# Patient Record
Sex: FEMALE | Race: OTHER | Employment: OTHER | URBAN - METROPOLITAN AREA
[De-identification: names, ages, dates, MRNs, and addresses within clinical notes are randomized per-mention and may not be internally consistent; named-entity substitution may affect disease eponyms.]

---

## 2017-10-24 ENCOUNTER — HOSPITAL ENCOUNTER (OUTPATIENT)
Age: 67
Discharge: HOME | End: 2017-10-24
Payer: MEDICARE

## 2017-10-24 VITALS
RESPIRATION RATE: 18 BRPM | DIASTOLIC BLOOD PRESSURE: 71 MMHG | HEART RATE: 72 BPM | WEIGHT: 152 LBS | OXYGEN SATURATION: 97 % | SYSTOLIC BLOOD PRESSURE: 126 MMHG | TEMPERATURE: 99 F | HEIGHT: 62 IN | BODY MASS INDEX: 27.97 KG/M2

## 2017-10-24 DIAGNOSIS — J06.9 VIRAL UPPER RESPIRATORY TRACT INFECTION WITH COUGH: ICD-10-CM

## 2017-10-24 DIAGNOSIS — J04.0 ACUTE LARYNGITIS: Primary | ICD-10-CM

## 2017-10-24 PROCEDURE — 99203 OFFICE O/P NEW LOW 30 MIN: CPT

## 2017-10-24 PROCEDURE — 87430 STREP A AG IA: CPT | Performed by: PHYSICIAN ASSISTANT

## 2017-10-24 PROCEDURE — 87081 CULTURE SCREEN ONLY: CPT | Performed by: PHYSICIAN ASSISTANT

## 2017-10-24 PROCEDURE — 99204 OFFICE O/P NEW MOD 45 MIN: CPT

## 2017-10-24 RX ORDER — FLUTICASONE PROPIONATE 50 MCG
2 SPRAY, SUSPENSION (ML) NASAL DAILY
Qty: 16 G | Refills: 0 | Status: SHIPPED | OUTPATIENT
Start: 2017-10-24 | End: 2017-11-23

## 2017-10-24 RX ORDER — METHYLPREDNISOLONE 4 MG/1
TABLET ORAL
Qty: 1 PACKAGE | Refills: 0 | Status: SHIPPED | OUTPATIENT
Start: 2017-10-24 | End: 2018-01-03 | Stop reason: ALTCHOICE

## 2017-10-24 NOTE — ED INITIAL ASSESSMENT (HPI)
Ameena    Voice hoarse;-  Just came from [address] throat pain and loss of voice since sept 20. Took cold eeze, zyrtec. Denies fever.    Cough- x 1 month, cough dry

## 2017-10-24 NOTE — ED PROVIDER NOTES
Patient Seen in: THE MEDICAL CENTER [facility] In [facility]    History   Patient presents with:  Hoarseness  Cough    Stated Complaint: SORE THROAT, LOST VOICE, COUGH, [id number]    HPI    68-year-old female who comes in today complaining of a dry cough, throat p mucosa are moist, pink, and intact; teeth intact.  Mild erythema, no exudates or tonsillar hypertrophy, uvula midline, no trismus or drooling   Neck: Supple; no anterior or posterior cervical adenopathy  Lungs: Clear to auscultation bilaterally, respiration return to the immediate care or ER for new, worsening or any persistent conditions. I've explained the importance of following up with her doctor- No primary care provider on file. - as instructed.   The patient verbalized understanding of the discharge i

## 2017-11-21 ENCOUNTER — TELEPHONE (OUTPATIENT)
Dept: OTOLARYNGOLOGY | Facility: CLINIC | Age: 67
End: 2017-11-21

## 2017-11-21 ENCOUNTER — OFFICE VISIT (OUTPATIENT)
Dept: OTOLARYNGOLOGY | Facility: CLINIC | Age: 67
End: 2017-11-21

## 2017-11-21 VITALS
BODY MASS INDEX: 27.6 KG/M2 | DIASTOLIC BLOOD PRESSURE: 64 MMHG | WEIGHT: 150 LBS | SYSTOLIC BLOOD PRESSURE: 128 MMHG | TEMPERATURE: 98 F | HEIGHT: 62 IN

## 2017-11-21 DIAGNOSIS — R49.0 HOARSENESS: Primary | ICD-10-CM

## 2017-11-21 PROCEDURE — 99203 OFFICE O/P NEW LOW 30 MIN: CPT | Performed by: OTOLARYNGOLOGY

## 2017-11-21 PROCEDURE — 31575 DIAGNOSTIC LARYNGOSCOPY: CPT | Performed by: OTOLARYNGOLOGY

## 2017-11-21 RX ORDER — FLUTICASONE PROPIONATE 50 MCG
1 SPRAY, SUSPENSION (ML) NASAL 2 TIMES DAILY
Qty: 1 BOTTLE | Refills: 3 | Status: SHIPPED | OUTPATIENT
Start: 2017-11-21 | End: 2018-06-25 | Stop reason: ALTCHOICE

## 2017-11-21 RX ORDER — PREDNISONE 10 MG/1
TABLET ORAL
Qty: 44 TABLET | Refills: 0 | Status: SHIPPED | OUTPATIENT
Start: 2017-11-21 | End: 2017-12-14 | Stop reason: ALTCHOICE

## 2017-11-21 RX ORDER — MONTELUKAST SODIUM 10 MG/1
10 TABLET ORAL NIGHTLY
Qty: 30 TABLET | Refills: 3 | Status: SHIPPED | OUTPATIENT
Start: 2017-11-21 | End: 2018-06-25 | Stop reason: ALTCHOICE

## 2017-11-21 RX ORDER — AMOXICILLIN 500 MG/1
500 CAPSULE ORAL 3 TIMES DAILY
COMMUNITY
End: 2017-12-14 | Stop reason: ALTCHOICE

## 2017-11-21 RX ORDER — AMOXICILLIN AND CLAVULANATE POTASSIUM 875; 125 MG/1; MG/1
1 TABLET, FILM COATED ORAL EVERY 12 HOURS
Qty: 20 TABLET | Refills: 0 | Status: SHIPPED | OUTPATIENT
Start: 2017-11-21 | End: 2017-12-14 | Stop reason: ALTCHOICE

## 2017-11-21 NOTE — TELEPHONE ENCOUNTER
Spoke with pt and informed her that Dr Jeronimo Hanley prescribed 5 medications for her. Pt educated on the different medications.  Pt voiced understanding

## 2017-11-21 NOTE — TELEPHONE ENCOUNTER
Pt states she was told she was only going to be prescribed 3 medications but states she received 5 at the pharmacy, pt would like clarification. Pls call thank you. Bruneian speaker.

## 2017-11-21 NOTE — PROGRESS NOTES
Nazario Pedro is a 75year old female.   Patient presents with:  Voice Problem: patient presents for voice problem has been progressively losing her voice for about 4 weeks      HISTORY OF PRESENT ILLNESS  She presents with a history of worsening voice in t Location: Left arm, Patient Position: Sitting, Cuff Size: adult)   Temp 98.1 °F (36.7 °C) (Tympanic)   Ht 5' 2\" (1.575 m)   Wt 150 lb (68 kg)   BMI 27.44 kg/m²        Constitutional Normal Overall appearance - Normal.   Psychiatric Normal Orientation - Or the interior of the larynx was performed. Findings were as follows.        Hypopharynx/Larynx:  Epiglottis is normal.  Arytenoids:  Bilateral: Arytenoids are normal.  Vocal folds-false  Bilateral: Vocal folds (false) are normal.  Vocal folds-true  Bilater

## 2017-12-01 ENCOUNTER — TELEPHONE (OUTPATIENT)
Dept: OTOLARYNGOLOGY | Facility: CLINIC | Age: 67
End: 2017-12-01

## 2017-12-01 NOTE — TELEPHONE ENCOUNTER
Patient seen [address] on 11/21/17. States she still is having a lot of hoarseness in her voice. States hey symptoms have not gotten better and has finished her medication. Requesting to speak with clinical staff. Please call.  Slovenian speaker

## 2017-12-01 NOTE — TELEPHONE ENCOUNTER
Language line [address] , [address]. Pt's LOV 11/21:  1. Hoarseness  Laryngeal exam reveals a lesion at the anterior vocal cord on the left. This appears benign.   I have asked her to start Augmentin, Singulair, loratadine D, fluticasone and

## 2017-12-01 NOTE — TELEPHONE ENCOUNTER
Language line , Tawana Lopes [id number]    Pt informed per JDO no further recommendations at this time; she should follow up w/ JDO as previously recommended. Pt verbalized understanding.

## 2017-12-14 ENCOUNTER — OFFICE VISIT (OUTPATIENT)
Dept: OTOLARYNGOLOGY | Facility: CLINIC | Age: 67
End: 2017-12-14

## 2017-12-14 VITALS
TEMPERATURE: 99 F | WEIGHT: 150 LBS | HEIGHT: 62 IN | DIASTOLIC BLOOD PRESSURE: 70 MMHG | SYSTOLIC BLOOD PRESSURE: 110 MMHG | BODY MASS INDEX: 27.6 KG/M2

## 2017-12-14 DIAGNOSIS — J38.3 LESION OF TRUE VOCAL CORD: ICD-10-CM

## 2017-12-14 DIAGNOSIS — Z01.818 PREOP TESTING: Primary | ICD-10-CM

## 2017-12-14 PROCEDURE — 31575 DIAGNOSTIC LARYNGOSCOPY: CPT | Performed by: OTOLARYNGOLOGY

## 2017-12-14 PROCEDURE — 99214 OFFICE O/P EST MOD 30 MIN: CPT | Performed by: OTOLARYNGOLOGY

## 2017-12-14 NOTE — PROGRESS NOTES
Norbert Nicolas is a 75year old female. Patient presents with:   Follow - Up: 3 week follow up- hoarseness of voice- per pt there is some changes/improvement of symptoms      HISTORY OF PRESENT ILLNESS  She presents with a history of worsening voice in the diarrhea. Endocrine Negative Cold intolerance and heat intolerance. Neuro Negative Tremors. Psych Negative Anxiety and depression. Integumentary Negative Frequent skin infections, pigment change and rash.    Hema/Lymph Negative Easy bleeding and eas Fiberoptic Laryngoscopy: A diagnostic flexible fiberoptic laryngoscopy was performed. The flexible fiberoptic laryngoscope was placed into the nose or mouthand advanced  into the interior of the larynx.  A thorough examination of the interior of the larynx postoperative pain, bleeding as well as anesthesia use. She accepts these risks and wishes to proceed  - Arik Yan MD    12/14/2017    12:01 PM

## 2017-12-15 ENCOUNTER — TELEPHONE (OUTPATIENT)
Dept: OTOLARYNGOLOGY | Facility: CLINIC | Age: 67
End: 2017-12-15

## 2017-12-16 RX ORDER — HYDROCODONE BITARTRATE AND ACETAMINOPHEN 7.5; 325 MG/1; MG/1
1 TABLET ORAL EVERY 6 HOURS PRN
Qty: 30 TABLET | Refills: 0 | Status: SHIPPED | OUTPATIENT
Start: 2017-12-16 | End: 2018-01-03

## 2017-12-19 ENCOUNTER — EKG ENCOUNTER (OUTPATIENT)
Dept: LAB | Age: 67
End: 2017-12-19
Attending: OTOLARYNGOLOGY
Payer: MEDICARE

## 2017-12-19 ENCOUNTER — APPOINTMENT (OUTPATIENT)
Dept: LAB | Age: 67
End: 2017-12-19
Attending: OTOLARYNGOLOGY
Payer: MEDICARE

## 2017-12-19 DIAGNOSIS — Z01.818 PREOP TESTING: ICD-10-CM

## 2017-12-19 PROCEDURE — 85730 THROMBOPLASTIN TIME PARTIAL: CPT

## 2017-12-19 PROCEDURE — 93005 ELECTROCARDIOGRAM TRACING: CPT

## 2017-12-19 PROCEDURE — 80053 COMPREHEN METABOLIC PANEL: CPT

## 2017-12-19 PROCEDURE — 93010 ELECTROCARDIOGRAM REPORT: CPT | Performed by: INTERNAL MEDICINE

## 2017-12-19 PROCEDURE — 36415 COLL VENOUS BLD VENIPUNCTURE: CPT

## 2017-12-19 PROCEDURE — 85025 COMPLETE CBC W/AUTO DIFF WBC: CPT

## 2017-12-19 PROCEDURE — 85610 PROTHROMBIN TIME: CPT

## 2017-12-27 ENCOUNTER — TELEPHONE (OUTPATIENT)
Dept: OTOLARYNGOLOGY | Facility: CLINIC | Age: 67
End: 2017-12-27

## 2017-12-27 NOTE — TELEPHONE ENCOUNTER
Per JULIO, pt may try to obtain pain medication from her PCP if it is closer. Also, pt may want to try to move surgery to a sooner date because of her pain. Pt informed she may use extra strength Tylenol for pain if she is unable to p/u Rx for pain med from office or if she cannot see her PCP for pain med; states she will c/b 12/28 to see if surgery date can be moved up.

## 2017-12-27 NOTE — TELEPHONE ENCOUNTER
Patient states she was prescribed Hydrocodone from [facility] due to throat pain. States is unable to  medication script due to not being able to find any transportation. Would like to know if there is another medication that can be sent to her pharmacy that she does not have to . States medication she had finished today and is in a lot of pain. Patient is a Chinese speaker. Please call. Thank you.

## 2017-12-28 ENCOUNTER — HOSPITAL ENCOUNTER (OUTPATIENT)
Age: 67
Discharge: HOME | End: 2017-12-28
Payer: MEDICARE

## 2017-12-28 ENCOUNTER — APPOINTMENT (OUTPATIENT)
Dept: GENERAL RADIOLOGY | Age: 67
End: 2017-12-28
Attending: PHYSICIAN ASSISTANT
Payer: MEDICARE

## 2017-12-28 VITALS
HEART RATE: 81 BPM | RESPIRATION RATE: 18 BRPM | SYSTOLIC BLOOD PRESSURE: 90 MMHG | TEMPERATURE: 98 F | DIASTOLIC BLOOD PRESSURE: 70 MMHG | OXYGEN SATURATION: 99 %

## 2017-12-28 DIAGNOSIS — S92.504A CLOSED NONDISPLACED FRACTURE OF PHALANX OF LESSER TOE OF RIGHT FOOT, UNSPECIFIED PHALANX, INITIAL ENCOUNTER: Primary | ICD-10-CM

## 2017-12-28 PROCEDURE — 99213 OFFICE O/P EST LOW 20 MIN: CPT

## 2017-12-28 PROCEDURE — 28510 TREATMENT OF TOE FRACTURE: CPT

## 2017-12-28 PROCEDURE — 99212 OFFICE O/P EST SF 10 MIN: CPT

## 2017-12-28 PROCEDURE — 73660 X-RAY EXAM OF TOE(S): CPT | Performed by: PHYSICIAN ASSISTANT

## 2017-12-28 NOTE — ED PROVIDER NOTES
Patient Seen in: [facility] END    History   Patient presents with:   Toe Pain    Stated Complaint: toe pain over 1 month    HPI    59-year-old female here with complaint of pain swelling and redness to her right foot fourth digit ×4 Head: Normocephalic and atraumatic.    Right Ear: External ear normal.   Left Ear: External ear normal.   Nose: Nose normal.   Mouth/Throat: Oropharynx is clear and moist.   Eyes: Conjunctivae and EOM are normal. Pupils are equal, round, and reactive to lig Clinical Impression: Right foot fourth digit toe fracture  Course of Treatment: Patient will use the buddy tape and postop shoe is provided. Keep the leg elevated. Tylenol Motrin for pain. Follow-up with podiatry as needed.       The patient is in good c

## 2018-01-03 ENCOUNTER — TELEPHONE (OUTPATIENT)
Dept: OTOLARYNGOLOGY | Facility: CLINIC | Age: 68
End: 2018-01-03

## 2018-01-03 ENCOUNTER — OFFICE VISIT (OUTPATIENT)
Dept: INTERNAL MEDICINE CLINIC | Facility: CLINIC | Age: 68
End: 2018-01-03

## 2018-01-03 VITALS
TEMPERATURE: 98 F | HEIGHT: 62.25 IN | BODY MASS INDEX: 27.3 KG/M2 | WEIGHT: 150.25 LBS | HEART RATE: 72 BPM | SYSTOLIC BLOOD PRESSURE: 90 MMHG | DIASTOLIC BLOOD PRESSURE: 60 MMHG | RESPIRATION RATE: 17 BRPM

## 2018-01-03 DIAGNOSIS — Z01.818 PREOPERATIVE EXAMINATION: Primary | ICD-10-CM

## 2018-01-03 DIAGNOSIS — J38.3 LESION OF TRUE VOCAL CORD: ICD-10-CM

## 2018-01-03 PROCEDURE — 99203 OFFICE O/P NEW LOW 30 MIN: CPT | Performed by: INTERNAL MEDICINE

## 2018-01-03 NOTE — TELEPHONE ENCOUNTER
Pt states she saw her PCP today and was cleared for sx, asking if her sx can be rs to 1/8. Pls advise thank you. Ukrainian speaker.

## 2018-01-03 NOTE — PATIENT INSTRUCTIONS
- You are ok to proceed with surgery  - From today onwards, no ibuprofen, anti-inflammatories (tylenol is ok), aspirin, fish oil, vitamins/supplements.    - Ok to use fluticasone nasal spray daily, and can use twice daily when having a lot of drainage/disc

## 2018-01-03 NOTE — PROGRESS NOTES
Renuka Warner is a 75year old female who presents for a pre-operative physical exam.   Renuka Warner is scheduled for a micro-direct laryngoscopy with biopsy procedure at [facility] on 1/15/18 performed by Dr Ghada Batista, education: N/A  Number of children: N/A     Occupational History  None on file     Social History Main Topics   Smoking status: Never Smoker    Smokeless tobacco: Never Used    Alcohol use No    Drug use: No    Sexual activity: Not on file     Other Topics procedure, and is ok to proceed with surgery without any further testing. Patient does note that the last time she had general anesthesia ( section), she felt very anxious during induction.   Note and pertinent pre-operative testing results will be

## 2018-01-05 ENCOUNTER — TELEPHONE (OUTPATIENT)
Dept: OTOLARYNGOLOGY | Facility: CLINIC | Age: 68
End: 2018-01-05

## 2018-01-10 DIAGNOSIS — J38.3 LESION OF TRUE VOCAL CORD: Primary | ICD-10-CM

## 2018-01-11 DIAGNOSIS — J38.3 VOCAL CORD MASS: Primary | ICD-10-CM

## 2018-01-13 ENCOUNTER — TELEPHONE (OUTPATIENT)
Dept: OTOLARYNGOLOGY | Facility: CLINIC | Age: 68
End: 2018-01-13

## 2018-01-13 NOTE — TELEPHONE ENCOUNTER
Pt states was trying to call Pre admission nurse regarding surgery Monday such as which mediations to take and hold the day of surgery, discharger instructions. Advised pt discharge instructions will be given the day of surgery.   Contacted teresita in pre

## 2018-01-13 NOTE — TELEPHONE ENCOUNTER
Pt requesting to speak with J regarding surgery for 1/15. Please advise. Pt is Australian speaking only. Thank you.

## 2018-01-15 ENCOUNTER — ANESTHESIA EVENT (OUTPATIENT)
Dept: SURGERY | Facility: HOSPITAL | Age: 68
End: 2018-01-15
Payer: MEDICARE

## 2018-01-15 ENCOUNTER — SURGERY (OUTPATIENT)
Age: 68
End: 2018-01-15

## 2018-01-15 ENCOUNTER — ANESTHESIA (OUTPATIENT)
Dept: SURGERY | Facility: HOSPITAL | Age: 68
End: 2018-01-15
Payer: MEDICARE

## 2018-01-15 ENCOUNTER — HOSPITAL ENCOUNTER (OUTPATIENT)
Facility: HOSPITAL | Age: 68
Setting detail: HOSPITAL OUTPATIENT SURGERY
Discharge: HOME | End: 2018-01-15
Attending: OTOLARYNGOLOGY | Admitting: OTOLARYNGOLOGY
Payer: MEDICARE

## 2018-01-15 ENCOUNTER — TELEPHONE (OUTPATIENT)
Dept: OTOLARYNGOLOGY | Facility: CLINIC | Age: 68
End: 2018-01-15

## 2018-01-15 VITALS
DIASTOLIC BLOOD PRESSURE: 58 MMHG | TEMPERATURE: 99 F | BODY MASS INDEX: 26.68 KG/M2 | HEIGHT: 62 IN | RESPIRATION RATE: 16 BRPM | HEART RATE: 57 BPM | SYSTOLIC BLOOD PRESSURE: 100 MMHG | WEIGHT: 145 LBS | OXYGEN SATURATION: 91 %

## 2018-01-15 DIAGNOSIS — J38.3 LESION OF TRUE VOCAL CORD: ICD-10-CM

## 2018-01-15 PROBLEM — J38.7 LESION OF LARYNX: Status: ACTIVE | Noted: 2018-01-15

## 2018-01-15 PROCEDURE — 31536 LARYNGOSCOPY W/BX & OP SCOPE: CPT | Performed by: OTOLARYNGOLOGY

## 2018-01-15 PROCEDURE — 0CBT8ZX EXCISION OF RIGHT VOCAL CORD, VIA NATURAL OR ARTIFICIAL OPENING ENDOSCOPIC, DIAGNOSTIC: ICD-10-PCS | Performed by: OTOLARYNGOLOGY

## 2018-01-15 PROCEDURE — 0CBV8ZX EXCISION OF LEFT VOCAL CORD, VIA NATURAL OR ARTIFICIAL OPENING ENDOSCOPIC, DIAGNOSTIC: ICD-10-PCS | Performed by: OTOLARYNGOLOGY

## 2018-01-15 RX ORDER — HYDROMORPHONE HYDROCHLORIDE 1 MG/ML
0.4 INJECTION, SOLUTION INTRAMUSCULAR; INTRAVENOUS; SUBCUTANEOUS EVERY 5 MIN PRN
Status: DISCONTINUED | OUTPATIENT
Start: 2018-01-15 | End: 2018-01-15

## 2018-01-15 RX ORDER — ONDANSETRON 2 MG/ML
4 INJECTION INTRAMUSCULAR; INTRAVENOUS EVERY 6 HOURS PRN
Status: CANCELLED | OUTPATIENT
Start: 2018-01-15

## 2018-01-15 RX ORDER — SODIUM CHLORIDE 0.9 % (FLUSH) 0.9 %
10 SYRINGE (ML) INJECTION AS NEEDED
Status: CANCELLED | OUTPATIENT
Start: 2018-01-15

## 2018-01-15 RX ORDER — MORPHINE SULFATE 10 MG/ML
6 INJECTION, SOLUTION INTRAMUSCULAR; INTRAVENOUS EVERY 10 MIN PRN
Status: DISCONTINUED | OUTPATIENT
Start: 2018-01-15 | End: 2018-01-15

## 2018-01-15 RX ORDER — HYDROCODONE BITARTRATE AND ACETAMINOPHEN 5; 325 MG/1; MG/1
1 TABLET ORAL EVERY 6 HOURS PRN
Qty: 25 TABLET | Refills: 0 | Status: SHIPPED | OUTPATIENT
Start: 2018-01-15 | End: 2018-03-30

## 2018-01-15 RX ORDER — ONDANSETRON 2 MG/ML
INJECTION INTRAMUSCULAR; INTRAVENOUS AS NEEDED
Status: DISCONTINUED | OUTPATIENT
Start: 2018-01-15 | End: 2018-01-15 | Stop reason: SURG

## 2018-01-15 RX ORDER — HYDROCODONE BITARTRATE AND ACETAMINOPHEN 5; 325 MG/1; MG/1
2 TABLET ORAL AS NEEDED
Status: COMPLETED | OUTPATIENT
Start: 2018-01-15 | End: 2018-01-15

## 2018-01-15 RX ORDER — HALOPERIDOL 5 MG/ML
0.25 INJECTION INTRAMUSCULAR ONCE AS NEEDED
Status: DISCONTINUED | OUTPATIENT
Start: 2018-01-15 | End: 2018-01-15

## 2018-01-15 RX ORDER — ACETAMINOPHEN 500 MG
1000 TABLET ORAL ONCE
Status: COMPLETED | OUTPATIENT
Start: 2018-01-15 | End: 2018-01-15

## 2018-01-15 RX ORDER — HYDROCODONE BITARTRATE AND ACETAMINOPHEN 5; 325 MG/1; MG/1
1 TABLET ORAL EVERY 4 HOURS PRN
Status: CANCELLED | OUTPATIENT
Start: 2018-01-15

## 2018-01-15 RX ORDER — FAMOTIDINE 20 MG/1
20 TABLET ORAL ONCE
Status: DISCONTINUED | OUTPATIENT
Start: 2018-01-15 | End: 2018-01-15 | Stop reason: HOSPADM

## 2018-01-15 RX ORDER — NALOXONE HYDROCHLORIDE 0.4 MG/ML
80 INJECTION, SOLUTION INTRAMUSCULAR; INTRAVENOUS; SUBCUTANEOUS AS NEEDED
Status: DISCONTINUED | OUTPATIENT
Start: 2018-01-15 | End: 2018-01-15

## 2018-01-15 RX ORDER — AMOXICILLIN 875 MG/1
875 TABLET, COATED ORAL 2 TIMES DAILY
Qty: 20 TABLET | Refills: 0 | Status: SHIPPED | OUTPATIENT
Start: 2018-01-15 | End: 2018-04-02

## 2018-01-15 RX ORDER — ONDANSETRON 4 MG/1
4 TABLET, ORALLY DISINTEGRATING ORAL EVERY 6 HOURS PRN
Status: CANCELLED | OUTPATIENT
Start: 2018-01-15

## 2018-01-15 RX ORDER — ONDANSETRON HYDROCHLORIDE 8 MG/1
4 TABLET, FILM COATED ORAL EVERY 6 HOURS PRN
Status: CANCELLED | OUTPATIENT
Start: 2018-01-15

## 2018-01-15 RX ORDER — HYDROMORPHONE HYDROCHLORIDE 1 MG/ML
0.6 INJECTION, SOLUTION INTRAMUSCULAR; INTRAVENOUS; SUBCUTANEOUS EVERY 5 MIN PRN
Status: DISCONTINUED | OUTPATIENT
Start: 2018-01-15 | End: 2018-01-15

## 2018-01-15 RX ORDER — SODIUM CHLORIDE, SODIUM LACTATE, POTASSIUM CHLORIDE, CALCIUM CHLORIDE 600; 310; 30; 20 MG/100ML; MG/100ML; MG/100ML; MG/100ML
INJECTION, SOLUTION INTRAVENOUS CONTINUOUS
Status: DISCONTINUED | OUTPATIENT
Start: 2018-01-15 | End: 2018-01-15

## 2018-01-15 RX ORDER — DEXAMETHASONE SODIUM PHOSPHATE 4 MG/ML
VIAL (ML) INJECTION AS NEEDED
Status: DISCONTINUED | OUTPATIENT
Start: 2018-01-15 | End: 2018-01-15 | Stop reason: SURG

## 2018-01-15 RX ORDER — LIDOCAINE HYDROCHLORIDE 40 MG/ML
SOLUTION TOPICAL AS NEEDED
Status: DISCONTINUED | OUTPATIENT
Start: 2018-01-15 | End: 2018-01-15 | Stop reason: SURG

## 2018-01-15 RX ORDER — MORPHINE SULFATE 2 MG/ML
2 INJECTION, SOLUTION INTRAMUSCULAR; INTRAVENOUS EVERY 10 MIN PRN
Status: DISCONTINUED | OUTPATIENT
Start: 2018-01-15 | End: 2018-01-15

## 2018-01-15 RX ORDER — HYDROMORPHONE HYDROCHLORIDE 1 MG/ML
0.2 INJECTION, SOLUTION INTRAMUSCULAR; INTRAVENOUS; SUBCUTANEOUS EVERY 5 MIN PRN
Status: DISCONTINUED | OUTPATIENT
Start: 2018-01-15 | End: 2018-01-15

## 2018-01-15 RX ORDER — HYDROCODONE BITARTRATE AND ACETAMINOPHEN 5; 325 MG/1; MG/1
2 TABLET ORAL EVERY 4 HOURS PRN
Status: CANCELLED | OUTPATIENT
Start: 2018-01-15

## 2018-01-15 RX ORDER — METOCLOPRAMIDE 10 MG/1
10 TABLET ORAL ONCE
Status: DISCONTINUED | OUTPATIENT
Start: 2018-01-15 | End: 2018-01-15 | Stop reason: HOSPADM

## 2018-01-15 RX ORDER — HYDROCODONE BITARTRATE AND ACETAMINOPHEN 5; 325 MG/1; MG/1
1 TABLET ORAL AS NEEDED
Status: COMPLETED | OUTPATIENT
Start: 2018-01-15 | End: 2018-01-15

## 2018-01-15 RX ORDER — ONDANSETRON 2 MG/ML
4 INJECTION INTRAMUSCULAR; INTRAVENOUS ONCE AS NEEDED
Status: DISCONTINUED | OUTPATIENT
Start: 2018-01-15 | End: 2018-01-15

## 2018-01-15 RX ORDER — MORPHINE SULFATE 4 MG/ML
4 INJECTION, SOLUTION INTRAMUSCULAR; INTRAVENOUS EVERY 10 MIN PRN
Status: DISCONTINUED | OUTPATIENT
Start: 2018-01-15 | End: 2018-01-15

## 2018-01-15 RX ORDER — ACETAMINOPHEN 325 MG/1
650 TABLET ORAL EVERY 4 HOURS PRN
Status: CANCELLED | OUTPATIENT
Start: 2018-01-15

## 2018-01-15 RX ORDER — MECLIZINE HCL 12.5 MG/1
12.5 TABLET ORAL 3 TIMES DAILY PRN
Qty: 60 TABLET | Refills: 0 | Status: SHIPPED | OUTPATIENT
Start: 2018-01-15 | End: 2018-06-25 | Stop reason: ALTCHOICE

## 2018-01-15 RX ADMIN — ONDANSETRON 4 MG: 2 INJECTION INTRAMUSCULAR; INTRAVENOUS at 12:23:00

## 2018-01-15 RX ADMIN — SODIUM CHLORIDE, SODIUM LACTATE, POTASSIUM CHLORIDE, CALCIUM CHLORIDE: 600; 310; 30; 20 INJECTION, SOLUTION INTRAVENOUS at 12:25:00

## 2018-01-15 RX ADMIN — DEXAMETHASONE SODIUM PHOSPHATE 4 MG: 4 MG/ML VIAL (ML) INJECTION at 12:23:00

## 2018-01-15 RX ADMIN — SODIUM CHLORIDE, SODIUM LACTATE, POTASSIUM CHLORIDE, CALCIUM CHLORIDE: 600; 310; 30; 20 INJECTION, SOLUTION INTRAVENOUS at 12:04:00

## 2018-01-15 RX ADMIN — LIDOCAINE HYDROCHLORIDE 4 ML: 40 SOLUTION TOPICAL at 12:12:00

## 2018-01-15 NOTE — ANESTHESIA PREPROCEDURE EVALUATION
Anesthesia PreOp Note    HPI:     Stew Earl is a 75year old female who presents for preoperative consultation requested by: Cherylene Moulder, MD    Date of Surgery: 1/15/2018    Procedure(s):  LARYNGOSCOPY DIRECT  Indication: Lesion of true Smoking status: Never Smoker    Smokeless tobacco: Never Used    Alcohol use No    Drug use: No    Sexual activity: Not on file     Other Topics Concern   None on file     Social History Narrative    ** Merged History Encounter **           Available pre-o complications, and any alternative forms of anesthetic management. All of the patient's questions were answered to the best of my ability. The patient desires the anesthetic management as planned.   Lizandro Reyes  1/15/2018 12:02 PM

## 2018-01-15 NOTE — ANESTHESIA POSTPROCEDURE EVALUATION
Patient: Nely Berwick    Procedure Summary     Date:  01/15/18 Room / Location:  [facility] OR 03 / [facility] OR    Anesthesia Start:  0385 Anesthesia Stop:  0563    Procedure:  LARYNGOSCOPY DIRECT (N/A ) Diagnosis:       Lesion of true vocal cor

## 2018-01-15 NOTE — BRIEF OP NOTE
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
## 2018-01-15 NOTE — INTERVAL H&P NOTE
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   I di

## 2018-01-16 NOTE — OPERATIVE REPORT
[facility]    PATIENT'S NAME: Mccollum MikieANTONIA santiago   ATTENDING PHYSICIAN: Martha Duarte. Valeria Vazquez MD   OPERATING PHYSICIAN: Martha Duarte.  Valeria Vazquez MD   PATIENT ACCOUNT#:   [id number]    LOCATION:  [facility] 2 [facility] 10  MEDICAL RECORD #:   [id number] containing oxymetazoline were placed for hemostasis. The patient was subsequently extubated without difficulty, with all biopsies sent fresh in formalin for further evaluation. Dictated By Jerardo Morrison MD  d: 01/15/2018 12:35:45  t: 01/15/2018 21:23

## 2018-01-16 NOTE — TELEPHONE ENCOUNTER
Pt is post op day 1 mrico DL and biopsy. Per pt is doing well,no bleeding and some pain. Advised pt that pain is expected.  Per , pt to take amoxicillin as prescribed along with norco, and if pt wants to continue Singulair and Flonase and follow up

## 2018-01-23 ENCOUNTER — OFFICE VISIT (OUTPATIENT)
Dept: OTOLARYNGOLOGY | Facility: CLINIC | Age: 68
End: 2018-01-23

## 2018-01-23 ENCOUNTER — TELEPHONE (OUTPATIENT)
Dept: INTERNAL MEDICINE CLINIC | Facility: CLINIC | Age: 68
End: 2018-01-23

## 2018-01-23 VITALS
DIASTOLIC BLOOD PRESSURE: 72 MMHG | BODY MASS INDEX: 27.6 KG/M2 | WEIGHT: 150 LBS | SYSTOLIC BLOOD PRESSURE: 110 MMHG | HEIGHT: 62 IN | TEMPERATURE: 98 F

## 2018-01-23 DIAGNOSIS — C32.9 SQUAMOUS CELL CARCINOMA OF LARYNX (HCC): Primary | ICD-10-CM

## 2018-01-23 PROCEDURE — 99214 OFFICE O/P EST MOD 30 MIN: CPT | Performed by: OTOLARYNGOLOGY

## 2018-01-23 PROCEDURE — G0463 HOSPITAL OUTPT CLINIC VISIT: HCPCS | Performed by: OTOLARYNGOLOGY

## 2018-01-23 NOTE — TELEPHONE ENCOUNTER
Gael Becker MD Physician Sign at close encounter  Creation Time: 01/22/2018 4:02 PM   Cosign Attest  Bookmark Copy       Referrals entered for Oncology (Dr. Bob Nuñez) and Radiation Oncology (Dr. Nette Og) as recommended by ENT.   Please call patient and give h

## 2018-01-24 NOTE — PROGRESS NOTES
Yudy Truong is a 75year old female.   Patient presents with:  Post-Op: Micro Laryngoscopy w/biopsy done on 1/15/18,      HISTORY OF PRESENT ILLNESS  She presents with a history of worsening voice in the past 2-4 weeks.  Her daughter is presen Alcohol use:  No              Drug use: No            Social History Narrative    ** Merged History Encounter **             Family History   Problem Relation Age of Onset   • Cancer Father        Past Medical History:   Diagnosis Date   • Oropharynx - Normal.   Ears Normal Inspection - Right: Normal, Left: Normal. Canal - Right: Normal, Left: Normal. TM - Right: Normal, Left: Normal.   Skin Normal Inspection - Normal.        Lymph Detail Normal Submental. Submandibular.  Anterior cervical. P ([id number]); Future            Josemanuel Marte MD    1/23/2018    6:11 PM

## 2018-02-01 ENCOUNTER — OFFICE VISIT (OUTPATIENT)
Dept: HEMATOLOGY/ONCOLOGY | Facility: HOSPITAL | Age: 68
End: 2018-02-01
Attending: INTERNAL MEDICINE
Payer: MEDICARE

## 2018-02-01 ENCOUNTER — HOSPITAL ENCOUNTER (OUTPATIENT)
Dept: RADIATION ONCOLOGY | Facility: HOSPITAL | Age: 68
Discharge: HOME | End: 2018-02-01
Attending: RADIOLOGY
Payer: MEDICARE

## 2018-02-01 VITALS
BODY MASS INDEX: 27 KG/M2 | SYSTOLIC BLOOD PRESSURE: 120 MMHG | RESPIRATION RATE: 17 BRPM | DIASTOLIC BLOOD PRESSURE: 70 MMHG | WEIGHT: 147.19 LBS | OXYGEN SATURATION: 98 % | HEART RATE: 57 BPM

## 2018-02-01 VITALS
TEMPERATURE: 98 F | HEART RATE: 71 BPM | OXYGEN SATURATION: 98 % | DIASTOLIC BLOOD PRESSURE: 69 MMHG | HEIGHT: 62 IN | BODY MASS INDEX: 27.6 KG/M2 | SYSTOLIC BLOOD PRESSURE: 124 MMHG | WEIGHT: 150 LBS | RESPIRATION RATE: 18 BRPM

## 2018-02-01 DIAGNOSIS — R49.0 HOARSENESS: ICD-10-CM

## 2018-02-01 DIAGNOSIS — C32.9 PRIMARY CANCER OF LARYNX (HCC): Primary | ICD-10-CM

## 2018-02-01 DIAGNOSIS — R13.12 OROPHARYNGEAL DYSPHAGIA: ICD-10-CM

## 2018-02-01 DIAGNOSIS — C32.0 PRIMARY MALIGNANT NEOPLASM OF GLOTTIS (HCC): Primary | ICD-10-CM

## 2018-02-01 PROCEDURE — 99214 OFFICE O/P EST MOD 30 MIN: CPT

## 2018-02-01 PROCEDURE — 99205 OFFICE O/P NEW HI 60 MIN: CPT | Performed by: INTERNAL MEDICINE

## 2018-02-01 NOTE — CONSULTS
Cancer Center Report of Consultation    Patient Name: Karan Bhat   YOB: 1950   Medical Record Number: [id number]   CSN: [id number]   Consulting Physician: Mirella Pierce MD  Referring Physician(s): Riddhi Domnick Sacks  Date of Consu Brother        Gyne History:  Obstetric History     T0    L0    SAB0  TAB0  Ectopic0  Multiple0  Live Births0       Psychosocial History:    Social History  Social History   Marital status: Single  Spouse name: N/A    Years of education: N/A  N lymphadenopathy. Neck is supple. Lymphatics: There is no palpable lymphadenopathy throughout in the cervical, supraclavicular, axillary, or inguinal regions. Chest: Clear to auscultation. No wheezes or rales. Heart: Regular rate and rhythm.  S1S2 normal.

## 2018-02-01 NOTE — PROGRESS NOTES
Nursing Consultation Note  Patient: Martin Woods  YOB: 1950  Age: 75year old  Radiation Oncologist: Dr. Marlo Mcconnell  Referring Physician: Sondra Langford  Diagnosis:No diagnosis found.     Consult Date: 2/1/2018    Nursing No at the gym, denies,  N/V/HA/D/C, no throat pain, here today with daughter    Chemotherapy: NA  Previous Radiation: NA  Review of Systems:Review of Systems   Constitutional: Negative. HENT: Negative. Eyes: Negative. Respiratory: Negative.     Cardio

## 2018-02-01 NOTE — PROGRESS NOTES
[facility] RADIATION ONCOLOGY CONSULTATION    PATIENT:   Iona Peralta      75year old      10/11/1950    REFERRING MD:  Dr. 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 Relocated from [facility] late 2017. Non-smoker nondrinker. FH:  Negative for head and neck cancer. I treated her daughter for a glioma postoperatively. ROS:  Hoarseness, no dysphagia, no weight loss, no pain.   All other systems were possibly be upstaged to T3 N0 or more by imaging. She appears not to be a good candidate for voice sparing laryngectomy. I think she would be better served with a course of primary radiotherapy.   Depending on her stage, concurrent chemotherapy may be c

## 2018-02-05 ENCOUNTER — HOSPITAL ENCOUNTER (OUTPATIENT)
Dept: RADIATION ONCOLOGY | Facility: HOSPITAL | Age: 68
Discharge: HOME | End: 2018-02-05
Attending: RADIOLOGY
Payer: MEDICARE

## 2018-02-05 PROCEDURE — 77334 RADIATION TREATMENT AID(S): CPT | Performed by: RADIOLOGY

## 2018-02-07 DIAGNOSIS — C10.9 OROPHARYNGEAL CANCER (HCC): ICD-10-CM

## 2018-02-07 DIAGNOSIS — R13.10 DYSPHAGIA, UNSPECIFIED TYPE: Primary | ICD-10-CM

## 2018-02-07 NOTE — PROGRESS NOTES
Video swallow ordered as recommended by Radiation Oncology. Patient can call central scheduling to schedule the study.

## 2018-02-07 NOTE — PROGRESS NOTES
Swallow therapy referral? Order placed/changed to Speech therapy referral.  Video swallow order already in system.

## 2018-02-08 ENCOUNTER — TELEPHONE (OUTPATIENT)
Dept: INTERNAL MEDICINE CLINIC | Facility: CLINIC | Age: 68
End: 2018-02-08

## 2018-02-08 ENCOUNTER — HOSPITAL ENCOUNTER (OUTPATIENT)
Dept: NUCLEAR MEDICINE | Facility: HOSPITAL | Age: 68
Discharge: HOME | End: 2018-02-08
Attending: OTOLARYNGOLOGY
Payer: MEDICARE

## 2018-02-08 DIAGNOSIS — C32.9 SQUAMOUS CELL CARCINOMA OF LARYNX (HCC): ICD-10-CM

## 2018-02-08 DIAGNOSIS — R13.10 DYSPHAGIA, UNSPECIFIED TYPE: Primary | ICD-10-CM

## 2018-02-08 DIAGNOSIS — C10.9 OROPHARYNGEAL CANCER (HCC): ICD-10-CM

## 2018-02-08 LAB — GLUCOSE BLD-MCNC: 99 MG/DL (ref 65–99)

## 2018-02-08 PROCEDURE — 82962 GLUCOSE BLOOD TEST: CPT

## 2018-02-08 PROCEDURE — 78815 PET IMAGE W/CT SKULL-THIGH: CPT | Performed by: OTOLARYNGOLOGY

## 2018-02-08 PROCEDURE — 77399 UNLISTED PX MED RADJ PHYSICS: CPT | Performed by: RADIOLOGY

## 2018-02-12 ENCOUNTER — APPOINTMENT (OUTPATIENT)
Dept: SPEECH THERAPY | Age: 68
End: 2018-02-12
Attending: RADIOLOGY
Payer: MEDICARE

## 2018-02-22 PROCEDURE — 77470 SPECIAL RADIATION TREATMENT: CPT | Performed by: RADIOLOGY

## 2018-02-23 PROCEDURE — 77301 RADIOTHERAPY DOSE PLAN IMRT: CPT | Performed by: RADIOLOGY

## 2018-02-23 PROCEDURE — 77338 DESIGN MLC DEVICE FOR IMRT: CPT | Performed by: RADIOLOGY

## 2018-02-23 PROCEDURE — 77300 RADIATION THERAPY DOSE PLAN: CPT | Performed by: RADIOLOGY

## 2018-02-24 ENCOUNTER — HOSPITAL ENCOUNTER (OUTPATIENT)
Dept: CT IMAGING | Facility: HOSPITAL | Age: 68
Discharge: HOME | End: 2018-02-24
Attending: RADIOLOGY
Payer: MEDICARE

## 2018-02-24 ENCOUNTER — HOSPITAL ENCOUNTER (OUTPATIENT)
Dept: GENERAL RADIOLOGY | Facility: HOSPITAL | Age: 68
Discharge: HOME | End: 2018-02-24
Attending: INTERNAL MEDICINE
Payer: MEDICARE

## 2018-02-24 DIAGNOSIS — C32.0 PRIMARY MALIGNANT NEOPLASM OF GLOTTIS (HCC): ICD-10-CM

## 2018-02-24 DIAGNOSIS — C10.9 OROPHARYNGEAL CANCER (HCC): ICD-10-CM

## 2018-02-24 DIAGNOSIS — R13.10 DYSPHAGIA, UNSPECIFIED TYPE: ICD-10-CM

## 2018-02-24 LAB — CREAT SERPL-MCNC: 0.7 MG/DL (ref 0.55–1.02)

## 2018-02-24 PROCEDURE — 74230 X-RAY XM SWLNG FUNCJ C+: CPT | Performed by: INTERNAL MEDICINE

## 2018-02-24 PROCEDURE — 82565 ASSAY OF CREATININE: CPT

## 2018-02-24 PROCEDURE — 92611 MOTION FLUOROSCOPY/SWALLOW: CPT

## 2018-02-24 PROCEDURE — 70491 CT SOFT TISSUE NECK W/DYE: CPT | Performed by: RADIOLOGY

## 2018-02-24 NOTE — PROGRESS NOTES
ADULT VIDEOFLUOROSCOPIC SWALLOWING STUDY    Admission Date: 2/24/2018  Evaluation Date: 02/24/18  Radiologist: María Muller    RECOMMENDATIONS   Diet Recommendations - Solids: Regular  Diet Recommendations - Liquid:  Thin    Further Follow-up:  Follow Up Needed: Goal #2 The patient/family/caregiver will demonstrate understanding and implementation of aspiration precautions and swallow strategies independently over 2-3 session(s).     In Progress   Goal #3  Pt would benefit from outpatient services for follow up a

## 2018-02-26 ENCOUNTER — OFFICE VISIT (OUTPATIENT)
Dept: SPEECH THERAPY | Age: 68
End: 2018-02-26
Attending: INTERNAL MEDICINE
Payer: MEDICARE

## 2018-02-26 DIAGNOSIS — R13.10 DYSPHAGIA, UNSPECIFIED TYPE: ICD-10-CM

## 2018-02-26 DIAGNOSIS — C10.9 OROPHARYNGEAL CANCER (HCC): ICD-10-CM

## 2018-02-26 PROCEDURE — 92610 EVALUATE SWALLOWING FUNCTION: CPT

## 2018-02-26 PROCEDURE — 92526 ORAL FUNCTION THERAPY: CPT

## 2018-02-26 NOTE — PROGRESS NOTES
HEAD AND NECK SWALLOWING EVALUATION  Referring Physician: Dr. Sandro Morrison  Diagnosis: Dysphagia, unspecified type [facility] R13.12,Dysphonia R49.0, Dysphagia, unspecified R13.10 Date of Service: 2/26/2018     PATIENT SUMMARY  Sandhya Chapman Repeat swallow, chin tuck    Today's Treatment: Reviewed the results of the VFSS and discussed swallowing anatomy and physiology.  Discussed possible side effects of radiation, what to expect during treatment, and educated her and family of dysphagia sympto me at Dept: [phone]    Sincerely,  Electronically signed by therapist: Karlo Marrero MS, CCC-SLP/L  Licensed Speech-Language Pathologist    [address] [facility] [address], [address]  Student Speech-Language Pathologist     Physician's certification required: Yes  I cert

## 2018-02-28 ENCOUNTER — APPOINTMENT (OUTPATIENT)
Dept: HEMATOLOGY/ONCOLOGY | Facility: HOSPITAL | Age: 68
End: 2018-02-28
Attending: INTERNAL MEDICINE
Payer: MEDICARE

## 2018-02-28 ENCOUNTER — HOSPITAL ENCOUNTER (OUTPATIENT)
Age: 68
Discharge: HOME | End: 2018-02-28
Payer: MEDICARE

## 2018-02-28 VITALS
OXYGEN SATURATION: 96 % | TEMPERATURE: 98 F | HEART RATE: 64 BPM | DIASTOLIC BLOOD PRESSURE: 75 MMHG | SYSTOLIC BLOOD PRESSURE: 122 MMHG | RESPIRATION RATE: 20 BRPM

## 2018-02-28 DIAGNOSIS — H66.001 ACUTE SUPPURATIVE OTITIS MEDIA OF RIGHT EAR WITHOUT SPONTANEOUS RUPTURE OF TYMPANIC MEMBRANE, RECURRENCE NOT SPECIFIED: ICD-10-CM

## 2018-02-28 DIAGNOSIS — R51.9 ACUTE NONINTRACTABLE HEADACHE, UNSPECIFIED HEADACHE TYPE: Primary | ICD-10-CM

## 2018-02-28 PROCEDURE — 99214 OFFICE O/P EST MOD 30 MIN: CPT

## 2018-02-28 PROCEDURE — 99213 OFFICE O/P EST LOW 20 MIN: CPT

## 2018-02-28 RX ORDER — ONDANSETRON 4 MG/1
4 TABLET, ORALLY DISINTEGRATING ORAL EVERY 4 HOURS PRN
Qty: 10 TABLET | Refills: 0 | Status: SHIPPED | OUTPATIENT
Start: 2018-02-28 | End: 2018-03-07

## 2018-02-28 RX ORDER — AMOXICILLIN 875 MG/1
875 TABLET, COATED ORAL 2 TIMES DAILY
Qty: 20 TABLET | Refills: 0 | Status: SHIPPED | OUTPATIENT
Start: 2018-02-28 | End: 2018-03-10

## 2018-02-28 RX ORDER — IBUPROFEN 600 MG/1
600 TABLET ORAL ONCE
Status: COMPLETED | OUTPATIENT
Start: 2018-02-28 | End: 2018-02-28

## 2018-02-28 RX ORDER — ONDANSETRON 4 MG/1
4 TABLET, ORALLY DISINTEGRATING ORAL ONCE
Status: COMPLETED | OUTPATIENT
Start: 2018-02-28 | End: 2018-02-28

## 2018-02-28 RX ORDER — MECLIZINE HCL 12.5 MG/1
12.5 TABLET ORAL ONCE
Status: DISCONTINUED | OUTPATIENT
Start: 2018-02-28 | End: 2018-02-28

## 2018-02-28 NOTE — ED PROVIDER NOTES
Patient Seen in: THE MEDICAL CENTER [facility] Immediate Care In [facility] & [facility]    History   Patient presents with:  Headache    Stated Complaint: SEVERE HEADACHE TODAY,PT SUFFERS FROM VERTIGO    HPI    Imer Wong is a 29-year-old female who presents with her daughter today for ev Triage Vitals [02/28/18 1157]  BP: 122/75  Pulse: 64  Resp: 20  Temp: (!) 97.5 °F (36.4 °C)  Temp src: Oral  SpO2: 96 %  O2 Device: None (Room air)    Current:/75   Pulse 64   Temp (!) 97.5 °F (36.4 °C) (Oral)   Resp 20   SpO2 96%         Physical Ex and her daughter express understanding, but the patient politely declines CT imaging at this time. Her judgement is intact and she appears to be capable of making her own decisions at this time.      With 600 mg of Motrin and Zofran in the office today, he Disp-20 tablet, R-0    ondansetron 4 MG Oral Tablet Dispersible  Take 1 tablet (4 mg total) by mouth every 4 (four) hours as needed for Nausea., Normal, Disp-10 tablet, R-0    !! - Potential duplicate medications found. Please discuss with provider.

## 2018-02-28 NOTE — ED INITIAL ASSESSMENT (HPI)
Woke up today with complaints of headache on back of head, nose congestion and ear ache which started today. No nausea or vomiting and also feels lightheaded. Newly diagnosed CA of oropharynx by biopsy last month.

## 2018-03-01 ENCOUNTER — HOSPITAL ENCOUNTER (OUTPATIENT)
Dept: RADIATION ONCOLOGY | Facility: HOSPITAL | Age: 68
Discharge: HOME | End: 2018-03-01
Attending: RADIOLOGY
Payer: MEDICARE

## 2018-03-01 ENCOUNTER — APPOINTMENT (OUTPATIENT)
Dept: RADIATION ONCOLOGY | Facility: HOSPITAL | Age: 68
End: 2018-03-01
Attending: RADIOLOGY
Payer: MEDICARE

## 2018-03-05 ENCOUNTER — HOSPITAL ENCOUNTER (OUTPATIENT)
Dept: RADIATION ONCOLOGY | Facility: HOSPITAL | Age: 68
Discharge: HOME | End: 2018-03-05
Attending: RADIOLOGY
Payer: MEDICARE

## 2018-03-05 VITALS
DIASTOLIC BLOOD PRESSURE: 83 MMHG | HEART RATE: 80 BPM | WEIGHT: 148.38 LBS | RESPIRATION RATE: 17 BRPM | BODY MASS INDEX: 27 KG/M2 | SYSTOLIC BLOOD PRESSURE: 153 MMHG | OXYGEN SATURATION: 97 %

## 2018-03-05 DIAGNOSIS — C32.0 PRIMARY MALIGNANT NEOPLASM OF GLOTTIS (HCC): Primary | ICD-10-CM

## 2018-03-05 PROCEDURE — 77386 HC IMRT COMPLEX: CPT | Performed by: RADIOLOGY

## 2018-03-05 NOTE — PROGRESS NOTES
[facility] Radiation Treatment Management Note 1-5    Patient:  Horacio Barahona  Age:  75year old  Visit Diagnosis:    1.  Primary malignant neoplasm of glottis [facility])      Primary Rad/Onc:  Dr. Juan C Ennis

## 2018-03-06 PROCEDURE — 77386 HC IMRT COMPLEX: CPT | Performed by: RADIOLOGY

## 2018-03-07 ENCOUNTER — TELEPHONE (OUTPATIENT)
Dept: INTERNAL MEDICINE CLINIC | Facility: CLINIC | Age: 68
End: 2018-03-07

## 2018-03-07 DIAGNOSIS — C10.9 OROPHARYNGEAL CANCER (HCC): Primary | ICD-10-CM

## 2018-03-07 PROCEDURE — 77386 HC IMRT COMPLEX: CPT | Performed by: RADIOLOGY

## 2018-03-07 NOTE — TELEPHONE ENCOUNTER
Spoke to patient's daughter, Dr. Luis Fernando Marina is referring patient to Dr. Kitty Francis for possible bx, DX: Oropharyngeal Ca

## 2018-03-08 PROCEDURE — 77307 TELETHX ISODOSE PLAN CPLX: CPT | Performed by: RADIOLOGY

## 2018-03-08 PROCEDURE — 77290 THER RAD SIMULAJ FIELD CPLX: CPT | Performed by: RADIOLOGY

## 2018-03-08 PROCEDURE — 77334 RADIATION TREATMENT AID(S): CPT | Performed by: RADIOLOGY

## 2018-03-08 PROCEDURE — 77386 HC IMRT COMPLEX: CPT | Performed by: RADIOLOGY

## 2018-03-09 ENCOUNTER — APPOINTMENT (OUTPATIENT)
Dept: HEMATOLOGY/ONCOLOGY | Facility: HOSPITAL | Age: 68
End: 2018-03-09
Attending: INTERNAL MEDICINE
Payer: MEDICARE

## 2018-03-09 PROCEDURE — 77386 HC IMRT COMPLEX: CPT | Performed by: RADIOLOGY

## 2018-03-09 PROCEDURE — 77336 RADIATION PHYSICS CONSULT: CPT | Performed by: RADIOLOGY

## 2018-03-12 ENCOUNTER — HOSPITAL ENCOUNTER (OUTPATIENT)
Dept: RADIATION ONCOLOGY | Facility: HOSPITAL | Age: 68
Discharge: HOME | End: 2018-03-12
Attending: RADIOLOGY
Payer: MEDICARE

## 2018-03-12 VITALS
SYSTOLIC BLOOD PRESSURE: 117 MMHG | DIASTOLIC BLOOD PRESSURE: 46 MMHG | WEIGHT: 149.13 LBS | HEART RATE: 59 BPM | BODY MASS INDEX: 27 KG/M2

## 2018-03-12 DIAGNOSIS — C32.0 PRIMARY MALIGNANT NEOPLASM OF GLOTTIS (HCC): Primary | ICD-10-CM

## 2018-03-12 PROCEDURE — 77386 HC IMRT COMPLEX: CPT | Performed by: RADIOLOGY

## 2018-03-12 NOTE — PROGRESS NOTES
[facility] Radiation Treatment Management Note 6-10    Patient:  Hernandez Waller  Age:  75year old  Visit Diagnosis:    1.  Primary malignant neoplasm of glottis [facility])      Primary Rad/Onc:  Dr. Galen Cline    Site Delivered

## 2018-03-13 PROCEDURE — 77386 HC IMRT COMPLEX: CPT | Performed by: RADIOLOGY

## 2018-03-14 PROCEDURE — 77386 HC IMRT COMPLEX: CPT | Performed by: RADIOLOGY

## 2018-03-15 PROCEDURE — 77386 HC IMRT COMPLEX: CPT | Performed by: RADIOLOGY

## 2018-03-16 PROCEDURE — 77336 RADIATION PHYSICS CONSULT: CPT | Performed by: RADIOLOGY

## 2018-03-16 PROCEDURE — 77386 HC IMRT COMPLEX: CPT | Performed by: RADIOLOGY

## 2018-03-17 ENCOUNTER — TELEPHONE (OUTPATIENT)
Dept: RADIATION ONCOLOGY | Facility: HOSPITAL | Age: 68
End: 2018-03-17

## 2018-03-17 RX ORDER — DEXAMETHASONE 4 MG/1
4 TABLET ORAL DAILY PRN
Qty: 15 TABLET | Refills: 0 | Status: SHIPPED | OUTPATIENT
Start: 2018-03-17 | End: 2018-04-01

## 2018-03-17 NOTE — TELEPHONE ENCOUNTER
TELEPHONE NOTE/ On call Radiation Oncologist    Pt's daughter called RE progressive dysphagia, despite lidocain viscous. Able to tolerate liquids, less solids. 5/10 pain, taking tylenol. Pt not using Norco that was given post-op and not able to find it.

## 2018-03-19 ENCOUNTER — HOSPITAL ENCOUNTER (OUTPATIENT)
Dept: RADIATION ONCOLOGY | Facility: HOSPITAL | Age: 68
Discharge: HOME | End: 2018-03-19
Attending: RADIOLOGY
Payer: MEDICARE

## 2018-03-19 VITALS
WEIGHT: 145.38 LBS | BODY MASS INDEX: 27 KG/M2 | DIASTOLIC BLOOD PRESSURE: 76 MMHG | RESPIRATION RATE: 18 BRPM | SYSTOLIC BLOOD PRESSURE: 118 MMHG | OXYGEN SATURATION: 97 % | HEART RATE: 90 BPM

## 2018-03-19 DIAGNOSIS — C32.0 PRIMARY MALIGNANT NEOPLASM OF GLOTTIS (HCC): Primary | ICD-10-CM

## 2018-03-19 PROCEDURE — 77386 HC IMRT COMPLEX: CPT | Performed by: RADIOLOGY

## 2018-03-19 NOTE — PROGRESS NOTES
[facility] Radiation Treatment Management Note 11-15    Patient:  Hernandez Waller  Age:  75year old  Visit Diagnosis:    1.  Primary malignant neoplasm of glottis [facility])      Primary Rad/Onc:  Dr. Kvng Mcconnell

## 2018-03-20 PROCEDURE — 77386 HC IMRT COMPLEX: CPT | Performed by: RADIOLOGY

## 2018-03-21 ENCOUNTER — OFFICE VISIT (OUTPATIENT)
Dept: HEMATOLOGY/ONCOLOGY | Facility: HOSPITAL | Age: 68
End: 2018-03-21
Attending: INTERNAL MEDICINE
Payer: MEDICARE

## 2018-03-21 PROCEDURE — 77386 HC IMRT COMPLEX: CPT | Performed by: RADIOLOGY

## 2018-03-21 NOTE — PROGRESS NOTES
Nutrition F/U Note     Date of visit: 3/21/2018     Diet Rx: High protein/calorie, soft     Pertinent Dx/PMH: Primary malignant neoplasm of glottis     TX: RT (anticipated completion date 4/20/18)     Other pertinent subjective/objective: samples of Ensure

## 2018-03-22 ENCOUNTER — SOCIAL WORK SERVICES (OUTPATIENT)
Dept: HEMATOLOGY/ONCOLOGY | Facility: HOSPITAL | Age: 68
End: 2018-03-22

## 2018-03-22 ENCOUNTER — NURSE ONLY - PARTIAL VISIT (OUTPATIENT)
Dept: RADIATION ONCOLOGY | Facility: HOSPITAL | Age: 68
End: 2018-03-22

## 2018-03-22 PROCEDURE — 77386 HC IMRT COMPLEX: CPT | Performed by: RADIOLOGY

## 2018-03-22 NOTE — PROGRESS NOTES
Met with patient while here for daily radiation treatment. VSS. States her pain has decreased with Norco & triple mix. Reports less difficulty swallowing, & has been able to take fluids/soup. Sleeping better as well.  Instructed RN will see here again mando

## 2018-03-22 NOTE — PROGRESS NOTES
At RN's request, LACY called patient and provided information on the [facility] in [place] and how to call and make an appointment.

## 2018-03-23 ENCOUNTER — OFFICE VISIT (OUTPATIENT)
Dept: HEMATOLOGY/ONCOLOGY | Facility: HOSPITAL | Age: 68
End: 2018-03-23
Attending: INTERNAL MEDICINE
Payer: MEDICARE

## 2018-03-23 PROCEDURE — 77386 HC IMRT COMPLEX: CPT | Performed by: RADIOLOGY

## 2018-03-23 PROCEDURE — 77336 RADIATION PHYSICS CONSULT: CPT | Performed by: RADIOLOGY

## 2018-03-23 NOTE — PROGRESS NOTES
Nutrition F/U Note     Date of visit: 3/23/2018     Diet Rx: High protein/calorie, soft     Pertinent Dx/PMH: Primary malignant neoplasm of glottis      TX: RT (anticipated completion date 4/20/18)     Other pertinent subjective/objective: samples of Ensur

## 2018-03-25 ENCOUNTER — HOSPITAL ENCOUNTER (EMERGENCY)
Facility: HOSPITAL | Age: 68
Discharge: HOME | End: 2018-03-25
Attending: EMERGENCY MEDICINE
Payer: MEDICARE

## 2018-03-25 VITALS
OXYGEN SATURATION: 98 % | RESPIRATION RATE: 16 BRPM | HEART RATE: 53 BPM | BODY MASS INDEX: 27 KG/M2 | WEIGHT: 145.5 LBS | DIASTOLIC BLOOD PRESSURE: 64 MMHG | SYSTOLIC BLOOD PRESSURE: 113 MMHG | TEMPERATURE: 98 F

## 2018-03-25 DIAGNOSIS — T66.XXXA ADVERSE EFFECT OF RADIATION, INITIAL ENCOUNTER: ICD-10-CM

## 2018-03-25 DIAGNOSIS — R13.10 SWALLOWING PAIN: Primary | ICD-10-CM

## 2018-03-25 LAB
ALBUMIN SERPL-MCNC: 3.8 G/DL (ref 3.5–4.8)
ALP LIVER SERPL-CCNC: 47 U/L (ref 55–142)
ALT SERPL-CCNC: 19 U/L (ref 14–54)
AST SERPL-CCNC: 16 U/L (ref 15–41)
BASOPHILS # BLD AUTO: 0.02 X10(3) UL (ref 0–0.1)
BASOPHILS NFR BLD AUTO: 0.3 %
BILIRUB SERPL-MCNC: 0.7 MG/DL (ref 0.1–2)
BUN BLD-MCNC: 13 MG/DL (ref 8–20)
CALCIUM BLD-MCNC: 8.9 MG/DL (ref 8.3–10.3)
CHLORIDE: 104 MMOL/L (ref 101–111)
CO2: 28 MMOL/L (ref 22–32)
CREAT BLD-MCNC: 0.77 MG/DL (ref 0.55–1.02)
EOSINOPHIL # BLD AUTO: 0.04 X10(3) UL (ref 0–0.3)
EOSINOPHIL NFR BLD AUTO: 0.7 %
ERYTHROCYTE [DISTWIDTH] IN BLOOD BY AUTOMATED COUNT: 12 % (ref 11.5–16)
GLUCOSE BLD-MCNC: 91 MG/DL (ref 70–99)
HCT VFR BLD AUTO: 44.4 % (ref 34–50)
HGB BLD-MCNC: 14.7 G/DL (ref 12–16)
IMMATURE GRANULOCYTE COUNT: 0.03 X10(3) UL (ref 0–1)
IMMATURE GRANULOCYTE RATIO %: 0.5 %
LYMPHOCYTES # BLD AUTO: 0.51 X10(3) UL (ref 0.9–4)
LYMPHOCYTES NFR BLD AUTO: 8.9 %
M PROTEIN MFR SERPL ELPH: 7.3 G/DL (ref 6.1–8.3)
MCH RBC QN AUTO: 30.4 PG (ref 27–33.2)
MCHC RBC AUTO-ENTMCNC: 33.1 G/DL (ref 31–37)
MCV RBC AUTO: 91.7 FL (ref 81–100)
MONOCYTES # BLD AUTO: 0.48 X10(3) UL (ref 0.1–1)
MONOCYTES NFR BLD AUTO: 8.3 %
NEUTROPHIL ABS PRELIM: 4.68 X10 (3) UL (ref 1.3–6.7)
NEUTROPHILS # BLD AUTO: 4.68 X10(3) UL (ref 1.3–6.7)
NEUTROPHILS NFR BLD AUTO: 81.3 %
POTASSIUM SERPL-SCNC: 3.5 MMOL/L (ref 3.6–5.1)
RBC # BLD AUTO: 4.84 X10(6)UL (ref 3.8–5.1)
RED CELL DISTRIBUTION WIDTH-SD: 40.2 FL (ref 35.1–46.3)
SODIUM SERPL-SCNC: 139 MMOL/L (ref 136–144)
WBC # BLD AUTO: 5.8 X10(3) UL (ref 4–13)

## 2018-03-25 PROCEDURE — 80053 COMPREHEN METABOLIC PANEL: CPT | Performed by: EMERGENCY MEDICINE

## 2018-03-25 PROCEDURE — 99284 EMERGENCY DEPT VISIT MOD MDM: CPT

## 2018-03-25 PROCEDURE — 99283 EMERGENCY DEPT VISIT LOW MDM: CPT

## 2018-03-25 PROCEDURE — 96374 THER/PROPH/DIAG INJ IV PUSH: CPT

## 2018-03-25 PROCEDURE — 85025 COMPLETE CBC W/AUTO DIFF WBC: CPT | Performed by: EMERGENCY MEDICINE

## 2018-03-25 PROCEDURE — 96375 TX/PRO/DX INJ NEW DRUG ADDON: CPT

## 2018-03-25 RX ORDER — KETOROLAC TROMETHAMINE 30 MG/ML
30 INJECTION, SOLUTION INTRAMUSCULAR; INTRAVENOUS ONCE
Status: COMPLETED | OUTPATIENT
Start: 2018-03-25 | End: 2018-03-25

## 2018-03-25 RX ORDER — DEXAMETHASONE SODIUM PHOSPHATE 4 MG/ML
4 VIAL (ML) INJECTION ONCE
Status: COMPLETED | OUTPATIENT
Start: 2018-03-25 | End: 2018-03-25

## 2018-03-25 NOTE — ED PROVIDER NOTES
Patient Seen in: [facility] Emergency Department    History   Patient presents with:  Swallowing Problem (gastrointestinal)  Pain (neurologic)    Stated Complaint:     HPI    With significant pain and difficulty swallowing secondary to getting daily Mouth/Throat: Oropharynx is clear and moist.   Erythema of her posterior oropharynx   Eyes: Conjunctivae and EOM are normal. Pupils are equal, round, and reactive to light. Right eye exhibits no discharge. Left eye exhibits no discharge.  No scleral icter MDM   She is coming by her daughter to the emergency department. She is having pain in her neck with swallowing and is fearful that she has become dehydrated because she is having difficulty eating and drinking.   Given a liter of normal saline in the em

## 2018-03-25 NOTE — ED INITIAL ASSESSMENT (HPI)
Pt arrives with c/o throat pain. Pt is presently being treated with radiation for a mass in her throat. Pt is able to swallow saliva but hasnt eaten in 3 days. Denies emesis or chest pain.

## 2018-03-26 ENCOUNTER — HOSPITAL ENCOUNTER (OUTPATIENT)
Dept: RADIATION ONCOLOGY | Facility: HOSPITAL | Age: 68
Discharge: HOME | End: 2018-03-26
Attending: RADIOLOGY
Payer: MEDICARE

## 2018-03-26 VITALS
SYSTOLIC BLOOD PRESSURE: 119 MMHG | HEART RATE: 73 BPM | BODY MASS INDEX: 26 KG/M2 | DIASTOLIC BLOOD PRESSURE: 59 MMHG | WEIGHT: 141.81 LBS

## 2018-03-26 DIAGNOSIS — C32.0 PRIMARY MALIGNANT NEOPLASM OF GLOTTIS (HCC): Primary | ICD-10-CM

## 2018-03-26 PROCEDURE — 77386 HC IMRT COMPLEX: CPT | Performed by: RADIOLOGY

## 2018-03-26 RX ORDER — FENTANYL 25 UG/H
1 PATCH TRANSDERMAL
Qty: 5 PATCH | Refills: 0 | Status: SHIPPED | OUTPATIENT
Start: 2018-03-26 | End: 2018-05-25

## 2018-03-26 NOTE — PROGRESS NOTES
[facility] Radiation Treatment Management Note 16-20    Patient:  Yudy Truong  Age:  75year old  Visit Diagnosis:    1.  Primary malignant neoplasm of glottis [facility])      Primary Rad/Onc:  Dr. Jean Jones prescribed by Dr. Shine Allen, whom she contacted over the weekend for choking sensation. Patient has Lortab but states that it does nothing. Offered to give her a break from therapy but she refused. Will try Fentanyl patch 0.25mcg.     Will be difficult to get

## 2018-03-27 ENCOUNTER — HOSPITAL ENCOUNTER (EMERGENCY)
Facility: HOSPITAL | Age: 68
Discharge: HOME | End: 2018-03-27
Attending: EMERGENCY MEDICINE
Payer: MEDICARE

## 2018-03-27 ENCOUNTER — TELEPHONE (OUTPATIENT)
Dept: INTERNAL MEDICINE CLINIC | Facility: CLINIC | Age: 68
End: 2018-03-27

## 2018-03-27 VITALS
OXYGEN SATURATION: 98 % | DIASTOLIC BLOOD PRESSURE: 68 MMHG | SYSTOLIC BLOOD PRESSURE: 113 MMHG | WEIGHT: 141 LBS | HEIGHT: 62 IN | BODY MASS INDEX: 25.95 KG/M2 | HEART RATE: 55 BPM | RESPIRATION RATE: 16 BRPM

## 2018-03-27 DIAGNOSIS — R11.2 NAUSEA AND VOMITING IN ADULT: Primary | ICD-10-CM

## 2018-03-27 LAB
ALBUMIN SERPL-MCNC: 3.6 G/DL (ref 3.5–4.8)
ALP LIVER SERPL-CCNC: 44 U/L (ref 55–142)
ALT SERPL-CCNC: 18 U/L (ref 14–54)
AST SERPL-CCNC: 16 U/L (ref 15–41)
BASOPHILS # BLD AUTO: 0.02 X10(3) UL (ref 0–0.1)
BASOPHILS NFR BLD AUTO: 0.2 %
BILIRUB SERPL-MCNC: 0.7 MG/DL (ref 0.1–2)
BILIRUB UR QL STRIP.AUTO: NEGATIVE
BUN BLD-MCNC: 14 MG/DL (ref 8–20)
CALCIUM BLD-MCNC: 8.9 MG/DL (ref 8.3–10.3)
CHLORIDE: 104 MMOL/L (ref 101–111)
CO2: 26 MMOL/L (ref 22–32)
COLOR UR AUTO: YELLOW
CREAT BLD-MCNC: 0.64 MG/DL (ref 0.55–1.02)
EOSINOPHIL # BLD AUTO: 0.02 X10(3) UL (ref 0–0.3)
EOSINOPHIL NFR BLD AUTO: 0.2 %
ERYTHROCYTE [DISTWIDTH] IN BLOOD BY AUTOMATED COUNT: 12.1 % (ref 11.5–16)
GLUCOSE BLD-MCNC: 112 MG/DL (ref 70–99)
GLUCOSE UR STRIP.AUTO-MCNC: NEGATIVE MG/DL
HCT VFR BLD AUTO: 41.5 % (ref 34–50)
HGB BLD-MCNC: 13.9 G/DL (ref 12–16)
HYALINE CASTS #/AREA URNS AUTO: PRESENT /LPF
IMMATURE GRANULOCYTE COUNT: 0.04 X10(3) UL (ref 0–1)
IMMATURE GRANULOCYTE RATIO %: 0.4 %
KETONES UR STRIP.AUTO-MCNC: 80 MG/DL
LYMPHOCYTES # BLD AUTO: 0.24 X10(3) UL (ref 0.9–4)
LYMPHOCYTES NFR BLD AUTO: 2.6 %
M PROTEIN MFR SERPL ELPH: 6.8 G/DL (ref 6.1–8.3)
MCH RBC QN AUTO: 31.2 PG (ref 27–33.2)
MCHC RBC AUTO-ENTMCNC: 33.5 G/DL (ref 31–37)
MCV RBC AUTO: 93 FL (ref 81–100)
MONOCYTES # BLD AUTO: 0.47 X10(3) UL (ref 0.1–1)
MONOCYTES NFR BLD AUTO: 5.1 %
NEUTROPHIL ABS PRELIM: 8.44 X10 (3) UL (ref 1.3–6.7)
NEUTROPHILS # BLD AUTO: 8.44 X10(3) UL (ref 1.3–6.7)
NEUTROPHILS NFR BLD AUTO: 91.5 %
NITRITE UR QL STRIP.AUTO: NEGATIVE
PH UR STRIP.AUTO: 5 [PH] (ref 4.5–8)
PLATELET # BLD AUTO: 143 10(3)UL (ref 150–450)
POTASSIUM SERPL-SCNC: 4 MMOL/L (ref 3.6–5.1)
PROT UR STRIP.AUTO-MCNC: NEGATIVE MG/DL
RBC # BLD AUTO: 4.46 X10(6)UL (ref 3.8–5.1)
RBC UR QL AUTO: NEGATIVE
RED CELL DISTRIBUTION WIDTH-SD: 41.7 FL (ref 35.1–46.3)
SODIUM SERPL-SCNC: 140 MMOL/L (ref 136–144)
SP GR UR STRIP.AUTO: 1.02 (ref 1–1.03)
UROBILINOGEN UR STRIP.AUTO-MCNC: <2 MG/DL
WBC # BLD AUTO: 9.2 X10(3) UL (ref 4–13)

## 2018-03-27 PROCEDURE — 80053 COMPREHEN METABOLIC PANEL: CPT | Performed by: EMERGENCY MEDICINE

## 2018-03-27 PROCEDURE — 85025 COMPLETE CBC W/AUTO DIFF WBC: CPT | Performed by: EMERGENCY MEDICINE

## 2018-03-27 PROCEDURE — 81001 URINALYSIS AUTO W/SCOPE: CPT | Performed by: EMERGENCY MEDICINE

## 2018-03-27 PROCEDURE — 99284 EMERGENCY DEPT VISIT MOD MDM: CPT

## 2018-03-27 PROCEDURE — 96374 THER/PROPH/DIAG INJ IV PUSH: CPT

## 2018-03-27 PROCEDURE — 87086 URINE CULTURE/COLONY COUNT: CPT | Performed by: EMERGENCY MEDICINE

## 2018-03-27 PROCEDURE — 96361 HYDRATE IV INFUSION ADD-ON: CPT

## 2018-03-27 RX ORDER — ONDANSETRON 2 MG/ML
4 INJECTION INTRAMUSCULAR; INTRAVENOUS ONCE
Status: COMPLETED | OUTPATIENT
Start: 2018-03-27 | End: 2018-03-27

## 2018-03-27 RX ORDER — ONDANSETRON 4 MG/1
4 TABLET, ORALLY DISINTEGRATING ORAL EVERY 4 HOURS PRN
Qty: 10 TABLET | Refills: 0 | Status: SHIPPED | OUTPATIENT
Start: 2018-03-27 | End: 2018-04-03

## 2018-03-27 NOTE — ADDENDUM NOTE
Encounter addended by: Miguel Schwarz MD on: 3/27/2018 11:23 AM<BR>    Actions taken: Sign clinical note

## 2018-03-27 NOTE — PROGRESS NOTES
Pt seen again on 3/27/18 due to CC: pain, nausea and decreased PO intake    Subjective: Pt started on Fentanyl patch but unable to use short acting Norco and Dysphagia cocktail due to nausea,  Vomited x 2 in clinic prior to tx,    Objective:   o/p clear  M

## 2018-03-27 NOTE — ED NOTES
Patient assisted up to bathroom at this time. Steady gait observed. Denies nausea at this time.  Waiting for updated from MD.

## 2018-03-27 NOTE — ED INITIAL ASSESSMENT (HPI)
Patient here from cancer center for throat pain, reports she is receiving radiation treatments, last treatment yesterday. Reports she was here on Sunday for the same. Reports pain increased today. Also reports nausea/vomiting today.  Reports treatment for g

## 2018-03-27 NOTE — ED PROVIDER NOTES
Patient Seen in: [facility] Emergency Department    History   Patient presents with:  Sore Throat  Nausea/Vomiting/Diarrhea (gastrointestinal)    Stated Complaint: from cancer center, throat pain.   Receiving radiation     HPI    68-year-old female c kg/m²         Physical Exam    HEENT : NCAT, EOMI, PEERL, throat clear, neck supple, no JVD, trachea midline, No LAD  Heart: S1S2 normal. No murmurs, regular rate and rhythm  Lungs: Clear to auscultation bilaterally  Abdomen: Soft nontender nondistended no Zofran were given. Did discuss removal of her patch but she states is controlling her pain secondary to radiation and would like to keep it. Patient be discharged home with Zofran and follow-up the physician.   ED Course as of Mar 27 1400  ---------------

## 2018-03-27 NOTE — ED NOTES
Patient remains updated with results at this time. Reports she is feeling a little better, nausea controlled at this time. IV fluids continue to infuse.

## 2018-03-27 NOTE — ED NOTES
MD at bedside at this time updating patient with results and plan of care. Patient has been resting comfortably with lights dimmed, eating ice chips intermittently.

## 2018-03-27 NOTE — TELEPHONE ENCOUNTER
Pt (Belizean speaking) came into the office requesting advise from Dr Suzan Clark due to not been able to eat and drink since the start of radiation therapy due to cancer of the larynx. Pt accompanied by daughter in [address] mother.  Pt informed Dr Suzan Clark not in the off

## 2018-03-28 ENCOUNTER — APPOINTMENT (OUTPATIENT)
Dept: HEMATOLOGY/ONCOLOGY | Facility: HOSPITAL | Age: 68
End: 2018-03-28
Attending: INTERNAL MEDICINE
Payer: MEDICARE

## 2018-03-29 ENCOUNTER — OFFICE VISIT (OUTPATIENT)
Dept: HEMATOLOGY/ONCOLOGY | Facility: HOSPITAL | Age: 68
End: 2018-03-29
Attending: INTERNAL MEDICINE
Payer: MEDICARE

## 2018-03-29 ENCOUNTER — HOSPITAL ENCOUNTER (EMERGENCY)
Facility: HOSPITAL | Age: 68
Discharge: HOME | End: 2018-03-31
Attending: EMERGENCY MEDICINE
Payer: MEDICARE

## 2018-03-29 VITALS
BODY MASS INDEX: 25.97 KG/M2 | HEIGHT: 62 IN | RESPIRATION RATE: 18 BRPM | DIASTOLIC BLOOD PRESSURE: 67 MMHG | TEMPERATURE: 98 F | WEIGHT: 141.13 LBS | OXYGEN SATURATION: 100 % | HEART RATE: 67 BPM | SYSTOLIC BLOOD PRESSURE: 123 MMHG

## 2018-03-29 VITALS
DIASTOLIC BLOOD PRESSURE: 66 MMHG | WEIGHT: 141 LBS | SYSTOLIC BLOOD PRESSURE: 103 MMHG | HEART RATE: 76 BPM | HEIGHT: 62.01 IN | TEMPERATURE: 98 F | RESPIRATION RATE: 18 BRPM | OXYGEN SATURATION: 98 % | BODY MASS INDEX: 25.62 KG/M2

## 2018-03-29 DIAGNOSIS — R53.1 WEAKNESS: ICD-10-CM

## 2018-03-29 DIAGNOSIS — F41.9 ANXIETY: ICD-10-CM

## 2018-03-29 DIAGNOSIS — R11.2 INTRACTABLE VOMITING WITH NAUSEA, UNSPECIFIED VOMITING TYPE: Primary | ICD-10-CM

## 2018-03-29 DIAGNOSIS — R11.0 NAUSEA: Primary | ICD-10-CM

## 2018-03-29 DIAGNOSIS — K59.09 OTHER CONSTIPATION: ICD-10-CM

## 2018-03-29 DIAGNOSIS — R53.83 FATIGUE DUE TO TREATMENT: ICD-10-CM

## 2018-03-29 DIAGNOSIS — R52 PAIN: ICD-10-CM

## 2018-03-29 DIAGNOSIS — B37.0 ORAL CANDIDIASIS: ICD-10-CM

## 2018-03-29 PROCEDURE — 96374 THER/PROPH/DIAG INJ IV PUSH: CPT

## 2018-03-29 PROCEDURE — 85025 COMPLETE CBC W/AUTO DIFF WBC: CPT | Performed by: EMERGENCY MEDICINE

## 2018-03-29 PROCEDURE — 99284 EMERGENCY DEPT VISIT MOD MDM: CPT

## 2018-03-29 PROCEDURE — 96361 HYDRATE IV INFUSION ADD-ON: CPT

## 2018-03-29 PROCEDURE — 99205 OFFICE O/P NEW HI 60 MIN: CPT | Performed by: NURSE PRACTITIONER

## 2018-03-29 PROCEDURE — 80048 BASIC METABOLIC PNL TOTAL CA: CPT | Performed by: EMERGENCY MEDICINE

## 2018-03-29 PROCEDURE — 82962 GLUCOSE BLOOD TEST: CPT

## 2018-03-29 RX ORDER — MORPHINE SULFATE 4 MG/ML
4 INJECTION, SOLUTION INTRAMUSCULAR; INTRAVENOUS EVERY 30 MIN PRN
Status: DISCONTINUED | OUTPATIENT
Start: 2018-03-29 | End: 2018-03-31

## 2018-03-29 RX ORDER — ONDANSETRON 2 MG/ML
4 INJECTION INTRAMUSCULAR; INTRAVENOUS ONCE
Status: COMPLETED | OUTPATIENT
Start: 2018-03-29 | End: 2018-03-29

## 2018-03-29 NOTE — ED PROVIDER NOTES
Patient Seen in: [facility] Emergency Department    History   Patient presents with:  GI Bleeding (gastrointestinal)    Stated Complaint: Blood in vomit, small amount    HPI    50-year-old female with a history of laryngal carcinoma who up until 2 d soft palate and buccal mucosa consistent with thrush. Neck: No adenopathy or thyromegaly. No hoarseness or stridor. Lungs are clear to auscultation. Heart exam: Normal S1-S2 without extra sounds or murmurs. Regular rate and rhythm.   Abdomen is nontend Clinical Impression:  Intractable vomiting with nausea, unspecified vomiting type  (primary encounter diagnosis)  Oral candidiasis    Disposition:  Discharge  3/29/2018 12:47 pm    Follow-up:  Cece Brady MD  [address] [place] [address] [place] [address]

## 2018-03-30 ENCOUNTER — OFFICE VISIT (OUTPATIENT)
Dept: HEMATOLOGY/ONCOLOGY | Facility: HOSPITAL | Age: 68
End: 2018-03-30
Attending: INTERNAL MEDICINE
Payer: MEDICARE

## 2018-03-30 ENCOUNTER — TELEPHONE (OUTPATIENT)
Dept: HEMATOLOGY/ONCOLOGY | Facility: HOSPITAL | Age: 68
End: 2018-03-30

## 2018-03-30 VITALS
TEMPERATURE: 97 F | HEART RATE: 69 BPM | DIASTOLIC BLOOD PRESSURE: 74 MMHG | OXYGEN SATURATION: 99 % | WEIGHT: 136.81 LBS | BODY MASS INDEX: 24.86 KG/M2 | RESPIRATION RATE: 18 BRPM | HEIGHT: 62.01 IN | SYSTOLIC BLOOD PRESSURE: 137 MMHG

## 2018-03-30 DIAGNOSIS — C32.9 CANCER OF LARYNX (HCC): Primary | ICD-10-CM

## 2018-03-30 PROBLEM — Z51.5 PALLIATIVE CARE BY SPECIALIST: Status: ACTIVE | Noted: 2018-03-30

## 2018-03-30 PROCEDURE — 96361 HYDRATE IV INFUSION ADD-ON: CPT

## 2018-03-30 PROCEDURE — 96375 TX/PRO/DX INJ NEW DRUG ADDON: CPT

## 2018-03-30 PROCEDURE — 96374 THER/PROPH/DIAG INJ IV PUSH: CPT

## 2018-03-30 RX ORDER — HYDROMORPHONE HYDROCHLORIDE 1 MG/ML
0.5 INJECTION, SOLUTION INTRAMUSCULAR; INTRAVENOUS; SUBCUTANEOUS EVERY 2 HOUR PRN
Status: CANCELLED
Start: 2018-03-30

## 2018-03-30 RX ORDER — KETOROLAC TROMETHAMINE 30 MG/ML
15 INJECTION, SOLUTION INTRAMUSCULAR; INTRAVENOUS ONCE
Status: CANCELLED
Start: 2018-03-30 | End: 2018-03-30

## 2018-03-30 RX ORDER — KETOROLAC TROMETHAMINE 30 MG/ML
15 INJECTION, SOLUTION INTRAMUSCULAR; INTRAVENOUS ONCE
Status: COMPLETED | OUTPATIENT
Start: 2018-03-30 | End: 2018-03-30

## 2018-03-30 RX ORDER — HYDROMORPHONE HYDROCHLORIDE 1 MG/ML
0.5 INJECTION, SOLUTION INTRAMUSCULAR; INTRAVENOUS; SUBCUTANEOUS EVERY 2 HOUR PRN
Status: CANCELLED
Start: 2018-03-31

## 2018-03-30 RX ADMIN — KETOROLAC TROMETHAMINE 15 MG: 30 INJECTION, SOLUTION INTRAMUSCULAR; INTRAVENOUS at 14:18:00

## 2018-03-30 NOTE — TELEPHONE ENCOUNTER
The patient states she is concern about continuing treatment. She states she has not eaten anything or drank anything in 15 days. She states she has lost weight. She is unable to sleep at night. She verbalizes anxiety. She would like to see Dr Santiago Pepper.  She melendez

## 2018-03-30 NOTE — PROGRESS NOTES
Education Record    Learner:  Patient    Disease / Diagnosis: Cancer of larynx     Barriers / Limitations:  None   Comments:    Method:  Brief focused and Reinforcement   Comments:    General Topics:  Diet, Side effects and symptom management and Plan of c

## 2018-03-30 NOTE — PROGRESS NOTES
Palliative Care Follow up Note    Patient Name: Cruz Wallis   YOB: 1950   Medical Record Number: [id number]   CSN: [id number]   Date of visit: 3/30/2018       Chief Complaint/Reason for Visit:  Patient presents with:  Consult: Occupational History  None on file     Social History Main Topics   Smoking status: Never Smoker    Smokeless tobacco: Never Used    Alcohol use No    Drug use: No    Sexual activity: Not on file     Other Topics Concern   None on file     Social Histo Sleeping well      Palliative Performance Scale:  50%     Physical Examination:   General: alert and oriented, In wheelchair,    Respiratory: Clear to auscultation. Cardiac: Regular rate and rhythm.    No edema  Abdomen: Soft, non distended, nontender, goo

## 2018-03-31 ENCOUNTER — OFFICE VISIT (OUTPATIENT)
Dept: HEMATOLOGY/ONCOLOGY | Facility: HOSPITAL | Age: 68
End: 2018-03-31
Attending: INTERNAL MEDICINE
Payer: MEDICARE

## 2018-03-31 VITALS
RESPIRATION RATE: 18 BRPM | SYSTOLIC BLOOD PRESSURE: 119 MMHG | TEMPERATURE: 98 F | HEART RATE: 59 BPM | DIASTOLIC BLOOD PRESSURE: 66 MMHG

## 2018-03-31 DIAGNOSIS — C32.9 CANCER OF LARYNX (HCC): Primary | ICD-10-CM

## 2018-03-31 PROCEDURE — 96361 HYDRATE IV INFUSION ADD-ON: CPT

## 2018-03-31 PROCEDURE — 96360 HYDRATION IV INFUSION INIT: CPT

## 2018-04-01 ENCOUNTER — OFFICE VISIT (OUTPATIENT)
Dept: HEMATOLOGY/ONCOLOGY | Facility: HOSPITAL | Age: 68
End: 2018-04-01
Attending: INTERNAL MEDICINE
Payer: MEDICARE

## 2018-04-01 ENCOUNTER — HOSPITAL ENCOUNTER (OUTPATIENT)
Dept: RADIATION ONCOLOGY | Facility: HOSPITAL | Age: 68
Discharge: HOME | End: 2018-04-01
Attending: RADIOLOGY
Payer: MEDICARE

## 2018-04-01 VITALS
HEART RATE: 61 BPM | OXYGEN SATURATION: 98 % | TEMPERATURE: 98 F | DIASTOLIC BLOOD PRESSURE: 83 MMHG | RESPIRATION RATE: 18 BRPM | SYSTOLIC BLOOD PRESSURE: 131 MMHG

## 2018-04-01 DIAGNOSIS — C32.9 CANCER OF LARYNX (HCC): Primary | ICD-10-CM

## 2018-04-01 DIAGNOSIS — R11.0 NAUSEA: ICD-10-CM

## 2018-04-01 PROCEDURE — 96374 THER/PROPH/DIAG INJ IV PUSH: CPT

## 2018-04-01 PROCEDURE — 96361 HYDRATE IV INFUSION ADD-ON: CPT

## 2018-04-01 RX ORDER — ONDANSETRON 2 MG/ML
4 INJECTION INTRAMUSCULAR; INTRAVENOUS ONCE
Status: COMPLETED | OUTPATIENT
Start: 2018-04-01 | End: 2018-04-01

## 2018-04-01 RX ADMIN — ONDANSETRON 4 MG: 2 INJECTION INTRAMUSCULAR; INTRAVENOUS at 08:58:00

## 2018-04-02 ENCOUNTER — OFFICE VISIT (OUTPATIENT)
Dept: HEMATOLOGY/ONCOLOGY | Facility: HOSPITAL | Age: 68
End: 2018-04-02
Attending: INTERNAL MEDICINE
Payer: MEDICARE

## 2018-04-02 ENCOUNTER — HOSPITAL ENCOUNTER (OUTPATIENT)
Dept: GENERAL RADIOLOGY | Facility: HOSPITAL | Age: 68
Discharge: HOME | End: 2018-04-02
Attending: NURSE PRACTITIONER
Payer: MEDICARE

## 2018-04-02 ENCOUNTER — HOSPITAL ENCOUNTER (OUTPATIENT)
Dept: RADIATION ONCOLOGY | Facility: HOSPITAL | Age: 68
Discharge: HOME | End: 2018-04-02
Attending: RADIOLOGY
Payer: MEDICARE

## 2018-04-02 VITALS
RESPIRATION RATE: 18 BRPM | WEIGHT: 136.19 LBS | SYSTOLIC BLOOD PRESSURE: 139 MMHG | HEART RATE: 82 BPM | DIASTOLIC BLOOD PRESSURE: 77 MMHG | BODY MASS INDEX: 25 KG/M2

## 2018-04-02 DIAGNOSIS — F41.9 ANXIETY: ICD-10-CM

## 2018-04-02 DIAGNOSIS — R52 PAIN: ICD-10-CM

## 2018-04-02 DIAGNOSIS — C32.9 CANCER OF LARYNX (HCC): ICD-10-CM

## 2018-04-02 DIAGNOSIS — K59.03 CONSTIPATION DUE TO OPIOID THERAPY: ICD-10-CM

## 2018-04-02 DIAGNOSIS — C32.0 PRIMARY MALIGNANT NEOPLASM OF GLOTTIS (HCC): Primary | ICD-10-CM

## 2018-04-02 DIAGNOSIS — R11.0 NAUSEA: ICD-10-CM

## 2018-04-02 DIAGNOSIS — T40.2X5A CONSTIPATION DUE TO OPIOID THERAPY: ICD-10-CM

## 2018-04-02 DIAGNOSIS — C32.9 CANCER OF LARYNX (HCC): Primary | ICD-10-CM

## 2018-04-02 DIAGNOSIS — R53.1 WEAKNESS: ICD-10-CM

## 2018-04-02 PROCEDURE — 96375 TX/PRO/DX INJ NEW DRUG ADDON: CPT

## 2018-04-02 PROCEDURE — 96361 HYDRATE IV INFUSION ADD-ON: CPT

## 2018-04-02 PROCEDURE — 99215 OFFICE O/P EST HI 40 MIN: CPT | Performed by: NURSE PRACTITIONER

## 2018-04-02 PROCEDURE — 74018 RADEX ABDOMEN 1 VIEW: CPT | Performed by: NURSE PRACTITIONER

## 2018-04-02 PROCEDURE — 96374 THER/PROPH/DIAG INJ IV PUSH: CPT

## 2018-04-02 RX ORDER — KETOROLAC TROMETHAMINE 30 MG/ML
15 INJECTION, SOLUTION INTRAMUSCULAR; INTRAVENOUS ONCE
Status: COMPLETED | OUTPATIENT
Start: 2018-04-02 | End: 2018-04-02

## 2018-04-02 RX ORDER — KETOROLAC TROMETHAMINE 30 MG/ML
15 INJECTION, SOLUTION INTRAMUSCULAR; INTRAVENOUS ONCE
Status: CANCELLED
Start: 2018-04-02 | End: 2018-04-02

## 2018-04-02 RX ORDER — SODIUM CHLORIDE 9 MG/ML
INJECTION, SOLUTION INTRAVENOUS CONTINUOUS
Status: DISCONTINUED | OUTPATIENT
Start: 2018-04-02 | End: 2018-04-02

## 2018-04-02 RX ORDER — SODIUM CHLORIDE 9 MG/ML
INJECTION, SOLUTION INTRAVENOUS ONCE
Status: DISCONTINUED | OUTPATIENT
Start: 2018-04-02 | End: 2018-04-02

## 2018-04-02 RX ORDER — HYDROMORPHONE HYDROCHLORIDE 1 MG/ML
0.5 INJECTION, SOLUTION INTRAMUSCULAR; INTRAVENOUS; SUBCUTANEOUS EVERY 2 HOUR PRN
Status: CANCELLED
Start: 2018-04-02

## 2018-04-02 RX ADMIN — KETOROLAC TROMETHAMINE 15 MG: 30 INJECTION, SOLUTION INTRAMUSCULAR; INTRAVENOUS at 14:48:00

## 2018-04-02 RX ADMIN — SODIUM CHLORIDE: 9 INJECTION, SOLUTION INTRAVENOUS at 15:05:00

## 2018-04-02 NOTE — PROGRESS NOTES
Palliative Care Follow up Note    Patient Name: Aunpam Cruz   YOB: 1950   Medical Record Number: [id number]   CSN: [id number]   Date of visit: 4/2/2018       Chief Complaint/Reason for Visit:  Patient presents with:   Follow - U Merged History Encounter **           Medications:    Current Outpatient Prescriptions:   •  nystatin 952243 UNIT/ML Mouth/Throat Suspension, Take 5 mL (500,000 Units total) by mouth 4 (four) times daily. , Disp: 140 mL, Rfl: 0  •  ondansetron 4 MG Oral Tab physically exhausted but states daily IV fluids have been helpful. She doesn't want to take an anxiolytic as anxiety is probably contributing to her heightened perception of symptoms.   Constipation remains a problem and she is using no laxatives despite c

## 2018-04-02 NOTE — PROGRESS NOTES
Pt meeting definition for SEVERE MALNUTRITION in the context of acute illness as evidenced by 8% unplanned wt loss x 1 mos and po intake meeting less than 50% estimated nutrition needs.      Nutrition F/U Note     Date of visit: 04/02/2018     Diet Rx: High

## 2018-04-02 NOTE — PROGRESS NOTES
[facility] Radiation Treatment Management Note 16-20    Patient:  Zeus Castle  Age:  75year old  Visit Diagnosis:    1.  Primary malignant neoplasm of glottis [facility])      Primary Rad/Onc:  Dr. Genesis Cordoba

## 2018-04-02 NOTE — PROGRESS NOTES
Education Record    Learner:  Patient and Family Member    Disease / Diagnosis: Laryngeal CA; here for IVF and pain management    Barriers / Limitations:  None   Comments:    Method:  Discussion   Comments:    General Topics:  Medication, Side effects and

## 2018-04-03 ENCOUNTER — OFFICE VISIT (OUTPATIENT)
Dept: HEMATOLOGY/ONCOLOGY | Facility: HOSPITAL | Age: 68
End: 2018-04-03
Attending: INTERNAL MEDICINE
Payer: MEDICARE

## 2018-04-03 VITALS
SYSTOLIC BLOOD PRESSURE: 125 MMHG | RESPIRATION RATE: 18 BRPM | TEMPERATURE: 99 F | DIASTOLIC BLOOD PRESSURE: 64 MMHG | HEART RATE: 60 BPM

## 2018-04-03 DIAGNOSIS — C32.9 CANCER OF LARYNX (HCC): Primary | ICD-10-CM

## 2018-04-03 PROBLEM — E86.0 DEHYDRATION: Status: ACTIVE | Noted: 2018-04-03

## 2018-04-03 PROBLEM — R52 PAIN: Status: ACTIVE | Noted: 2018-04-03

## 2018-04-03 PROCEDURE — 96375 TX/PRO/DX INJ NEW DRUG ADDON: CPT

## 2018-04-03 PROCEDURE — 96361 HYDRATE IV INFUSION ADD-ON: CPT

## 2018-04-03 PROCEDURE — 96374 THER/PROPH/DIAG INJ IV PUSH: CPT

## 2018-04-03 RX ORDER — HYDROMORPHONE HYDROCHLORIDE 1 MG/ML
0.5 INJECTION, SOLUTION INTRAMUSCULAR; INTRAVENOUS; SUBCUTANEOUS EVERY 2 HOUR PRN
Status: DISCONTINUED | OUTPATIENT
Start: 2018-04-03 | End: 2018-04-03

## 2018-04-03 RX ORDER — KETOROLAC TROMETHAMINE 30 MG/ML
15 INJECTION, SOLUTION INTRAMUSCULAR; INTRAVENOUS ONCE
Status: COMPLETED | OUTPATIENT
Start: 2018-04-03 | End: 2018-04-03

## 2018-04-03 RX ORDER — POLYETHYLENE GLYCOL 3350 17 G/17G
17 POWDER, FOR SOLUTION ORAL 2 TIMES DAILY
COMMUNITY
End: 2018-06-25 | Stop reason: ALTCHOICE

## 2018-04-03 RX ORDER — MAGNESIUM CARB/ALUMINUM HYDROX 105-160MG
296 TABLET,CHEWABLE ORAL ONCE
COMMUNITY
End: 2018-06-08 | Stop reason: ALTCHOICE

## 2018-04-03 RX ORDER — HYDROMORPHONE HYDROCHLORIDE 1 MG/ML
0.5 INJECTION, SOLUTION INTRAMUSCULAR; INTRAVENOUS; SUBCUTANEOUS EVERY 2 HOUR PRN
Status: CANCELLED
Start: 2018-04-03

## 2018-04-03 RX ORDER — SENNOSIDES 8.6 MG
8.6 TABLET ORAL 2 TIMES DAILY
COMMUNITY
End: 2018-06-25 | Stop reason: ALTCHOICE

## 2018-04-03 RX ADMIN — KETOROLAC TROMETHAMINE 15 MG: 30 INJECTION, SOLUTION INTRAMUSCULAR; INTRAVENOUS at 16:17:00

## 2018-04-03 NOTE — PROGRESS NOTES
Patient states she's feeling slightly better today. Pain improved, nausea improved. States she cannot return for infusion tomorrow d/t lack of transportation.     Education Record    Learner:  Patient    Disease / Diagnosis:    Barriers / Limitations:  None

## 2018-04-04 ENCOUNTER — APPOINTMENT (OUTPATIENT)
Dept: HEMATOLOGY/ONCOLOGY | Facility: HOSPITAL | Age: 68
End: 2018-04-04
Attending: INTERNAL MEDICINE
Payer: MEDICARE

## 2018-04-05 ENCOUNTER — OFFICE VISIT (OUTPATIENT)
Dept: HEMATOLOGY/ONCOLOGY | Facility: HOSPITAL | Age: 68
End: 2018-04-05
Attending: INTERNAL MEDICINE
Payer: MEDICARE

## 2018-04-05 ENCOUNTER — TELEPHONE (OUTPATIENT)
Dept: RADIATION ONCOLOGY | Facility: HOSPITAL | Age: 68
End: 2018-04-05

## 2018-04-05 ENCOUNTER — APPOINTMENT (OUTPATIENT)
Dept: RADIATION ONCOLOGY | Facility: HOSPITAL | Age: 68
End: 2018-04-05
Attending: RADIOLOGY
Payer: MEDICARE

## 2018-04-05 VITALS
DIASTOLIC BLOOD PRESSURE: 82 MMHG | RESPIRATION RATE: 18 BRPM | OXYGEN SATURATION: 98 % | HEART RATE: 61 BPM | TEMPERATURE: 98 F | SYSTOLIC BLOOD PRESSURE: 128 MMHG | BODY MASS INDEX: 25.44 KG/M2 | HEIGHT: 62.01 IN | WEIGHT: 140 LBS

## 2018-04-05 DIAGNOSIS — C32.9 CANCER OF LARYNX (HCC): Primary | ICD-10-CM

## 2018-04-05 PROCEDURE — 96361 HYDRATE IV INFUSION ADD-ON: CPT

## 2018-04-05 PROCEDURE — 99214 OFFICE O/P EST MOD 30 MIN: CPT | Performed by: INTERNAL MEDICINE

## 2018-04-05 PROCEDURE — 96374 THER/PROPH/DIAG INJ IV PUSH: CPT

## 2018-04-05 RX ORDER — FLUCONAZOLE 40 MG/ML
100 POWDER, FOR SUSPENSION ORAL DAILY
Qty: 35 ML | Refills: 0 | Status: SHIPPED | OUTPATIENT
Start: 2018-04-05 | End: 2018-04-19

## 2018-04-05 RX ORDER — HYDROMORPHONE HYDROCHLORIDE 1 MG/ML
0.5 INJECTION, SOLUTION INTRAMUSCULAR; INTRAVENOUS; SUBCUTANEOUS EVERY 2 HOUR PRN
Status: CANCELLED
Start: 2018-04-05

## 2018-04-05 RX ORDER — FENTANYL 12 UG/H
1 PATCH TRANSDERMAL
Qty: 10 PATCH | Refills: 0 | Status: SHIPPED | OUTPATIENT
Start: 2018-04-05 | End: 2018-05-25

## 2018-04-05 NOTE — PATIENT INSTRUCTIONS
Education Record    Learner:  Patient    Disease / Diagnosis:CANCER OF LARYNX    Barriers / Limitations:  None   Comments:    Method:  Brief focused   Comments:    General Topics:  Medication and Plan of care reviewed   Comments:  PT HERE FOR IV ZOFRAN AND

## 2018-04-05 NOTE — TELEPHONE ENCOUNTER
TC to patient for condition update & to discuss resuming RT. Patient reports she is still having significant pain & nausea despite receiving daily hydration & pain medication. States she had a long discussion with Dr Nicole Pena about her treatment today.  Remains

## 2018-04-05 NOTE — PROGRESS NOTES
Cancer Center Progress Note    Problem List:      Patient Active Problem List:     Cancer of larynx [facility])     Palliative care by specialist     Pain     constipation     Dehydration      Interim History:    The patient has been getting primary radiation th and rhythm. Abdomen: Soft, non tender with good bowel sounds. Extremities: No edema. No calf tenderness. Lymphatics: There is no palpable lymphadenopathy throughout in the cervical, supraclavicular, or axillary regions.       Labs:       Lab Results  Co

## 2018-04-06 ENCOUNTER — OFFICE VISIT (OUTPATIENT)
Dept: HEMATOLOGY/ONCOLOGY | Facility: HOSPITAL | Age: 68
End: 2018-04-06
Attending: INTERNAL MEDICINE
Payer: MEDICARE

## 2018-04-06 VITALS
BODY MASS INDEX: 24.62 KG/M2 | SYSTOLIC BLOOD PRESSURE: 115 MMHG | DIASTOLIC BLOOD PRESSURE: 70 MMHG | HEART RATE: 61 BPM | RESPIRATION RATE: 18 BRPM | HEIGHT: 62.01 IN | WEIGHT: 135.5 LBS | OXYGEN SATURATION: 100 % | TEMPERATURE: 98 F

## 2018-04-06 DIAGNOSIS — R52 PAIN: Primary | ICD-10-CM

## 2018-04-06 DIAGNOSIS — R52 PAIN: ICD-10-CM

## 2018-04-06 DIAGNOSIS — F41.9 ANXIETY: ICD-10-CM

## 2018-04-06 DIAGNOSIS — K59.03 CONSTIPATION DUE TO OPIOID THERAPY: ICD-10-CM

## 2018-04-06 DIAGNOSIS — C32.9 CANCER OF LARYNX (HCC): Primary | ICD-10-CM

## 2018-04-06 DIAGNOSIS — C32.9 LARYNX CANCER (HCC): ICD-10-CM

## 2018-04-06 DIAGNOSIS — E86.0 DEHYDRATION: ICD-10-CM

## 2018-04-06 DIAGNOSIS — T40.2X5A CONSTIPATION DUE TO OPIOID THERAPY: ICD-10-CM

## 2018-04-06 PROCEDURE — 96360 HYDRATION IV INFUSION INIT: CPT

## 2018-04-06 PROCEDURE — 96374 THER/PROPH/DIAG INJ IV PUSH: CPT

## 2018-04-06 PROCEDURE — 99214 OFFICE O/P EST MOD 30 MIN: CPT | Performed by: NURSE PRACTITIONER

## 2018-04-06 PROCEDURE — 96361 HYDRATE IV INFUSION ADD-ON: CPT

## 2018-04-06 RX ORDER — HYDROMORPHONE HYDROCHLORIDE 1 MG/ML
0.5 INJECTION, SOLUTION INTRAMUSCULAR; INTRAVENOUS; SUBCUTANEOUS EVERY 2 HOUR PRN
Status: CANCELLED
Start: 2018-04-06

## 2018-04-06 RX ORDER — IBUPROFEN 200 MG
400 TABLET ORAL EVERY 6 HOURS PRN
Status: CANCELLED
Start: 2018-04-07

## 2018-04-06 RX ORDER — IBUPROFEN 200 MG
400 TABLET ORAL EVERY 6 HOURS PRN
Status: DISCONTINUED | OUTPATIENT
Start: 2018-04-07 | End: 2018-04-07

## 2018-04-06 RX ORDER — IBUPROFEN 200 MG
400 TABLET ORAL EVERY 6 HOURS PRN
Status: DISCONTINUED | OUTPATIENT
Start: 2018-04-07 | End: 2018-04-08

## 2018-04-06 RX ORDER — KETOROLAC TROMETHAMINE 30 MG/ML
15 INJECTION, SOLUTION INTRAMUSCULAR; INTRAVENOUS ONCE
Status: COMPLETED | OUTPATIENT
Start: 2018-04-06 | End: 2018-04-06

## 2018-04-06 RX ORDER — KETOROLAC TROMETHAMINE 30 MG/ML
15 INJECTION, SOLUTION INTRAMUSCULAR; INTRAVENOUS ONCE
Status: CANCELLED
Start: 2018-04-06 | End: 2018-04-06

## 2018-04-06 RX ADMIN — KETOROLAC TROMETHAMINE 15 MG: 30 INJECTION, SOLUTION INTRAMUSCULAR; INTRAVENOUS at 14:43:00

## 2018-04-06 NOTE — PROGRESS NOTES
Palliative Care Follow up Note    Patient Name: Manda Johnson   YOB: 1950   Medical Record Number: [id number]   CSN: [id number]   Date of visit: 4/6/2018       Chief Complaint/Reason for Visit:  Palliative follow up     History o mouth daily. , Disp: 35 mL, Rfl: 0  •  fentaNYL 12 MCG/HR Transdermal Patch 72 Hr, Place 1 patch onto the skin every third day., Disp: 10 patch, Rfl: 0  •  Polyethylene Glycol 3350 (MIRALAX) Oral Powder, Take 17 g by mouth 2 (two) times daily. , Disp: , Rfl: Directives Discussed and Completed:    No   POLST Discussed and Completed:   No    Palliative Care:  Patient continues to complain of pain but isn't using any regular pain meds. She is happy with toradol when given in clinic.   She should benefit from ibup

## 2018-04-06 NOTE — PROGRESS NOTES
Education Record    Learner:  Patient    Disease / Diagnosis:CANCER OF LARYNX , PAIN, AND DIFFICULTY SWALLOWING    Barriers / Limitations:  None   Comments:    Method:  Brief focused   Comments:    General Topics:  Medication and Plan of care reviewed   Co

## 2018-04-06 NOTE — PROGRESS NOTES
NUTRITION F/U NOTE: Noted pt RT on hold as per side effects. RD continued available for support prn.

## 2018-04-07 ENCOUNTER — OFFICE VISIT (OUTPATIENT)
Dept: HEMATOLOGY/ONCOLOGY | Facility: HOSPITAL | Age: 68
End: 2018-04-07
Attending: INTERNAL MEDICINE
Payer: MEDICARE

## 2018-04-07 VITALS
OXYGEN SATURATION: 100 % | SYSTOLIC BLOOD PRESSURE: 119 MMHG | RESPIRATION RATE: 18 BRPM | HEART RATE: 65 BPM | TEMPERATURE: 98 F | DIASTOLIC BLOOD PRESSURE: 69 MMHG

## 2018-04-07 DIAGNOSIS — C32.9 CANCER OF LARYNX (HCC): Primary | ICD-10-CM

## 2018-04-07 DIAGNOSIS — R52 PAIN: ICD-10-CM

## 2018-04-07 PROCEDURE — 96360 HYDRATION IV INFUSION INIT: CPT

## 2018-04-08 ENCOUNTER — OFFICE VISIT (OUTPATIENT)
Dept: HEMATOLOGY/ONCOLOGY | Facility: HOSPITAL | Age: 68
End: 2018-04-08
Attending: INTERNAL MEDICINE
Payer: MEDICARE

## 2018-04-08 VITALS
TEMPERATURE: 98 F | SYSTOLIC BLOOD PRESSURE: 121 MMHG | RESPIRATION RATE: 18 BRPM | OXYGEN SATURATION: 99 % | DIASTOLIC BLOOD PRESSURE: 76 MMHG | HEART RATE: 99 BPM

## 2018-04-08 DIAGNOSIS — C32.9 CANCER OF LARYNX (HCC): Primary | ICD-10-CM

## 2018-04-08 DIAGNOSIS — R52 PAIN: ICD-10-CM

## 2018-04-08 PROCEDURE — 96360 HYDRATION IV INFUSION INIT: CPT

## 2018-04-08 PROCEDURE — 96361 HYDRATE IV INFUSION ADD-ON: CPT

## 2018-04-09 ENCOUNTER — TELEPHONE (OUTPATIENT)
Dept: RADIATION ONCOLOGY | Age: 68
End: 2018-04-09

## 2018-04-09 ENCOUNTER — SOCIAL WORK SERVICES (OUTPATIENT)
Dept: HEMATOLOGY/ONCOLOGY | Facility: HOSPITAL | Age: 68
End: 2018-04-09

## 2018-04-09 ENCOUNTER — TELEPHONE (OUTPATIENT)
Dept: HEMATOLOGY/ONCOLOGY | Facility: HOSPITAL | Age: 68
End: 2018-04-09

## 2018-04-09 ENCOUNTER — APPOINTMENT (OUTPATIENT)
Dept: RADIATION ONCOLOGY | Facility: HOSPITAL | Age: 68
End: 2018-04-09
Attending: RADIOLOGY
Payer: MEDICARE

## 2018-04-09 NOTE — TELEPHONE ENCOUNTER
Called patient this morning , she was to return to us in [facility] today for RT. Pt stated because of transportation issues, she is unable to come in today for appointment.   I told patient to please try her hardest to come in tomorrow to discuss our n

## 2018-04-09 NOTE — TELEPHONE ENCOUNTER
Patient states she does not feel good and she will call when she feels ready to do treatment.  Dr Nito Alvarado informed

## 2018-04-09 NOTE — PROGRESS NOTES
At [facility]' request, LACY called patient's daughter Cinthia Allen to discuss transportation for her mother's treatment visits. Khushboo asked that the resource be emailed to her at [email or web address] because she is at [employer].     LACY did so, an

## 2018-04-10 ENCOUNTER — TELEPHONE (OUTPATIENT)
Dept: RADIATION ONCOLOGY | Age: 68
End: 2018-04-10

## 2018-04-10 ENCOUNTER — TELEPHONE (OUTPATIENT)
Dept: HEMATOLOGY/ONCOLOGY | Facility: HOSPITAL | Age: 68
End: 2018-04-10

## 2018-04-10 NOTE — TELEPHONE ENCOUNTER
Attempted to call patient to check on her as she continues to cancel RT due to symptoms. She has not received IV hydration since Friday,  Message left for daughter, Saintclair Killer to have patient call regarding plan and how she is feeling.     Let daughter know t

## 2018-04-10 NOTE — TELEPHONE ENCOUNTER
Made telephone call to patient asking her if she was coming in today to speak to Dr. Munira Powell. She stated she is still at home resting. I asked her if she is continuing to have pain.   She stated she is feeling a better, eating has improved, she is moving her

## 2018-04-11 ENCOUNTER — TELEPHONE (OUTPATIENT)
Dept: RADIATION ONCOLOGY | Facility: HOSPITAL | Age: 68
End: 2018-04-11

## 2018-04-11 NOTE — TELEPHONE ENCOUNTER
TC received from Annelise Turner, Nurse Navigator at [facility] . RN states patient called their facility seeking an appointment for treatment there.  During their conversation, patient reported she \"stopped her radiation treatment with Dr Florencia Ponce a few weeks ago

## 2018-04-13 ENCOUNTER — TELEPHONE (OUTPATIENT)
Dept: RADIATION ONCOLOGY | Facility: HOSPITAL | Age: 68
End: 2018-04-13

## 2018-04-13 NOTE — TELEPHONE ENCOUNTER
TC to patient for a condition update & to discuss resuming radiation treatments. Patient reports pain is a little better & she is \"finally able to take a little water & soup\". She states she is using her pain medication as directed.  She reports feeling e

## 2018-04-16 ENCOUNTER — APPOINTMENT (OUTPATIENT)
Dept: RADIATION ONCOLOGY | Facility: HOSPITAL | Age: 68
End: 2018-04-16
Attending: RADIOLOGY
Payer: MEDICARE

## 2018-04-17 ENCOUNTER — TELEPHONE (OUTPATIENT)
Dept: RADIATION ONCOLOGY | Facility: HOSPITAL | Age: 68
End: 2018-04-17

## 2018-04-17 ENCOUNTER — HOSPITAL ENCOUNTER (OUTPATIENT)
Dept: RADIATION ONCOLOGY | Facility: HOSPITAL | Age: 68
Discharge: HOME | End: 2018-04-17
Attending: RADIOLOGY
Payer: MEDICARE

## 2018-04-17 VITALS
OXYGEN SATURATION: 97 % | WEIGHT: 134 LBS | SYSTOLIC BLOOD PRESSURE: 150 MMHG | RESPIRATION RATE: 17 BRPM | DIASTOLIC BLOOD PRESSURE: 47 MMHG | HEART RATE: 80 BPM | BODY MASS INDEX: 25 KG/M2

## 2018-04-17 DIAGNOSIS — C32.9 CANCER OF LARYNX (HCC): ICD-10-CM

## 2018-04-17 PROCEDURE — 99213 OFFICE O/P EST LOW 20 MIN: CPT

## 2018-04-17 RX ORDER — PILOCARPINE HYDROCHLORIDE 5 MG/1
5 TABLET, FILM COATED ORAL 2 TIMES DAILY
COMMUNITY
End: 2018-10-29

## 2018-04-17 NOTE — PROGRESS NOTES
Nursing Follow-Up Note    Patient: Daniel Rivers  YOB: 1950  Age: 75year old  Radiation Oncologist: Dr. Jude Paige  Referring Physician: Jong Browning  Chief Complaint: Patient presents with:   Follow - Up    Date: 4/17/2018

## 2018-04-17 NOTE — TELEPHONE ENCOUNTER
TC to patient. She is doing ok. Has been resistant to resuming RT despite multiple calls from my office and nurses. She said she would call her daughter, figure out transportation today, and come in to further discuss.   Her treatment is absolutely i

## 2018-04-18 PROCEDURE — 77300 RADIATION THERAPY DOSE PLAN: CPT | Performed by: RADIOLOGY

## 2018-04-19 ENCOUNTER — TELEPHONE (OUTPATIENT)
Dept: RADIATION ONCOLOGY | Facility: HOSPITAL | Age: 68
End: 2018-04-19

## 2018-04-19 ENCOUNTER — SOCIAL WORK SERVICES (OUTPATIENT)
Dept: HEMATOLOGY/ONCOLOGY | Facility: HOSPITAL | Age: 68
End: 2018-04-19

## 2018-04-19 NOTE — PROGRESS NOTES
SW received call from patient stating that she needs to stop treatment because she has too many problems. She states that she is awaiting a return call from Dr. Jeet Obando.     Patient advises that she received a bill for services she did not have, and hasn't be

## 2018-04-19 NOTE — TELEPHONE ENCOUNTER
TC received from patient regarding her billing statement, she has questions/concerns regarding the dates & charges. We had instructed this patient to call Patient Accounts regarding her bill when she was here for an office visit 4/17.  We instructed that th

## 2018-04-19 NOTE — TELEPHONE ENCOUNTER
TC to patient to give her additional contact information. Per Antionette Gone in billing, patient should call [phone] with her billing questions. Patient states she will call & was able to repeat phone number back to me.

## 2018-04-23 ENCOUNTER — TELEPHONE (OUTPATIENT)
Dept: RADIATION ONCOLOGY | Facility: HOSPITAL | Age: 68
End: 2018-04-23

## 2018-04-23 ENCOUNTER — APPOINTMENT (OUTPATIENT)
Dept: RADIATION ONCOLOGY | Facility: HOSPITAL | Age: 68
End: 2018-04-23
Attending: RADIOLOGY
Payer: MEDICARE

## 2018-04-23 NOTE — TELEPHONE ENCOUNTER
Received TC from patient, asking to talk to Dr Etelvina Hernandez. States she has more questions regarding \"seeing ENT & starting radiation\". I offered to address her questions/concerns but patient states she wants to speak directly to Dr Etelvina Hernandez.

## 2018-04-24 ENCOUNTER — SOCIAL WORK SERVICES (OUTPATIENT)
Dept: HEMATOLOGY/ONCOLOGY | Facility: HOSPITAL | Age: 68
End: 2018-04-24

## 2018-04-24 ENCOUNTER — TELEPHONE (OUTPATIENT)
Dept: RADIATION ONCOLOGY | Facility: HOSPITAL | Age: 68
End: 2018-04-24

## 2018-04-24 NOTE — TELEPHONE ENCOUNTER
TC to patient. I explained again that she needs to resume radiation immediately. Her social circumstances, to her, do not allow her to come in for treatment. She feels she needs to prioritize other matters.   SW is working with her on transportation an
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
## 2018-05-01 ENCOUNTER — HOSPITAL ENCOUNTER (OUTPATIENT)
Dept: RADIATION ONCOLOGY | Facility: HOSPITAL | Age: 68
Discharge: HOME | End: 2018-05-01
Attending: RADIOLOGY
Payer: MEDICARE

## 2018-05-14 ENCOUNTER — HOSPITAL ENCOUNTER (OUTPATIENT)
Dept: RADIATION ONCOLOGY | Facility: HOSPITAL | Age: 68
Discharge: HOME | End: 2018-05-14
Attending: RADIOLOGY
Payer: MEDICARE

## 2018-05-14 VITALS
DIASTOLIC BLOOD PRESSURE: 71 MMHG | WEIGHT: 134.19 LBS | HEART RATE: 68 BPM | SYSTOLIC BLOOD PRESSURE: 126 MMHG | RESPIRATION RATE: 17 BRPM | OXYGEN SATURATION: 98 % | BODY MASS INDEX: 25 KG/M2

## 2018-05-14 DIAGNOSIS — C32.9 CANCER OF LARYNX (HCC): ICD-10-CM

## 2018-05-14 PROCEDURE — 77412 RADIATION TX DELIVERY LVL 3: CPT | Performed by: RADIOLOGY

## 2018-05-14 PROCEDURE — 77280 THER RAD SIMULAJ FIELD SMPL: CPT | Performed by: RADIOLOGY

## 2018-05-14 PROCEDURE — 99213 OFFICE O/P EST LOW 20 MIN: CPT

## 2018-05-14 NOTE — PROGRESS NOTES
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 She will not agree to a G tube or consideration of laryngectomy. Lanney Leyden, MD  Radiation Oncology  [email or web address]  Spectralink:  [phone]      CC: Dr. Courtney Short, Augustin PARNELL - Palliative Care, Dr. Isrrael Brown

## 2018-05-14 NOTE — PROGRESS NOTES
Nursing Follow-Up Note    Patient: Martin Woods  YOB: 1950  Age: 75year old  Radiation Oncologist: Dr. Marlo Mcconnell  Referring Physician: Sondra Langford  Chief Complaint: Patient presents with:   Follow - Up    Date: 5/14/2018

## 2018-05-15 PROCEDURE — 77412 RADIATION TX DELIVERY LVL 3: CPT | Performed by: RADIOLOGY

## 2018-05-15 PROCEDURE — 77387 GUIDANCE FOR RADJ TX DLVR: CPT | Performed by: RADIOLOGY

## 2018-05-16 PROCEDURE — 77412 RADIATION TX DELIVERY LVL 3: CPT | Performed by: RADIOLOGY

## 2018-05-16 PROCEDURE — 77387 GUIDANCE FOR RADJ TX DLVR: CPT | Performed by: RADIOLOGY

## 2018-05-16 PROCEDURE — 77331 SPECIAL RADIATION DOSIMETRY: CPT | Performed by: RADIOLOGY

## 2018-05-17 PROCEDURE — 77387 GUIDANCE FOR RADJ TX DLVR: CPT | Performed by: RADIOLOGY

## 2018-05-17 PROCEDURE — 77412 RADIATION TX DELIVERY LVL 3: CPT | Performed by: RADIOLOGY

## 2018-05-18 PROCEDURE — 77336 RADIATION PHYSICS CONSULT: CPT | Performed by: RADIOLOGY

## 2018-05-18 PROCEDURE — 77412 RADIATION TX DELIVERY LVL 3: CPT | Performed by: RADIOLOGY

## 2018-05-18 PROCEDURE — 77387 GUIDANCE FOR RADJ TX DLVR: CPT | Performed by: RADIOLOGY

## 2018-05-21 ENCOUNTER — HOSPITAL ENCOUNTER (OUTPATIENT)
Dept: RADIATION ONCOLOGY | Facility: HOSPITAL | Age: 68
Discharge: HOME | End: 2018-05-21
Attending: RADIOLOGY
Payer: MEDICARE

## 2018-05-21 ENCOUNTER — APPOINTMENT (OUTPATIENT)
Dept: HEMATOLOGY/ONCOLOGY | Facility: HOSPITAL | Age: 68
End: 2018-05-21
Attending: INTERNAL MEDICINE
Payer: MEDICARE

## 2018-05-21 VITALS
DIASTOLIC BLOOD PRESSURE: 76 MMHG | HEART RATE: 66 BPM | SYSTOLIC BLOOD PRESSURE: 116 MMHG | BODY MASS INDEX: 24 KG/M2 | WEIGHT: 133.81 LBS | RESPIRATION RATE: 16 BRPM | OXYGEN SATURATION: 98 %

## 2018-05-21 DIAGNOSIS — C32.0 PRIMARY MALIGNANT NEOPLASM OF GLOTTIS (HCC): Primary | ICD-10-CM

## 2018-05-21 NOTE — PROGRESS NOTES
[facility] Radiation Treatment Management Note 1-5    Patient:  Trinidad Thompson  Age:  75year old  Visit Diagnosis:    1.  Primary malignant neoplasm of glottis [facility])      Primary Rad/Onc:  Dr. Toshia Hutchins

## 2018-05-22 PROCEDURE — 77412 RADIATION TX DELIVERY LVL 3: CPT | Performed by: RADIOLOGY

## 2018-05-22 PROCEDURE — 77387 GUIDANCE FOR RADJ TX DLVR: CPT | Performed by: RADIOLOGY

## 2018-05-24 PROCEDURE — 77412 RADIATION TX DELIVERY LVL 3: CPT | Performed by: RADIOLOGY

## 2018-05-24 PROCEDURE — 77387 GUIDANCE FOR RADJ TX DLVR: CPT | Performed by: RADIOLOGY

## 2018-05-25 ENCOUNTER — OFFICE VISIT (OUTPATIENT)
Dept: HEMATOLOGY/ONCOLOGY | Facility: HOSPITAL | Age: 68
End: 2018-05-25
Attending: INTERNAL MEDICINE
Payer: MEDICARE

## 2018-05-25 DIAGNOSIS — G89.3 NEOPLASM RELATED PAIN: Primary | ICD-10-CM

## 2018-05-25 DIAGNOSIS — Z51.5 ENCOUNTER FOR QUALITY OF LIFE PALLIATIVE CARE: ICD-10-CM

## 2018-05-25 PROCEDURE — 77412 RADIATION TX DELIVERY LVL 3: CPT | Performed by: RADIOLOGY

## 2018-05-25 PROCEDURE — 99213 OFFICE O/P EST LOW 20 MIN: CPT | Performed by: NURSE PRACTITIONER

## 2018-05-25 PROCEDURE — 77387 GUIDANCE FOR RADJ TX DLVR: CPT | Performed by: RADIOLOGY

## 2018-05-25 NOTE — PROGRESS NOTES
Palliative Care Follow up Note    Patient Name: Lorraine Hunt   YOB: 1950   Medical Record Number: [id number]   CSN: [id number]   Date of visit: 5/25/2018       Chief Complaint/Reason for Visit:  Palliative follow up      History Powder, Take 17 g by mouth 2 (two) times daily. , Disp: , Rfl:   •  magnesium citrate 1.745 GM/30ML Oral Solution, Take 296 mL by mouth once., Disp: , Rfl:   •  Senna 8.6 MG Oral Tab, Take 8.6 mg by mouth 2 (two) times daily. , Disp: , Rfl:   •  HYDROcodone- spent in counseling and coordination of care for symptom management.         Electronically Signed by:  SOHEILA Bess, EMMETTP-BC  [facility] Outpatient Palliative Nurse Practitioner

## 2018-05-29 PROCEDURE — 77412 RADIATION TX DELIVERY LVL 3: CPT | Performed by: RADIOLOGY

## 2018-05-29 PROCEDURE — 77387 GUIDANCE FOR RADJ TX DLVR: CPT | Performed by: RADIOLOGY

## 2018-05-30 ENCOUNTER — HOSPITAL ENCOUNTER (OUTPATIENT)
Dept: RADIATION ONCOLOGY | Facility: HOSPITAL | Age: 68
End: 2018-05-30
Attending: RADIOLOGY
Payer: MEDICARE

## 2018-05-30 ENCOUNTER — MED REC SCAN ONLY (OUTPATIENT)
Dept: INTERNAL MEDICINE CLINIC | Facility: CLINIC | Age: 68
End: 2018-05-30

## 2018-05-30 PROCEDURE — 77387 GUIDANCE FOR RADJ TX DLVR: CPT | Performed by: RADIOLOGY

## 2018-05-30 PROCEDURE — 77412 RADIATION TX DELIVERY LVL 3: CPT | Performed by: RADIOLOGY

## 2018-05-31 ENCOUNTER — OFFICE VISIT (OUTPATIENT)
Dept: HEMATOLOGY/ONCOLOGY | Facility: HOSPITAL | Age: 68
End: 2018-05-31
Attending: INTERNAL MEDICINE
Payer: MEDICARE

## 2018-05-31 ENCOUNTER — HOSPITAL ENCOUNTER (OUTPATIENT)
Dept: RADIATION ONCOLOGY | Facility: HOSPITAL | Age: 68
Discharge: HOME | End: 2018-05-31
Attending: RADIOLOGY
Payer: MEDICARE

## 2018-05-31 VITALS
SYSTOLIC BLOOD PRESSURE: 114 MMHG | WEIGHT: 131.81 LBS | BODY MASS INDEX: 24 KG/M2 | DIASTOLIC BLOOD PRESSURE: 71 MMHG | HEART RATE: 75 BPM

## 2018-05-31 DIAGNOSIS — C32.9 CANCER OF LARYNX (HCC): Primary | ICD-10-CM

## 2018-05-31 DIAGNOSIS — C32.0 PRIMARY MALIGNANT NEOPLASM OF GLOTTIS (HCC): Primary | ICD-10-CM

## 2018-05-31 PROCEDURE — 77387 GUIDANCE FOR RADJ TX DLVR: CPT | Performed by: RADIOLOGY

## 2018-05-31 PROCEDURE — 77412 RADIATION TX DELIVERY LVL 3: CPT | Performed by: RADIOLOGY

## 2018-05-31 RX ORDER — IBUPROFEN 200 MG
400 TABLET ORAL EVERY 6 HOURS PRN
Status: CANCELLED
Start: 2018-06-01

## 2018-05-31 RX ORDER — HYDROMORPHONE HYDROCHLORIDE 1 MG/ML
0.5 INJECTION, SOLUTION INTRAMUSCULAR; INTRAVENOUS; SUBCUTANEOUS EVERY 2 HOUR PRN
Status: CANCELLED
Start: 2018-05-31

## 2018-05-31 NOTE — PROGRESS NOTES
[facility] Radiation Treatment Management Note 11-15    Patient:  Trinidad Thompson  Age:  75year old  Visit Diagnosis:    1.  Primary malignant neoplasm of glottis [facility])      Primary Rad/Onc:  Dr. Rebecca Lennon

## 2018-06-01 ENCOUNTER — HOSPITAL ENCOUNTER (OUTPATIENT)
Dept: RADIATION ONCOLOGY | Facility: HOSPITAL | Age: 68
Discharge: HOME | End: 2018-06-01
Attending: RADIOLOGY
Payer: MEDICARE

## 2018-06-01 ENCOUNTER — OFFICE VISIT (OUTPATIENT)
Dept: HEMATOLOGY/ONCOLOGY | Facility: HOSPITAL | Age: 68
End: 2018-06-01
Attending: INTERNAL MEDICINE
Payer: MEDICARE

## 2018-06-01 DIAGNOSIS — G89.3 NEOPLASM RELATED PAIN: Primary | ICD-10-CM

## 2018-06-01 DIAGNOSIS — F41.9 ANXIETY: ICD-10-CM

## 2018-06-01 PROCEDURE — 77336 RADIATION PHYSICS CONSULT: CPT | Performed by: RADIOLOGY

## 2018-06-01 PROCEDURE — 77412 RADIATION TX DELIVERY LVL 3: CPT | Performed by: RADIOLOGY

## 2018-06-01 PROCEDURE — 77387 GUIDANCE FOR RADJ TX DLVR: CPT | Performed by: RADIOLOGY

## 2018-06-01 PROCEDURE — 99213 OFFICE O/P EST LOW 20 MIN: CPT | Performed by: NURSE PRACTITIONER

## 2018-06-01 NOTE — PROGRESS NOTES
Palliative Care Follow up Note    Patient Name: Tay Padron   YOB: 1950   Medical Record Number: [id number]   [facility]: [id number]   Date of visit: 6/1/2018       Chief Complaint/Reason for Visit:  Palliative follow up     History o mouth once., Disp: , Rfl:   •  Senna 8.6 MG Oral Tab, Take 8.6 mg by mouth 2 (two) times daily. , Disp: , Rfl:   •  Meclizine HCl 12.5 MG Oral Tab, Take 1 tablet (12.5 mg total) by mouth 3 (three) times daily as needed. , Disp: 60 tablet, Rfl: 0  •  [place]

## 2018-06-04 ENCOUNTER — SOCIAL WORK SERVICES (OUTPATIENT)
Dept: HEMATOLOGY/ONCOLOGY | Facility: HOSPITAL | Age: 68
End: 2018-06-04

## 2018-06-04 ENCOUNTER — HOSPITAL ENCOUNTER (OUTPATIENT)
Dept: RADIATION ONCOLOGY | Facility: HOSPITAL | Age: 68
Discharge: HOME | End: 2018-06-04
Attending: RADIOLOGY
Payer: MEDICARE

## 2018-06-04 VITALS
HEART RATE: 65 BPM | DIASTOLIC BLOOD PRESSURE: 66 MMHG | SYSTOLIC BLOOD PRESSURE: 112 MMHG | BODY MASS INDEX: 24 KG/M2 | WEIGHT: 133 LBS

## 2018-06-04 DIAGNOSIS — C32.0 PRIMARY MALIGNANT NEOPLASM OF GLOTTIS (HCC): Primary | ICD-10-CM

## 2018-06-04 PROCEDURE — 77412 RADIATION TX DELIVERY LVL 3: CPT | Performed by: RADIOLOGY

## 2018-06-04 PROCEDURE — 77387 GUIDANCE FOR RADJ TX DLVR: CPT | Performed by: RADIOLOGY

## 2018-06-04 NOTE — PROGRESS NOTES
[facility] Radiation Treatment Management Note 11-15    Patient:  Phong Anderson  Age:  75year old  Visit Diagnosis:    1.  Primary malignant neoplasm of glottis [facility])      Primary Rad/Onc:  Dr. Laney Powell 60.328 kg   BSA (m2) 1.61 m2 1.6 m2 1.61 m2   /76 114/71 112/66   Pulse 66 75 65   Resp 16 - -   SpO2 98 - -   Pain Score 0 3 1   Some recent data might be hidden      Nursing Note:  Skin intact, denies pruritis   Mild discomfort with swallowing, imp

## 2018-06-05 ENCOUNTER — OFFICE VISIT (OUTPATIENT)
Dept: HEMATOLOGY/ONCOLOGY | Facility: HOSPITAL | Age: 68
End: 2018-06-05
Attending: INTERNAL MEDICINE
Payer: MEDICARE

## 2018-06-05 ENCOUNTER — TELEPHONE (OUTPATIENT)
Dept: HEMATOLOGY/ONCOLOGY | Facility: HOSPITAL | Age: 68
End: 2018-06-05

## 2018-06-05 DIAGNOSIS — R52 PAIN: Primary | ICD-10-CM

## 2018-06-05 DIAGNOSIS — C32.9 CANCER OF LARYNX (HCC): ICD-10-CM

## 2018-06-05 PROCEDURE — 77387 GUIDANCE FOR RADJ TX DLVR: CPT | Performed by: RADIOLOGY

## 2018-06-05 PROCEDURE — 96374 THER/PROPH/DIAG INJ IV PUSH: CPT

## 2018-06-05 PROCEDURE — 77412 RADIATION TX DELIVERY LVL 3: CPT | Performed by: RADIOLOGY

## 2018-06-05 RX ORDER — KETOROLAC TROMETHAMINE 30 MG/ML
15 INJECTION, SOLUTION INTRAMUSCULAR; INTRAVENOUS EVERY 6 HOURS PRN
Status: CANCELLED
Start: 2018-06-05

## 2018-06-05 RX ORDER — KETOROLAC TROMETHAMINE 30 MG/ML
15 INJECTION, SOLUTION INTRAMUSCULAR; INTRAVENOUS EVERY 6 HOURS PRN
Status: DISCONTINUED | OUTPATIENT
Start: 2018-06-05 | End: 2018-06-05

## 2018-06-05 RX ORDER — HYDROMORPHONE HYDROCHLORIDE 1 MG/ML
0.5 INJECTION, SOLUTION INTRAMUSCULAR; INTRAVENOUS; SUBCUTANEOUS EVERY 2 HOUR PRN
Status: CANCELLED
Start: 2018-06-05

## 2018-06-05 RX ADMIN — KETOROLAC TROMETHAMINE 15 MG: 30 INJECTION, SOLUTION INTRAMUSCULAR; INTRAVENOUS at 11:11:00

## 2018-06-05 NOTE — TELEPHONE ENCOUNTER
Patient called the pain is starting to intensify in her throat. She has 1 week left of RT and the pain is now becoming severe 10/10. She is eating and drinking at this point. She doesn't want any opiates as they cause nausea.   She is declining hydration

## 2018-06-06 ENCOUNTER — OFFICE VISIT (OUTPATIENT)
Dept: HEMATOLOGY/ONCOLOGY | Facility: HOSPITAL | Age: 68
End: 2018-06-06
Attending: INTERNAL MEDICINE
Payer: MEDICARE

## 2018-06-06 VITALS
RESPIRATION RATE: 18 BRPM | HEIGHT: 62.01 IN | HEART RATE: 64 BPM | WEIGHT: 131.81 LBS | SYSTOLIC BLOOD PRESSURE: 112 MMHG | DIASTOLIC BLOOD PRESSURE: 74 MMHG | BODY MASS INDEX: 23.95 KG/M2 | OXYGEN SATURATION: 98 % | TEMPERATURE: 97 F

## 2018-06-06 DIAGNOSIS — C32.9 CANCER OF LARYNX (HCC): Primary | ICD-10-CM

## 2018-06-06 DIAGNOSIS — R52 PAIN: Primary | ICD-10-CM

## 2018-06-06 DIAGNOSIS — R52 PAIN: ICD-10-CM

## 2018-06-06 PROCEDURE — 96374 THER/PROPH/DIAG INJ IV PUSH: CPT

## 2018-06-06 PROCEDURE — 96361 HYDRATE IV INFUSION ADD-ON: CPT

## 2018-06-06 PROCEDURE — 99214 OFFICE O/P EST MOD 30 MIN: CPT | Performed by: NURSE PRACTITIONER

## 2018-06-06 PROCEDURE — 77412 RADIATION TX DELIVERY LVL 3: CPT | Performed by: RADIOLOGY

## 2018-06-06 PROCEDURE — 77387 GUIDANCE FOR RADJ TX DLVR: CPT | Performed by: RADIOLOGY

## 2018-06-06 RX ORDER — HYDROMORPHONE HYDROCHLORIDE 1 MG/ML
0.5 INJECTION, SOLUTION INTRAMUSCULAR; INTRAVENOUS; SUBCUTANEOUS EVERY 2 HOUR PRN
Status: CANCELLED
Start: 2018-06-06

## 2018-06-06 RX ORDER — KETOROLAC TROMETHAMINE 30 MG/ML
15 INJECTION, SOLUTION INTRAMUSCULAR; INTRAVENOUS ONCE
Status: COMPLETED | OUTPATIENT
Start: 2018-06-06 | End: 2018-06-06

## 2018-06-06 RX ORDER — KETOROLAC TROMETHAMINE 30 MG/ML
15 INJECTION, SOLUTION INTRAMUSCULAR; INTRAVENOUS EVERY 6 HOURS PRN
Status: CANCELLED
Start: 2018-06-06

## 2018-06-06 RX ADMIN — KETOROLAC TROMETHAMINE 15 MG: 30 INJECTION, SOLUTION INTRAMUSCULAR; INTRAVENOUS at 10:14:00

## 2018-06-06 NOTE — PROGRESS NOTES
Education Record    Learner:  Patient    Disease / Diagnosis:Pain management and IV hydration     Barriers / Limitations:  None   Comments:    Method:  Brief focused   Comments:    General Topics:  Infection, Medication, Pain, Precautions, Procedure, Side

## 2018-06-07 ENCOUNTER — OFFICE VISIT (OUTPATIENT)
Dept: HEMATOLOGY/ONCOLOGY | Facility: HOSPITAL | Age: 68
End: 2018-06-07
Attending: INTERNAL MEDICINE
Payer: MEDICARE

## 2018-06-07 VITALS
TEMPERATURE: 97 F | HEART RATE: 113 BPM | DIASTOLIC BLOOD PRESSURE: 65 MMHG | RESPIRATION RATE: 18 BRPM | SYSTOLIC BLOOD PRESSURE: 106 MMHG

## 2018-06-07 DIAGNOSIS — R52 PAIN: ICD-10-CM

## 2018-06-07 DIAGNOSIS — C32.9 CANCER OF LARYNX (HCC): Primary | ICD-10-CM

## 2018-06-07 PROCEDURE — 77412 RADIATION TX DELIVERY LVL 3: CPT | Performed by: RADIOLOGY

## 2018-06-07 PROCEDURE — 96360 HYDRATION IV INFUSION INIT: CPT

## 2018-06-07 PROCEDURE — 77387 GUIDANCE FOR RADJ TX DLVR: CPT | Performed by: RADIOLOGY

## 2018-06-07 PROCEDURE — 96361 HYDRATE IV INFUSION ADD-ON: CPT

## 2018-06-07 PROCEDURE — 96374 THER/PROPH/DIAG INJ IV PUSH: CPT

## 2018-06-07 RX ORDER — HYDROMORPHONE HYDROCHLORIDE 1 MG/ML
0.5 INJECTION, SOLUTION INTRAMUSCULAR; INTRAVENOUS; SUBCUTANEOUS EVERY 2 HOUR PRN
Status: CANCELLED
Start: 2018-06-07

## 2018-06-07 RX ORDER — KETOROLAC TROMETHAMINE 30 MG/ML
15 INJECTION, SOLUTION INTRAMUSCULAR; INTRAVENOUS EVERY 6 HOURS PRN
Status: CANCELLED
Start: 2018-06-07

## 2018-06-07 RX ORDER — KETOROLAC TROMETHAMINE 30 MG/ML
15 INJECTION, SOLUTION INTRAMUSCULAR; INTRAVENOUS ONCE
Status: COMPLETED | OUTPATIENT
Start: 2018-06-07 | End: 2018-06-07

## 2018-06-07 RX ADMIN — KETOROLAC TROMETHAMINE 15 MG: 30 INJECTION, SOLUTION INTRAMUSCULAR; INTRAVENOUS at 10:09:00

## 2018-06-07 NOTE — PROGRESS NOTES
Palliative Care Follow up Note    Patient Name: Bard Nava   YOB: 1950   Medical Record Number: [id number]   CSN: [id number]   Date of visit: 6/7/2018       Chief Complaint/Reason for Visit:  Palliative follow up     History o Rfl:   •  magnesium citrate 1.745 GM/30ML Oral Solution, Take 296 mL by mouth once., Disp: , Rfl:   •  Senna 8.6 MG Oral Tab, Take 8.6 mg by mouth 2 (two) times daily. , Disp: , Rfl:   •  Meclizine HCl 12.5 MG Oral Tab, Take 1 tablet (12.5 mg total) by mout

## 2018-06-07 NOTE — PROGRESS NOTES
Pt here for fluids and pain medicine. Arrives Ambulating independently .     Modifications in dose or schedule: NO     Frequency of blood return and site check throughout administration: Prior to administration   Discharged to Home, accompanied by Adam Umanzor

## 2018-06-08 ENCOUNTER — OFFICE VISIT (OUTPATIENT)
Dept: HEMATOLOGY/ONCOLOGY | Facility: HOSPITAL | Age: 68
End: 2018-06-08
Attending: INTERNAL MEDICINE
Payer: MEDICARE

## 2018-06-08 ENCOUNTER — DOCUMENTATION ONLY (OUTPATIENT)
Dept: RADIATION ONCOLOGY | Facility: HOSPITAL | Age: 68
End: 2018-06-08

## 2018-06-08 VITALS
HEIGHT: 62.01 IN | RESPIRATION RATE: 18 BRPM | OXYGEN SATURATION: 99 % | BODY MASS INDEX: 24.53 KG/M2 | WEIGHT: 135 LBS | SYSTOLIC BLOOD PRESSURE: 138 MMHG | DIASTOLIC BLOOD PRESSURE: 83 MMHG | TEMPERATURE: 98 F | HEART RATE: 59 BPM

## 2018-06-08 DIAGNOSIS — C32.9 LARYNX CANCER (HCC): ICD-10-CM

## 2018-06-08 DIAGNOSIS — C32.9 CANCER OF LARYNX (HCC): Primary | ICD-10-CM

## 2018-06-08 DIAGNOSIS — C32.9 LARYNX CANCER (HCC): Primary | ICD-10-CM

## 2018-06-08 DIAGNOSIS — R52 PAIN: ICD-10-CM

## 2018-06-08 DIAGNOSIS — R52 PAIN: Primary | ICD-10-CM

## 2018-06-08 PROCEDURE — 96374 THER/PROPH/DIAG INJ IV PUSH: CPT

## 2018-06-08 PROCEDURE — 96361 HYDRATE IV INFUSION ADD-ON: CPT

## 2018-06-08 PROCEDURE — 77387 GUIDANCE FOR RADJ TX DLVR: CPT | Performed by: RADIOLOGY

## 2018-06-08 PROCEDURE — 77336 RADIATION PHYSICS CONSULT: CPT | Performed by: RADIOLOGY

## 2018-06-08 PROCEDURE — 77412 RADIATION TX DELIVERY LVL 3: CPT | Performed by: RADIOLOGY

## 2018-06-08 PROCEDURE — 99214 OFFICE O/P EST MOD 30 MIN: CPT | Performed by: NURSE PRACTITIONER

## 2018-06-08 RX ORDER — HYDROMORPHONE HYDROCHLORIDE 1 MG/ML
0.5 INJECTION, SOLUTION INTRAMUSCULAR; INTRAVENOUS; SUBCUTANEOUS EVERY 2 HOUR PRN
Status: CANCELLED
Start: 2018-06-08

## 2018-06-08 RX ORDER — KETOROLAC TROMETHAMINE 30 MG/ML
15 INJECTION, SOLUTION INTRAMUSCULAR; INTRAVENOUS EVERY 6 HOURS PRN
Status: CANCELLED
Start: 2018-06-08

## 2018-06-08 RX ORDER — ACETAMINOPHEN 500 MG
1000 TABLET ORAL EVERY 6 HOURS PRN
Qty: 120 TABLET | Refills: 0 | Status: SHIPPED | OUTPATIENT
Start: 2018-06-08 | End: 2018-06-25 | Stop reason: ALTCHOICE

## 2018-06-08 RX ORDER — OMEGA-3 FATTY ACIDS/FISH OIL 300-1000MG
400 CAPSULE ORAL EVERY 6 HOURS PRN
Qty: 120 CAPSULE | Refills: 0 | Status: SHIPPED | OUTPATIENT
Start: 2018-06-08 | End: 2018-06-25 | Stop reason: ALTCHOICE

## 2018-06-08 RX ORDER — KETOROLAC TROMETHAMINE 30 MG/ML
15 INJECTION, SOLUTION INTRAMUSCULAR; INTRAVENOUS EVERY 6 HOURS PRN
Status: DISCONTINUED | OUTPATIENT
Start: 2018-06-08 | End: 2018-06-08

## 2018-06-08 RX ADMIN — KETOROLAC TROMETHAMINE 15 MG: 30 INJECTION, SOLUTION INTRAMUSCULAR; INTRAVENOUS at 10:16:00

## 2018-06-08 NOTE — PROGRESS NOTES
Education Record    Learner:  Patient    Disease / Diagnosis: Cancer of the larynx/pain    Barriers / Limitations:  Language   Comments:    Method:  Brief focused   Comments:    General Topics:  Plan of care reviewed   Comments:    Outcome:  Shows Lelia

## 2018-06-08 NOTE — PATIENT INSTRUCTIONS
POST-RADIATION INSTRUCTIONS:   - CALL [phone] FOR A FOLLOW-UP WITH DR. WILKS IN ONE MONTH  - SIDE EFFECTS OF RADIATION WILL GRADUALLY SUBSIDE, IT MAY TAKE UP TO 2 WEEKS POST-RADIATION FOR YOU TO NOTICE CHANGES; SUCH AS A DECREASE IN YOUR FATIGUE CARLOSE

## 2018-06-08 NOTE — PATIENT INSTRUCTIONS
Take ibuprofen 400mg (2 tabs) every 6 hours if needed for pain  If ibuprofen isn't helpful, may alternate with acetaminophen 1000mg (2 tabs) every 6 hours if needed for pain

## 2018-06-08 NOTE — PROGRESS NOTES
Palliative Care Follow up Note    Patient Name: Mike Daily   YOB: 1950   Medical Record Number: [id number]   CSN: [id number]   Date of visit: 6/8/2018       Chief Complaint/Reason for Visit:  Patient presents with:   Follow - U Solution, MIX EQUAL PARTS BENADRYL AND MAALOX OTC.  TAKE 10 CC PO (DO NOT SWISH) PRN PAIN UP TO 5X PER DAY., Disp: 100 mL, Rfl: 1  •  Pilocarpine HCl 5 MG Oral Tab, Take 5 mg by mouth 2 (two) times daily. , Disp: , Rfl:   •  Polyethylene Glycol 3350 (Wyatt Miryam counseling and coordination of care for symptom management.         Electronically Signed by:  SOHEILA Tran, JÚNIOR-BC  [facility] Outpatient Palliative Nurse Practitioner

## 2018-06-11 ENCOUNTER — OFFICE VISIT (OUTPATIENT)
Dept: HEMATOLOGY/ONCOLOGY | Facility: HOSPITAL | Age: 68
End: 2018-06-11
Attending: INTERNAL MEDICINE
Payer: MEDICARE

## 2018-06-11 ENCOUNTER — HOSPITAL ENCOUNTER (OUTPATIENT)
Dept: RADIATION ONCOLOGY | Facility: HOSPITAL | Age: 68
Discharge: HOME | End: 2018-06-11
Attending: RADIOLOGY
Payer: MEDICARE

## 2018-06-11 VITALS
WEIGHT: 130.38 LBS | SYSTOLIC BLOOD PRESSURE: 121 MMHG | DIASTOLIC BLOOD PRESSURE: 67 MMHG | RESPIRATION RATE: 17 BRPM | OXYGEN SATURATION: 98 % | BODY MASS INDEX: 24 KG/M2 | HEART RATE: 62 BPM

## 2018-06-11 DIAGNOSIS — C32.9 CANCER OF LARYNX (HCC): Primary | ICD-10-CM

## 2018-06-11 DIAGNOSIS — R52 PAIN: ICD-10-CM

## 2018-06-11 DIAGNOSIS — C32.0 PRIMARY MALIGNANT NEOPLASM OF GLOTTIS (HCC): Primary | ICD-10-CM

## 2018-06-11 DIAGNOSIS — R52 PAIN: Primary | ICD-10-CM

## 2018-06-11 PROCEDURE — 99214 OFFICE O/P EST MOD 30 MIN: CPT | Performed by: NURSE PRACTITIONER

## 2018-06-11 PROCEDURE — 96360 HYDRATION IV INFUSION INIT: CPT

## 2018-06-11 PROCEDURE — 96361 HYDRATE IV INFUSION ADD-ON: CPT

## 2018-06-11 PROCEDURE — 96374 THER/PROPH/DIAG INJ IV PUSH: CPT

## 2018-06-11 RX ORDER — KETOROLAC TROMETHAMINE 30 MG/ML
15 INJECTION, SOLUTION INTRAMUSCULAR; INTRAVENOUS EVERY 6 HOURS PRN
Status: DISCONTINUED | OUTPATIENT
Start: 2018-06-11 | End: 2018-06-11

## 2018-06-11 RX ORDER — KETOROLAC TROMETHAMINE 30 MG/ML
15 INJECTION, SOLUTION INTRAMUSCULAR; INTRAVENOUS EVERY 6 HOURS PRN
Status: CANCELLED
Start: 2018-06-11

## 2018-06-11 RX ORDER — TRAMADOL HYDROCHLORIDE 50 MG/1
50 TABLET ORAL EVERY 6 HOURS PRN
Qty: 20 TABLET | Refills: 0 | Status: SHIPPED | OUTPATIENT
Start: 2018-06-11 | End: 2018-10-29

## 2018-06-11 RX ORDER — HYDROMORPHONE HYDROCHLORIDE 1 MG/ML
0.5 INJECTION, SOLUTION INTRAMUSCULAR; INTRAVENOUS; SUBCUTANEOUS EVERY 2 HOUR PRN
Status: CANCELLED
Start: 2018-06-11

## 2018-06-11 RX ADMIN — KETOROLAC TROMETHAMINE 15 MG: 30 INJECTION, SOLUTION INTRAMUSCULAR; INTRAVENOUS at 10:31:00

## 2018-06-11 NOTE — PROGRESS NOTES
Education Record    Learner:  Patient    Disease / Diagnosis: Pain    Barriers / Limitations:  Pain   Comments:    Method:  Brief focused   Comments:    General Topics:  Medication, Side effects and symptom management and Plan of care reviewed   Comments:

## 2018-06-11 NOTE — PROGRESS NOTES
[facility] Radiation Treatment Management Note 16-20    Patient:  Zeus Castle  Age:  75year old  Visit Diagnosis:    1.  Primary malignant neoplasm of glottis [facility])      Primary Rad/Onc:  Dr. Ankur Mendoza RT    Dr. Toro Signs Radiation Treatment Management Note 11-15    Patient:  Mardella Fossa  Age:  75year old  Visit Diagnosis:    No diagnosis found.   Primary Rad/Onc:  Dr. Maine Jaime

## 2018-06-11 NOTE — PROGRESS NOTES
Palliative Care Follow up Note    Patient Name: Tay Padron   YOB: 1950   Medical Record Number: [id number]   CSN: [id number]   Date of visit: 6/11/2018       Chief Complaint/Reason for Visit:  Palliative care follow up     His Pain., Disp: 120 tablet, Rfl: 0  •  Lidocaine Viscous 2 % Mouth/Throat Solution, MIX EQUAL PARTS BENADRYL AND MAALOX OTC.  TAKE 10 CC PO (DO NOT SWISH) PRN PAIN UP TO 5X PER DAY., Disp: 100 mL, Rfl: 1  •  Pilocarpine HCl 5 MG Oral Tab, Take 5 mg by mouth 2 intake. Impression/Plan:   Pain  1. Tramadol 50mg Q 6 prn  2. Ibuprofen 400mg Q 6 prn  3. Acetaminophen 1G Q 6 prn  4.   IV toradol 15mg X 3 days  5.  1L NS hydration prn      Planned Follow up:   prn        30 total minutes spent with patient >50%

## 2018-06-12 ENCOUNTER — TELEPHONE (OUTPATIENT)
Dept: RADIATION ONCOLOGY | Age: 68
End: 2018-06-12

## 2018-06-12 ENCOUNTER — OFFICE VISIT (OUTPATIENT)
Dept: HEMATOLOGY/ONCOLOGY | Facility: HOSPITAL | Age: 68
End: 2018-06-12
Attending: INTERNAL MEDICINE
Payer: MEDICARE

## 2018-06-12 DIAGNOSIS — C32.9 CANCER OF LARYNX (HCC): Primary | ICD-10-CM

## 2018-06-12 DIAGNOSIS — R52 PAIN: ICD-10-CM

## 2018-06-12 PROCEDURE — 96374 THER/PROPH/DIAG INJ IV PUSH: CPT

## 2018-06-12 PROCEDURE — 96361 HYDRATE IV INFUSION ADD-ON: CPT

## 2018-06-12 RX ORDER — KETOROLAC TROMETHAMINE 30 MG/ML
15 INJECTION, SOLUTION INTRAMUSCULAR; INTRAVENOUS EVERY 6 HOURS PRN
Status: CANCELLED
Start: 2018-06-12

## 2018-06-12 RX ORDER — KETOROLAC TROMETHAMINE 30 MG/ML
15 INJECTION, SOLUTION INTRAMUSCULAR; INTRAVENOUS ONCE
Status: COMPLETED | OUTPATIENT
Start: 2018-06-12 | End: 2018-06-12

## 2018-06-12 RX ORDER — HYDROMORPHONE HYDROCHLORIDE 1 MG/ML
0.5 INJECTION, SOLUTION INTRAMUSCULAR; INTRAVENOUS; SUBCUTANEOUS EVERY 2 HOUR PRN
Status: CANCELLED
Start: 2018-06-12

## 2018-06-12 RX ADMIN — KETOROLAC TROMETHAMINE 15 MG: 30 INJECTION, SOLUTION INTRAMUSCULAR; INTRAVENOUS at 09:15:00

## 2018-06-12 NOTE — TELEPHONE ENCOUNTER
PT arrived for RT appointment yesterday, and refused treatment first until talking to a doctor.   Upon bringing her back to speak to doctor, she stated she no longer wanted to continue with on with with her last three treatments due to extreme pain in her t

## 2018-06-12 NOTE — PROGRESS NOTES
Education Record    Learner:  Patient    Disease / Diagnosis: Cancer of larynx/pain    Barriers / Limitations:  Pain   Comments:    Method:  Brief focused   Comments:    General Topics:  Plan of care reviewed   Comments:    Outcome:  Shows understanding

## 2018-06-13 ENCOUNTER — SOCIAL WORK SERVICES (OUTPATIENT)
Dept: HEMATOLOGY/ONCOLOGY | Facility: HOSPITAL | Age: 68
End: 2018-06-13

## 2018-06-13 ENCOUNTER — OFFICE VISIT (OUTPATIENT)
Dept: HEMATOLOGY/ONCOLOGY | Facility: HOSPITAL | Age: 68
End: 2018-06-13
Attending: NURSE PRACTITIONER
Payer: MEDICARE

## 2018-06-13 ENCOUNTER — OFFICE VISIT (OUTPATIENT)
Dept: HEMATOLOGY/ONCOLOGY | Facility: HOSPITAL | Age: 68
End: 2018-06-13
Attending: INTERNAL MEDICINE
Payer: MEDICARE

## 2018-06-13 VITALS
RESPIRATION RATE: 18 BRPM | DIASTOLIC BLOOD PRESSURE: 71 MMHG | TEMPERATURE: 97 F | SYSTOLIC BLOOD PRESSURE: 99 MMHG | HEART RATE: 62 BPM | OXYGEN SATURATION: 99 %

## 2018-06-13 DIAGNOSIS — R13.10 ODYNOPHAGIA: Primary | ICD-10-CM

## 2018-06-13 DIAGNOSIS — C32.9 CANCER OF LARYNX (HCC): Primary | ICD-10-CM

## 2018-06-13 DIAGNOSIS — R52 PAIN: ICD-10-CM

## 2018-06-13 PROCEDURE — 96361 HYDRATE IV INFUSION ADD-ON: CPT

## 2018-06-13 PROCEDURE — 99214 OFFICE O/P EST MOD 30 MIN: CPT | Performed by: NURSE PRACTITIONER

## 2018-06-13 PROCEDURE — 96360 HYDRATION IV INFUSION INIT: CPT

## 2018-06-13 PROCEDURE — 96374 THER/PROPH/DIAG INJ IV PUSH: CPT

## 2018-06-13 RX ORDER — KETOROLAC TROMETHAMINE 30 MG/ML
15 INJECTION, SOLUTION INTRAMUSCULAR; INTRAVENOUS EVERY 6 HOURS PRN
Status: CANCELLED
Start: 2018-06-13

## 2018-06-13 RX ORDER — HYDROMORPHONE HYDROCHLORIDE 1 MG/ML
0.5 INJECTION, SOLUTION INTRAMUSCULAR; INTRAVENOUS; SUBCUTANEOUS EVERY 2 HOUR PRN
Status: CANCELLED
Start: 2018-06-13

## 2018-06-13 RX ORDER — KETOROLAC TROMETHAMINE 30 MG/ML
15 INJECTION, SOLUTION INTRAMUSCULAR; INTRAVENOUS ONCE
Status: COMPLETED | OUTPATIENT
Start: 2018-06-13 | End: 2018-06-13

## 2018-06-13 RX ADMIN — KETOROLAC TROMETHAMINE 15 MG: 30 INJECTION, SOLUTION INTRAMUSCULAR; INTRAVENOUS at 09:25:00

## 2018-06-13 NOTE — PROGRESS NOTES
Patient reports pain 10/10 and difficulty swallowing due to RT. Patient getting 1L NS and IV Toradol.      Education Record    Learner:  Patient    Disease / Diagnosis:    Barriers / Limitations:  Pain   Comments:    Method:  Discussion   Comments:    Gener

## 2018-06-13 NOTE — PROGRESS NOTES
LACY met with patient and advised that she qualified for a 25% [employer]. LACY organized her outstanding invoices and calculated the discount.   Encouraged her to call [employer] whenever she receives another bill, and [employer]

## 2018-06-14 NOTE — PROGRESS NOTES
Palliative Care Follow up Note    Patient Name: Stew Earl   YOB: 1950   Medical Record Number: [id number]   CSN: [id number]   Date of visit: 6/14/2018       Chief Complaint/Reason for Visit:  Palliative follow up     History MAALOX OTC.  TAKE 10 CC PO (DO NOT SWISH) PRN PAIN UP TO 5X PER DAY., Disp: 100 mL, Rfl: 1  •  Pilocarpine HCl 5 MG Oral Tab, Take 5 mg by mouth 2 (two) times daily. , Disp: , Rfl:   •  Polyethylene Glycol 3350 (MIRALAX) Oral Powder, Take 17 g by mouth 2 (t Follow up:    prn        30 total minutes spent with patient >50% of visit was spent in counseling and coordination of care for symptom management.         Electronically Signed by:  SOHEILA Gar, FNP-BC  [address] [facility] Outpatient Palliative Nurse Practitioner

## 2018-06-25 ENCOUNTER — APPOINTMENT (OUTPATIENT)
Dept: GENERAL RADIOLOGY | Age: 68
End: 2018-06-25
Attending: FAMILY MEDICINE
Payer: MEDICARE

## 2018-06-25 ENCOUNTER — HOSPITAL ENCOUNTER (OUTPATIENT)
Age: 68
Discharge: HOME | End: 2018-06-25
Attending: FAMILY MEDICINE
Payer: MEDICARE

## 2018-06-25 ENCOUNTER — APPOINTMENT (OUTPATIENT)
Dept: ULTRASOUND IMAGING | Age: 68
End: 2018-06-25
Attending: FAMILY MEDICINE
Payer: MEDICARE

## 2018-06-25 VITALS
OXYGEN SATURATION: 98 % | HEART RATE: 65 BPM | BODY MASS INDEX: 23.92 KG/M2 | TEMPERATURE: 98 F | HEIGHT: 62 IN | RESPIRATION RATE: 20 BRPM | DIASTOLIC BLOOD PRESSURE: 45 MMHG | WEIGHT: 130 LBS | SYSTOLIC BLOOD PRESSURE: 96 MMHG

## 2018-06-25 DIAGNOSIS — M17.11 ARTHRITIS OF KNEE, RIGHT: Primary | ICD-10-CM

## 2018-06-25 DIAGNOSIS — M71.21 BAKER CYST, RIGHT: ICD-10-CM

## 2018-06-25 PROCEDURE — 99214 OFFICE O/P EST MOD 30 MIN: CPT

## 2018-06-25 PROCEDURE — 93971 EXTREMITY STUDY: CPT | Performed by: FAMILY MEDICINE

## 2018-06-25 PROCEDURE — 73560 X-RAY EXAM OF KNEE 1 OR 2: CPT | Performed by: FAMILY MEDICINE

## 2018-06-25 PROCEDURE — 96372 THER/PROPH/DIAG INJ SC/IM: CPT

## 2018-06-25 RX ORDER — NAPROXEN 500 MG/1
500 TABLET ORAL 2 TIMES DAILY PRN
Qty: 20 TABLET | Refills: 0 | Status: SHIPPED | OUTPATIENT
Start: 2018-06-25 | End: 2018-07-02

## 2018-06-25 RX ORDER — KETOROLAC TROMETHAMINE 30 MG/ML
30 INJECTION, SOLUTION INTRAMUSCULAR; INTRAVENOUS ONCE
Status: COMPLETED | OUTPATIENT
Start: 2018-06-25 | End: 2018-06-25

## 2018-06-25 NOTE — ED PROVIDER NOTES
Patient Seen in: [facility]    History   Patient presents with:  Leg Pain    Telephone  service  used to get the history and explained the results  Stated Complaint: right leg pain, x2days    HPI    67-year-ol Head: Normocephalic and atraumatic. Eyes: Conjunctivae are normal.   Cardiovascular: Normal rate, regular rhythm and normal heart sounds. Pulmonary/Chest: Effort normal and breath sounds normal. No respiratory distress. She has no wheezes.    Musculo analysis, and color flow. Doppler imaging were performed. The following veins were imaged:  Common, deep, and superficial femoral, popliteal, sapheno-femoral junction, posterior tibial veins, and the contralateral common femoral vein.   PATIENT STATED HIS Prescribed:  Current Discharge Medication List    START taking these medications    naproxen 500 MG Oral Tab  Take 1 tablet (500 mg total) by mouth 2 (two) times daily as needed.   Qty: 20 tablet Refills: 0

## 2018-06-27 NOTE — PROGRESS NOTES
[facility] RADIATION ONCOLOGY   TREATMENT SUMMARY    PATIENT:  Martin Woods    REFERRING MD: Dr. 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 Follow up with Dr. Anastasiya Westbrook. She is at high risk for local recurrence and will need very close follow-up. Eva Perkins M.D. Radiation Oncology  [email or web address]    CC: Dr. Anastasiya Westbrook

## 2018-06-29 ENCOUNTER — OFFICE VISIT (OUTPATIENT)
Dept: HEMATOLOGY/ONCOLOGY | Facility: HOSPITAL | Age: 68
End: 2018-06-29
Attending: INTERNAL MEDICINE
Payer: MEDICARE

## 2018-06-29 DIAGNOSIS — R52 PAIN: Primary | ICD-10-CM

## 2018-06-29 PROCEDURE — 99213 OFFICE O/P EST LOW 20 MIN: CPT | Performed by: NURSE PRACTITIONER

## 2018-07-02 ENCOUNTER — TELEPHONE (OUTPATIENT)
Dept: HEMATOLOGY/ONCOLOGY | Facility: HOSPITAL | Age: 68
End: 2018-07-02

## 2018-07-02 ENCOUNTER — HOSPITAL ENCOUNTER (OUTPATIENT)
Dept: RADIATION ONCOLOGY | Facility: HOSPITAL | Age: 68
Discharge: HOME | End: 2018-07-02
Attending: RADIOLOGY
Payer: MEDICARE

## 2018-07-02 VITALS
RESPIRATION RATE: 16 BRPM | DIASTOLIC BLOOD PRESSURE: 77 MMHG | OXYGEN SATURATION: 97 % | WEIGHT: 128.63 LBS | BODY MASS INDEX: 24 KG/M2 | HEART RATE: 81 BPM | SYSTOLIC BLOOD PRESSURE: 128 MMHG

## 2018-07-02 DIAGNOSIS — C32.0 PRIMARY MALIGNANT NEOPLASM OF GLOTTIS (HCC): Primary | ICD-10-CM

## 2018-07-02 PROCEDURE — 77412 RADIATION TX DELIVERY LVL 3: CPT | Performed by: RADIOLOGY

## 2018-07-02 PROCEDURE — 77387 GUIDANCE FOR RADJ TX DLVR: CPT | Performed by: RADIOLOGY

## 2018-07-02 NOTE — PROGRESS NOTES
Palliative Care Follow up Note    Patient Name: Stew Earl   YOB: 1950   Medical Record Number: [id number]   CSN: [id number]   Date of visit: 7/2/2018       Chief Complaint/Reason for Visit:  Palliative follow up     History o Feels well. Respiratory:  Denies SOB, denies cough  Cardiac:  Denies chest pain, heart palpitations  Abdomen:  Denies constipation, diarrhea. Denies pain. Psych:  No complaints.   Sleeping well      Palliative Performance Scale:    100%     Physical

## 2018-07-02 NOTE — PROGRESS NOTES
[facility] Radiation Treatment Management Note 16-20    Patient:  Albina Tello  Age:  75year old  Visit Diagnosis:    1.  Primary malignant neoplasm of glottis [facility])      Primary Rad/Onc:  Dr. Lesley Samuels

## 2018-07-03 PROCEDURE — 77412 RADIATION TX DELIVERY LVL 3: CPT | Performed by: RADIOLOGY

## 2018-07-03 PROCEDURE — 77387 GUIDANCE FOR RADJ TX DLVR: CPT | Performed by: RADIOLOGY

## 2018-07-05 ENCOUNTER — DOCUMENTATION ONLY (OUTPATIENT)
Dept: RADIATION ONCOLOGY | Facility: HOSPITAL | Age: 68
End: 2018-07-05

## 2018-07-05 ENCOUNTER — OFFICE VISIT (OUTPATIENT)
Dept: HEMATOLOGY/ONCOLOGY | Facility: HOSPITAL | Age: 68
End: 2018-07-05
Attending: INTERNAL MEDICINE
Payer: MEDICARE

## 2018-07-05 DIAGNOSIS — C32.9 CANCER OF LARYNX (HCC): Primary | ICD-10-CM

## 2018-07-05 DIAGNOSIS — R13.10 PAINFUL SWALLOWING: Primary | ICD-10-CM

## 2018-07-05 DIAGNOSIS — R52 PAIN: ICD-10-CM

## 2018-07-05 PROCEDURE — 77412 RADIATION TX DELIVERY LVL 3: CPT | Performed by: RADIOLOGY

## 2018-07-05 PROCEDURE — 99213 OFFICE O/P EST LOW 20 MIN: CPT | Performed by: NURSE PRACTITIONER

## 2018-07-05 PROCEDURE — 77387 GUIDANCE FOR RADJ TX DLVR: CPT | Performed by: RADIOLOGY

## 2018-07-05 PROCEDURE — 96374 THER/PROPH/DIAG INJ IV PUSH: CPT

## 2018-07-05 RX ORDER — HYDROMORPHONE HYDROCHLORIDE 1 MG/ML
0.5 INJECTION, SOLUTION INTRAMUSCULAR; INTRAVENOUS; SUBCUTANEOUS EVERY 2 HOUR PRN
Status: CANCELLED
Start: 2018-07-05

## 2018-07-05 RX ORDER — KETOROLAC TROMETHAMINE 30 MG/ML
15 INJECTION, SOLUTION INTRAMUSCULAR; INTRAVENOUS ONCE
Status: COMPLETED | OUTPATIENT
Start: 2018-07-05 | End: 2018-07-05

## 2018-07-05 RX ORDER — KETOROLAC TROMETHAMINE 30 MG/ML
15 INJECTION, SOLUTION INTRAMUSCULAR; INTRAVENOUS EVERY 6 HOURS PRN
Status: CANCELLED
Start: 2018-07-05

## 2018-07-05 RX ADMIN — KETOROLAC TROMETHAMINE 15 MG: 30 INJECTION, SOLUTION INTRAMUSCULAR; INTRAVENOUS at 14:40:00

## 2018-07-05 NOTE — PROGRESS NOTES
Education Record    Learner:  Patient    Disease / Diagnosis: Pain management - patient given 15mg IV tordal per Khushbu Santo    Barriers / Limitations:  None   Comments:    Method:  Brief focused and Reinforcement   Comments:    General Topics:  Plan of care

## 2018-07-06 PROCEDURE — 77336 RADIATION PHYSICS CONSULT: CPT | Performed by: RADIOLOGY

## 2018-07-07 NOTE — PROGRESS NOTES
Palliative Care Follow up Note    Patient Name: Karan Bhat   YOB: 1950   Medical Record Number: [id number]   CSN: [id number]   Date of visit: 7/6/2018       Chief Complaint/Reason for Visit:  Palliative follow up     History o well      Palliative Performance Scale:   100%      Physical Examination:   General: alert and oriented, not in acute distress. HEENT:  Oropharynx is clear. Skin:  Discolored intact skin at larynx in RT field  Respiratory: Clear to auscultation.   Cardiac

## 2018-08-16 ENCOUNTER — HOSPITAL ENCOUNTER (OUTPATIENT)
Dept: RADIATION ONCOLOGY | Facility: HOSPITAL | Age: 68
Discharge: HOME | End: 2018-08-16
Attending: RADIOLOGY
Payer: MEDICARE

## 2018-08-16 VITALS
WEIGHT: 130.19 LBS | HEART RATE: 58 BPM | BODY MASS INDEX: 24 KG/M2 | DIASTOLIC BLOOD PRESSURE: 90 MMHG | SYSTOLIC BLOOD PRESSURE: 138 MMHG

## 2018-08-16 DIAGNOSIS — C32.0 PRIMARY MALIGNANT NEOPLASM OF GLOTTIS (HCC): Primary | ICD-10-CM

## 2018-08-16 PROCEDURE — 99213 OFFICE O/P EST LOW 20 MIN: CPT

## 2018-08-16 NOTE — PROGRESS NOTES
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

## 2018-08-16 NOTE — PROGRESS NOTES
Nursing Follow-Up Note    Patient: Bard Nava  YOB: 1950  Age: 75year old  Radiation Oncologist: Dr. Evie Rhoades  Referring Physician: No ref. provider found  Chief Complaint: Patient presents with:   Follow - Up    Date: 8/

## 2018-08-16 NOTE — PATIENT INSTRUCTIONS
- CALL [phone] FOR A [address] in 6 months (February 2019)  - Zamzam Colunga his office for an appointment  - CALL [place] [phone] [address] REGARDING RADIATION THERAPY 21

## 2018-10-29 ENCOUNTER — OFFICE VISIT (OUTPATIENT)
Dept: INTERNAL MEDICINE CLINIC | Facility: CLINIC | Age: 68
End: 2018-10-29

## 2018-10-29 VITALS
BODY MASS INDEX: 25.03 KG/M2 | HEART RATE: 82 BPM | OXYGEN SATURATION: 98 % | WEIGHT: 136 LBS | SYSTOLIC BLOOD PRESSURE: 122 MMHG | TEMPERATURE: 98 F | DIASTOLIC BLOOD PRESSURE: 62 MMHG | RESPIRATION RATE: 18 BRPM | HEIGHT: 62 IN

## 2018-10-29 DIAGNOSIS — Z00.00 ROUTINE GENERAL MEDICAL EXAMINATION AT A HEALTH CARE FACILITY: Primary | ICD-10-CM

## 2018-10-29 DIAGNOSIS — M17.11 PRIMARY OSTEOARTHRITIS OF RIGHT KNEE: ICD-10-CM

## 2018-10-29 DIAGNOSIS — D69.6 THROMBOCYTOPENIA (HCC): Chronic | ICD-10-CM

## 2018-10-29 DIAGNOSIS — C32.9 CANCER OF LARYNX (HCC): ICD-10-CM

## 2018-10-29 DIAGNOSIS — Z12.39 SCREENING BREAST EXAMINATION: ICD-10-CM

## 2018-10-29 DIAGNOSIS — Z12.11 SCREEN FOR COLON CANCER: ICD-10-CM

## 2018-10-29 DIAGNOSIS — M81.0 AGE-RELATED OSTEOPOROSIS WITHOUT CURRENT PATHOLOGICAL FRACTURE: ICD-10-CM

## 2018-10-29 PROCEDURE — G0439 PPPS, SUBSEQ VISIT: HCPCS | Performed by: INTERNAL MEDICINE

## 2018-10-29 PROCEDURE — 96160 PT-FOCUSED HLTH RISK ASSMT: CPT | Performed by: INTERNAL MEDICINE

## 2018-10-29 NOTE — PATIENT INSTRUCTIONS
Please request results of your last pap smear from [address] [facility] [address] porciones de ocho onzas de calcio / vitamina D al día. [place] incluye espinacas, col rizada, brócoli, almendras, leche, yogur o [employer].     Le aconsejo que se aplique la vac aproximadamente a 1 pie de distancia de la pared. [employer] (conectado a stefan polea o a stefan singh de goma) debe estar un paso detrás. · Tire hacia adelante con el pie atado, manteniendo las rodillas rectas rojelio no trabadas.  (Mantenga los dedos del pie or reservados. Esta información no pretende sustituir la atención médica profesional. Sólo pineda médico puede diagnosticar y tratar un problema de christine.         Ejercicios para las piernas y las rodillas: Elevación de talones     El objetivo de deep ejercicio es hombros.  2. Dé un paso adelante con el pie _______ y baje el cuerpo hasta alcanzar stefan posición cómoda. Mantenga la espalda recta y los pies orientados hacia adelante. Mientras da el paso hacia adelante, el talón del otro pie se levanta del suelo.   3. [place] [address] [address]. Todos los derechos reservados. Esta información no pretende sustituir la atención médica profesional. Sólo pineda médico puede diagnosticar y tratar un problema de christine. Estiramiento de gemelos (flexibilidad)    1.  Póngase de pie mirando haci recibido.     Consejo de seguridad: Vaya de a poco. Si usted se esfuerza demasiado antes de [place], podría crear nuevos problemas en la rodilla o incluso volver a lesionarla. Date Last Reviewed: 3/10/2016  © 9115-6484 [employer] [address].  [facility]

## 2018-10-29 NOTE — PROGRESS NOTES
Rufus Valerio is a 70year old female. HPI:   Patient presents for medicare supervisit and following issues. 1. Laryngeal cancer: now doing well post radiation thherapy. Completed in 7/2018.  F/u with rad onc and ENT a few months ago and th Smoking status: Never Smoker      Smokeless tobacco: Never Used    Alcohol use: No    Drug use: No            EXAM:   /62   Pulse 82   Temp 97.8 °F (36.6 °C) (Oral)   Resp 18   Ht 62\"   Wt 136 lb   SpO2 98%   BMI 24.87 kg/m²   Medicare Hearing Evalu years ago. She is going to request records.    Breast Cancer Screening: discussed yearly mammogram, ordered  Bone Health/Fall Prevention (Matthew Chi): see above  Vaccines: I discussed recommendations for prevnar, pneumovac 23, shingrix, TDAP, and flu shot but

## 2018-11-02 ENCOUNTER — TELEPHONE (OUTPATIENT)
Dept: INTERNAL MEDICINE CLINIC | Facility: CLINIC | Age: 68
End: 2018-11-02

## 2018-11-02 NOTE — TELEPHONE ENCOUNTER
Per provider OV note:   Start vitamin D3, check level, discussed weight bearing exercises, dietary calcium/vit D3, fall prevention.      Please advise dosage pt should start on or if pt needs to have labs checked first.

## 2018-11-14 ENCOUNTER — LAB ENCOUNTER (OUTPATIENT)
Dept: LAB | Age: 68
End: 2018-11-14
Attending: INTERNAL MEDICINE
Payer: MEDICARE

## 2018-11-14 DIAGNOSIS — M81.0 AGE-RELATED OSTEOPOROSIS WITHOUT CURRENT PATHOLOGICAL FRACTURE: ICD-10-CM

## 2018-11-14 DIAGNOSIS — Z12.39 SCREENING BREAST EXAMINATION: ICD-10-CM

## 2018-11-14 DIAGNOSIS — C32.9 CANCER OF LARYNX (HCC): ICD-10-CM

## 2018-11-14 DIAGNOSIS — Z12.11 SCREEN FOR COLON CANCER: ICD-10-CM

## 2018-11-14 DIAGNOSIS — D69.6 THROMBOCYTOPENIA (HCC): ICD-10-CM

## 2018-11-14 PROCEDURE — 86803 HEPATITIS C AB TEST: CPT

## 2018-11-14 PROCEDURE — 80048 BASIC METABOLIC PNL TOTAL CA: CPT

## 2018-11-14 PROCEDURE — 82306 VITAMIN D 25 HYDROXY: CPT

## 2018-11-14 PROCEDURE — 84450 TRANSFERASE (AST) (SGOT): CPT

## 2018-11-14 PROCEDURE — 36415 COLL VENOUS BLD VENIPUNCTURE: CPT

## 2018-11-14 PROCEDURE — 80061 LIPID PANEL: CPT

## 2018-11-14 PROCEDURE — 85025 COMPLETE CBC W/AUTO DIFF WBC: CPT

## 2018-11-14 PROCEDURE — 84460 ALANINE AMINO (ALT) (SGPT): CPT

## 2018-11-15 DIAGNOSIS — M81.0 AGE-RELATED OSTEOPOROSIS WITHOUT CURRENT PATHOLOGICAL FRACTURE: ICD-10-CM

## 2018-11-15 DIAGNOSIS — E55.9 VITAMIN D DEFICIENCY: Primary | ICD-10-CM

## 2018-11-15 RX ORDER — ERGOCALCIFEROL 1.25 MG/1
50000 CAPSULE ORAL WEEKLY
Qty: 8 CAPSULE | Refills: 0 | Status: SHIPPED | OUTPATIENT
Start: 2018-11-15 | End: 2018-12-15

## 2018-11-29 ENCOUNTER — TELEPHONE (OUTPATIENT)
Dept: OTOLARYNGOLOGY | Facility: CLINIC | Age: 68
End: 2018-11-29

## 2018-11-29 NOTE — TELEPHONE ENCOUNTER
Ce Fagan MD  [facility] Clinical Staff             Please contact patient and see if she has followed with an ENT following termination of her radiation earlier this year.  She should RTC if not being followed by an ENT

## 2018-11-29 NOTE — TELEPHONE ENCOUNTER
Dr. Leyla De Luna, per pt she has followed up with another ENT, finished radiation. She would like to thank you for the your concerns and checking up on her.

## 2019-01-16 ENCOUNTER — HOSPITAL ENCOUNTER (OUTPATIENT)
Dept: MAMMOGRAPHY | Age: 69
Discharge: HOME | End: 2019-01-16
Attending: INTERNAL MEDICINE
Payer: MEDICARE

## 2019-01-16 ENCOUNTER — HOSPITAL ENCOUNTER (OUTPATIENT)
Dept: BONE DENSITY | Age: 69
Discharge: HOME | End: 2019-01-16
Attending: INTERNAL MEDICINE
Payer: MEDICARE

## 2019-01-16 DIAGNOSIS — M81.0 AGE-RELATED OSTEOPOROSIS WITHOUT CURRENT PATHOLOGICAL FRACTURE: ICD-10-CM

## 2019-01-16 DIAGNOSIS — Z12.39 SCREENING BREAST EXAMINATION: ICD-10-CM

## 2019-01-16 PROCEDURE — 77067 SCR MAMMO BI INCL CAD: CPT | Performed by: INTERNAL MEDICINE

## 2019-01-16 PROCEDURE — 77063 BREAST TOMOSYNTHESIS BI: CPT | Performed by: INTERNAL MEDICINE

## 2019-01-16 PROCEDURE — 77080 DXA BONE DENSITY AXIAL: CPT | Performed by: INTERNAL MEDICINE

## 2019-02-19 ENCOUNTER — HOSPITAL ENCOUNTER (OUTPATIENT)
Dept: RADIATION ONCOLOGY | Facility: HOSPITAL | Age: 69
Discharge: HOME | End: 2019-02-19
Attending: RADIOLOGY
Payer: MEDICARE

## 2019-02-19 VITALS
HEART RATE: 69 BPM | SYSTOLIC BLOOD PRESSURE: 129 MMHG | WEIGHT: 142 LBS | BODY MASS INDEX: 26 KG/M2 | DIASTOLIC BLOOD PRESSURE: 72 MMHG | OXYGEN SATURATION: 100 % | TEMPERATURE: 98 F

## 2019-02-19 DIAGNOSIS — C32.0 PRIMARY MALIGNANT NEOPLASM OF GLOTTIS (HCC): Primary | ICD-10-CM

## 2019-02-19 PROCEDURE — 99213 OFFICE O/P EST LOW 20 MIN: CPT

## 2019-02-19 NOTE — PROGRESS NOTES
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

## 2019-02-19 NOTE — PROGRESS NOTES
Nursing Follow-Up Note    Patient: Maia Goldmann  YOB: 1950  Age: 70year old  Radiation Oncologist: Dr. Ed Dean  Referring Physician: No ref. provider found  Chief Complaint: Patient presents with:   Follow - Up    Date: 2/

## 2019-02-19 NOTE — PATIENT INSTRUCTIONS
- CALL [phone] FOR A FOLLOW-UP WITH DR. Garima Garsia in  [facility] Dr Celeste Reeder as directed in 6 months  - CALL [phone] TO SCHEDULE YOUR     - CALL IF YOU HAVE ANY QUESTIONS/CONCERNS REGARDING RADIATION THERAPY: 21

## 2019-05-30 ENCOUNTER — TELEPHONE (OUTPATIENT)
Dept: INTERNAL MEDICINE CLINIC | Facility: CLINIC | Age: 69
End: 2019-05-30

## 2019-05-30 DIAGNOSIS — C10.9 OROPHARYNGEAL CANCER (HCC): Primary | ICD-10-CM

## 2019-05-30 NOTE — TELEPHONE ENCOUNTER
Patient Name: Gerardo Li   [facility]   Reason for the order/referral: F/u   PCP: Dante Franklin MD   Refer to Provider (first and last name): Dr. Giovanni Arriola   Specialty: ENT   Patient Insurance: Payor: [facility]   Duration/Summary of S

## 2019-06-24 NOTE — TELEPHONE ENCOUNTER
Pt called in wanted referral's faxed, sent out faxed and confirmed it sent for OTOLARYNGOLOGY and Radiation Oncology

## 2019-09-24 ENCOUNTER — MA CHART PREP (OUTPATIENT)
Dept: FAMILY MEDICINE CLINIC | Facility: CLINIC | Age: 69
End: 2019-09-24

## 2019-09-24 NOTE — PROGRESS NOTES
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

## 2019-09-25 ENCOUNTER — HOSPITAL ENCOUNTER (OUTPATIENT)
Dept: RADIATION ONCOLOGY | Facility: HOSPITAL | Age: 69
Discharge: HOME | End: 2019-09-25
Attending: RADIOLOGY
Payer: MEDICARE

## 2019-09-25 VITALS
OXYGEN SATURATION: 98 % | WEIGHT: 151 LBS | TEMPERATURE: 98 F | HEART RATE: 60 BPM | RESPIRATION RATE: 18 BRPM | DIASTOLIC BLOOD PRESSURE: 79 MMHG | BODY MASS INDEX: 28 KG/M2 | SYSTOLIC BLOOD PRESSURE: 127 MMHG

## 2019-09-25 DIAGNOSIS — C32.9 CANCER OF LARYNX (HCC): Primary | ICD-10-CM

## 2019-09-25 PROCEDURE — 99213 OFFICE O/P EST LOW 20 MIN: CPT

## 2019-09-25 NOTE — PROGRESS NOTES
Nursing Follow-Up Note    Patient: Lorraine Hunt  YOB: 1950  Age: 70year old  Radiation Oncologist: Dr. Doreatha Meckel  Referring Physician: Jeimy Zuleta  Chief Complaint: Patient presents with:   Follow - Up    Date: 9/25/2019

## 2019-09-25 NOTE — PATIENT INSTRUCTIONS
- CALL [phone] FOR A FOLLOW-UP WITH DR. Bala Morgan in 6 months (March 2020)  - Dr Bala Morgan would like you to see Dr Lashae Merchant in 6 months (March 2020)  - CALL IF YOU HAVE ANY QUESTIONS/CONCERNS REGARDING RADIATION THERAPY 21

## 2019-10-30 ENCOUNTER — LAB ENCOUNTER (OUTPATIENT)
Dept: LAB | Age: 69
End: 2019-10-30
Attending: INTERNAL MEDICINE
Payer: MEDICARE

## 2019-10-30 ENCOUNTER — OFFICE VISIT (OUTPATIENT)
Dept: INTERNAL MEDICINE CLINIC | Facility: CLINIC | Age: 69
End: 2019-10-30
Payer: MEDICARE

## 2019-10-30 VITALS
OXYGEN SATURATION: 98 % | HEIGHT: 62 IN | RESPIRATION RATE: 16 BRPM | TEMPERATURE: 98 F | SYSTOLIC BLOOD PRESSURE: 116 MMHG | BODY MASS INDEX: 28.71 KG/M2 | WEIGHT: 156 LBS | DIASTOLIC BLOOD PRESSURE: 70 MMHG | HEART RATE: 62 BPM

## 2019-10-30 DIAGNOSIS — E66.3 OVERWEIGHT (BMI 25.0-29.9): ICD-10-CM

## 2019-10-30 DIAGNOSIS — Z12.31 ENCOUNTER FOR SCREENING MAMMOGRAM FOR MALIGNANT NEOPLASM OF BREAST: ICD-10-CM

## 2019-10-30 DIAGNOSIS — E55.9 VITAMIN D DEFICIENCY: ICD-10-CM

## 2019-10-30 DIAGNOSIS — Z12.11 SCREEN FOR COLON CANCER: ICD-10-CM

## 2019-10-30 DIAGNOSIS — Z00.00 ROUTINE GENERAL MEDICAL EXAMINATION AT A HEALTH CARE FACILITY: Primary | ICD-10-CM

## 2019-10-30 DIAGNOSIS — M81.0 AGE-RELATED OSTEOPOROSIS WITHOUT CURRENT PATHOLOGICAL FRACTURE: ICD-10-CM

## 2019-10-30 DIAGNOSIS — M85.88 OSTEOPENIA OF LUMBAR SPINE: ICD-10-CM

## 2019-10-30 DIAGNOSIS — C32.9 CANCER OF LARYNX (HCC): ICD-10-CM

## 2019-10-30 PROCEDURE — 99397 PER PM REEVAL EST PAT 65+ YR: CPT | Performed by: INTERNAL MEDICINE

## 2019-10-30 PROCEDURE — 96160 PT-FOCUSED HLTH RISK ASSMT: CPT | Performed by: INTERNAL MEDICINE

## 2019-10-30 PROCEDURE — G0439 PPPS, SUBSEQ VISIT: HCPCS | Performed by: INTERNAL MEDICINE

## 2019-10-30 RX ORDER — ACETAMINOPHEN 160 MG
2000 TABLET,DISINTEGRATING ORAL DAILY
COMMUNITY

## 2019-10-30 NOTE — PATIENT INSTRUCTIONS
Necesita 3 porciones de ocho onzas de calcio / vitamina D al día. [place] incluye espinacas, col rizada, brócoli, almendras, leche, yogurt o requesón.

## 2019-10-30 NOTE — PROGRESS NOTES
Daron Ho is a 78year old female. HPI:   Patient presents for medicare supervisit and additional issues. Comes in alone. 1. Laryngeal Cancer: she feels very well. No issues. 2. Osteopenia: she has not fallen. Taking vitamin D daily. Past Surgical History:   Procedure Laterality Date   •       x 3   • LARYNGOSCOPY DIRECT N/A 1/15/2018    Performed by Craige Duverney, MD at [facility] OR   • LARYNGOSCOPY,DIRCT,OP,BIOPSY  01/15/2018      Social History:    Social History    Tobac ASA (50-57 yo): no indication for ASA at this time  Colon Cancer Screening: never had a colonoscopy. Referral ordered again. Cervical Cancer Screening: she states she has always had regular pap smears (normal) over past 10 years.  She was not able to get

## 2020-08-31 ENCOUNTER — TELEPHONE (OUTPATIENT)
Dept: CASE MANAGEMENT | Age: 70
End: 2020-08-31

## 2020-08-31 NOTE — TELEPHONE ENCOUNTER
Using  Kerry Macias #[id number] Pt informed is eligible for 2020 Medicare Advantage Supervisit and offered assistance in scheduling, states will call back to schedule later this year.

## 2020-09-23 ENCOUNTER — HOSPITAL ENCOUNTER (OUTPATIENT)
Dept: RADIATION ONCOLOGY | Facility: HOSPITAL | Age: 70
Discharge: HOME | End: 2020-09-23
Attending: RADIOLOGY
Payer: MEDICARE

## 2020-09-23 VITALS
HEART RATE: 63 BPM | OXYGEN SATURATION: 96 % | WEIGHT: 161 LBS | BODY MASS INDEX: 29 KG/M2 | SYSTOLIC BLOOD PRESSURE: 139 MMHG | RESPIRATION RATE: 16 BRPM | TEMPERATURE: 98 F | DIASTOLIC BLOOD PRESSURE: 75 MMHG

## 2020-09-23 DIAGNOSIS — C32.0 PRIMARY MALIGNANT NEOPLASM OF GLOTTIS (HCC): Primary | ICD-10-CM

## 2020-09-23 RX ORDER — OMEPRAZOLE 20 MG/1
20 CAPSULE, DELAYED RELEASE ORAL NIGHTLY
COMMUNITY
End: 2021-01-18

## 2020-09-23 NOTE — PATIENT INSTRUCTIONS
- CALL [phone] FOR A FOLLOW-UP WITH DR. WILKS IN 6 MONTHS (MARCH 2021)    - CALL IF YOU HAVE ANY QUESTIONS/CONCERNS REGARDING RADIATION THERAPY

## 2020-09-23 NOTE — PROGRESS NOTES
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

## 2020-09-23 NOTE — PROGRESS NOTES
Nursing Follow-Up Note    Patient: Joann Frank  YOB: 1950  Age: 78year old  Radiation Oncologist: Dr. Anil Jefferson  Referring Physician: No ref. provider found  Chief Complaint: Patient presents with:   Follow - Up    Date: 9/

## 2020-10-29 ENCOUNTER — TELEPHONE (OUTPATIENT)
Dept: CASE MANAGEMENT | Age: 70
End: 2020-10-29

## 2020-10-29 NOTE — TELEPHONE ENCOUNTER
Pt is eligible for 2020 Medicare Advantage Supervisit, using  Karishma Mello #[id number] message left for patient to call Dr. Altagracia Villafana office to schedule MA supervisit.

## 2020-11-09 NOTE — TELEPHONE ENCOUNTER
Informed pt via  line rx ready for . Pt verbalized did not have a ride. Requesting the rx be mailed or faxed. Informed pt rx could only be picked up. Pt verbalized understanding. rx placed at .         Nirmala Gaston MD   Em Ent
Language line , [place] [id number]    Per pt her throat has been hurting for months; pt would like to know if there is anything she may take for this? Pt currently taking Singulair, Claritin, and Flonase as directed by JULIO.   Advised pt to con
Pt. States that she is having throat pain, and wants to know what should she take?
She is being scheduled for surgery. She can not use NSAIDs.   Tylenol or if she wishes please ask DR Loreta Nowak if she will fill a script for a few [place] until surgery
H

## 2020-12-14 ENCOUNTER — TELEPHONE (OUTPATIENT)
Dept: INTERNAL MEDICINE CLINIC | Facility: CLINIC | Age: 70
End: 2020-12-14

## 2020-12-14 DIAGNOSIS — R21 RASH AND NONSPECIFIC SKIN ERUPTION: Primary | ICD-10-CM

## 2020-12-14 NOTE — TELEPHONE ENCOUNTER
Patient called requesting a referral to see allergist specialist \" Merced Saleh" Due to her insurance. She currently has a rash all over her body  for the past 3-4 days.  Patient stated she has been using OTC  Hydrocortisone cream, she mention that speci

## 2020-12-14 NOTE — TELEPHONE ENCOUNTER
I have placed one referral to [employer]. Patient is overdue for MAS with me. Please schedule for 45 minute appt asap.

## 2020-12-14 NOTE — TELEPHONE ENCOUNTER
Pt called back to schedule MA Supervisit and to check status on referral and let her know that referral has not been authorized yet

## 2020-12-14 NOTE — TELEPHONE ENCOUNTER
Spoke with patient provided recommendations from Dr. Lennette Bosworth, patient will call back to schedule MAS, she would like to hold off a couple weeks due to Amarjit, she is aware will not receive refills until she comes in for appt.  Patient verbalized understanding a

## 2021-01-01 ENCOUNTER — MED REC SCAN ONLY (OUTPATIENT)
Dept: INTERNAL MEDICINE CLINIC | Facility: CLINIC | Age: 71
End: 2021-01-01

## 2021-01-06 ENCOUNTER — TELEPHONE (OUTPATIENT)
Dept: INTERNAL MEDICINE CLINIC | Facility: CLINIC | Age: 71
End: 2021-01-06

## 2021-01-06 DIAGNOSIS — Z91.018 FOOD ALLERGY: Primary | ICD-10-CM

## 2021-01-06 NOTE — TELEPHONE ENCOUNTER
Referral pending if appropriate (needs recommendation on a provider). FYI LOV 10/30/2019    . Reason for the order/referral:ALLERGY & IMMUNOLOGY   PCP: Simone Ring   Refer to Provider (RECOMMENDATION ON [address]   Patient In

## 2021-01-06 NOTE — TELEPHONE ENCOUNTER
Pt called regarding the referral for dermatology. Pt notes that insurance says it is in network. Pt advised that we will have referral specialist look into this matter and reach out. Per referral notes Dr. Maria T Uribe is out of network.

## 2021-01-06 NOTE — TELEPHONE ENCOUNTER
I have declined referral. I have not seen her since 2019. She has an appt with me coming up. I will discuss issues at that visit and order referral if appropriate.  TY

## 2021-01-18 ENCOUNTER — OFFICE VISIT (OUTPATIENT)
Dept: INTERNAL MEDICINE CLINIC | Facility: CLINIC | Age: 71
End: 2021-01-18
Payer: MEDICARE

## 2021-01-18 VITALS
RESPIRATION RATE: 16 BRPM | HEIGHT: 61.5 IN | OXYGEN SATURATION: 99 % | BODY MASS INDEX: 29.41 KG/M2 | TEMPERATURE: 98 F | HEART RATE: 71 BPM | WEIGHT: 157.81 LBS | DIASTOLIC BLOOD PRESSURE: 70 MMHG | SYSTOLIC BLOOD PRESSURE: 122 MMHG

## 2021-01-18 DIAGNOSIS — Z85.21 HISTORY OF CANCER OF LARYNX: ICD-10-CM

## 2021-01-18 DIAGNOSIS — M85.88 OSTEOPENIA OF LUMBAR SPINE: ICD-10-CM

## 2021-01-18 DIAGNOSIS — Z00.00 ROUTINE GENERAL MEDICAL EXAMINATION AT A HEALTH CARE FACILITY: Primary | ICD-10-CM

## 2021-01-18 DIAGNOSIS — L50.9 URTICARIA: ICD-10-CM

## 2021-01-18 DIAGNOSIS — Z12.11 SCREEN FOR COLON CANCER: ICD-10-CM

## 2021-01-18 DIAGNOSIS — Z12.31 VISIT FOR SCREENING MAMMOGRAM: ICD-10-CM

## 2021-01-18 DIAGNOSIS — E66.3 OVERWEIGHT (BMI 25.0-29.9): ICD-10-CM

## 2021-01-18 PROCEDURE — G0439 PPPS, SUBSEQ VISIT: HCPCS | Performed by: INTERNAL MEDICINE

## 2021-01-18 PROCEDURE — 96160 PT-FOCUSED HLTH RISK ASSMT: CPT | Performed by: INTERNAL MEDICINE

## 2021-01-18 PROCEDURE — 3078F DIAST BP <80 MM HG: CPT | Performed by: INTERNAL MEDICINE

## 2021-01-18 PROCEDURE — 3008F BODY MASS INDEX DOCD: CPT | Performed by: INTERNAL MEDICINE

## 2021-01-18 PROCEDURE — 3074F SYST BP LT 130 MM HG: CPT | Performed by: INTERNAL MEDICINE

## 2021-01-18 PROCEDURE — 99397 PER PM REEVAL EST PAT 65+ YR: CPT | Performed by: INTERNAL MEDICINE

## 2021-01-18 RX ORDER — OMEPRAZOLE 20 MG/1
20 CAPSULE, DELAYED RELEASE ORAL
COMMUNITY

## 2021-01-18 RX ORDER — OMEPRAZOLE 40 MG/1
40 CAPSULE, DELAYED RELEASE ORAL NIGHTLY
COMMUNITY
Start: 2021-01-05 | End: 2021-01-18

## 2021-01-18 NOTE — PROGRESS NOTES
Maia Goldmann is a 69year old female. HPI:   Patient presents for medicare supervisit and additional issues. Comes in alone. 1. GERD: controlled on daily omeprazole.  She denies any symptoms of heartburn but her ENT saw signs of gastritis • Cancer Brother    • Cancer Brother         liver cancer      Past Medical History:   Diagnosis Date   • Cancer [facility])    • Migraines    • Osteoporosis    • Vertigo    • Visual impairment       Past Surgical History:   Procedure Laterality Date   • C-SEC - discussed weight bearing exercises, dietary calcium/vit D3, fall prevention. # Primary OA of Right Knee: mild on xray. Currently no pain or issues  # Bilateral Sensorineural Hearing Loss: does not need hearing aids yet per ENT.   # Health Maintenance:

## 2021-01-18 NOTE — PATIENT INSTRUCTIONS
Necesita 3 porciones de ocho onzas de calcio / vitamina D al día. [place] incluye helenecas, col rizada, brócoli, almendras, leche, yogur o Yoshi Teran.

## 2021-01-20 PROBLEM — Z85.21 HISTORY OF CANCER OF LARYNX: Status: ACTIVE | Noted: 2021-01-20

## 2021-01-20 PROBLEM — C32.9 CANCER OF LARYNX (HCC): Status: RESOLVED | Noted: 2018-01-15 | Resolved: 2021-01-20

## 2021-01-26 ENCOUNTER — TELEPHONE (OUTPATIENT)
Dept: INTERNAL MEDICINE CLINIC | Facility: CLINIC | Age: 71
End: 2021-01-26

## 2021-01-26 NOTE — TELEPHONE ENCOUNTER
Spoke with patient notified Dr. Yaritza Curiel does not have any additional recommendations at this time, patient to await Dr. Anastasiia Starkey advise and recommendations. Patient verbalized understanding and agreeable to POC.

## 2021-01-26 NOTE — TELEPHONE ENCOUNTER
Spoke with patient stating she continues to experience \"buzzing\" in ears, left greater than right. Patient indicates has seen Dr. Eden Ponce as well for this.  Patient had hearing test with audiologist not too long ago, states was reviewed by Dr. Virgie Duarte as well,

## 2021-01-26 NOTE — TELEPHONE ENCOUNTER
Pt would like a call back is calling with condition update.  Pt was seen 1.18.21 for wellness visit and pcp and pt talked about her having ringing in her ear and as recommended she went to see an ENT but states they did not tell her anything nor did they gi

## 2021-01-27 ENCOUNTER — LAB ENCOUNTER (OUTPATIENT)
Dept: LAB | Age: 71
End: 2021-01-27
Attending: INTERNAL MEDICINE
Payer: MEDICARE

## 2021-01-27 DIAGNOSIS — R79.89 ABNORMAL TSH: Primary | ICD-10-CM

## 2021-01-27 DIAGNOSIS — M85.88 OSTEOPENIA OF LUMBAR SPINE: ICD-10-CM

## 2021-01-27 DIAGNOSIS — Z00.00 ROUTINE GENERAL MEDICAL EXAMINATION AT A HEALTH CARE FACILITY: ICD-10-CM

## 2021-01-27 LAB
ALT SERPL-CCNC: 26 U/L
ANION GAP SERPL CALC-SCNC: 1 MMOL/L (ref 0–18)
AST SERPL-CCNC: 18 U/L (ref 15–37)
BASOPHILS # BLD AUTO: 0.01 X10(3) UL (ref 0–0.2)
BASOPHILS NFR BLD AUTO: 0.2 %
BUN BLD-MCNC: 14 MG/DL (ref 7–18)
BUN/CREAT SERPL: 17.1 (ref 10–20)
CALCIUM BLD-MCNC: 8.9 MG/DL (ref 8.5–10.1)
CHLORIDE SERPL-SCNC: 110 MMOL/L (ref 98–112)
CHOLEST SMN-MCNC: 126 MG/DL (ref ?–200)
CO2 SERPL-SCNC: 31 MMOL/L (ref 21–32)
CREAT BLD-MCNC: 0.82 MG/DL
DEPRECATED RDW RBC AUTO: 43.4 FL (ref 35.1–46.3)
EOSINOPHIL # BLD AUTO: 0.07 X10(3) UL (ref 0–0.7)
EOSINOPHIL NFR BLD AUTO: 1.6 %
ERYTHROCYTE [DISTWIDTH] IN BLOOD BY AUTOMATED COUNT: 12.5 % (ref 11–15)
GLUCOSE BLD-MCNC: 93 MG/DL (ref 70–99)
HCT VFR BLD AUTO: 40.8 %
HDLC SERPL-MCNC: 64 MG/DL (ref 40–59)
HGB BLD-MCNC: 13.1 G/DL
IMM GRANULOCYTES # BLD AUTO: 0.01 X10(3) UL (ref 0–1)
IMM GRANULOCYTES NFR BLD: 0.2 %
LDLC SERPL CALC-MCNC: 52 MG/DL (ref ?–100)
LYMPHOCYTES # BLD AUTO: 1.06 X10(3) UL (ref 1–4)
LYMPHOCYTES NFR BLD AUTO: 24.4 %
MCH RBC QN AUTO: 30.3 PG (ref 26–34)
MCHC RBC AUTO-ENTMCNC: 32.1 G/DL (ref 31–37)
MCV RBC AUTO: 94.2 FL
MONOCYTES # BLD AUTO: 0.47 X10(3) UL (ref 0.1–1)
MONOCYTES NFR BLD AUTO: 10.8 %
NEUTROPHILS # BLD AUTO: 2.72 X10 (3) UL (ref 1.5–7.7)
NEUTROPHILS # BLD AUTO: 2.72 X10(3) UL (ref 1.5–7.7)
NEUTROPHILS NFR BLD AUTO: 62.8 %
NONHDLC SERPL-MCNC: 62 MG/DL (ref ?–130)
OSMOLALITY SERPL CALC.SUM OF ELEC: 294 MOSM/KG (ref 275–295)
PATIENT FASTING Y/N/NP: YES
PATIENT FASTING Y/N/NP: YES
PLATELET # BLD AUTO: 165 10(3)UL (ref 150–450)
POTASSIUM SERPL-SCNC: 4.1 MMOL/L (ref 3.5–5.1)
RBC # BLD AUTO: 4.33 X10(6)UL
SODIUM SERPL-SCNC: 142 MMOL/L (ref 136–145)
T4 FREE SERPL-MCNC: 1 NG/DL (ref 0.8–1.7)
TRIGL SERPL-MCNC: 49 MG/DL (ref 30–149)
TSI SER-ACNC: 5.7 MIU/ML (ref 0.36–3.74)
VIT D+METAB SERPL-MCNC: 37.1 NG/ML (ref 30–100)
VLDLC SERPL CALC-MCNC: 10 MG/DL (ref 0–30)
WBC # BLD AUTO: 4.3 X10(3) UL (ref 4–11)

## 2021-01-27 PROCEDURE — 82306 VITAMIN D 25 HYDROXY: CPT

## 2021-01-27 PROCEDURE — 36415 COLL VENOUS BLD VENIPUNCTURE: CPT

## 2021-01-27 PROCEDURE — 84443 ASSAY THYROID STIM HORMONE: CPT

## 2021-01-27 PROCEDURE — 85025 COMPLETE CBC W/AUTO DIFF WBC: CPT

## 2021-01-27 PROCEDURE — 80048 BASIC METABOLIC PNL TOTAL CA: CPT

## 2021-01-27 PROCEDURE — 84439 ASSAY OF FREE THYROXINE: CPT

## 2021-01-27 PROCEDURE — 84460 ALANINE AMINO (ALT) (SGPT): CPT

## 2021-01-27 PROCEDURE — 80061 LIPID PANEL: CPT

## 2021-01-27 PROCEDURE — 84450 TRANSFERASE (AST) (SGOT): CPT

## 2021-01-28 ENCOUNTER — TELEPHONE (OUTPATIENT)
Dept: INTERNAL MEDICINE CLINIC | Facility: CLINIC | Age: 71
End: 2021-01-28

## 2021-01-28 NOTE — TELEPHONE ENCOUNTER
Patient called stating that she was referred to Dr. Parish Pisano but he is too far.  Would like the name of a different provider that Dr. Ramon Hansen would recommend

## 2021-01-29 NOTE — TELEPHONE ENCOUNTER
Unfortunately she will have to call her insurance to see who else is in network.  I am only familiar with [facility].

## 2021-02-06 ENCOUNTER — HOSPITAL ENCOUNTER (OUTPATIENT)
Dept: MAMMOGRAPHY | Age: 71
Discharge: HOME | End: 2021-02-06
Attending: INTERNAL MEDICINE
Payer: MEDICARE

## 2021-02-06 ENCOUNTER — HOSPITAL ENCOUNTER (OUTPATIENT)
Dept: BONE DENSITY | Age: 71
Discharge: HOME | End: 2021-02-06
Attending: INTERNAL MEDICINE
Payer: MEDICARE

## 2021-02-06 DIAGNOSIS — M85.88 OSTEOPENIA OF LUMBAR SPINE: ICD-10-CM

## 2021-02-06 DIAGNOSIS — Z12.31 VISIT FOR SCREENING MAMMOGRAM: ICD-10-CM

## 2021-02-06 PROCEDURE — 77063 BREAST TOMOSYNTHESIS BI: CPT | Performed by: INTERNAL MEDICINE

## 2021-02-06 PROCEDURE — 77080 DXA BONE DENSITY AXIAL: CPT | Performed by: INTERNAL MEDICINE

## 2021-02-06 PROCEDURE — 77067 SCR MAMMO BI INCL CAD: CPT | Performed by: INTERNAL MEDICINE

## 2021-02-10 ENCOUNTER — TELEPHONE (OUTPATIENT)
Dept: INTERNAL MEDICINE CLINIC | Facility: CLINIC | Age: 71
End: 2021-02-10

## 2021-02-10 DIAGNOSIS — H93.13 BILATERAL TINNITUS: Primary | ICD-10-CM

## 2021-02-10 NOTE — TELEPHONE ENCOUNTER
Pt would like a call back from nurse would like some advise says that pcp sent her to see an ENT for her ear pain and had labs done and was suggested to see a neurologist

## 2021-02-10 NOTE — TELEPHONE ENCOUNTER
Spoke with patient stating had office visit with Dr. Mere Blanchard and he referred her to Neurology Dr. Sharona Moore, patient is asking for Dr. Eileen Sanchez opinion on this and would like her to place referral if it is what she recommends. Please advise.

## 2021-02-15 ENCOUNTER — TELEPHONE (OUTPATIENT)
Dept: INTERNAL MEDICINE CLINIC | Facility: CLINIC | Age: 71
End: 2021-02-15

## 2021-02-15 DIAGNOSIS — H93.13 BILATERAL TINNITUS: Primary | ICD-10-CM

## 2021-02-15 NOTE — TELEPHONE ENCOUNTER
Referral pending approval if appropriate. Thank you!     .Reason for the order/referral:PHYSICAL THERAPY/CONSULT AND TREATMENT   PCP: RONDA QUEZADA [facility]   Refer to Provider ([address]   Specialty:PT   Patient Insurance: Payor: Landen Tovar / Plan: Manuel Lopez

## 2021-02-15 NOTE — TELEPHONE ENCOUNTER
LM on  notifying patient referral for PT, for [facility] has been placed, once approved we will let her know.

## 2021-02-18 ENCOUNTER — TELEPHONE (OUTPATIENT)
Dept: INTERNAL MEDICINE CLINIC | Facility: CLINIC | Age: 71
End: 2021-02-18

## 2021-02-18 DIAGNOSIS — L50.9 URTICARIA: Primary | ICD-10-CM

## 2021-02-18 NOTE — TELEPHONE ENCOUNTER
Patient called requesting referral to be placed for allergist. Needed to have referral placed for someone who is in [facility]\"  Dr. Amin Hallmark    Ph# [phone]  Fx# [phone]    [place] speaking

## 2021-02-18 NOTE — TELEPHONE ENCOUNTER
Went over labs with pt. She understands that she will need to recheck thyroid levels. Pt is asking about her mammogram results and Bone density results. Please advise.

## 2021-02-19 NOTE — TELEPHONE ENCOUNTER
Pt informed referral for allergist and PT order placed and awaiting for insurance authorization. Pt agreeable to wait and requested a call back when authorized.

## 2021-02-19 NOTE — TELEPHONE ENCOUNTER
Pt informed order placed and awaiting for insurance authorization. Pt agreeable.    See other te re: referral for allergist.

## 2021-02-19 NOTE — TELEPHONE ENCOUNTER
Pt informed of test results and stated will continue w MD's advise. Pt clarified does not use [employer] and unable to login due to deficient electronic literacy. Pt agreeable to close account.

## 2021-02-19 NOTE — TELEPHONE ENCOUNTER
I had sent her a NMRKTchart message on 2/6 regarding both tests. If she is not using [employer] then we should disable it so she gets called instead. Her mammogram did not show any suspicious lesions. Dear Domingo Hamman for completing your DEXA scan.

## 2021-02-26 ENCOUNTER — TELEPHONE (OUTPATIENT)
Dept: INTERNAL MEDICINE CLINIC | Facility: CLINIC | Age: 71
End: 2021-02-26

## 2021-02-26 NOTE — TELEPHONE ENCOUNTER
Faxed Plan of care to [facility]   Fax confirmation received   Sent to scan and copy placed in accordion

## 2021-03-01 ENCOUNTER — TELEPHONE (OUTPATIENT)
Dept: INTERNAL MEDICINE CLINIC | Facility: CLINIC | Age: 71
End: 2021-03-01

## 2021-03-01 NOTE — TELEPHONE ENCOUNTER
Patient came into office expressing frustration over not receiving covid vaccine or any communication regarding when she would be able to receive covid vaccine from office. She expected to have some communication after Jan 18 OV. She does not use [facility], no existing orders for vaccine. She did also speak with , Adela Mcgarry. Requesting communication from Dr. Ryan directly.

## 2021-03-02 NOTE — TELEPHONE ENCOUNTER
I am unable to call patient directly for this matter as it is not under my control. I am sure staff has already explained COVID vaccine eligibility and process through [employer]. Nothing more to add. Please convey to patient that I agree with staff's recommendations.

## 2021-03-12 DIAGNOSIS — Z23 NEED FOR VACCINATION: ICD-10-CM

## 2021-04-21 ENCOUNTER — HOSPITAL ENCOUNTER (OUTPATIENT)
Dept: RADIATION ONCOLOGY | Facility: HOSPITAL | Age: 71
Discharge: HOME | End: 2021-04-21
Attending: RADIOLOGY
Payer: MEDICARE

## 2021-04-21 ENCOUNTER — APPOINTMENT (OUTPATIENT)
Dept: RADIATION ONCOLOGY | Facility: HOSPITAL | Age: 71
End: 2021-04-21
Attending: RADIOLOGY
Payer: MEDICARE

## 2021-04-21 VITALS
WEIGHT: 154 LBS | RESPIRATION RATE: 16 BRPM | HEART RATE: 65 BPM | OXYGEN SATURATION: 98 % | BODY MASS INDEX: 29 KG/M2 | DIASTOLIC BLOOD PRESSURE: 68 MMHG | TEMPERATURE: 98 F | SYSTOLIC BLOOD PRESSURE: 138 MMHG

## 2021-04-21 DIAGNOSIS — C32.0 PRIMARY MALIGNANT NEOPLASM OF GLOTTIS (HCC): Primary | ICD-10-CM

## 2021-04-21 NOTE — PROGRESS NOTES
[facility] RADIATION ONCOLOGY  FOLLOW UP     PATIENT:   Oma Bettencourt      10/11/1950    DIAGNOSIS:   T2 N0 SCCA glottis      CANCER HISTORY   56-year-old woman with SCCA bilateral cords, SGL extension, ?subglottic, node negative, cT2N0.

## 2021-04-21 NOTE — PATIENT INSTRUCTIONS
- CALL [phone] FOR A FOLLOW-UP WITH DR. WILKS IN 6 MONTHS (OCTOBER 2021)    - CALL IF YOU HAVE ANY QUESTIONS/CONCERNS REGARDING RADIATION THERAPY

## 2021-04-21 NOTE — PROGRESS NOTES
Nursing Follow-Up Note    Patient: Nely Richmond  YOB: 1950  Age: 69year old  Radiation Oncologist: Dr. Sara Sorto   Referring Physician: Dr. Kaleigh Wheatley  Chief Complaint: Patient presents with:   Follow - Up    Date: 4/21/2021    To

## 2021-04-29 ENCOUNTER — TELEPHONE (OUTPATIENT)
Dept: RADIATION ONCOLOGY | Facility: HOSPITAL | Age: 71
End: 2021-04-29

## 2021-04-29 NOTE — TELEPHONE ENCOUNTER
Lisa naranjo. She states she received an approval letter from [employer] approving treatment for this year. She is very confused as to what treatment this is and if Dr. Luther Ott wants her to complete more treatment.  She states she is very nervous about this because

## 2021-06-11 ENCOUNTER — TELEPHONE (OUTPATIENT)
Dept: INTERNAL MEDICINE CLINIC | Facility: CLINIC | Age: 71
End: 2021-06-11

## 2021-06-11 NOTE — TELEPHONE ENCOUNTER
Spoke with patient stating she would like an order for PT for tinnitus/dizzines, symptoms have been ongoing for 7 months, patient would like an order for PT in [place], she states her insurance here will no longer approve any more sessions and she cont

## 2021-06-11 NOTE — TELEPHONE ENCOUNTER
Pt is very concerned about her condition (Dizziness, Vertigo, Unbalance) pt stated she is been going to [facility] since December 2020 she has an appt this month and has been treating this condition for almost 7 months and the [facility] treatment has not help her get b

## 2021-06-16 ENCOUNTER — ORDER TRANSCRIPTION (OUTPATIENT)
Dept: PHYSICAL THERAPY | Facility: HOSPITAL | Age: 71
End: 2021-06-16

## 2021-06-16 ENCOUNTER — TELEPHONE (OUTPATIENT)
Dept: PHYSICAL THERAPY | Facility: HOSPITAL | Age: 71
End: 2021-06-16

## 2021-06-16 DIAGNOSIS — R42 DIZZINESS: Primary | ICD-10-CM

## 2021-06-23 ENCOUNTER — TELEPHONE (OUTPATIENT)
Dept: PHYSICAL THERAPY | Facility: HOSPITAL | Age: 71
End: 2021-06-23

## 2021-06-23 ENCOUNTER — TELEPHONE (OUTPATIENT)
Dept: INTERNAL MEDICINE CLINIC | Facility: CLINIC | Age: 71
End: 2021-06-23

## 2021-06-28 ENCOUNTER — TELEPHONE (OUTPATIENT)
Dept: PHYSICAL THERAPY | Facility: HOSPITAL | Age: 71
End: 2021-06-28

## 2021-06-28 NOTE — TELEPHONE ENCOUNTER
Called to speak with patient regarding moving her PT evaluation to an earlier date. Patient requested that I call her daughter Dennis Potts at [phone] to set this up. Called daughter and she took this appointment for her mother.  Agreed to 7/8/21 at 8:30 am

## 2021-07-07 ENCOUNTER — TELEPHONE (OUTPATIENT)
Dept: PHYSICAL THERAPY | Facility: HOSPITAL | Age: 71
End: 2021-07-07

## 2021-07-08 ENCOUNTER — OFFICE VISIT (OUTPATIENT)
Dept: PHYSICAL THERAPY | Facility: HOSPITAL | Age: 71
End: 2021-07-08
Attending: INTERNAL MEDICINE
Payer: MEDICARE

## 2021-07-08 PROCEDURE — 97161 PT EVAL LOW COMPLEX 20 MIN: CPT

## 2021-07-08 PROCEDURE — 95992 CANALITH REPOSITIONING PROC: CPT

## 2021-07-08 NOTE — PROGRESS NOTES
VESTIBULAR EVALUATION:   Referring Physician: Dr. Natan Valenzuela  Diagnosis: Dizziness     Date of Service: 7/8/2021     PATIENT Shonda Akins is a 69year old female who presents to therapy today with reports of vertigo and tinnitus.  Kavya Dizziness: Current 0/10, Best 0/10, Worst 10/10    Quality: spinning, nausea, vomiting  Frequency/Duration: episodes can be brief or last for an entire day   Aggravates: Unsure; sometimes will not have an episode for almost a year or only a few in a ye nature of episodic symptoms. Pt and PT discussed evaluation findings, pathology, POC and HEP. Pt voiced understanding and performs HEP correctly. Skilled Physical Therapy is medically necessary to address the above impairments and reach functional goals. reproduction of dizziness or change in tinnitus  Palpation: NTTP SOs, UTs, scalenes, and SCM all WNL, no reproduction of dizziness or change in tinnitus  Accessory motion: cervical WNL throughout, no reproduction of dizziness or change in tinnitus  Cervica stimulation  and manual therapy.      Education or treatment limitation: None   Rehab Potential: good     Patient/Family/Caregiver was advised of these findings, precautions, and treatment options and has agreed to actively participate in planning and for t

## 2021-07-20 ENCOUNTER — OFFICE VISIT (OUTPATIENT)
Dept: PHYSICAL THERAPY | Facility: HOSPITAL | Age: 71
End: 2021-07-20
Attending: INTERNAL MEDICINE
Payer: MEDICARE

## 2021-07-20 PROCEDURE — 97140 MANUAL THERAPY 1/> REGIONS: CPT

## 2021-07-20 PROCEDURE — 95992 CANALITH REPOSITIONING PROC: CPT

## 2021-07-20 PROCEDURE — 97112 NEUROMUSCULAR REEDUCATION: CPT

## 2021-07-20 NOTE — PROGRESS NOTES
Dx: Dizziness         Insurance (Authorized # of Visits):  8 requested           Authorizing Physician: Dr. Sandra Baer Next MD visit: none scheduled  Fall Risk: standard         Precautions: n/a             Subjective: Patient states that she is feeling about th CRM  - Epley R x 2       NR  - discussion, edu clinical findings, POC, HEP  - edu self-Epley R with performance and review       Manual  - STM cervical paraspinals, SOs, scalenes, UTs  - bouts of manual cervical traction  - supine C1-2 C-PA mobs gr II/II

## 2021-08-03 ENCOUNTER — OFFICE VISIT (OUTPATIENT)
Dept: PHYSICAL THERAPY | Facility: HOSPITAL | Age: 71
End: 2021-08-03
Attending: INTERNAL MEDICINE
Payer: MEDICARE

## 2021-08-03 DIAGNOSIS — R42 DIZZINESS: ICD-10-CM

## 2021-08-03 PROCEDURE — 95992 CANALITH REPOSITIONING PROC: CPT

## 2021-08-03 PROCEDURE — 97140 MANUAL THERAPY 1/> REGIONS: CPT

## 2021-08-03 NOTE — PROGRESS NOTES
Dx: Dizziness         Insurance (Authorized # of Visits):  8 requested           Authorizing Physician: Dr. Sea Arias Next MD visit: none scheduled  Fall Risk: standard         Precautions: n/a             Subjective: Patient states that she has not been experi exercises. Date: 7/20/2021  TX#: 2/8 Date: 8/3/21              TX#: 3/8 Date:                 TX#: 4/ Date:                 TX#: 5/ Date:    Tx#: 6/   CRM  - Epley R x 2 CRM  - Epley R x 2      NR  - discussion, edu clinical findings, POC, HEP  - edu self

## 2021-08-05 ENCOUNTER — APPOINTMENT (OUTPATIENT)
Dept: PHYSICAL THERAPY | Facility: HOSPITAL | Age: 71
End: 2021-08-05
Attending: INTERNAL MEDICINE
Payer: MEDICARE

## 2021-08-10 ENCOUNTER — OFFICE VISIT (OUTPATIENT)
Dept: PHYSICAL THERAPY | Facility: HOSPITAL | Age: 71
End: 2021-08-10
Attending: INTERNAL MEDICINE
Payer: MEDICARE

## 2021-08-10 PROCEDURE — 97140 MANUAL THERAPY 1/> REGIONS: CPT

## 2021-08-10 NOTE — PROGRESS NOTES
Dx: Dizziness         Insurance (Authorized # of Visits):  8 requested           Authorizing Physician: Dr. Sandra Baer Next MD visit: none scheduled  Fall Risk: standard         Precautions: n/a             Subjective: Patient states she has been doing well rega NR  - discussion, edu clinical findings, POC, HEP  - edu self-Epley R with performance and review - -     Manual  - STM cervical paraspinals, SOs, scalenes, UTs  - bouts of manual cervical traction  - supine C1-2 C-PA mobs gr II/III Manual  - STM cervica

## 2021-08-17 ENCOUNTER — OFFICE VISIT (OUTPATIENT)
Dept: PHYSICAL THERAPY | Facility: HOSPITAL | Age: 71
End: 2021-08-17
Attending: INTERNAL MEDICINE
Payer: MEDICARE

## 2021-08-17 PROCEDURE — 97140 MANUAL THERAPY 1/> REGIONS: CPT

## 2021-08-17 NOTE — PROGRESS NOTES
Dx: Dizziness         Insurance (Authorized # of Visits):  8 requested           Authorizing Physician: Dr. Britta Conklin Next MD visit: none scheduled  Fall Risk: standard         Precautions: n/a             Subjective: Patient states that she is happy she has no self-Epley R with performance and review - - -    Manual  - STM cervical paraspinals, SOs, scaleerica, UTs  - bouts of manual cervical traction  - supine C1-2 C-PA mobs gr II/III Manual  - STM cervical paraspinals, SOs, scaleerica, UTs  - bouts of manual cervi

## 2021-08-24 ENCOUNTER — OFFICE VISIT (OUTPATIENT)
Dept: PHYSICAL THERAPY | Facility: HOSPITAL | Age: 71
End: 2021-08-24
Attending: INTERNAL MEDICINE
Payer: MEDICARE

## 2021-08-24 PROCEDURE — 97140 MANUAL THERAPY 1/> REGIONS: CPT

## 2021-08-24 NOTE — PROGRESS NOTES
Discharge Summary  Pt has attended 6 visits in Physical Therapy.      Dx: Dizziness         Insurance (Authorized # of Visits):  8 requested           Authorizing Physician: Dr. Lennette Bosworth Next MD visit: none scheduled  Fall Risk: standard         Precautions: n supply for the ear, case studies have shown successful improvement in tinnitus with this. Attempted for several visits with patient reporting no change.  Discussed with her that due to no change, will D/C from PT and refer back to MD. She will also be likel paraspinals, SOs, scalenes, UTs  - bouts of manual cervical traction  - prone C1-2 C-PA mobs gr II/III  - upper thoracic STM and lateral oscillations Manual  - STM cervical paraspinals, SOs, scalenes, UTs, facial mm, masseter  - bouts of manual cervical tr

## 2021-08-31 ENCOUNTER — APPOINTMENT (OUTPATIENT)
Dept: PHYSICAL THERAPY | Facility: HOSPITAL | Age: 71
End: 2021-08-31
Attending: INTERNAL MEDICINE
Payer: MEDICARE

## 2021-09-02 ENCOUNTER — APPOINTMENT (OUTPATIENT)
Dept: PHYSICAL THERAPY | Facility: HOSPITAL | Age: 71
End: 2021-09-02
Attending: INTERNAL MEDICINE
Payer: MEDICARE

## 2021-11-11 ENCOUNTER — HOSPITAL ENCOUNTER (OUTPATIENT)
Dept: RADIATION ONCOLOGY | Facility: HOSPITAL | Age: 71
Discharge: HOME | End: 2021-11-11
Attending: RADIOLOGY
Payer: MEDICARE

## 2021-11-11 ENCOUNTER — APPOINTMENT (OUTPATIENT)
Dept: RADIATION ONCOLOGY | Facility: HOSPITAL | Age: 71
End: 2021-11-11
Attending: RADIOLOGY
Payer: MEDICARE

## 2021-11-11 VITALS
RESPIRATION RATE: 16 BRPM | BODY MASS INDEX: 29 KG/M2 | DIASTOLIC BLOOD PRESSURE: 74 MMHG | HEART RATE: 60 BPM | WEIGHT: 153.81 LBS | TEMPERATURE: 98 F | OXYGEN SATURATION: 96 % | SYSTOLIC BLOOD PRESSURE: 157 MMHG

## 2021-11-11 DIAGNOSIS — C32.0 PRIMARY MALIGNANT NEOPLASM OF GLOTTIS (HCC): Primary | ICD-10-CM

## 2021-11-11 DIAGNOSIS — Z85.21 HISTORY OF CANCER OF LARYNX: ICD-10-CM

## 2021-11-11 PROCEDURE — 99213 OFFICE O/P EST LOW 20 MIN: CPT

## 2021-11-11 NOTE — PROGRESS NOTES
Nursing Follow-Up Note    Patient: Stew Earl  YOB: 1950  Age: 66year old  Radiation Oncologist: Dr. Rod Flood  Referring Physician: Dr. Moon Cary  Chief Complaint: Patient presents with:   Follow - Up    Date: 11/11/2021    To

## 2021-11-11 NOTE — PATIENT INSTRUCTIONS
- WE WILL CALL TO SCHEDULE YOUR FOLLOW-UP APPOINTMENT WITH DR. WILKS IN 1 YEAR      - CALL [phone] IF YOU HAVE ANY QUESTIONS/CONCERNS REGARDING RADIATION THERAPY

## 2021-11-11 NOTE — PROGRESS NOTES
[facility] RADIATION ONCOLOGY  FOLLOW UP     PATIENT:   Bard Nava      10/11/1950    DIAGNOSIS:   T2 N0 SCCA glottis      CANCER HISTORY   41-year-old woman with SCCA bilateral cords, SGL extension, ?subglottic, node negative, cT2N0.

## 2021-12-03 LAB
AMB EXT HEMATOCRIT: 40.3
AMB EXT HEMOGLOBIN: 13.4
AMB EXT MCV: 92.3
AMB EXT PLATELETS: 160
AMB EXT WBC: 4.7 X10(3)UL

## 2022-01-03 ENCOUNTER — TELEPHONE (OUTPATIENT)
Dept: INTERNAL MEDICINE CLINIC | Facility: CLINIC | Age: 72
End: 2022-01-03

## 2022-01-03 NOTE — TELEPHONE ENCOUNTER
Using  [place] ID # [id number] message left for patient  is eligible for 2022 Medicare Advantage Supervisit and to call Dr. Annalise Velasquez office to schedule.

## 2022-06-06 ENCOUNTER — OFFICE VISIT (OUTPATIENT)
Dept: INTERNAL MEDICINE CLINIC | Facility: CLINIC | Age: 72
End: 2022-06-06
Payer: MEDICARE

## 2022-06-06 ENCOUNTER — TELEPHONE (OUTPATIENT)
Dept: INTERNAL MEDICINE CLINIC | Facility: CLINIC | Age: 72
End: 2022-06-06

## 2022-06-06 VITALS
SYSTOLIC BLOOD PRESSURE: 115 MMHG | RESPIRATION RATE: 16 BRPM | DIASTOLIC BLOOD PRESSURE: 70 MMHG | WEIGHT: 173.19 LBS | HEIGHT: 62.21 IN | HEART RATE: 68 BPM | OXYGEN SATURATION: 97 % | BODY MASS INDEX: 31.47 KG/M2

## 2022-06-06 DIAGNOSIS — Z85.21 HISTORY OF CANCER OF LARYNX: ICD-10-CM

## 2022-06-06 DIAGNOSIS — K21.9 GASTROESOPHAGEAL REFLUX DISEASE, UNSPECIFIED WHETHER ESOPHAGITIS PRESENT: ICD-10-CM

## 2022-06-06 DIAGNOSIS — H93.13 TINNITUS OF BOTH EARS: ICD-10-CM

## 2022-06-06 DIAGNOSIS — Z12.31 ENCOUNTER FOR SCREENING MAMMOGRAM FOR MALIGNANT NEOPLASM OF BREAST: ICD-10-CM

## 2022-06-06 DIAGNOSIS — R79.89 ABNORMAL THYROID BLOOD TEST: ICD-10-CM

## 2022-06-06 DIAGNOSIS — M85.88 OSTEOPENIA OF LUMBAR SPINE: ICD-10-CM

## 2022-06-06 DIAGNOSIS — J34.89 SINUS PRESSURE: ICD-10-CM

## 2022-06-06 DIAGNOSIS — E03.9 HYPOTHYROIDISM, UNSPECIFIED TYPE: Primary | ICD-10-CM

## 2022-06-06 DIAGNOSIS — M79.10 MUSCLE TENSION PAIN: ICD-10-CM

## 2022-06-06 DIAGNOSIS — Z12.11 COLON CANCER SCREENING: ICD-10-CM

## 2022-06-06 PROCEDURE — 99214 OFFICE O/P EST MOD 30 MIN: CPT | Performed by: INTERNAL MEDICINE

## 2022-06-06 PROCEDURE — 3078F DIAST BP <80 MM HG: CPT | Performed by: INTERNAL MEDICINE

## 2022-06-06 PROCEDURE — 3074F SYST BP LT 130 MM HG: CPT | Performed by: INTERNAL MEDICINE

## 2022-06-06 PROCEDURE — 3008F BODY MASS INDEX DOCD: CPT | Performed by: INTERNAL MEDICINE

## 2022-06-06 RX ORDER — OMEPRAZOLE 20 MG/1
20 CAPSULE, DELAYED RELEASE ORAL
Qty: 90 CAPSULE | Refills: 1 | Status: SHIPPED | OUTPATIENT
Start: 2022-06-06

## 2022-06-06 RX ORDER — LEVOTHYROXINE SODIUM 0.03 MG/1
12.5 TABLET ORAL
Qty: 90 TABLET | Refills: 0 | Status: SHIPPED | OUTPATIENT
Start: 2022-06-06

## 2022-06-06 RX ORDER — FLUTICASONE PROPIONATE 50 MCG
2 SPRAY, SUSPENSION (ML) NASAL DAILY
Qty: 18.2 ML | Refills: 0 | Status: SHIPPED | OUTPATIENT
Start: 2022-06-06 | End: 2022-06-24

## 2022-06-09 LAB
ALBUMIN: 4.4 G/DL
ALKALINE PHOSPHATASE: 59
ALT: 13
ANION GAP: 7.5
AST: 17
BASOPHIL %: 0.6
BASOPHIL ABSOLUTE: 0
BILIRUBIN, TOTAL: 0.4
BUN: 17
CALCIUM: 9.8
CARBON DIOXIDE: 30.5
CHLORIDE: 105
CHOL/HDL RATIO: 2.3
CREATININE: 0.7 MG/DL (ref 0.6–1.2)
EGFR IF AFRICN AM: 100
EGFR IF NONAFRICN AM: 86
EOSINOPHIL %: 1.5
EOSINOPHIL ABSOLUTE: 0.1
GLUCOSE: 92
HCT: 40.3
HDL CHOL: 62
HGB: 13.4 G/DL
LDL CHOLESTEROL CALC: 61 MG/DL
LYMPHOCYTE %: 23.4
LYMPHOCYTE ABSOLUTE: 1.1
MCH: 30.7
MCHC: 33.2
MCV: 92.3
MEAN PLATELET VOLUME: 10.4
MONOCYTE %: 7.7
MONOCYTE ABSOLUTE: 0.4
NEUTROPHIL %: 66.8
NEUTROPHIL ABSOLUTE: 3.1
NON-HDL CHOLESTEROL: 78
PLT: 160
POTASSIUM: 4.5
PROTEIN: 6.6
RBC: 4.36
RDW: 13.5
SODIUM: 143
T4, FREE: 0.75
TOTAL CHOLESTEROL: 140 MG/DL (ref ?–200)
TRIGLYCERIDES: 87 MG/DL
TSH: 6.33 UIU/ML
VLDL CHOLESTEROL CAL: 17 MG/DL
WBC: 4.7

## 2022-12-14 ENCOUNTER — TELEPHONE (OUTPATIENT)
Dept: HEMATOLOGY/ONCOLOGY | Facility: HOSPITAL | Age: 72
End: 2022-12-14

## 2022-12-19 ENCOUNTER — OFFICE VISIT (OUTPATIENT)
Facility: LOCATION | Age: 72
End: 2022-12-19
Payer: MEDICARE

## 2022-12-19 DIAGNOSIS — K21.9 LARYNGOPHARYNGEAL REFLUX: Primary | ICD-10-CM

## 2022-12-19 DIAGNOSIS — R13.10 DYSPHAGIA, UNSPECIFIED TYPE: ICD-10-CM

## 2022-12-19 DIAGNOSIS — Z85.21 HISTORY OF PRIMARY LARYNGEAL CANCER: ICD-10-CM

## 2022-12-19 PROCEDURE — 99213 OFFICE O/P EST LOW 20 MIN: CPT | Performed by: OTOLARYNGOLOGY

## 2022-12-19 PROCEDURE — 31575 DIAGNOSTIC LARYNGOSCOPY: CPT | Performed by: OTOLARYNGOLOGY

## 2022-12-19 RX ORDER — OMEPRAZOLE 40 MG/1
CAPSULE, DELAYED RELEASE ORAL
Qty: 90 CAPSULE | Refills: 3 | Status: SHIPPED | OUTPATIENT
Start: 2022-12-19

## 2023-01-03 ENCOUNTER — TELEPHONE (OUTPATIENT)
Dept: RADIATION ONCOLOGY | Facility: HOSPITAL | Age: 73
End: 2023-01-03

## 2023-01-03 NOTE — TELEPHONE ENCOUNTER
Patient called to cancel appointment for tomorrow with doctor Abigail Tristan  has a dentist appointment schedule tomorrow. please call to reschedule .

## 2023-01-04 ENCOUNTER — TELEPHONE (OUTPATIENT)
Dept: RADIATION ONCOLOGY | Facility: HOSPITAL | Age: 73
End: 2023-01-04

## 2023-01-04 ENCOUNTER — APPOINTMENT (OUTPATIENT)
Dept: RADIATION ONCOLOGY | Facility: HOSPITAL | Age: 73
End: 2023-01-04
Attending: RADIOLOGY
Payer: MEDICARE

## 2023-01-04 NOTE — TELEPHONE ENCOUNTER
Patient called says she hasn't received a call back to reschedule her appt doctor Raquel Sheets. Please call patient.

## 2023-01-10 PROBLEM — C32.0: Status: ACTIVE | Noted: 2023-01-10

## 2023-01-11 ENCOUNTER — APPOINTMENT (OUTPATIENT)
Dept: RADIATION ONCOLOGY | Facility: HOSPITAL | Age: 73
End: 2023-01-11
Attending: RADIOLOGY
Payer: MEDICARE

## 2023-01-19 ENCOUNTER — TELEPHONE (OUTPATIENT)
Dept: HEMATOLOGY/ONCOLOGY | Facility: HOSPITAL | Age: 73
End: 2023-01-19

## 2023-02-15 ENCOUNTER — HOSPITAL ENCOUNTER (OUTPATIENT)
Dept: RADIATION ONCOLOGY | Facility: HOSPITAL | Age: 73
Discharge: HOME | End: 2023-02-15
Attending: RADIOLOGY
Payer: MEDICARE

## 2023-02-15 VITALS
OXYGEN SATURATION: 97 % | WEIGHT: 168.81 LBS | BODY MASS INDEX: 31 KG/M2 | RESPIRATION RATE: 16 BRPM | TEMPERATURE: 98 F | DIASTOLIC BLOOD PRESSURE: 77 MMHG | SYSTOLIC BLOOD PRESSURE: 131 MMHG | HEART RATE: 65 BPM

## 2023-02-15 DIAGNOSIS — C32.0 PRIMARY MALIGNANT NEOPLASM OF GLOTTIS (HCC): Primary | ICD-10-CM

## 2023-02-15 PROCEDURE — 99213 OFFICE O/P EST LOW 20 MIN: CPT

## 2023-02-15 NOTE — PATIENT INSTRUCTIONS
- FOLLOW-UP WITH DR. WILKS ONLY AS NEEDED      - CALL [phone] IF YOU HAVE ANY QUESTIONS/CONCERNS REGARDING RADIATION THERAPY

## 2023-07-20 ENCOUNTER — OFFICE VISIT (OUTPATIENT)
Dept: INTERNAL MEDICINE CLINIC | Facility: CLINIC | Age: 73
End: 2023-07-20
Payer: MEDICARE

## 2023-07-20 VITALS
WEIGHT: 172.81 LBS | HEIGHT: 61.3 IN | HEART RATE: 66 BPM | DIASTOLIC BLOOD PRESSURE: 60 MMHG | BODY MASS INDEX: 32.21 KG/M2 | RESPIRATION RATE: 15 BRPM | OXYGEN SATURATION: 97 % | SYSTOLIC BLOOD PRESSURE: 120 MMHG | TEMPERATURE: 98 F

## 2023-07-20 DIAGNOSIS — Z00.00 ENCOUNTER FOR ANNUAL WELLNESS VISIT (AWV) IN MEDICARE PATIENT: Primary | ICD-10-CM

## 2023-07-20 DIAGNOSIS — K21.9 LARYNGOPHARYNGEAL REFLUX: ICD-10-CM

## 2023-07-20 DIAGNOSIS — Z12.11 COLON CANCER SCREENING: ICD-10-CM

## 2023-07-20 DIAGNOSIS — Z78.0 POSTMENOPAUSAL: ICD-10-CM

## 2023-07-20 DIAGNOSIS — Z12.31 ENCOUNTER FOR SCREENING MAMMOGRAM FOR MALIGNANT NEOPLASM OF BREAST: ICD-10-CM

## 2023-07-20 DIAGNOSIS — M85.80 OSTEOPENIA, UNSPECIFIED LOCATION: ICD-10-CM

## 2023-07-20 DIAGNOSIS — Z85.21 HISTORY OF CANCER OF LARYNX: ICD-10-CM

## 2023-07-20 DIAGNOSIS — E03.9 HYPOTHYROIDISM, UNSPECIFIED TYPE: ICD-10-CM

## 2023-07-20 DIAGNOSIS — H93.19 TINNITUS, UNSPECIFIED LATERALITY: ICD-10-CM

## 2023-07-20 PROBLEM — M85.88 OSTEOPENIA OF LUMBAR SPINE: Status: RESOLVED | Noted: 2019-10-30 | Resolved: 2023-07-20

## 2023-07-20 PROBLEM — E66.3 OVERWEIGHT (BMI 25.0-29.9): Status: RESOLVED | Noted: 2019-10-30 | Resolved: 2023-07-20

## 2023-07-20 PROBLEM — C32.0: Status: RESOLVED | Noted: 2023-01-10 | Resolved: 2023-07-20

## 2023-08-23 ENCOUNTER — LAB ENCOUNTER (OUTPATIENT)
Dept: LAB | Age: 73
End: 2023-08-23
Attending: INTERNAL MEDICINE
Payer: MEDICARE

## 2023-08-23 DIAGNOSIS — M85.80 OSTEOPENIA, UNSPECIFIED LOCATION: ICD-10-CM

## 2023-08-23 DIAGNOSIS — Z00.00 ENCOUNTER FOR ANNUAL WELLNESS VISIT (AWV) IN MEDICARE PATIENT: ICD-10-CM

## 2023-08-23 DIAGNOSIS — E03.9 HYPOTHYROIDISM, UNSPECIFIED TYPE: Primary | ICD-10-CM

## 2023-08-23 DIAGNOSIS — Z78.0 POSTMENOPAUSAL: ICD-10-CM

## 2023-08-23 DIAGNOSIS — E03.9 HYPOTHYROIDISM, UNSPECIFIED TYPE: ICD-10-CM

## 2023-08-23 LAB
ALBUMIN SERPL-MCNC: 3.4 G/DL (ref 3.4–5)
ALBUMIN/GLOB SERPL: 1 {RATIO} (ref 1–2)
ALP LIVER SERPL-CCNC: 63 U/L
ALT SERPL-CCNC: 26 U/L
ANION GAP SERPL CALC-SCNC: 5 MMOL/L (ref 0–18)
AST SERPL-CCNC: 18 U/L (ref 15–37)
BASOPHILS # BLD AUTO: 0.02 X10(3) UL (ref 0–0.2)
BASOPHILS NFR BLD AUTO: 0.3 %
BILIRUB SERPL-MCNC: 0.5 MG/DL (ref 0.1–2)
BUN BLD-MCNC: 16 MG/DL (ref 7–18)
CALCIUM BLD-MCNC: 9.1 MG/DL (ref 8.5–10.1)
CHLORIDE SERPL-SCNC: 110 MMOL/L (ref 98–112)
CHOLEST SERPL-MCNC: 122 MG/DL (ref ?–200)
CO2 SERPL-SCNC: 26 MMOL/L (ref 21–32)
CREAT BLD-MCNC: 1.01 MG/DL
EGFRCR SERPLBLD CKD-EPI 2021: 59 ML/MIN/1.73M2 (ref 60–?)
EOSINOPHIL # BLD AUTO: 0.17 X10(3) UL (ref 0–0.7)
EOSINOPHIL NFR BLD AUTO: 2.7 %
ERYTHROCYTE [DISTWIDTH] IN BLOOD BY AUTOMATED COUNT: 13.3 %
EST. AVERAGE GLUCOSE BLD GHB EST-MCNC: 128 MG/DL (ref 68–126)
FASTING PATIENT LIPID ANSWER: YES
FASTING STATUS PATIENT QL REPORTED: YES
GLOBULIN PLAS-MCNC: 3.5 G/DL (ref 2.8–4.4)
GLUCOSE BLD-MCNC: 107 MG/DL (ref 70–99)
HBA1C MFR BLD: 6.1 % (ref ?–5.7)
HCT VFR BLD AUTO: 41.3 %
HDLC SERPL-MCNC: 49 MG/DL (ref 40–59)
HGB BLD-MCNC: 13.1 G/DL
IMM GRANULOCYTES # BLD AUTO: 0.02 X10(3) UL (ref 0–1)
IMM GRANULOCYTES NFR BLD: 0.3 %
LDLC SERPL CALC-MCNC: 58 MG/DL (ref ?–100)
LYMPHOCYTES # BLD AUTO: 1.28 X10(3) UL (ref 1–4)
LYMPHOCYTES NFR BLD AUTO: 20.3 %
MCH RBC QN AUTO: 28.4 PG (ref 26–34)
MCHC RBC AUTO-ENTMCNC: 31.7 G/DL (ref 31–37)
MCV RBC AUTO: 89.6 FL
MONOCYTES # BLD AUTO: 0.55 X10(3) UL (ref 0.1–1)
MONOCYTES NFR BLD AUTO: 8.7 %
NEUTROPHILS # BLD AUTO: 4.26 X10 (3) UL (ref 1.5–7.7)
NEUTROPHILS # BLD AUTO: 4.26 X10(3) UL (ref 1.5–7.7)
NEUTROPHILS NFR BLD AUTO: 67.7 %
NONHDLC SERPL-MCNC: 73 MG/DL (ref ?–130)
OSMOLALITY SERPL CALC.SUM OF ELEC: 294 MOSM/KG (ref 275–295)
PLATELET # BLD AUTO: 162 10(3)UL (ref 150–450)
POTASSIUM SERPL-SCNC: 4 MMOL/L (ref 3.5–5.1)
PROT SERPL-MCNC: 6.9 G/DL (ref 6.4–8.2)
RBC # BLD AUTO: 4.61 X10(6)UL
SODIUM SERPL-SCNC: 141 MMOL/L (ref 136–145)
T4 FREE SERPL-MCNC: 0.9 NG/DL (ref 0.8–1.7)
TRIGL SERPL-MCNC: 76 MG/DL (ref 30–149)
TSI SER-ACNC: 9.37 MIU/ML (ref 0.36–3.74)
VIT D+METAB SERPL-MCNC: 41 NG/ML (ref 30–100)
VLDLC SERPL CALC-MCNC: 11 MG/DL (ref 0–30)
WBC # BLD AUTO: 6.3 X10(3) UL (ref 4–11)

## 2023-08-23 PROCEDURE — 36415 COLL VENOUS BLD VENIPUNCTURE: CPT

## 2023-08-23 PROCEDURE — 82306 VITAMIN D 25 HYDROXY: CPT

## 2023-08-23 PROCEDURE — 83036 HEMOGLOBIN GLYCOSYLATED A1C: CPT

## 2023-08-23 PROCEDURE — 84443 ASSAY THYROID STIM HORMONE: CPT

## 2023-08-23 PROCEDURE — 85025 COMPLETE CBC W/AUTO DIFF WBC: CPT

## 2023-08-23 PROCEDURE — 80053 COMPREHEN METABOLIC PANEL: CPT

## 2023-08-23 PROCEDURE — 80061 LIPID PANEL: CPT

## 2023-08-23 PROCEDURE — 84439 ASSAY OF FREE THYROXINE: CPT

## 2023-08-23 RX ORDER — LEVOTHYROXINE SODIUM 0.03 MG/1
25 TABLET ORAL
Qty: 90 TABLET | Refills: 0 | Status: SHIPPED | OUTPATIENT
Start: 2023-08-23

## 2023-08-25 ENCOUNTER — APPOINTMENT (OUTPATIENT)
Dept: GENERAL RADIOLOGY | Age: 73
End: 2023-08-25
Attending: EMERGENCY MEDICINE
Payer: MEDICARE

## 2023-08-25 ENCOUNTER — HOSPITAL ENCOUNTER (OUTPATIENT)
Age: 73
Discharge: HOME | End: 2023-08-25
Attending: EMERGENCY MEDICINE
Payer: MEDICARE

## 2023-08-25 VITALS
HEIGHT: 61 IN | BODY MASS INDEX: 32.47 KG/M2 | RESPIRATION RATE: 20 BRPM | TEMPERATURE: 98 F | SYSTOLIC BLOOD PRESSURE: 140 MMHG | DIASTOLIC BLOOD PRESSURE: 65 MMHG | HEART RATE: 58 BPM | OXYGEN SATURATION: 99 % | WEIGHT: 172 LBS

## 2023-08-25 DIAGNOSIS — L03.039 CELLULITIS OF FIFTH TOE: Primary | ICD-10-CM

## 2023-08-25 PROCEDURE — 99213 OFFICE O/P EST LOW 20 MIN: CPT

## 2023-08-25 PROCEDURE — 99204 OFFICE O/P NEW MOD 45 MIN: CPT

## 2023-08-25 PROCEDURE — 73660 X-RAY EXAM OF TOE(S): CPT | Performed by: EMERGENCY MEDICINE

## 2023-08-25 RX ORDER — CEPHALEXIN 500 MG/1
500 CAPSULE ORAL 3 TIMES DAILY
Qty: 21 CAPSULE | Refills: 0 | Status: SHIPPED | OUTPATIENT
Start: 2023-08-25 | End: 2023-08-29

## 2023-08-25 NOTE — DISCHARGE INSTRUCTIONS
Follow up with podiatry if symptoms do not improve  Take keflex three times a day for 7 days  Take tylenol or motrin for pain  Return if any worsening symptoms or new concerns

## 2023-08-26 ENCOUNTER — HOSPITAL ENCOUNTER (OUTPATIENT)
Dept: BONE DENSITY | Age: 73
Discharge: HOME | End: 2023-08-26
Attending: INTERNAL MEDICINE
Payer: MEDICARE

## 2023-08-26 ENCOUNTER — HOSPITAL ENCOUNTER (OUTPATIENT)
Dept: MAMMOGRAPHY | Age: 73
Discharge: HOME | End: 2023-08-26
Attending: INTERNAL MEDICINE
Payer: MEDICARE

## 2023-08-26 DIAGNOSIS — Z00.00 ENCOUNTER FOR ANNUAL WELLNESS VISIT (AWV) IN MEDICARE PATIENT: ICD-10-CM

## 2023-08-26 DIAGNOSIS — Z12.31 ENCOUNTER FOR SCREENING MAMMOGRAM FOR MALIGNANT NEOPLASM OF BREAST: ICD-10-CM

## 2023-08-26 DIAGNOSIS — M85.80 OSTEOPENIA, UNSPECIFIED LOCATION: ICD-10-CM

## 2023-08-26 DIAGNOSIS — Z78.0 POSTMENOPAUSAL: ICD-10-CM

## 2023-08-26 PROCEDURE — 77063 BREAST TOMOSYNTHESIS BI: CPT | Performed by: INTERNAL MEDICINE

## 2023-08-26 PROCEDURE — 77080 DXA BONE DENSITY AXIAL: CPT | Performed by: INTERNAL MEDICINE

## 2023-08-26 PROCEDURE — 77067 SCR MAMMO BI INCL CAD: CPT | Performed by: INTERNAL MEDICINE

## 2023-08-29 ENCOUNTER — HOSPITAL ENCOUNTER (OUTPATIENT)
Age: 73
Discharge: HOME | End: 2023-08-29
Attending: EMERGENCY MEDICINE
Payer: MEDICARE

## 2023-08-29 VITALS
TEMPERATURE: 98 F | OXYGEN SATURATION: 97 % | DIASTOLIC BLOOD PRESSURE: 65 MMHG | HEART RATE: 88 BPM | WEIGHT: 172 LBS | HEIGHT: 61 IN | RESPIRATION RATE: 18 BRPM | BODY MASS INDEX: 32.47 KG/M2 | SYSTOLIC BLOOD PRESSURE: 134 MMHG

## 2023-08-29 DIAGNOSIS — T50.905A MEDICATION REACTION, INITIAL ENCOUNTER: Primary | ICD-10-CM

## 2023-08-29 DIAGNOSIS — Z87.2 HISTORY OF CELLULITIS: ICD-10-CM

## 2023-08-29 PROCEDURE — 99214 OFFICE O/P EST MOD 30 MIN: CPT

## 2023-08-29 PROCEDURE — 99213 OFFICE O/P EST LOW 20 MIN: CPT

## 2023-08-29 RX ORDER — DOXYCYCLINE HYCLATE 100 MG/1
100 CAPSULE ORAL 2 TIMES DAILY
Qty: 14 CAPSULE | Refills: 0 | Status: SHIPPED | OUTPATIENT
Start: 2023-08-29 | End: 2023-09-05

## 2023-08-29 NOTE — ED INITIAL ASSESSMENT (HPI)
C/o stomach for 3 days. Pt reports she took Cephalexin for 3 days and each time she took it it upset her stomach. Pt reports yesterday she stopped taking it and her pain went away. Pt taking meds for infection on 5th toe of right foot. Pt denies pain.

## 2023-08-29 NOTE — DISCHARGE INSTRUCTIONS
You are not allergic to the first antibiotic that you took however the stomach upset is likely related to it. Discontinue cephalexin. Begin doxycycline. If the doxycycline causes you stomach upset, discontinue all medications completely. Follow-up with podiatry as prior referral provided.

## 2023-08-30 ENCOUNTER — TELEPHONE (OUTPATIENT)
Dept: INTERNAL MEDICINE CLINIC | Facility: CLINIC | Age: 73
End: 2023-08-30

## 2023-09-06 ENCOUNTER — OFFICE VISIT (OUTPATIENT)
Facility: LOCATION | Age: 73
End: 2023-09-06
Payer: MEDICARE

## 2023-09-06 DIAGNOSIS — K21.9 LARYNGOPHARYNGEAL REFLUX: Primary | ICD-10-CM

## 2023-09-06 DIAGNOSIS — Z85.21 HISTORY OF PRIMARY LARYNGEAL CANCER: ICD-10-CM

## 2023-09-06 PROCEDURE — 31575 DIAGNOSTIC LARYNGOSCOPY: CPT | Performed by: OTOLARYNGOLOGY

## 2023-09-06 PROCEDURE — 1160F RVW MEDS BY RX/DR IN RCRD: CPT | Performed by: OTOLARYNGOLOGY

## 2023-09-06 PROCEDURE — 99213 OFFICE O/P EST LOW 20 MIN: CPT | Performed by: OTOLARYNGOLOGY

## 2023-09-06 PROCEDURE — 1159F MED LIST DOCD IN RCRD: CPT | Performed by: OTOLARYNGOLOGY

## 2023-09-06 NOTE — PROGRESS NOTES
Sia Lambert is a 67year old female. Patient presents with:  Throat Problem    HPI:   Patient had treatment for laryngeal cancer in 2017. She notes no dysphagia dyne aphasia otalgia. She is here for routine checkup. She usually takes omeprazole but has not had that for the last week or so. Current Outpatient Medications   Medication Sig Dispense Refill    levothyroxine 25 MCG Oral Tab Take 1 tablet (25 mcg total) by mouth before breakfast. 90 tablet 0    Omeprazole 40 MG Oral Capsule Delayed Release Take one capsule nightly 90 capsule 3    Multiple Vitamins-Minerals (CENTRUM) Oral Tab Take 1 tablet by mouth daily. Vitamin D3 50 MCG (2000 UT) Oral Cap Take 1 capsule (2,000 Units total) by mouth daily. Past Medical History:   Diagnosis Date    Cancer ([facility] [address]     Migraines     Osteoporosis     Primary malignant neoplasm of glottis ([facility] [address] 01/10/2023    Vertigo     Visual impairment       Social History:  Social History     Socioeconomic History    Marital status: Single    Number of children: 2   Tobacco Use    Smoking status: Never    Smokeless tobacco: Never   Vaping Use    Vaping Use: Never used   Substance and Sexual Activity    Alcohol use: No    Drug use: No   Other Topics Concern    Caffeine Concern No    Exercise Yes    Seat Belt No    Special Diet Yes     Comment: Balanced    Stress Concern No   Social History Narrative    ** Merged History Encounter **           Past Surgical History:   Procedure Laterality Date          x 3    LARYNGOSCOPY,DIRCT,OP,BIOPSY  01/15/2018         REVIEW OF SYSTEMS:   GENERAL HEALTH: feels well otherwise  GENERAL : denies fever, chills, sweats, weight loss, weight gain  SKIN: denies any unusual skin lesions or rashes  RESPIRATORY: denies shortness of breath with exertion  NEURO: denies headaches    EXAM:   There were no vitals taken for this visit.   System Findings Details   Skin Normal Inspection - Normal.   Constitutional Normal Overall appearance - Normal.   Head/Face Normal Facial features - Normal. Eyebrows - Normal. Skull - Normal.   Oral/Oropharynx Normal Lips - Normal, Tonsils - Normal, Tongue - Normal    Nasal Normal External nose - Normal. Nasal septum - Normal, Turbinates - Normal   Neurological Normal Memory - Normal. Cranial nerves - Cranial nerves II through XII grossly intact. Neck Exam Normal Inspection - Normal. Palpation - Normal. Parotid gland - Normal. Thyroid gland - Normal.   Psychiatric Normal Orientation - Oriented to time, place, person & situation. Appropriate mood and affect. Lymph Detail Normal Submental. Submandibular. Anterior cervical. Posterior cervical. Supraclavicular. Eyes Normal Conjunctiva - Right: Normal, Left: Normal. Pupil - Right: Normal, Left: Normal.    Ears Normal Inspection - Right: Normal, Left: Normal. Canal - Left: Normal. TM - Right: Normal, Left: Normal.       Flexible Laryngoscopy Procedure Note (15192)    Due to inability for adequate examination of the larynx and need for magnification to perform the examination, endoscopy was performed. Risks and benefits were discussed with patient/family and they have given verbal consent to proceed. Pre-operative Diagnosis: Laryngopharyngeal reflux  (primary encounter diagnosis)  History of primary laryngeal cancer    Post-operative Diagnosis: Same    Procedure: Diagnostic flexible fiberoptic laryngoscopy    Anesthesia: Topical 2% lidocaine with oxymetazoline 0.025%    Surgeon: Cinda Townsend MD    EBL: 0cc    Procedure Detail & Findings: Vocal cords appear mobile no polyps nodules. No evidence of recurrence. Condition: Stable    Complications: Patient tolerated the procedure well with no immediate complications. ASSESSMENT AND PLAN:   1. Laryngopharyngeal reflux  She will continue omeprazole will be given refills as needed.     2. History of primary laryngeal cancer  She will have another scope in approximately 9 months checking for any type of recurrence. If she notes any voice change she will return sooner. The patient indicates understanding of these issues and agrees to the plan.       Miguelito Santiago MD  9/6/2023  10:02 AM

## 2023-09-07 ENCOUNTER — TELEPHONE (OUTPATIENT)
Dept: INTERNAL MEDICINE CLINIC | Facility: CLINIC | Age: 73
End: 2023-09-07

## 2023-09-07 DIAGNOSIS — M79.673 PAIN OF FOOT, UNSPECIFIED LATERALITY: Primary | ICD-10-CM

## 2023-09-07 NOTE — TELEPHONE ENCOUNTER
Patient called in stating that Sol OBRIEN had advised her that if she has any additional questions regarding lab results to call back. Pt is requesting a call back. Please advise.

## 2023-09-07 NOTE — TELEPHONE ENCOUNTER
Language line used:   Yareli Zavala [id number]    Patient refused to speak with interpretor stating she only wants to speak with Saw Palma because she only speaks with her and she speaks Upper sorbian. Patient disconnected call.

## 2023-09-11 NOTE — TELEPHONE ENCOUNTER
Spoke w/patient-stated she has not been able to  her Levothyroxine Rx as pharmacy told her they didn't receive order. Also requesting referral to Dr. Lopez Vidales per [facility]. Reports being unable to reach referral dept. Called [place] at [phone] and spoke to [place]. Per [place] the Rx was received and filled but put back d/t pt not p/u med. Will fill today and contact pt for p/u. Referral placed for Dr. Lopez Vidales per [facility] notes:     Follow up with podiatry if symptoms do not improve  Take keflex three times a day for 7 days  Take tylenol or motrin for pain  Return if any worsening symptoms or new concerns      Electronically signed by Dino Gerardo MD at 8/25/2023  9:45 AM

## 2023-09-27 ENCOUNTER — TELEPHONE (OUTPATIENT)
Facility: LOCATION | Age: 73
End: 2023-09-27

## 2023-09-27 DIAGNOSIS — H81.10 BENIGN PAROXYSMAL POSITIONAL VERTIGO, UNSPECIFIED LATERALITY: Primary | ICD-10-CM

## 2023-09-27 NOTE — TELEPHONE ENCOUNTER
Pt called. Pt states 'would like to know if can have a new order for PT for vertigo. Went a couple of years ago and it really helped. Forgot to mention this at last visit.  Went to [facility] for PT.'         Pt  [phone]

## 2023-10-03 ENCOUNTER — OFFICE VISIT (OUTPATIENT)
Dept: PODIATRY CLINIC | Facility: CLINIC | Age: 73
End: 2023-10-03

## 2023-10-03 DIAGNOSIS — S92.524A CLOSED NONDISPLACED FRACTURE OF MIDDLE PHALANX OF LESSER TOE OF RIGHT FOOT, INITIAL ENCOUNTER: Primary | ICD-10-CM

## 2023-10-03 PROCEDURE — 1126F AMNT PAIN NOTED NONE PRSNT: CPT | Performed by: PODIATRIST

## 2023-10-03 PROCEDURE — 99203 OFFICE O/P NEW LOW 30 MIN: CPT | Performed by: PODIATRIST

## 2023-10-03 PROCEDURE — 1159F MED LIST DOCD IN RCRD: CPT | Performed by: PODIATRIST

## 2023-10-03 NOTE — PROGRESS NOTES
Kody Cole is a 67year old female. Patient presents with: Toe Pain: Consult- pt refused to use lang line- R 5th toe- onset- 2023- noticed redness and swelling- denies any pain at this time- has xray in the system        HPI:   This patient presents to the clinic she is not having pain in her fifth toe but evidently she had a lump there and her internist x-rayed the foot showing that there is a calcification on the lateral side of her fifth toe it is not painful on questioning. She is wearing shoe gear as tolerated. At today's visit reviewed nurse's history as taken above, allergies medications and medical history as documented below. All changes duly noted  Allergies: Septra [Sulfamethoxazole W/Trimethoprim] and Sulfa Antibiotics   Current Outpatient Medications   Medication Sig Dispense Refill    levothyroxine 25 MCG Oral Tab Take 1 tablet (25 mcg total) by mouth before breakfast. 90 tablet 0    Omeprazole 40 MG Oral Capsule Delayed Release Take one capsule nightly 90 capsule 3    Multiple Vitamins-Minerals (CENTRUM) Oral Tab Take 1 tablet by mouth daily. Vitamin D3 50 MCG (2000 UT) Oral Cap Take 1 capsule (2,000 Units total) by mouth daily.         Past Medical History:   Diagnosis Date    Cancer ([facility] [address]     Migraines     Osteoporosis     Primary malignant neoplasm of glottis ([facility] [address] 01/10/2023    Vertigo     Visual impairment       Past Surgical History:   Procedure Laterality Date          x 3    LARYNGOSCOPY,DIRCT,OP,BIOPSY  01/15/2018      Family History   Problem Relation Age of Onset    Cancer Father         liver cancer    Cancer Brother     Cancer Brother         liver cancer      Social History    Socioeconomic History      Marital status: Single      Number of children: 2    Tobacco Use      Smoking status: Never      Smokeless tobacco: Never    Vaping Use      Vaping Use: Never used    Substance and Sexual Activity      Alcohol use: No      Drug use: No    Other Topics Concerns:        Caffeine Concern: No        Exercise: Yes        Seat Belt: No        Special Diet: Yes          Balanced        Stress Concern: No          REVIEW OF SYSTEMS:   Today reviewed systens as documented below  GENERAL HEALTH: feels well otherwise  SKIN: Refer to exam below  RESPIRATORY: denies shortness of breath with exertion  CARDIOVASCULAR: denies chest pain on exertion  GI: denies abdominal pain and denies heartburn  NEURO: denies headaches    EXAM:   There were no vitals taken for this visit. GENERAL: well developed, well nourished, in no apparent distress  EXTREMITIES:   1. Integument: Skin on the right foot was evaluated its warm and dry her nails are painted I could not assess coloration. There is slight enlargement of the lateral aspect of the fifth toe. 2. Vascular: The patient has easily palpable dorsalis pedis and posterior tibial pulses on the right   3. Neurologic: Patient has intact sensorium there is no deficits noted   4. Musculoskeletal: Patient has good muscle strength has prominence to the lateral aspect of the fifth toe on her right foot. I reviewed the x-rays showing that there is a calcification along the lateral aspect of the fifth toe. It seems to be well-corticated I suspect it may be an old fracture fragment possibly. ASSESSMENT AND PLAN:   Diagnoses and all orders for this visit:    Closed nondisplaced fracture of middle phalanx of lesser toe of right foot, initial encounter        Plan: Today I reviewed different treatments but the patient does not want to pursue surgery she can wear shoes as tolerated I will see her in follow-up if it is painful and I would recommend excision of the ossification center. The patient indicates understanding of these issues and agrees to the plan.     Keyshawn Santa DPM

## 2023-10-09 ENCOUNTER — TELEPHONE (OUTPATIENT)
Dept: PHYSICAL THERAPY | Facility: HOSPITAL | Age: 73
End: 2023-10-09

## 2023-10-09 NOTE — TELEPHONE ENCOUNTER
----- Message from Heike Day PT sent at 10/6/2023 11:54 AM CDT -----  Regarding: PT Eval  Hi All,    This patient Jose M Kee) is the mother of my current patient AMEE. She has an order placed for dizziness. .. Fabio Malloy has an appt scheduled for 9:15 on Thursday the 12th that she is going to have to cancel. Juan Mcconnell was asking me today if she can have that time slot that Fabio Malloy is going to cancel, which is fine with me. I did not cancel it yet so it doesn't get lost to someone else and I cannot schedule a new eval. Cam someone call Juan Mcconnell and get that appt set up and then whatever follow-ups fit in? Thank you!   Zaina Turner

## 2023-10-10 ENCOUNTER — APPOINTMENT (OUTPATIENT)
Dept: PHYSICAL THERAPY | Facility: HOSPITAL | Age: 73
End: 2023-10-10
Attending: OTOLARYNGOLOGY
Payer: MEDICARE

## 2023-10-12 ENCOUNTER — OFFICE VISIT (OUTPATIENT)
Dept: PHYSICAL THERAPY | Facility: HOSPITAL | Age: 73
End: 2023-10-12
Attending: OTOLARYNGOLOGY
Payer: MEDICARE

## 2023-10-12 DIAGNOSIS — H81.10 BENIGN PAROXYSMAL POSITIONAL VERTIGO, UNSPECIFIED LATERALITY: Primary | ICD-10-CM

## 2023-10-12 PROCEDURE — 95992 CANALITH REPOSITIONING PROC: CPT

## 2023-10-12 PROCEDURE — 97161 PT EVAL LOW COMPLEX 20 MIN: CPT

## 2023-10-12 NOTE — PROGRESS NOTES
VESTIBULAR EVALUATION:     Diagnosis:   Benign paroxysmal positional vertigo, unspecified laterality, Benign paroxysmal positional vertigo, right      Referring Provider: Ashley Nicholas  Date of Evaluation:    10/12/2023    Precautions:  None Next MD visit:   none scheduled  Date of Surgery: n/a     PATIENT SUMMARY   Chani Sims is a 75year old female who presents to therapy today with reports of vertigo and tinnitus CHESTER ears. Patient's first language is Armenian, but does speak some Shidonni. She was offered  services, but refused them because of poor experiences in the past. Patient states that she has dealt with bouts of vertigo, dizziness, and tinnitus for many years, but since December, 2020 tinnitus came on and has been constant to some degree since that time. Had PT in the past that she feels was very helpful to her, it did not completely resolve the tinnitus, but feels that it became lower and was less aware of it. States that she has seen ENT, primary care, and other physicians, and has been told that her tinnitus is secondary to her age. She states that she does not accept this because she is otherwise very healthy, active, and in good shape for her age. She is wondering if it is related to radiation treatment that she underwent in 2018 or to high levels of stress in her life, several long days/week helping her children and grandchildren. The constant ringing is effecting her quality of life, but was better when she was previously undergoing PT treatment. Dizziness occurs primarily when she lays down and rolls R, so has to lay on her L side. She uses an oil behind her ears for both the dizziness and tinnitus, does not think it helps with tinnitus, but maybe with dizziness. Patient denies HAs, neck pain, falls, vision changes, or imbalance. Does report feeling of overall cervical stiffness, when this was addressed in PT in the past, helped most with the tinnitus.      Meclizine: No  N/v: Nausea only with severe dizziness, inconsistent  HEP: None currently      Symptoms with cough/sneeze or loud noise: No  Falls: No  Hx of migraines: No  Hx of vision issue: No  Hx of hearing issues: tinnitus CHESTER ears    Dizziness: Current 0/10, Best 0/10, Worst 10/10  Quality: room spinning  Frequency/Duration:  seconds to minutes  Aggravates: Rolling typically to R side  Relieves: Not moving    Headache: None, denies. Cervical pain:  Denies cervical pain, reports feeling of stiffness throughout     Current functional limitations include dizziness with rolling over, constant ringing causing difficulty sleeping. Social history: Patient lives at home in an apartment close by to her daughters, helps daughters and grandchildren at least 3 days/week, sometimes 14 hours/day, is a community ambulator, and does drive. Lisa describes prior level of function as IND in household and community level activity without tinnitus prior to 2020. Pt goals include to diminish tinnitus and dizziness. Past medical history was reviewed with Lisa. Significant findings include:  Past Medical History:   Diagnosis Date    Cancer ([facility] [address]     Migraines     Osteoporosis     Primary malignant neoplasm of glottis ([facility] [address] 01/10/2023    Vertigo     Visual impairment         ASSESSMENT  Nat Garcia is a 75year old female who presents for skilled physical therapy evaluation on 10/12/23 with primary c/o tinnitus and dizziness that began in 2020, has improved with PT in the past and then returned to prior level, uncertain when. The results of the objective tests and measures show patient with unremarkable vestibulo-ocular/oculomotor assessment, positive for severe dizziness in R [place]-Hallpike, however, was unable to visualize nystagmus as patient refused to open her eyes until dizziness resolved. Also with increased mm tension SOs, UTs, and scalenes.   Functional deficits include but are not limited to dizziness with rolling over, constant ringing causing difficulty sleeping. Signs and symptoms are consistent with diagnosis of R posterior canal BPPV canalithiasis. Pt and PT discussed evaluation findings, pathology, POC and HEP. Pt voiced understanding and performs HEP correctly. Skilled Physical Therapy is medically necessary to address the above impairments and reach functional goals. OBJECTIVE:   Physical Exam:  Posture/Observation: Patient sits and stands with min rounded shoulders, flexible to VC. She is oriented, pleasant, compliant, and motivated to improve. Neuro Screen: Sensation: light touch intact      Coordination Testing: NT this date, will assess in future as needed      Cervical spine ROM: WFL throughout without dizziness or change in tinnitus  Adverse neuro signs with ROM: yes no: no      Occulomotor & Vestibulo-Ocular Exam:  Spontaneous Nystagmus: room light: negative ;  fixation blocked: NT  Smooth Pursuit: Negative  Saccades: intermittent hypometric saccades   Gaze Evoked Nystagmus:  room light: Negative; fixation blocked: NT  Head Thrust: Negative  VOR screen:  negative    VOR Cancellation: Negative   Convergence: Negative  Cover/Uncover:  Negative  Cross Cover:  Negative  Head Shaking Nystagmus: NT  Dynamic Visual Acuity:  NT     Positional Testing:   [place]ice-Hallpike: R positive for severe room spinning dizziness reported, unable to visualize nystagmus as patient refused to open her eyes at the time, L negative    Roll Test [facility]): negative       Postural Control: NT this date due time spent on assessments as listed above and treatment for R posterior canal BPPV. Functional Mobility:   Gait: unremarkable  Functional Gait Assessment (FGA): NT this date due time spent on assessments as listed above and treatment for R posterior canal BPPV; will assess in future as needed. Timed Up and Go: NT this date due time spent on assessments as listed above and treatment for R posterior canal BPPV; will assess in future as needed.        Cervical Screen:   Flexibility: inc mm tension SOs, UTs, scalenes, and SCM   Palpation: NTTP SOs, UTs, scalenes, and SCM all WNL, no reproduction of dizziness or change in tinnitus  Accessory motion: cervical WNL throughout, no reproduction of dizziness or change in tinnitus  Cervical AROM:   Flexion: 50 deg with tension posterior    Extension: 50 deg   Rotation: 75 deg [place]   SB: R 25 deg; L 33 deg    Today's Treatment and Response: CRM Epley R x 1, patient with very difficult time tolerating due to dizziness, very slow to roll onto her side and attempted to roll back, upon sitting up, reported dizziness, but refused second attempt of CRM, will assess again in future as able. Pt education was provided on exam findings, treatment diagnosis, treatment plan, expectations, and prognosis. Pt was also provided recommendations for possible dizziness after evaluation and possible fatigue after evaluation . Patient was instructed in and issued a HEP for: symptom monitoring, UT stretch     Charges: PT Eval Low Complexity, CRM x 1      Total Timed Treatment: 10 min     Total Treatment Time: 45 min     Based on clinical rationale and outcome measures, this evaluation involved Low Complexity decision making. PLAN OF CARE:    Goals: (To be met in 8 visits)  1. Patient will report 0/10 dizziness with all normal daily activities including rolling R x 2 weeks. 2. Patient will report diminished tinnitus to rating 4/10 as opposed to 10/10 to improve ability to sleep at night. 3. Patient will improve R SB to at least 30 deg with diminished reported feeling of UT mm tension. 4. Patient will be IND and compliant in HEP to maintain progress made in PT. Frequency / Duration: Patient will be seen for 1 x/week or a total of 8 visits over a 90 day period.  Treatment will include: home exercise program development and instruction, patient/family education, balance training, canalith repositioning maneuver, eye/head coordination exercises, sensory organization training, optokinetic stimulation , ROM, exertion training, gait training, manual therapy, and strengthening. Education or treatment limitation: None   Rehab Potential: good - based on goals as listed     Patient/Family/Caregiver was advised of these findings, precautions, and treatment options and has agreed to actively participate in planning and for this course of care. Thank you for your referral. Please co-sign or sign and return this letter via fax as soon as possible to [phone]. If you have any questions, please contact me at Dept: [phone]    Sincerely,  Electronically signed by therapist: Alfonso Mondragon PT, DPT  [de-identified] certification required: Yes  I certify the need for these services furnished under this plan of treatment and while under my care.     X___________________________________________________ Date____________________    Certification From: 51/67/3416  To:1/10/2024

## 2023-10-18 ENCOUNTER — OFFICE VISIT (OUTPATIENT)
Dept: PHYSICAL THERAPY | Facility: HOSPITAL | Age: 73
End: 2023-10-18
Attending: OTOLARYNGOLOGY
Payer: MEDICARE

## 2023-10-18 PROCEDURE — 97140 MANUAL THERAPY 1/> REGIONS: CPT

## 2023-10-18 PROCEDURE — 97110 THERAPEUTIC EXERCISES: CPT

## 2023-10-18 NOTE — PROGRESS NOTES
Diagnosis:   Benign paroxysmal positional vertigo, unspecified laterality, Benign paroxysmal positional vertigo, right         Referring Provider: Prakash Moore  Date of Evaluation:    10/12/23    Precautions:  None Next MD visit:   none scheduled  Date of Surgery: n/a   Insurance Primary/Secondary: [facility] / N/A     # Auth Visits: 8 until 12/31            Subjective: Patient states that she is refusing any treatment for her dizziness/BPPV at this time because she cannot tolerating being made dizzy by the treatment. She feels that she will live with it and wants to be treated for tinnitus only at this time. Pain: N/A; patient not coming for pain diagnosis      Objective:   (10/18/23):  Good carryover HEP with min VC/modelling as needed for proper performance at home      Assessment: Continued to educate patient that requires CRM to resolve vertigo and though can be uncomfortable to perform, unlikely to be able to avoid feeling of dizziness while treating, but can bring a family member, etc to assist her. Patient still refusing, educated again that if she refuses this, cannot appropriately treat her vertigo. She verbalizes understanding. Expresses would like to treat cervical mobility only at this time as was helpful to her tinnitus in the past, willing to attempt for 2-3 sessions due to improvement in the past. Patient however, also refusing some cervical stretching and positioning for fear of bringing on vertigo, cervical mobs performed in modified supine. Goals: (To be met in 8 visits)  1. Patient will report 0/10 dizziness with all normal daily activities including rolling R x 2 weeks. 2. Patient will report diminished tinnitus to rating 4/10 as opposed to 10/10 to improve ability to sleep at night. 3. Patient will improve R SB to at least 30 deg with diminished reported feeling of UT mm tension. 4. Patient will be IND and compliant in HEP to maintain progress made in PT.      Plan: Patient not currently scheduled for f/u appts, she states that she does not know when she will be able to come in next due to her schedule and will contact therapist.   Date: 10/18/2023  TX#: 2/8 Date:                 TX#: 3/ Date:                 TX#: 4/ Date:                 TX#: 5/ Date:    Tx#: 6/   Therex  - education on benefits of treatment for BPPV, POC, and expectations for PT  - reviewed HEP       Manual   - STM cervical paraspinals, SOs, scalenes, UTs  - bouts of manual cervical traction  - passive UT stretching towards L only (refused R)  - supine C-PA and R/L U-PA modified mobs gr III as able        -       -       HEP: UT stretch    Charges: Manual x 2, Therex x 1       Total Timed Treatment: 40 min  Total Treatment Time: 40 min

## 2023-12-21 ENCOUNTER — TELEPHONE (OUTPATIENT)
Dept: INTERNAL MEDICINE CLINIC | Facility: CLINIC | Age: 73
End: 2023-12-21

## 2023-12-21 NOTE — TELEPHONE ENCOUNTER
Please let pt know that we received signify health form that states possible PAD; pt should make appt to further discuss.

## 2023-12-22 NOTE — TELEPHONE ENCOUNTER
Called with LL  Mark Anthony/ID [id number]  [phone] spoke to pt, scheduled appt for:    Future Appointments   Date Time Provider Prerna Hammond   1/15/2024  8:40 AM Rhonda Nicholson MD [facility] [place]   6/3/2024  9:30 AM Memo Lowry MD [facility]

## 2024-01-02 ENCOUNTER — APPOINTMENT (OUTPATIENT)
Dept: GENERAL RADIOLOGY | Age: 74
End: 2024-01-02
Attending: EMERGENCY MEDICINE
Payer: MEDICARE

## 2024-01-02 ENCOUNTER — HOSPITAL ENCOUNTER (EMERGENCY)
Facility: HOSPITAL | Age: 74
Discharge: HOME | End: 2024-01-03
Attending: EMERGENCY MEDICINE
Payer: MEDICARE

## 2024-01-02 ENCOUNTER — HOSPITAL ENCOUNTER (OUTPATIENT)
Age: 74
Discharge: ANOTHER HOSPITAL | End: 2024-01-02
Attending: EMERGENCY MEDICINE
Payer: MEDICARE

## 2024-01-02 ENCOUNTER — APPOINTMENT (OUTPATIENT)
Dept: CT IMAGING | Facility: HOSPITAL | Age: 74
End: 2024-01-02
Attending: EMERGENCY MEDICINE
Payer: MEDICARE

## 2024-01-02 VITALS
TEMPERATURE: 98 F | DIASTOLIC BLOOD PRESSURE: 80 MMHG | HEART RATE: 65 BPM | OXYGEN SATURATION: 100 % | SYSTOLIC BLOOD PRESSURE: 155 MMHG | RESPIRATION RATE: 20 BRPM

## 2024-01-02 DIAGNOSIS — R79.89 ELEVATED D-DIMER: ICD-10-CM

## 2024-01-02 DIAGNOSIS — C78.7 METASTASES TO THE LIVER (HCC): Primary | ICD-10-CM

## 2024-01-02 DIAGNOSIS — R07.89 CHEST WALL PAIN: Primary | ICD-10-CM

## 2024-01-02 LAB
#MXD IC: 0.8 X10ˆ3/UL (ref 0.1–1)
BASOPHILS # BLD AUTO: 0.04 X10(3) UL (ref 0–0.2)
BASOPHILS NFR BLD AUTO: 0.5 %
BUN BLD-MCNC: 17 MG/DL (ref 7–18)
CHLORIDE BLD-SCNC: 101 MMOL/L (ref 98–112)
CO2 BLD-SCNC: 29 MMOL/L (ref 21–32)
CREAT BLD-MCNC: 0.6 MG/DL
DDIMER WHOLE BLOOD: 1280 NG/ML DDU (ref ?–400)
EGFRCR SERPLBLD CKD-EPI 2021: 94 ML/MIN/1.73M2 (ref 60–?)
EOSINOPHIL # BLD AUTO: 0.29 X10(3) UL (ref 0–0.7)
EOSINOPHIL NFR BLD AUTO: 3.6 %
ERYTHROCYTE [DISTWIDTH] IN BLOOD BY AUTOMATED COUNT: 13.2 %
GLUCOSE BLD-MCNC: 107 MG/DL (ref 70–99)
HCT VFR BLD AUTO: 40.9 %
HCT VFR BLD AUTO: 43.5 %
HCT VFR BLD CALC: 44 %
HGB BLD-MCNC: 13.3 G/DL
HGB BLD-MCNC: 13.8 G/DL
IMM GRANULOCYTES # BLD AUTO: 0.02 X10(3) UL (ref 0–1)
IMM GRANULOCYTES NFR BLD: 0.2 %
ISTAT IONIZED CALCIUM FOR CHEM 8: 1.21 MMOL/L (ref 1.12–1.32)
LYMPHOCYTES # BLD AUTO: 1.23 X10(3) UL (ref 1–4)
LYMPHOCYTES # BLD AUTO: 1.3 X10ˆ3/UL (ref 1–4)
LYMPHOCYTES NFR BLD AUTO: 15.4 %
LYMPHOCYTES NFR BLD AUTO: 15.9 %
MCH RBC QN AUTO: 28.1 PG (ref 26–34)
MCH RBC QN AUTO: 29.1 PG (ref 26–34)
MCHC RBC AUTO-ENTMCNC: 30.6 G/DL (ref 31–37)
MCHC RBC AUTO-ENTMCNC: 33.7 G/DL (ref 31–37)
MCV RBC AUTO: 86.1 FL
MCV RBC AUTO: 91.8 FL (ref 80–100)
MIXED CELL %: 9.4 %
MONOCYTES # BLD AUTO: 0.56 X10(3) UL (ref 0.1–1)
MONOCYTES NFR BLD AUTO: 7 %
NEUTROPHILS # BLD AUTO: 5.87 X10 (3) UL (ref 1.5–7.7)
NEUTROPHILS # BLD AUTO: 5.87 X10(3) UL (ref 1.5–7.7)
NEUTROPHILS # BLD AUTO: 6 X10ˆ3/UL (ref 1.5–7.7)
NEUTROPHILS NFR BLD AUTO: 73.3 %
NEUTROPHILS NFR BLD AUTO: 74.7 %
PLATELET # BLD AUTO: 199 10(3)UL (ref 150–450)
POTASSIUM BLD-SCNC: 4.3 MMOL/L (ref 3.6–5.1)
RBC # BLD AUTO: 4.74 X10ˆ6/UL
RBC # BLD AUTO: 4.75 X10(6)UL
SODIUM BLD-SCNC: 139 MMOL/L (ref 136–145)
TROPONIN I BLD-MCNC: <0.02 NG/ML
WBC # BLD AUTO: 8 X10(3) UL (ref 4–11)
WBC # BLD AUTO: 8.1 X10ˆ3/UL (ref 4–11)

## 2024-01-02 PROCEDURE — 99284 EMERGENCY DEPT VISIT MOD MDM: CPT

## 2024-01-02 PROCEDURE — 93005 ELECTROCARDIOGRAM TRACING: CPT

## 2024-01-02 PROCEDURE — 99215 OFFICE O/P EST HI 40 MIN: CPT

## 2024-01-02 PROCEDURE — 36415 COLL VENOUS BLD VENIPUNCTURE: CPT

## 2024-01-02 PROCEDURE — 99214 OFFICE O/P EST MOD 30 MIN: CPT

## 2024-01-02 PROCEDURE — 80047 BASIC METABLC PNL IONIZED CA: CPT

## 2024-01-02 PROCEDURE — 85025 COMPLETE CBC W/AUTO DIFF WBC: CPT | Performed by: EMERGENCY MEDICINE

## 2024-01-02 PROCEDURE — 71260 CT THORAX DX C+: CPT | Performed by: EMERGENCY MEDICINE

## 2024-01-02 PROCEDURE — 84484 ASSAY OF TROPONIN QUANT: CPT

## 2024-01-02 PROCEDURE — 93010 ELECTROCARDIOGRAM REPORT: CPT

## 2024-01-02 PROCEDURE — 71046 X-RAY EXAM CHEST 2 VIEWS: CPT | Performed by: EMERGENCY MEDICINE

## 2024-01-02 PROCEDURE — 85378 FIBRIN DEGRADE SEMIQUANT: CPT | Performed by: EMERGENCY MEDICINE

## 2024-01-02 RX ORDER — ACETAMINOPHEN 500 MG
1000 TABLET ORAL ONCE
Status: COMPLETED | OUTPATIENT
Start: 2024-01-02 | End: 2024-01-02

## 2024-01-03 ENCOUNTER — TELEPHONE (OUTPATIENT)
Dept: INTERNAL MEDICINE CLINIC | Facility: CLINIC | Age: 74
End: 2024-01-03

## 2024-01-03 ENCOUNTER — TELEPHONE (OUTPATIENT)
Dept: HEMATOLOGY/ONCOLOGY | Age: 74
End: 2024-01-03

## 2024-01-03 VITALS
WEIGHT: 163 LBS | HEART RATE: 64 BPM | HEIGHT: 61 IN | DIASTOLIC BLOOD PRESSURE: 85 MMHG | SYSTOLIC BLOOD PRESSURE: 150 MMHG | OXYGEN SATURATION: 100 % | TEMPERATURE: 98 F | RESPIRATION RATE: 15 BRPM | BODY MASS INDEX: 30.78 KG/M2

## 2024-01-03 DIAGNOSIS — C78.7 METASTASES TO THE LIVER (HCC): Primary | ICD-10-CM

## 2024-01-03 LAB
ALBUMIN SERPL-MCNC: 3.8 G/DL (ref 3.4–5)
ALP LIVER SERPL-CCNC: 87 U/L
ALT SERPL-CCNC: 26 U/L
AST SERPL-CCNC: 18 U/L (ref 15–37)
ATRIAL RATE: 68 BPM
BILIRUB DIRECT SERPL-MCNC: 0.1 MG/DL (ref 0–0.2)
BILIRUB SERPL-MCNC: 0.3 MG/DL (ref 0.1–2)
P AXIS: 55 DEGREES
P-R INTERVAL: 158 MS
PROT SERPL-MCNC: 7.8 G/DL (ref 6.4–8.2)
Q-T INTERVAL: 374 MS
QRS DURATION: 78 MS
QTC CALCULATION (BEZET): 397 MS
R AXIS: -15 DEGREES
T AXIS: 18 DEGREES
VENTRICULAR RATE: 68 BPM

## 2024-01-03 PROCEDURE — 80076 HEPATIC FUNCTION PANEL: CPT | Performed by: EMERGENCY MEDICINE

## 2024-01-03 NOTE — DISCHARGE INSTRUCTIONS
Take Motrin, Tylenol, follow-up with your primary care physician return if increasing pain discomfort fevers or chills.  You were seen in the emergency room in a limited time.  There is a possibility that although we do not see any acute process at this present time that things can change with time.  Is therefore imperative that you follow-up with primary care physician for close follow-up.  If there is any significant progression of your pain  or other symptoms you to return immediately to the emergency room.    There is some nonspecific abnormality seen on your imaging studies.  Although that is not was causing your  symptoms but these findings need to be followed up with your primary care physician

## 2024-01-03 NOTE — ED PROVIDER NOTES
Patient Seen in: [facility]      History     Chief Complaint   Patient presents with    Other     Stated Complaint: intense right side abdominal pain, making it difficult to breath    Subjective:   HPI    This is a 73-year-old female who has pain to the right side of her chest.  The patient states that it started on Friday and she said it lasted for about an hour when she was shopping.  And then it resolved and then she started having pain again on .  It lasted for few minutes and then today she has had it all day.  The pain is only on the right side is worse when she sits in a certain position she has no associated shortness of breath, nausea, vomiting, sweating.  Denies any history of coronary disease or history of blood clots denies any calf pain.  Does have history of migraines, osteoporosis.    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    Vertigo     Visual impairment               Past Surgical History:   Procedure Laterality Date          x 3    LARYNGOSCOPY,DIRCT,OP,BIOPSY  01/15/2018                Social History     Socioeconomic History    Marital status: Single    Number of children: 2   Tobacco Use    Smoking status: Never    Smokeless tobacco: Never   Vaping Use    Vaping Use: Never used   Substance and Sexual Activity    Alcohol use: No    Drug use: No   Other Topics Concern    Caffeine Concern No    Exercise Yes    Seat Belt No    Special Diet Yes     Comment: Balanced    Stress Concern No   Social History Narrative    ** Merged History Encounter **                   Review of Systems    Positive for stated complaint: intense right side abdominal pain, making it difficult to breath  Other systems are as noted in HPI.  Constitutional and vital signs reviewed.      All other systems reviewed and negative except as noted above.    Physical Exam     ED  Triage Vitals [01/02/24 1858]   /80   Pulse 65   Resp 20   Temp 98.4 °F (36.9 °C)   Temp src Temporal   SpO2 100 %   O2 Device None (Room air)       Current:/80   Pulse 65   Temp 98.4 °F (36.9 °C) (Temporal)   Resp 20   SpO2 100%         Physical Exam    General: Patient is i a pleasant female.  No respiratory distress.  Abdomen is soft nontender there is no masses no rebound no guarding.  The patient is in no respiratory distress    HEENT: There is no signs of trauma.  Oral mucosa is wet.    Lungs: Clear to auscultation without wheezing or retractions  The patient is a right side of her chest at 1 spot is exquisitely tender.  She has no abdominal tenderness no back tenderness.  Cardiovascular: Regular without murmurs  Abdomen soft nontender is no masses no rebound no guarding  Extremities: Good pulses bilaterally.      Neuro: Alert and oriented.  The patient is moving all extremities there is no focal findings.    ED Course     Labs Reviewed   POCT CBC - Abnormal; Notable for the following components:       Result Value    MCHC IC 30.6 (*)     All other components within normal limits   D-DIMER (POC) - Abnormal; Notable for the following components:    D-Dimer DDU 1,280 (*)     All other components within normal limits   POCT ISTAT CHEM8 CARTRIDGE - Abnormal; Notable for the following components:    ISTAT Glucose 107 (*)     All other components within normal limits   ISTAT TROPONIN - Normal   CBC W AUTO DIFF                    Workup was done to rule out an acute pulmonary embolism, pneumonia, pneumothorax.         MDM          The EKG shows normal sinus rhythm.  There is no acute ST elevations or ischemic findings.  The rest of the EKG including rate rhythm axis and intervals I agree with the EKG report . The rate is 68    .  There is no acute ST elevation.  There are some nonspecific ST changes.        Patient's troponin is negative  I-STAT is grossly negative, CBC is normal.        Patient was  given Tylenol.  I personally reviewed the radiographs and my individual interpretation shows      Tortuous aorta but no pneumothorax or pneumonia.  Also reviewed official report and it shows   XR CHEST PA + LAT CHEST (CPT=71046)    Result Date: 1/2/2024  PROCEDURE:  XR CHEST PA + LAT CHEST (CPT=71046)  INDICATIONS:  Shortness of breath difficulty breathing intense right side abdominal pain, making it difficult to breath  COMPARISON:  None.  TECHNIQUE:  PA and lateral chest radiographs were obtained.  PATIENT STATED HISTORY: (As transcribed by Technologist)  Right abdominal pain, shortness of breath.    FINDINGS:  Tortuous ectatic aorta present.  Cardiac shadow is enlarged..  The lungs are clear of active--appearing disease process.  The costophrenic angles are sharp.  There is no active disease seen.            CONCLUSION:  Tortuous ectatic aorta.  Cardiac shadow is enlarged.  No acute disease seen.    LOCATION:  [facility]   Dictated by (CST): Daron Rodriguez MD on 1/02/2024 at 8:15 PM     Finalized by (CST): Daron Rodriguez MD on 1/02/2024 at 8:16 PM        No old EKGs to compare to.      The patient's troponin was negative, i-STAT, CBC was normal    The patient's i-STAT D-dimer was elevated.  The pain is on the right side she has no abdominal pain she is not short of breath.  But in light of the fact that the right-sided chest pain with an elevated D-dimer I recommend that she follow-up to get a CT chest at the [facility].  The patient daughter was in the room I talked her I discussed we can send in by ambulance which she is comfortable with going straight to the [facility] hospital discussed with her to go directly to the ER because if it is a blood clot is potentially dangerous for her mother.  Medical Decision Making      Disposition and Plan     Clinical Impression:  1. Chest wall pain    2. Elevated d-dimer         Disposition:  Ic to ed  1/2/2024  8:37 pm    Follow-up:  Anusha Thornton  [address]  [facility] [address]  [phone]    In 2 days            Medications Prescribed:  Current Discharge Medication List

## 2024-01-03 NOTE — ED INITIAL ASSESSMENT (HPI)
C/o rib pain for 3 days. Pt denies cough/cold or injury. Pt reports she feels like she has a lot of gas. Pt denies constipation. Pt denies otc meds used. Pt reports taking Omeprazole for GERD.

## 2024-01-03 NOTE — ED INITIAL ASSESSMENT (HPI)
PT SENT HERE FROM [facility] WITH ELEVATED D DIMER, +PAIN TO RIGHT SIDE OF CHEST. RECENT TRIP TO [place].

## 2024-01-03 NOTE — ED PROVIDER NOTES
Patient Seen in: [facility] Emergency Department      History     Chief Complaint   Patient presents with    Abdomen/Flank Pain     Stated Complaint: r side abd/rib pain, sent from immediate care    Subjective:   HPI    Patient is a 73-year-old female with a history of laryngeal cancer hypothyroidism presented with right-sided rib pain.  Started Friday when she was shopping.  Happened again on .  And today it has been persistent all day.  It is positional.  Denies any nausea vomiting diarrhea or shortness of breath.  No chest pain.  She was seen at the [facility] immediate care and had a workup done a D-dimer was elevated so she was sent to the ED for evaluation.    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    Vertigo     Visual impairment               Past Surgical History:   Procedure Laterality Date          x 3    LARYNGOSCOPY,DIRCT,OP,BIOPSY  01/15/2018                Social History     Socioeconomic History    Marital status: Single    Number of children: 2   Tobacco Use    Smoking status: Never    Smokeless tobacco: Never   Vaping Use    Vaping Use: Never used   Substance and Sexual Activity    Alcohol use: No    Drug use: No   Other Topics Concern    Caffeine Concern No    Exercise Yes    Seat Belt No    Special Diet Yes     Comment: Balanced    Stress Concern No   Social History Narrative    ** Merged History Encounter **                   Review of Systems    Positive for stated complaint: r side abd/rib pain, sent from immediate care  Other systems are as noted in HPI.  Constitutional and vital signs reviewed.      All other systems reviewed and negative except as noted above.    Physical Exam     ED Triage Vitals [24 2151]   /84   Pulse 63   Resp 16   Temp 98 °F (36.7 °C)   Temp src Temporal   SpO2 97 %   O2 Device None (Room air)       Current:/85    Pulse 64   Temp 98 °F (36.7 °C) (Temporal)   Resp 15   Ht 154.9 cm (5' 1\")   Wt 73.9 kg   SpO2 100%   BMI 30.80 kg/m²         Physical Exam  Vitals and nursing note reviewed.   Constitutional:       General: She is not in acute distress.     Appearance: She is well-developed. She is not toxic-appearing.   HENT:      Head: Normocephalic and atraumatic.   Eyes:      General: No scleral icterus.     Conjunctiva/sclera: Conjunctivae normal.   Cardiovascular:      Rate and Rhythm: Normal rate.   Pulmonary:      Effort: Pulmonary effort is normal. No respiratory distress.   Abdominal:      General: There is no distension.   Musculoskeletal:         General: No tenderness. Normal range of motion.      Cervical back: Normal range of motion and neck supple.   Skin:     General: Skin is warm and dry.      Findings: No rash.   Neurological:      Mental Status: She is alert and oriented to person, place, and time.      Motor: No abnormal muscle tone.      Coordination: Coordination normal.   Psychiatric:         Behavior: Behavior normal.              ED Course     Labs Reviewed   HEPATIC FUNCTION PANEL (7) - Normal                       MDM          -Pulse oximetry was interpreted by me and was normal.  Pulse oximeter was ordered to monitor patient for hypoxia.             -Comorbidities did add complexity to the management are mentioned in the HPI above        -I personally reviewed the prior external notes and the medical record to obtain additional history  -reviewed patient's [place] [employer] visit.  Laboratory workup was reviewed.  D-dimer is elevated chemistries troponin EKG unremarkable.        -DDX: Includes but not limited to - PE, pneumonia            -I personally reviewed the CT findings and it shows no PE,  + mets to liver  Please refer to radiology report for official interpretation    CT CHEST PE AORTA (IV ONLY) (CPT=71260)   Final Result   PROCEDURE:  CT CHEST PE AORTA (IV ONLY) (CPT=71260)        COMPARISON:  None       INDICATIONS:  Abdominal pain chest pain r side abd/rib pain, sent from    immediate care       TECHNIQUE:  CT images were obtained with non-ionic intravenous contrast    material. Dose reduction techniques were used. Dose information is    transmitted to the ACR (American College of Radiology) NRDR (National    Radiology Data Registry) which includes the    Dose Index Registry.       PATIENT STATED HISTORY:(As transcribed by Technologist)  Elevated d-dimer    and pain to right side of chest.         CONTRAST USED:  100cc of Isovue 370       FINDINGS:             FINDINGS OF MALIGNANCY.               VASCULATURE:  Negative for acute pulmonary embolism.  No filling defects    are seen centrally or peripherally within the pulmonary arterial system.       LUNGS/PLEURA:  No acute process.       THORACIC AORTA:  Ectasia ascending aorta 3.9 cm.  No sign of dissection.       MEDIASTINUM/HAILEY:  No pathologically enlarged nodes.       CARDIAC:  Unremarkable.       CHEST WALL:  Although nonspecific, there is asymmetric non fatty breast    tissue on the right, compared to left series 4, image 153. Given the    findings in the liver, suggest physical examination and consider    diagnostic breast imaging workup.       LIMITED ABDOMEN:  Partially seen low-attenuation hepatic masses concerning    for metastatic disease to the liver.  One of the larger masses is present    series 4, image 233 measuring 4.3 x 3.5 cm AP transverse dimension    low-attenuation centrally, with subtle    peripheral enhancement.  Additional masses are partially seen on this    exam covering portion of the liver.  Partially seen cysts involving both    kidneys, the largest of which projects laterally from the left kidney 7.1    cm.  No upper abdominal ascites    present.  Small hiatal hernia present.       BONES:  Note is made of degenerative changes present in the spine.       OTHER:  None.                         =====    CONCLUSION:             FINDINGS OF MALIGNANCY.           1. Limited imaging upper abdomen shows multiple hepatic metastatic    lesions.       2. Negative for acute pulmonary embolism.       3.  Subtle and nonspecific asymmetric breast density on the right.  Given    the findings in the liver, suggest breast examination, and consideration    for non emergency diagnostic breast imaging.  Non emergency metastatic    workup also advised.           LOCATION:  [facility]           Dictated by (CST): Daron Rodriguez MD on 1/02/2024 at 10:54 PM        Finalized by (CST): Daron Rodriguez MD on 1/02/2024 at 11:02 PM               I discussed case with Dr. Jimenez.  Recommended LFTs.  Okay to follow-up as outpatient for workup of malignancy    Labs Reviewed   HEPATIC FUNCTION PANEL (7) - Normal       LFTs are unremarkable.  Patient was discharged home with family.                                     Medical Decision Making      Disposition and Plan     Clinical Impression:  1. Metastases to the liver (HCC)         Disposition:  Discharge  1/3/2024 12:55 am    Follow-up:  Renaldo Guevara MD  [facility] [place] [facility] [address] [facility]  [facility] [address]  [phone]    Follow up            Medications Prescribed:  Discharge Medication List as of 1/3/2024 12:56 AM

## 2024-01-03 NOTE — TELEPHONE ENCOUNTER
Pt is calling to make a consult appt-NL    New Consult:Dr. Guevara  Referred by:Dr Thornton  Reason:Metastases to the liver   Insurance:[employer]

## 2024-01-03 NOTE — TELEPHONE ENCOUNTER
Pended referral to Dr Guevara. Patient has scheduled follow up with you on 01/15.      ER visit 01/02/2023  linical Impression:  1. Metastases to the liver (HCC)      Disposition:  Discharge  1/3/2024 12:55 am     Follow-up:  Renaldo Guevara MD  [address] [place] [address]  [address] [facility] [address]  [phone]     Follow up

## 2024-01-03 NOTE — TELEPHONE ENCOUNTER
Patient was seen at ER yesterday and referred to Renaldo Guevara MD.     They will not schedule an appointment without a referral.     [facility]  [address]   [address] [address]    [phone]    [place] [address] [address] [address]    [phone]

## 2024-01-04 ENCOUNTER — APPOINTMENT (OUTPATIENT)
Dept: HEMATOLOGY/ONCOLOGY | Age: 74
End: 2024-01-04
Attending: INTERNAL MEDICINE
Payer: MEDICARE

## 2024-01-11 ENCOUNTER — OFFICE VISIT (OUTPATIENT)
Dept: HEMATOLOGY/ONCOLOGY | Age: 74
End: 2024-01-11
Attending: INTERNAL MEDICINE
Payer: MEDICARE

## 2024-01-11 VITALS
BODY MASS INDEX: 31.28 KG/M2 | DIASTOLIC BLOOD PRESSURE: 84 MMHG | TEMPERATURE: 97 F | RESPIRATION RATE: 18 BRPM | OXYGEN SATURATION: 97 % | HEART RATE: 76 BPM | HEIGHT: 61.42 IN | WEIGHT: 167.81 LBS | SYSTOLIC BLOOD PRESSURE: 168 MMHG

## 2024-01-11 DIAGNOSIS — R16.0 LIVER MASSES: Primary | ICD-10-CM

## 2024-01-11 DIAGNOSIS — N64.89 BREAST ASYMMETRY: ICD-10-CM

## 2024-01-11 DIAGNOSIS — Z85.21 HISTORY OF CANCER OF LARYNX: ICD-10-CM

## 2024-01-11 PROCEDURE — 99205 OFFICE O/P NEW HI 60 MIN: CPT | Performed by: INTERNAL MEDICINE

## 2024-01-11 NOTE — PROGRESS NOTES
Patient is here for consultation for abnormal imaging.   She went to immediate care last week for pain in her right side that increased with deep breath.  She had imaging that showed abnormalities in the liver.   She no longer has this pain.   She denies any fever, cough or shortness of breath.   She is eating well and has good energy.   She denies any problems with bowel movements.     Education Record    Learner:  Patient and Family Member    Disease / Diagnosis: abnormal imaging    Barriers / Limitations:  Language   Comments: daughter here to translate, refuse phone translation    Method:  Discussion   Comments:    General Topics:  Other:  reason for consultation    Comments:    Outcome:  Shows understanding   Comments:

## 2024-01-11 NOTE — CONSULTS
[facility]cer Center Report of Consultation    Patient Name: Lisa Iqbal   YOB: 1950   Medical Record Number: [id number]   CSN: [id number]   Consulting Physician: Ronan Camarillo MD  Referring Physician(s): Anusha Abraham  Date of Consultation: 2024     Reason for Consultation:  Lisa Iqbal was seen today in the Cancer Center for evaluation and management of multiple liver lesions.    History of Present Illness:     73 year old has a history of head and neck cancer. 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 She had radiaiton but could not complete the full treatment due to mucosal toxity. She has had POLI with ENT and rad onc follow up within the last year. She presented with acute RUQ sharp pain. She had three episodes of intense sharp pain. She presented to immediate care and had a ddimer that was elevated. She was sent to the ED. She had a CTA that was negative for thrombosis but there were multiple liver lesions. She had some asymmetry of her breast tissue.      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    Vertigo     Visual impairment        Past Surgical History:  Past Surgical History:   Procedure Laterality Date          x 3    LARYNGOSCOPY,DIRCT,OP,BIOPSY  01/15/2018       Family Medical History:  Family History   Problem Relation Age of Onset    Cancer Father         liver cancer    Cancer Brother         liver cancer       Gyne History:  OB History    Para Term  AB Living   3 2 0 0 0 0   SAB IAB Ectopic Multiple Live Births   0 0 0 0 0       Psychosocial History:  Social History     Socioeconomic History    Marital status: Single     Spouse name: Not on file    Number of children: 2    Years of education: Not on file    Highest education level: Not on file   Occupational History    Not on file    Tobacco Use    Smoking status: Never    Smokeless tobacco: Never   Vaping Use    Vaping Use: Never used   Substance and Sexual Activity    Alcohol use: No    Drug use: No    Sexual activity: Not on file   Other Topics Concern    Caffeine Concern No    Exercise Yes    Seat Belt No    Special Diet Yes     Comment: Balanced    Stress Concern No    Weight Concern Not Asked   Social History Narrative    ** Merged History Encounter **          Social Determinants of Health     Financial Resource Strain: Not on file   Food Insecurity: Not on file   Transportation Needs: Not on file   Physical Activity: Not on file   Stress: Not on file   Social Connections: Not on file   Housing Stability: Not on file       Allergies:   Allergies   Allergen Reactions    Septra [Sulfamethoxazole W/Trimethoprim] RASH    Sulfa Antibiotics RASH       Current Medications:    Current Outpatient Medications:     levothyroxine 25 MCG Oral Tab, Take 1 tablet (25 mcg total) by mouth before breakfast., Disp: 90 tablet, Rfl: 0    Omeprazole 40 MG Oral Capsule Delayed Release, Take one capsule nightly, Disp: 90 capsule, Rfl: 3    Multiple Vitamins-Minerals (CENTRUM) Oral Tab, Take 1 tablet by mouth daily., Disp: , Rfl:     Vitamin D3 50 MCG (2000 UT) Oral Cap, Take 1 capsule (2,000 Units total) by mouth daily., Disp: , Rfl:     Review of Systems:    Constitutional: Negative for anorexia, fatigue, fevers, chills, night sweats and weight loss.  Eyes: Negative for visual disturbance, irritation and redness.  Respiratory: Negative for cough, hemoptysis, chest pain, or dyspnea.  Cardiovascular: Negative for angina, orthopnea or palpitations.  Gastrointestinal: Negative for nausea, vomiting, change in bowel habits, diarrhea, constipation and abdominal pain.  Integument/breast: Negative for rash, skin lesions, and pruritus.  Hematologic/lymphatic: Negative for easy bruising, bleeding, and lymphadenopathy.  Musculoskeletal: Negative for myalgias,  arthralgias, muscle weakness.  Genitourinary: Negative for dysuria or hematuria  Neurological: Negative for headaches, dizziness, speech problems, gait problems and focal weakness.  Psychiatric: The patient's mood was calm and appropriate for this visit.    The pertinent positives and negatives were described in the HPI and above. All other systems were negative.      Vital Signs:  Height: 156 cm (5' 1.42\") (01/11 1402)  Weight: 76.1 kg (167 lb 12.8 oz) (01/11 1402)  BSA (Calculated - sq m): 1.76 sq meters (01/11 1402)  Pulse: 76 (01/11 1402)  BP: 168/84 (01/11 1402)  Temp: 96.9 °F (36.1 °C) (01/11 1402)  Do Not Use - Resp Rate: --  SpO2: 97 % (01/11 1402)    Physical Examination:    Constitutional: Patient is alert and oriented x 3, not in acute distress.  HEENT:  Oropharynx is clear. Neck is supple.  Eyes: Anicteric sclera. Pink conjunctiva.  Respiratory: Clear to auscultation and percussion. No rales.  No wheezes.  Cardiovascular: Regular rate and rhythm.   Gastrointestinal: Soft, non tender with good bowel sounds.  Extremities: No edema. No calf tenderness.  Neurological: Grossly intact without focal motor or sensory deficit.  Lymphatics: There is no palpable lymphadenopathy throughout in the cervical, supraclavicular, or axillary regions.    Labs reviewed at this visit:  Lab Results   Component Value Date    WBC 8.0 01/02/2024    RBC 4.75 01/02/2024    HGB 13.8 01/02/2024    HCT 40.9 01/02/2024    MCV 91.8 01/02/2024    MCV 86.1 01/02/2024    MCH 29.1 01/02/2024    MCHC 30.6 (L) 01/02/2024    MCHC 33.7 01/02/2024    RDW 13.2 01/02/2024    .0 01/02/2024    MPV 10.4 12/03/2021     Lab Results   Component Value Date     08/23/2023    K 4.0 08/23/2023     08/23/2023    CO2 26.0 08/23/2023    BUN 16 08/23/2023    CREATSERUM 1.01 08/23/2023     (H) 08/23/2023    CA 9.1 08/23/2023    ALKPHO 87 01/03/2024    ALT 26 01/03/2024    AST 18 01/03/2024    BILT 0.3 01/03/2024    ALB 3.8 01/03/2024     TP 7.8 01/03/2024        Radiologic imaging reviewed at this visit:    CT Chest on 1/2/2024:  FINDINGS:          FINDINGS OF MALIGNANCY.     VASCULATURE:  Negative for acute pulmonary embolism.  No filling defects are seen centrally or peripherally within the pulmonary arterial system.     LUNGS/PLEURA: No acute process.     THORACIC AORTA: Ectasia ascending aorta 3.9 cm.  No sign of dissection.     MEDIASTINUM/HAILEY: No pathologically enlarged nodes.     CARDIAC:  Unremarkable.     CHEST WALL: Although nonspecific, there is asymmetric non fatty breast tissue on the right, compared to left series 4, image 153. Given the findings in the liver, suggest physical examination and consider diagnostic breast imaging workup.     LIMITED ABDOMEN:  Partially seen low-attenuation hepatic masses concerning for metastatic disease to the liver.  One of the larger masses is present series 4, image 233 measuring 4.3 x 3.5 cm AP transverse dimension low-attenuation centrally, with subtle  peripheral enhancement. Additional masses are partially seen on this exam covering portion of the liver.  Partially seen cysts involving both kidneys, the largest of which projects laterally from the left kidney 7.1 cm.  No upper abdominal ascites  present.  Small hiatal hernia present.     BONES:  Note is made of degenerative changes present in the spine.     OTHER:  None.      Impression   CONCLUSION:          FINDINGS OF MALIGNANCY.        1. Limited imaging upper abdomen shows multiple hepatic metastatic lesions.     2. Negative for acute pulmonary embolism.     3.  Subtle and nonspecific asymmetric breast density on the right.  Given the findings in the liver, suggest breast examination, and consideration for non emergency diagnostic breast imaging.  Non emergency metastatic workup also advised.       Assessment/Plan:    H/O Laryngeal cancer in 2018:  New multiple suspicious liver lesions:    This is suspicious of metastatic cancer but  recurrence from her head and neck cancer is unlikely over 5 years out. I would recommend that we first get a PET scan to evaluate for the extent of the disease and to determine the best site for biopsy to establish a diagnosis. I will contact her with the results to decide on further work up.     I reviewed the images of the CT scan with the patient and her daughter. All questions were answered.    This visit lasted 60 minutes with 20 minutes for history and physical, 30 minutes for management discussion and review of imaging and 10 minutes for post visit chart review and charting.        Ronan Camarillo MD

## 2024-01-15 ENCOUNTER — LAB ENCOUNTER (OUTPATIENT)
Dept: LAB | Age: 74
End: 2024-01-15
Attending: INTERNAL MEDICINE
Payer: MEDICARE

## 2024-01-15 ENCOUNTER — OFFICE VISIT (OUTPATIENT)
Dept: INTERNAL MEDICINE CLINIC | Facility: CLINIC | Age: 74
End: 2024-01-15
Payer: MEDICARE

## 2024-01-15 VITALS
TEMPERATURE: 97 F | WEIGHT: 169 LBS | DIASTOLIC BLOOD PRESSURE: 68 MMHG | BODY MASS INDEX: 31.5 KG/M2 | HEIGHT: 61.42 IN | HEART RATE: 80 BPM | SYSTOLIC BLOOD PRESSURE: 124 MMHG | OXYGEN SATURATION: 97 % | RESPIRATION RATE: 16 BRPM

## 2024-01-15 DIAGNOSIS — R93.89 ABNORMAL FINDING ON IMAGING: ICD-10-CM

## 2024-01-15 DIAGNOSIS — Z85.21 HISTORY OF CANCER OF LARYNX: ICD-10-CM

## 2024-01-15 DIAGNOSIS — E03.9 HYPOTHYROIDISM, UNSPECIFIED TYPE: ICD-10-CM

## 2024-01-15 DIAGNOSIS — E03.9 HYPOTHYROIDISM, UNSPECIFIED TYPE: Primary | ICD-10-CM

## 2024-01-15 DIAGNOSIS — R16.0 LIVER MASSES: ICD-10-CM

## 2024-01-15 PROCEDURE — 3008F BODY MASS INDEX DOCD: CPT | Performed by: INTERNAL MEDICINE

## 2024-01-15 PROCEDURE — 99214 OFFICE O/P EST MOD 30 MIN: CPT | Performed by: INTERNAL MEDICINE

## 2024-01-15 PROCEDURE — 1159F MED LIST DOCD IN RCRD: CPT | Performed by: INTERNAL MEDICINE

## 2024-01-15 PROCEDURE — 1160F RVW MEDS BY RX/DR IN RCRD: CPT | Performed by: INTERNAL MEDICINE

## 2024-01-15 PROCEDURE — 3078F DIAST BP <80 MM HG: CPT | Performed by: INTERNAL MEDICINE

## 2024-01-15 PROCEDURE — 3074F SYST BP LT 130 MM HG: CPT | Performed by: INTERNAL MEDICINE

## 2024-01-15 NOTE — PROGRESS NOTES
Subjective:   Lisa Iqbal is a 73 year old female  who presents for Follow - Up       Liver lesions thought suspicious for metastatic cancer. Hx of cancer of larynx. Pt following with Dr. Camarillo.   Had mammogram in 8/2023 that was reported as wnl.   However on recent imaging \" Subtle and nonspecific asymmetric breast density on the right.  Given the findings in the liver, suggest breast examination, and consideration for non emergency diagnostic breast imaging.  Non emergency metastatic workup also advised.  \"  Pt is scheduled for PET scan.   Denies family hx of breast or colon cancer.  Denies breast or nipple changes.   Per pt she had breast exam with Dr. Camarillo recently and reviewed prior mammogram.     Today we discussed doing a diagnostic mammogram out of precaution, but pt would like to wait for PET scan results.     Hypothyroidism- on levothyroxine. But due for labs.     Per home study done via health insurance - possible PAD. However pt wo claudication symptoms.     History/Other:    Chief Complaint Reviewed and Verified  No Further Nursing Notes to   Review  Tobacco Reviewed  Allergies Reviewed  Medications Reviewed  OB   Status Reviewed         Current Outpatient Medications   Medication Sig Dispense Refill    levothyroxine 25 MCG Oral Tab Take 1 tablet (25 mcg total) by mouth before breakfast. 90 tablet 0    Omeprazole 40 MG Oral Capsule Delayed Release Take one capsule nightly 90 capsule 3    Multiple Vitamins-Minerals (CENTRUM) Oral Tab Take 1 tablet by mouth daily.      Vitamin D3 50 MCG (2000 UT) Oral Cap Take 1 capsule (2,000 Units total) by mouth daily.         Review of Systems:  Pertinent items are noted in HPI.  A comprehensive 10 point review of systems was completed.  Pertinent positives and negatives noted in the the HPI.        Objective:   /68   Pulse 80   Temp 97.1 °F (36.2 °C) (Temporal)   Resp 16   Ht 5' 1.42\" (1.56 m)   Wt 169 lb (76.7 kg)   SpO2 97%   BMI  31.50 kg/m²  Estimated body mass index is 31.5 kg/m² as calculated from the following:    Height as of this encounter: 5' 1.42\" (1.56 m).    Weight as of this encounter: 169 lb (76.7 kg).  PHYSICAL EXAM:   General: no acute distress   Neck: trachea midline; no adenopathy; thyroid not enlarged   Hematologic/lymphatic:no cervical lymphadenopathy; no supraclavicular adenopathy   Respiratory: no increased work of breathing; good air exchange; CTAB; no crackles or wheezing   Cardiovascular: RRR; S1, S2; no murmurs; no edema;   Gastrointestinal: normal bowel sounds; soft; non-distended; non-tender  Breast exam- reports done with Dr. Camarillo   Behavioral/Psych: euthymic; appropriate affect     Assessment & Plan:     Lisa was seen today for follow - up.    Diagnoses and all orders for this visit:    Hypothyroidism, unspecified type  -levothyroxine   -check labs     History of cancer of larynx; glottis  -as per ENT    Liver masses  -scheduled for PET scan. Following with Dr. Camarillo for further management    Abnormal finding on imaging  Comments:  per insurance testing at home she may possibly have PAD. pt wo symptmos.  Orders:  -   for complete evaluation given above-  US ANKLE BRACHIAL INDEX (CPT=93922); Future          Anusha Abraham MD

## 2024-01-17 ENCOUNTER — LAB ENCOUNTER (OUTPATIENT)
Dept: LAB | Age: 74
End: 2024-01-17
Attending: INTERNAL MEDICINE
Payer: MEDICARE

## 2024-01-17 DIAGNOSIS — E03.9 HYPOTHYROIDISM, UNSPECIFIED TYPE: ICD-10-CM

## 2024-01-17 DIAGNOSIS — E03.9 ACQUIRED HYPOTHYROIDISM: ICD-10-CM

## 2024-01-17 LAB
T4 FREE SERPL-MCNC: 1.2 NG/DL (ref 0.8–1.7)
TSI SER-ACNC: 4.02 MIU/ML (ref 0.36–3.74)

## 2024-01-17 PROCEDURE — 84443 ASSAY THYROID STIM HORMONE: CPT

## 2024-01-17 PROCEDURE — 84439 ASSAY OF FREE THYROXINE: CPT

## 2024-01-17 PROCEDURE — 36415 COLL VENOUS BLD VENIPUNCTURE: CPT

## 2024-01-17 RX ORDER — LEVOTHYROXINE SODIUM 0.05 MG/1
50 TABLET ORAL
Qty: 90 TABLET | Refills: 0 | Status: ON HOLD | OUTPATIENT
Start: 2024-01-17 | End: 2024-03-25

## 2024-01-22 ENCOUNTER — HOSPITAL ENCOUNTER (OUTPATIENT)
Dept: NUCLEAR MEDICINE | Facility: HOSPITAL | Age: 74
Discharge: HOME | End: 2024-01-22
Attending: INTERNAL MEDICINE
Payer: MEDICARE

## 2024-01-22 DIAGNOSIS — N64.89 BREAST ASYMMETRY: ICD-10-CM

## 2024-01-22 DIAGNOSIS — R16.0 LIVER MASSES: ICD-10-CM

## 2024-01-22 DIAGNOSIS — Z85.21 HISTORY OF CANCER OF LARYNX: ICD-10-CM

## 2024-01-22 LAB — GLUCOSE BLD-MCNC: 100 MG/DL (ref 70–99)

## 2024-01-22 PROCEDURE — 82962 GLUCOSE BLOOD TEST: CPT

## 2024-01-22 PROCEDURE — 78815 PET IMAGE W/CT SKULL-THIGH: CPT | Performed by: INTERNAL MEDICINE

## 2024-01-24 ENCOUNTER — TELEPHONE (OUTPATIENT)
Dept: HEMATOLOGY/ONCOLOGY | Facility: HOSPITAL | Age: 74
End: 2024-01-24

## 2024-01-24 DIAGNOSIS — R16.0 LIVER MASSES: Primary | ICD-10-CM

## 2024-01-24 DIAGNOSIS — Z85.21 HISTORY OF CANCER OF LARYNX: ICD-10-CM

## 2024-01-24 NOTE — TELEPHONE ENCOUNTER
New patient of Dr Camarillo seen for consultation on 1/11/2024. She has a history of cancer of the larynx. New liver masses. Scheduled for biopsy on 2/8/2024.    Abdominal Pain/Gas    Patient reports having a pain and pressure in her abdomen. \"It feels like bad gas.\" Patient states her pain is not as bad as the pain was when she went to the ER. She is rating her current pain \"3/10.\" The strongest it has been is \"5/10.\" She is eating 3 good sized meals daily. She denies chills, shakes or fever. She is drinking plenty of fluids. She also had a medium to large, brown bowel movement this morning. She is passing gas. She asked what can she take ?    I told her she may 2 extra strength tylenol and Gas-X. She verbalized understanding and agreed with the plan. No other questions or concerns at this time.

## 2024-01-24 NOTE — TELEPHONE ENCOUNTER
Patient called.She has pain in her abdomen.  She has had the pain since 1/22/24.  She is eating and drinking. Sleeping fine.  Please call back.  Thank you.

## 2024-01-29 ENCOUNTER — TELEPHONE (OUTPATIENT)
Dept: HEMATOLOGY/ONCOLOGY | Facility: HOSPITAL | Age: 74
End: 2024-01-29

## 2024-01-29 DIAGNOSIS — R16.0 LIVER MASSES: Primary | ICD-10-CM

## 2024-01-29 DIAGNOSIS — Z85.21 HISTORY OF CANCER OF LARYNX: ICD-10-CM

## 2024-01-29 RX ORDER — HYDROCODONE BITARTRATE AND ACETAMINOPHEN 5; 325 MG/1; MG/1
1-2 TABLET ORAL EVERY 8 HOURS PRN
Qty: 30 TABLET | Refills: 0 | Status: SHIPPED | OUTPATIENT
Start: 2024-01-29

## 2024-01-29 NOTE — TELEPHONE ENCOUNTER
Dr. Camarillo is prescribing some Norco for pain and she can continue to use GasX for bloating feeling.

## 2024-01-29 NOTE — TELEPHONE ENCOUNTER
Pt's daughter is calling to speak to nurse regarding pt having pain on her right side under her ribs. She would like to know what she can take for the pain-NL

## 2024-01-29 NOTE — TELEPHONE ENCOUNTER
Spoke to dtr.  Patient in last couple of days having increased pain under ribs to right side and gas.  No relieved by Tylenol 500 mg BID or controlled by Gas X.  Describes pain as sharp intermittent pain, worsen with deep breath.  5-6/10.  Lying down bothers her more.  When pain passes it is gone.      Denies fevers, no sob or chest pain.  Denies n/v/d.  Is eating and drinking.  Has gas blotting of which Gas X only helps for short period.  Denies lightheadedness or dizziness.  No urinary or bowel complaints.    She is requesting a call back with instructions for better pain management.   VIEW ALL

## 2024-01-30 ENCOUNTER — TELEPHONE (OUTPATIENT)
Dept: HEMATOLOGY/ONCOLOGY | Facility: HOSPITAL | Age: 74
End: 2024-01-30

## 2024-01-30 DIAGNOSIS — G89.3 NEOPLASM RELATED PAIN: ICD-10-CM

## 2024-01-30 DIAGNOSIS — R16.0 LIVER MASSES: Primary | ICD-10-CM

## 2024-01-30 RX ORDER — TRAMADOL HYDROCHLORIDE 50 MG/1
50 TABLET ORAL EVERY 6 HOURS PRN
Qty: 20 TABLET | Refills: 0 | Status: SHIPPED | OUTPATIENT
Start: 2024-01-30 | End: 2024-02-08

## 2024-01-30 NOTE — TELEPHONE ENCOUNTER
Called Beryl.  Dr. Camarillo has recommended Tramadol and this prescription was sent to the pharmacy by SOHEILA.     Detail Level: Detailed Detail Level: Simple Detail Level: Generalized

## 2024-01-30 NOTE — TELEPHONE ENCOUNTER
Jamar Camarillo prescribed Norco for the patient, but it makes he vomit.  Can he prescribe something else?  Please call Beryl back.  Thank you.

## 2024-02-04 ENCOUNTER — HOSPITAL ENCOUNTER (OUTPATIENT)
Age: 74
Discharge: HOME | End: 2024-02-04
Attending: EMERGENCY MEDICINE
Payer: MEDICARE

## 2024-02-04 VITALS
RESPIRATION RATE: 20 BRPM | WEIGHT: 165 LBS | HEIGHT: 61 IN | OXYGEN SATURATION: 95 % | SYSTOLIC BLOOD PRESSURE: 147 MMHG | DIASTOLIC BLOOD PRESSURE: 83 MMHG | BODY MASS INDEX: 31.15 KG/M2 | TEMPERATURE: 98 F | HEART RATE: 95 BPM

## 2024-02-04 DIAGNOSIS — Z13.9 ENCOUNTER FOR MEDICAL SCREENING EXAMINATION: Primary | ICD-10-CM

## 2024-02-04 PROCEDURE — 99213 OFFICE O/P EST LOW 20 MIN: CPT

## 2024-02-04 PROCEDURE — 99212 OFFICE O/P EST SF 10 MIN: CPT

## 2024-02-04 NOTE — ED INITIAL ASSESSMENT (HPI)
Patient c/o ruq abdominal pain. Patient seen here 1/2/24 and sent to ER. Has upcoming biopsy of the liver this week. Patient has tramadol which is helping the pain. Patient here for a check up.

## 2024-02-04 NOTE — ED PROVIDER NOTES
Patient Seen in: [facility]      History     Chief Complaint   Patient presents with    Abdomen/Flank Pain     Stated Complaint: right side pain x 4 weeks    Subjective:   HPI    73-year-old female with a history of hypothyroidism, history of migraines, history of laryngeal cancer who presents to the immediate care for ongoing right-sided abdominal and rib pain.  Patient was initially seen in the immediate care and then was referred to the emergency department on January 3, 2024.  A CT was negative for evidence of pulmonary embolism.  Patient was noted to have multiple hepatic metastatic lesions of the liver.  Patient was seen in follow-up on 2024 by oncology.  Patient was referred for further imaging with a PET scan and she is scheduled for a liver biopsy on 2024.  Patient was concerned because of her ongoing symptoms.  She came to get her vital signs checked.  She denies any new complaints.  She denies any fevers or chills.  Denies productive cough or sputum.    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    Vertigo     Visual impairment     Glasses              Past Surgical History:   Procedure Laterality Date          x 3    HYSTERECTOMY      Bilateral ovaries remain    LARYNGOSCOPY,DIRCT,OP,BIOPSY  01/15/2018                Social History     Socioeconomic History    Marital status: Single    Number of children: 2   Tobacco Use    Smoking status: Never    Smokeless tobacco: Never   Vaping Use    Vaping Use: Never used   Substance and Sexual Activity    Alcohol use: No    Drug use: No   Other Topics Concern    Caffeine Concern No    Exercise Yes    Seat Belt No    Special  Diet Yes     Comment: Balanced    Stress Concern No   Social History Narrative    ** Merged History Encounter **                   Review of Systems    Positive for stated complaint: right side pain x 4 weeks  Other systems are as noted in HPI.  Constitutional and vital signs reviewed.      All other systems reviewed and negative except as noted above.    Physical Exam     ED Triage Vitals [02/04/24 0915]   /83   Pulse 95   Resp 20   Temp 97.9 °F (36.6 °C)   Temp src Temporal   SpO2 95 %   O2 Device None (Room air)       Current:/83   Pulse 95   Temp 97.9 °F (36.6 °C) (Temporal)   Resp 20   Ht 154.9 cm (5' 1\")   Wt 74.8 kg   SpO2 95%   BMI 31.18 kg/m²         Physical Exam  General: Alert and oriented. No acute distress.  HEENT: Normocephalic. No evidence of trauma. Extraocular movements are intact.  Cardiovascular exam: Regular rate and rhythm  Lungs: Clear to auscultation bilaterally.  Abdomen: Soft, nondistended, nontender.  Extremities: No evidence of deformity. No clubbing or cyanosis.  Neuro: No focal deficit is noted.       ED Course   Labs Reviewed - No data to display     Patient is hemodynamically stable.  Her physical exam is unremarkable.  She has had a fairly extensive workup recently which showed evidence of metastatic lesions to the liver.  Patient was given reassurance.  Recommend continue follow-up with oncology and to follow-up with her liver biopsy.  Patient instructed continue her home medications including tramadol which was prescribed for her.           MDM   Patient was screened and evaluated during this visit.   As a treating physician attending to the patient, I determined, within reasonable clinical confidence and prior to discharge, that an emergency medical condition was not or was no longer present.  There was no indication for further evaluation, treatment or admission on an emergency basis.  Comprehensive verbal and written discharge and follow-up instructions were  provided to help prevent relapse or worsening.  Patient was instructed to follow-up with her primary care provider for further evaluation and treatment, but to return immediately to the ER for worsening, concerning, new, changing or persisting symptoms.  I discussed the case with the patient and they had no questions, complaints, or concerns.  Patient felt comfortable going home.    ^^Please note that this report has been produced using speech recognition software and may contain errors related to that system including, but not limited to, errors in grammar, punctuation, and spelling, as well as words and phrases that possibly may have been recognized inappropriately.  If there are any questions or concerns, contact the dictating provider for clarification                                   Medical Decision Making      Disposition and Plan     Clinical Impression:  1. Encounter for medical screening examination         Disposition:  Discharge  2/4/2024  9:31 am    Follow-up:  Anusha Lucio MD  [address]  [address]  [facility] [id number] [phone]    Call   As needed, If symptoms worsen    Ronan Camarillo MD  [address]  [facility] [address] [phone]    Call   As needed, If symptoms worsen          Medications Prescribed:  Current Discharge Medication List

## 2024-02-04 NOTE — DISCHARGE INSTRUCTIONS
Continue your home medications including tramadol  Follow-up with your liver biopsy  Follow-up with oncology Dr. Camarillo  Return to the emergency department if you have any worsening symptoms or new concerns

## 2024-02-08 ENCOUNTER — NURSE ONLY (OUTPATIENT)
Dept: LAB | Facility: HOSPITAL | Age: 74
End: 2024-02-08
Attending: INTERNAL MEDICINE
Payer: MEDICARE

## 2024-02-08 ENCOUNTER — HOSPITAL ENCOUNTER (OUTPATIENT)
Dept: ULTRASOUND IMAGING | Facility: HOSPITAL | Age: 74
Discharge: HOME | End: 2024-02-08
Attending: INTERNAL MEDICINE
Payer: MEDICARE

## 2024-02-08 VITALS
HEIGHT: 61 IN | HEART RATE: 62 BPM | SYSTOLIC BLOOD PRESSURE: 101 MMHG | DIASTOLIC BLOOD PRESSURE: 69 MMHG | BODY MASS INDEX: 31.34 KG/M2 | OXYGEN SATURATION: 95 % | WEIGHT: 166 LBS | RESPIRATION RATE: 18 BRPM | TEMPERATURE: 98 F

## 2024-02-08 DIAGNOSIS — Z85.21 HISTORY OF CANCER OF LARYNX: ICD-10-CM

## 2024-02-08 DIAGNOSIS — R16.0 LIVER MASS: Primary | ICD-10-CM

## 2024-02-08 DIAGNOSIS — G89.3 NEOPLASM RELATED PAIN: ICD-10-CM

## 2024-02-08 DIAGNOSIS — R16.0 LIVER MASSES: ICD-10-CM

## 2024-02-08 DIAGNOSIS — R16.0 LIVER MASS: ICD-10-CM

## 2024-02-08 LAB
INR BLD: 1.07 (ref 0.8–1.2)
PLATELET # BLD AUTO: 205 10(3)UL (ref 150–450)
PROTHROMBIN TIME: 13.9 SECONDS (ref 11.6–14.8)

## 2024-02-08 PROCEDURE — 85610 PROTHROMBIN TIME: CPT

## 2024-02-08 PROCEDURE — 88342 IMHCHEM/IMCYTCHM 1ST ANTB: CPT | Performed by: INTERNAL MEDICINE

## 2024-02-08 PROCEDURE — 99153 MOD SED SAME PHYS/QHP EA: CPT | Performed by: INTERNAL MEDICINE

## 2024-02-08 PROCEDURE — 85049 AUTOMATED PLATELET COUNT: CPT | Performed by: RADIOLOGY

## 2024-02-08 PROCEDURE — 88341 IMHCHEM/IMCYTCHM EA ADD ANTB: CPT | Performed by: INTERNAL MEDICINE

## 2024-02-08 PROCEDURE — 47000 NEEDLE BIOPSY OF LIVER PERQ: CPT | Performed by: INTERNAL MEDICINE

## 2024-02-08 PROCEDURE — 88307 TISSUE EXAM BY PATHOLOGIST: CPT | Performed by: INTERNAL MEDICINE

## 2024-02-08 PROCEDURE — 76942 ECHO GUIDE FOR BIOPSY: CPT | Performed by: INTERNAL MEDICINE

## 2024-02-08 PROCEDURE — 99152 MOD SED SAME PHYS/QHP 5/>YRS: CPT | Performed by: INTERNAL MEDICINE

## 2024-02-08 PROCEDURE — 36415 COLL VENOUS BLD VENIPUNCTURE: CPT | Performed by: RADIOLOGY

## 2024-02-08 RX ORDER — TRAMADOL HYDROCHLORIDE 50 MG/1
50 TABLET ORAL EVERY 6 HOURS PRN
Qty: 20 TABLET | Refills: 0 | Status: SHIPPED | OUTPATIENT
Start: 2024-02-08 | End: 2024-02-16

## 2024-02-08 RX ORDER — HYDROCODONE BITARTRATE AND ACETAMINOPHEN 5; 325 MG/1; MG/1
2 TABLET ORAL EVERY 4 HOURS PRN
Status: DISCONTINUED | OUTPATIENT
Start: 2024-02-08 | End: 2024-02-10

## 2024-02-08 RX ORDER — SODIUM CHLORIDE 9 MG/ML
INJECTION, SOLUTION INTRAVENOUS CONTINUOUS
Status: DISCONTINUED | OUTPATIENT
Start: 2024-02-08 | End: 2024-02-10

## 2024-02-08 RX ORDER — ACETAMINOPHEN 325 MG/1
650 TABLET ORAL EVERY 4 HOURS PRN
Status: DISCONTINUED | OUTPATIENT
Start: 2024-02-08 | End: 2024-02-10

## 2024-02-08 RX ORDER — NALOXONE HYDROCHLORIDE 0.4 MG/ML
INJECTION, SOLUTION INTRAMUSCULAR; INTRAVENOUS; SUBCUTANEOUS
Status: DISCONTINUED
Start: 2024-02-08 | End: 2024-02-08 | Stop reason: WASHOUT

## 2024-02-08 RX ORDER — NALOXONE HYDROCHLORIDE 0.4 MG/ML
80 INJECTION, SOLUTION INTRAMUSCULAR; INTRAVENOUS; SUBCUTANEOUS AS NEEDED
Status: DISCONTINUED | OUTPATIENT
Start: 2024-02-08 | End: 2024-02-10

## 2024-02-08 RX ORDER — MIDAZOLAM HYDROCHLORIDE 1 MG/ML
INJECTION INTRAMUSCULAR; INTRAVENOUS
Status: COMPLETED
Start: 2024-02-08 | End: 2024-02-08

## 2024-02-08 RX ORDER — HYDROCODONE BITARTRATE AND ACETAMINOPHEN 5; 325 MG/1; MG/1
1 TABLET ORAL EVERY 4 HOURS PRN
Status: DISCONTINUED | OUTPATIENT
Start: 2024-02-08 | End: 2024-02-10

## 2024-02-08 RX ORDER — FLUMAZENIL 0.1 MG/ML
0.2 INJECTION INTRAVENOUS AS NEEDED
Status: DISCONTINUED | OUTPATIENT
Start: 2024-02-08 | End: 2024-02-10

## 2024-02-08 RX ORDER — FLUMAZENIL 0.1 MG/ML
INJECTION INTRAVENOUS
Status: DISCONTINUED
Start: 2024-02-08 | End: 2024-02-08 | Stop reason: WASHOUT

## 2024-02-08 RX ORDER — MIDAZOLAM HYDROCHLORIDE 1 MG/ML
1 INJECTION INTRAMUSCULAR; INTRAVENOUS EVERY 5 MIN PRN
Status: ACTIVE | OUTPATIENT
Start: 2024-02-08 | End: 2024-02-08

## 2024-02-08 RX ADMIN — MIDAZOLAM HYDROCHLORIDE 1 MG: 1 INJECTION INTRAMUSCULAR; INTRAVENOUS at 10:05:00

## 2024-02-08 RX ADMIN — SODIUM CHLORIDE: 9 INJECTION, SOLUTION INTRAVENOUS at 09:53:00

## 2024-02-08 RX ADMIN — MIDAZOLAM HYDROCHLORIDE 0.5 MG: 1 INJECTION INTRAMUSCULAR; INTRAVENOUS at 10:08:00

## 2024-02-08 NOTE — TELEPHONE ENCOUNTER
Patient's daughter called for a refill of tramadol.  She would like to pick it up at [facility].  The patient is having a biopsy now and it would be more convienent.  Please call Beryl back to confirm.   Thank you.

## 2024-02-08 NOTE — DISCHARGE INSTRUCTIONS
[facility] Department of Radiology    Biopsy of any type    Have someone drive you home after your procedure.  You may not drive yourself home    AFTER YOU ARRIVE HOME    Take things easy for the rest of the day after your biopsy.  You may be sore in the biopsy area for one to five days  DO drink plenty of fluids  DO resume your regular diet  DO keep a bandage on the biopsy site for at least 24 hours  DO NOT take a hot bath or shower for at least 12 hours  DO NOT drive or operative machinery for at least 24 hours  DO NOT smoke for at least 24 hours  DO NOT do any strenuous exercise or lifting for at least 48 hours  DO call your doctor immediately if  You start bleeding  There is any change in color or temperature of the area where the biopsy was performed  You develop increasing pain in shortness of breath

## 2024-02-08 NOTE — PROCEDURES
[facility]  Procedure Note    Lisa Iqbal Patient Status:  Outpatient    10/11/1950 MRN [id number]   Location [facility] ULTRASOUND Attending Ronan Camarillo MD    Day # 0 PCP Anusha Abraham MD     Procedure: liver dome Bx    Pre-Procedure Diagnosis:  liver masses    Post-Procedure Diagnosis: same    Anesthesia:  Sedation    Findings:  multiple masses liver    Specimens: 18 core dome liver lesion x 4    Blood Loss:  none    Tourniquet Time: none  Complications:  None  Drains:  none    Secondary Diagnosis:  larynx lesion    Clayton Bahena MD  2024

## 2024-02-08 NOTE — IMAGING NOTE
Pt post ultra sound guided liver biopsy. Tolerated procedure and sedation well. Denies any pain. Biospy site lateral upper abdomen CDI with tegaderm.  Alert and oriented. Report called to Crystal in [facility] and bed assigned.

## 2024-02-16 ENCOUNTER — APPOINTMENT (OUTPATIENT)
Dept: HEMATOLOGY/ONCOLOGY | Age: 74
End: 2024-02-16
Attending: INTERNAL MEDICINE
Payer: MEDICARE

## 2024-02-16 DIAGNOSIS — G89.3 NEOPLASM RELATED PAIN: ICD-10-CM

## 2024-02-16 DIAGNOSIS — R16.0 LIVER MASSES: ICD-10-CM

## 2024-02-16 RX ORDER — TRAMADOL HYDROCHLORIDE 50 MG/1
50 TABLET ORAL EVERY 6 HOURS PRN
Qty: 20 TABLET | Refills: 0 | Status: SHIPPED | OUTPATIENT
Start: 2024-02-16 | End: 2024-02-23

## 2024-02-16 RX ORDER — TRAMADOL HYDROCHLORIDE 50 MG/1
50 TABLET ORAL EVERY 8 HOURS PRN
Qty: 30 TABLET | Refills: 0 | Status: SHIPPED | OUTPATIENT
Start: 2024-02-16 | End: 2024-02-29

## 2024-02-23 DIAGNOSIS — R16.0 LIVER MASSES: ICD-10-CM

## 2024-02-23 DIAGNOSIS — G89.3 NEOPLASM RELATED PAIN: ICD-10-CM

## 2024-02-23 RX ORDER — TRAMADOL HYDROCHLORIDE 50 MG/1
50 TABLET ORAL EVERY 6 HOURS PRN
Qty: 60 TABLET | Refills: 0 | Status: SHIPPED | OUTPATIENT
Start: 2024-02-23

## 2024-02-27 ENCOUNTER — ANESTHESIA (OUTPATIENT)
Dept: ENDOSCOPY | Facility: HOSPITAL | Age: 74
End: 2024-02-27
Payer: MEDICARE

## 2024-02-27 ENCOUNTER — ANESTHESIA EVENT (OUTPATIENT)
Dept: ENDOSCOPY | Facility: HOSPITAL | Age: 74
End: 2024-02-27
Payer: MEDICARE

## 2024-02-27 ENCOUNTER — HOSPITAL ENCOUNTER (OUTPATIENT)
Facility: HOSPITAL | Age: 74
Setting detail: HOSPITAL OUTPATIENT SURGERY
Discharge: HOME | End: 2024-02-27
Attending: INTERNAL MEDICINE | Admitting: INTERNAL MEDICINE
Payer: MEDICARE

## 2024-02-27 VITALS
OXYGEN SATURATION: 98 % | SYSTOLIC BLOOD PRESSURE: 122 MMHG | WEIGHT: 165 LBS | HEART RATE: 61 BPM | DIASTOLIC BLOOD PRESSURE: 67 MMHG | TEMPERATURE: 98 F | HEIGHT: 61 IN | RESPIRATION RATE: 18 BRPM | BODY MASS INDEX: 31.15 KG/M2

## 2024-02-27 DIAGNOSIS — C79.9 METASTATIC MALIGNANT NEOPLASM, UNSPECIFIED SITE (HCC): ICD-10-CM

## 2024-02-27 DIAGNOSIS — C80.1 ADENOCARCINOMA (HCC): Primary | ICD-10-CM

## 2024-02-27 PROCEDURE — 0DBN8ZX EXCISION OF SIGMOID COLON, VIA NATURAL OR ARTIFICIAL OPENING ENDOSCOPIC, DIAGNOSTIC: ICD-10-PCS | Performed by: INTERNAL MEDICINE

## 2024-02-27 PROCEDURE — 88342 IMHCHEM/IMCYTCHM 1ST ANTB: CPT | Performed by: INTERNAL MEDICINE

## 2024-02-27 PROCEDURE — 0DB98ZX EXCISION OF DUODENUM, VIA NATURAL OR ARTIFICIAL OPENING ENDOSCOPIC, DIAGNOSTIC: ICD-10-PCS | Performed by: INTERNAL MEDICINE

## 2024-02-27 PROCEDURE — 0DB78ZX EXCISION OF STOMACH, PYLORUS, VIA NATURAL OR ARTIFICIAL OPENING ENDOSCOPIC, DIAGNOSTIC: ICD-10-PCS | Performed by: INTERNAL MEDICINE

## 2024-02-27 PROCEDURE — 88305 TISSUE EXAM BY PATHOLOGIST: CPT | Performed by: INTERNAL MEDICINE

## 2024-02-27 PROCEDURE — 3E0H8KZ INTRODUCTION OF OTHER DIAGNOSTIC SUBSTANCE INTO LOWER GI, VIA NATURAL OR ARTIFICIAL OPENING ENDOSCOPIC: ICD-10-PCS | Performed by: INTERNAL MEDICINE

## 2024-02-27 RX ORDER — NALOXONE HYDROCHLORIDE 0.4 MG/ML
0.08 INJECTION, SOLUTION INTRAMUSCULAR; INTRAVENOUS; SUBCUTANEOUS ONCE AS NEEDED
Status: DISCONTINUED | OUTPATIENT
Start: 2024-02-27 | End: 2024-02-27

## 2024-02-27 RX ORDER — SODIUM CHLORIDE, SODIUM LACTATE, POTASSIUM CHLORIDE, CALCIUM CHLORIDE 600; 310; 30; 20 MG/100ML; MG/100ML; MG/100ML; MG/100ML
INJECTION, SOLUTION INTRAVENOUS CONTINUOUS
Status: DISCONTINUED | OUTPATIENT
Start: 2024-02-27 | End: 2024-02-27

## 2024-02-27 RX ORDER — LIDOCAINE HYDROCHLORIDE 10 MG/ML
INJECTION, SOLUTION EPIDURAL; INFILTRATION; INTRACAUDAL; PERINEURAL AS NEEDED
Status: DISCONTINUED | OUTPATIENT
Start: 2024-02-27 | End: 2024-02-27 | Stop reason: SURG

## 2024-02-27 RX ADMIN — LIDOCAINE HYDROCHLORIDE 50 MG: 10 INJECTION, SOLUTION EPIDURAL; INFILTRATION; INTRACAUDAL; PERINEURAL at 09:38:00

## 2024-02-27 NOTE — OPERATIVE REPORT
Lisa Iqbal Patient Status:  Hospital Outpatient Surgery    10/11/1950 MRN [id number]   Formerly [facility] ENDOSCOPY PAIN CENTER Attending Po Aviles MD   Date 2024 PCP Anusha Abraham MD     PREOPERATIVE DIAGNOSIS/INDICATION: Metastatic adenocarcinoma   POSTOPERTATIVE DIAGNOSIS: Malignant looking mass rectosigmoid colon, colon polyp  PROCEDURE PERFORMED: COLONOSCOPY with biopsy and submucosal injection/tattoo  SEDATION: MAC sedation provided by General Anesthesia    TIME OUT WAS PERFORMED    INFORMED CONSENT: Risks, benefits and alternatives to the procedure were explained to the patient including but not limited to bleeding, infection, perforation, adverse drug reactions, pancreatitis and the need for hospitalization and surgery if this occurs, the patient understands and agrees to procedure.  PROCEDURE DESCRIPTION: After careful digital rectal examination a pediatric colonoscope was introduced into the patients rectum, advanced pass the recto sigmoid junction, into the descending colon, splenic flexure, transverse colon, hepatic flexure, ascending colon, cecum, confirmed by landmarks, including the appendiceal orifice and ileocecal valve. Careful examination of the above described areas was performed on withdrawal of the endoscope. Retroflexion was performed on the rectum. The patient tolerated the procedure well, there were no immediate complication immediately following the procedure, and the patient was transferred to recovery in stable condition.  QUALITY OF PREPARATION: Cameron Bowel Preparation Scale:            -      Right colon 2, Transverse colon 2, Left colon 2   FINDINGS/THERAPEUTICS:  COLON: Circumferential partially obstructive mass at 20 cm from the anal verge, friable, ulcerated malignant looking s/p cold forceps biopsy and submucosal spot tattoo in a four quadrant fashion distal (anal side) of the lesion, Just proximal to this there was a pedunculated  polyp 15 mm in diameter not removed  RECOMMENDATIONS:   Post Colonoscopy precautions, watch for bleeding, infection, perforation, adverse drug reactions   Follow biopsies.  Follow with Oncology  Po Aviles MD  2/27/2024  10:56 AM

## 2024-02-27 NOTE — DISCHARGE INSTRUCTIONS
Home Care Instructions for Colonoscopy and/or Gastroscopy with Sedation    Diet:  - Resume your regular diet .  - Start with light meals to minimize bloating.  - Do not drink alcohol today.    Medication:  - If you have questions about resuming your normal medications, please contact your Primary Care Physician.    Activities:  - Take it easy today. Do not return to work today.  - Do not drive today.  - Do not operate any machinery today (including kitchen equipment).    Colonoscopy:  - You may notice some rectal \"spotting\" (a little blood on the toilet tissue) for a day or two after the exam. This is normal.  - If you experience any rectal bleeding (not spotting), persistent tenderness or sharp severe abdominal pains, oral temperature over 100 degrees Fahrenheit, light-headedness or dizziness, or any other problems, contact your doctor.    Gastroscopy:  - You may have a sore throat for 2-3 days following the exam. This is normal. Gargling with warm salt water (1/2 tsp salt to 1 glass warm water) or using throat lozenges will help.  - If you experience any sharp pain in your neck, abdomen or chest, vomiting of blood, oral temperature over 100 degrees Fahrenheit, light-headedness or dizziness, or any other problems, contact your doctor.    **If unable to reach your doctor, please go to the [facility] Emergency Room**    - Your referring physician will receive a full report of your examination.  - If you do not hear from your doctor's office within two weeks of your biopsy, please call them for your results.

## 2024-02-27 NOTE — ANESTHESIA POSTPROCEDURE EVALUATION
[facility]    Lisa Iqbal Patient Status:  Hospital Outpatient Surgery   Age/Gender 73 year old female MRN [id number]   Location [facility] ENDOSCOPY PAIN CENTER Attending Po Aviles MD   Hosp Day # 0 PCP Anusha Abraham MD       Anesthesia Post-op Note    ESOPHAGOGASTRODUODENOSCOPY (EGD) with biopsies, COLONOSCOPY with biopsies and tattoo injection    Procedure Summary       Date: 02/27/24 Room / Location:  ENDOSCOPY 02 /  ENDOSCOPY    Anesthesia Start: 0937 Anesthesia Stop:     Procedures:       ESOPHAGOGASTRODUODENOSCOPY (EGD) with biopsies, COLONOSCOPY with biopsies and tattoo injection      COLONOSCOPY Diagnosis:       Metastatic malignant neoplasm, unspecified site (HCC)      (egd: hiatal hernia colon: sigmoid mass)    Surgeons: Po Aviles MD Anesthesiologist: Keegan Lantigua MD    Anesthesia Type: MAC ASA Status: 3            Anesthesia Type: MAC    Vitals Value Taken Time   BP 89/58 02/27/24 1010   Temp  02/27/24 1011   Pulse 70 02/27/24 1010   Resp 18 02/27/24 1010   SpO2 100 % 02/27/24 1010   Vitals shown include unfiled device data.    Patient Location: Endoscopy    Anesthesia Type: MAC    Airway Patency: patent    Postop Pain Control: adequate    Mental Status: mildly sedated but able to meaningfully participate in the post-anesthesia evaluation    Nausea/Vomiting: none    Cardiopulmonary/Hydration status: stable euvolemic    Complications: no apparent anesthesia related complications    Postop vital signs: stable    Dental Exam: Unchanged from Preop    Patient to be discharged home when criteria met.

## 2024-02-27 NOTE — ANESTHESIA PREPROCEDURE EVALUATION
PRE-OP EVALUATION    Patient Name: Lisa Iqbal    Admit Diagnosis: Metastatic malignant neoplasm, unspecified site (HCC) [C79.9]    Pre-op Diagnosis: Metastatic malignant neoplasm, unspecified site (HCC) [C79.9]    ESOPHAGOGASTRODUODENOSCOPY (EGD), COLONOSCOPY    Anesthesia Procedure: ESOPHAGOGASTRODUODENOSCOPY (EGD), COLONOSCOPY  COLONOSCOPY    Surgeon(s) and Role:     * Po Aviles MD - Primary    Pre-op vitals reviewed.  Temp: 98.1 °F (36.7 °C)  Pulse: 82  Resp: 18  BP: 134/73  SpO2: 100 %  Body mass index is 31.18 kg/m².    Current medications reviewed.  Hospital Medications:   lactated ringers infusion   Intravenous Continuous       Outpatient Medications:     Medications Prior to Admission   Medication Sig Dispense Refill Last Dose    Sodium Sulfate-Mag Sulfate-KCl (SUTAB) 7079-765-411 MG Oral Tab Take as directed by physician. Co pay card BIN: [id number]  PCN: PRESLEY  Group: [id number] Member ID: [id number] 24 tablet 0 2/26/2024    levothyroxine 50 MCG Oral Tab Take 1 tablet (50 mcg total) by mouth before breakfast. 90 tablet 0 2/24/2024    Omeprazole 40 MG Oral Capsule Delayed Release Take one capsule nightly 90 capsule 3 2/24/2024    Multiple Vitamins-Minerals (CENTRUM) Oral Tab Take 1 tablet by mouth daily.   2/24/2024    Vitamin D3 50 MCG (2000 UT) Oral Cap Take 1 capsule (2,000 Units total) by mouth daily.   2/24/2024    traMADol 50 MG Oral Tab Take 1 tablet (50 mg total) by mouth every 6 (six) hours as needed for Pain. 60 tablet 0 2/25/2024    traMADol 50 MG Oral Tab Take 1 tablet (50 mg total) by mouth every 8 (eight) hours as needed for Pain. 30 tablet 0 2/25/2024    PEG 3350-KCl-Na Bicarb-NaCl 420 g Oral Recon Soln Take as directed by physician 4000 mL 0        Allergies: Septra [sulfamethoxazole w/trimethoprim] and Sulfa antibiotics      Anesthesia Evaluation        Anesthetic Complications  (-) history of anesthetic complications         GI/Hepatic/Renal      (+) GERD                            Cardiovascular        Exercise tolerance: good     MET: >4                             (-) angina              Endo/Other      (-) diabetes     (+) hypothyroidism                  (-) rheumatoid arthritis     Pulmonary               (-) shortness of breath  (-) recent URI          Neuro/Psych                              74yo female w h/o laryngeal ca, hypothyroid now found to have liver lesions presents for EGD/cscope for further workup         Past Surgical History:   Procedure Laterality Date          x 3    HYSTERECTOMY      Bilateral ovaries remain    LARYNGOSCOPY,DIRCT,OP,BIOPSY  01/15/2018     Social History     Socioeconomic History    Marital status: Single    Number of children: 2   Tobacco Use    Smoking status: Never    Smokeless tobacco: Never   Vaping Use    Vaping Use: Never used   Substance and Sexual Activity    Alcohol use: No    Drug use: No   Other Topics Concern    Caffeine Concern No    Exercise Yes    Seat Belt No    Special Diet Yes     Comment: Balanced    Stress Concern No     History   Drug Use No     Available pre-op labs reviewed.  Lab Results   Component Value Date    WBC 8.0 2024    RBC 4.75 2024    HGB 13.8 2024    HCT 40.9 2024    MCV 91.8 2024    MCV 86.1 2024    MCH 29.1 2024    MCHC 30.6 (L) 2024    MCHC 33.7 2024    RDW 13.2 2024    .0 2024        Lab Results   Component Value Date    INR 1.07 2024         Airway      Mallampati: II  Mouth opening: >3 FB  TM distance: 4 - 6 cm  Neck ROM: full Cardiovascular    Cardiovascular exam normal.         Dental    Dentition appears grossly intact         Pulmonary    Pulmonary exam normal.                 Other findings              ASA: 3   Plan: MAC  NPO status verified and patient meets guidelines.    Post-procedure pain management plan discussed with surgeon and patient.    Comment: Intraoperative awareness, conversion to GA  (PONV, dental/oral injury, corneal abrasion, postoperative pain, allergic reaction, death, aspiration)  Plan/risks discussed with: patient  Use of blood product(s) discussed with: patient    Consented to blood products.          Present on Admission:  **None**

## 2024-02-27 NOTE — OPERATIVE REPORT
Lisa Iqbal Patient Status:  Hospital Outpatient Surgery    10/11/1950 MRN [id number]   [facility] ENDOSCOPY PAIN CENTER Attending Po Aviles MD   Date 2024 PCP Anusha Abraham MD     PREOPERATIVE DIAGNOSIS/INDICATION: Metastatic adenocarcinoma   POSTOPERTATIVE DIAGNOSIS: Hiatal hernia  PROCEDURE PERFORMED: EGD with biopsy  TIME OUT WAS PERFORMED    SEDATION: MAC sedation provided by General Anesthesia    INFORMED CONSENT: Risks, benefits and alternatives to the procedure were explained to the patient including but not limited to bleeding, infection, perforation, adverse drug reactions, pancreatitis and the need for hospitalization and surgery if this occurs, the patient understands and agrees to procedure.  PROCEDURE DESCRIPTION: A regular upper endoscope was introduced into the patient’s mouth, hypo pharynx, esophagus, stomach and the first and second portion of the duodenum, retroflexion of the endoscope was performed in the stomach. Careful examination of the above described areas was performed on withdrawal of the endoscope. The patient tolerated the procedure well, there were no immediate complication immediately following the procedure, and the patient was transferred to recovery in stable condition.  FINDINGS:  ESOPHAGUS: Normal  EGJ: Moderate hiatal hernia  STOMACH: Few sessile fundic gland polyps  DUODENUM: Normal  THERAPEUTICS: Cold forceps biopsies were performed from duodenum, and antrum  RECOMMENDATIONS:   Post EGD precautions, watch for bleeding, infection, perforation, adverse drug reactions   Follow biopsies.    Po Aviles MD  2024  10:55 AM

## 2024-02-27 NOTE — H&P
[facility]  Pre-op H and P    Lisa Iqbal Patient Status:  Hospital Outpatient Surgery    10/11/1950 MRN [id number]   Location [facility] ENDOSCOPY PAIN CENTER Attending Po Aviles MD   Date 2024 PCP Anusha Abraham MD     CC: Metastatic Metastatic adenocarcinoma     History of Present Illness:  Lisa Iqbal is a a(n) 73 year old female. 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    Disorder of thyroid     Esophageal reflux     Exposure to medical diagnostic radiation     Last treatment 2018    History of adverse reaction to anesthesia     Has anxiety/nervousness when waking up    Migraines     Osteopenia     Osteoporosis     Vertigo     Visual impairment     Glasses     Past Surgical History:   Procedure Laterality Date          x 3    HYSTERECTOMY      Bilateral ovaries remain    LARYNGOSCOPY,DIRCT,OP,BIOPSY  01/15/2018     Family History   Problem Relation Age of Onset    Cancer Father         liver cancer    Cancer Brother         liver cancer      reports that she has never smoked. She has never used smokeless tobacco. She reports that she does not drink alcohol and does not use drugs.    Allergies:  Allergies   Allergen Reactions    Septra [Sulfamethoxazole W/Trimethoprim] RASH    Sulfa Antibiotics RASH       Medications:    Current Facility-Administered Medications:     lactated ringers infusion, , Intravenous, Continuous    lactated ringers infusion, , Intravenous, Continuous    naloxone (Narcan) 0.4 MG/ML injection 0.08 mg, 0.08 mg, Intravenous, Once PRN    Physical Exam:    Blood pressure 122/67, pulse 61, temperature 98.1 °F (36.7 °C), temperature source Temporal, resp. rate 18, height 5' 1\" (1.549 m), weight 165 lb (74.8 kg), SpO2 98%.    General: Appears alert, oriented x3 and in no acute distress.  CV: Normal rate   Lungs: Normal effort   Skin:  Warm and dry.  Laboratory Data:         Assessment/Plan/Procedure:  Patient Active Problem List   Diagnosis    Osteopenia    History of cancer of larynx; glottis    Hypothyroidism    Laryngopharyngeal reflux    Tinnitus       Metastatic Metastatic adenocarcinoma     Plan:  EGD and colonoscopy    Po Aviles MD  2/27/2024  10:53 AM

## 2024-02-29 ENCOUNTER — OFFICE VISIT (OUTPATIENT)
Dept: HEMATOLOGY/ONCOLOGY | Age: 74
End: 2024-02-29
Attending: INTERNAL MEDICINE
Payer: MEDICARE

## 2024-02-29 VITALS
RESPIRATION RATE: 18 BRPM | WEIGHT: 161 LBS | OXYGEN SATURATION: 97 % | HEIGHT: 60.98 IN | BODY MASS INDEX: 30.4 KG/M2 | SYSTOLIC BLOOD PRESSURE: 146 MMHG | DIASTOLIC BLOOD PRESSURE: 79 MMHG | HEART RATE: 78 BPM | TEMPERATURE: 98 F

## 2024-02-29 DIAGNOSIS — C18.9 ADENOCARCINOMA OF COLON METASTATIC TO LIVER (HCC): Primary | ICD-10-CM

## 2024-02-29 DIAGNOSIS — Z85.21 HISTORY OF CANCER OF LARYNX: ICD-10-CM

## 2024-02-29 DIAGNOSIS — C78.7 ADENOCARCINOMA OF COLON METASTATIC TO LIVER (HCC): Primary | ICD-10-CM

## 2024-02-29 PROCEDURE — 99215 OFFICE O/P EST HI 40 MIN: CPT | Performed by: INTERNAL MEDICINE

## 2024-02-29 NOTE — PROGRESS NOTES
Next generation genomic sequencing order sent in online to  [facility] ; paired blood sample sent via [email or web address].

## 2024-02-29 NOTE — PROGRESS NOTES
Cancer Center Progress Note    Problem List:      Patient Active Problem List   Diagnosis    Osteopenia    History of cancer of larynx; glottis    Hypothyroidism    Laryngopharyngeal reflux    Tinnitus       Interim History:    Lisa Iqbal presents today for evaluation and management of a diagnosis of metastatic colon cancer with liver metastases.    The patient had a colonoscopy that showed a sigmoid colon mass. The biopsy was positive for invasive adenocarcinoma. She has multiple liver metastases confirmed with biopsy. She presents with her daughter to discuss management.    She has no pain. She has no dyspnea or cough. She has no fever or sweats. She has no other new complaints.    Review of Systems:   Constitutional: Negative for anorexia, fatigue, fevers, chills, night sweats and weight loss.  Respiratory: Negative for cough, hemoptysis, chest pain, or dyspnea.  Cardiovascular: Negative for angina, orthopnea or palpitations.  Gastrointestinal: Negative for nausea, vomiting, change in bowel habits, diarrhea, constipation and abdominal pain.  Integument/breast: Negative for rash, skin lesions, and pruritus.  Musculoskeletal: Negative for myalgias, arthralgias, muscle weakness.  Genitourinary: Negative for dysuria or hematuria  Psychiatric: The patient's mood was calm and appropriate for this visit.  The pertinent positives and negatives were described. All other systems were negative.    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    Disorder of thyroid     Esophageal reflux     Exposure to medical diagnostic radiation     Last treatment 2018    History of adverse reaction to anesthesia     Has anxiety/nervousness when waking up    Migraines     Osteopenia     Osteoporosis     Vertigo     Visual impairment     Glasses       Past Surgical History:   Procedure Laterality Date          x 3    COLONOSCOPY N/A 2024    Procedure:  COLONOSCOPY;  Surgeon: Po Aviles MD;  Location:  ENDOSCOPY    HYSTERECTOMY      Bilateral ovaries remain    LARYNGOSCOPY,DIRCT,OP,BIOPSY  01/15/2018       Family History Reviewed:  Family History   Problem Relation Age of Onset    Cancer Father         liver cancer    Cancer Brother         liver cancer       Allergies:     Allergies   Allergen Reactions    Septra [Sulfamethoxazole W/Trimethoprim] RASH    Sulfa Antibiotics RASH       Medications:   traMADol 50 MG Oral Tab Take 1 tablet (50 mg total) by mouth every 6 (six) hours as needed for Pain. 60 tablet 0    Sodium Sulfate-Mag Sulfate-KCl (SUTAB) 7941-611-605 MG Oral Tab Take as directed by physician. Co pay card BIN: [id number]  PCN: PRESLEY  Group: [id number] Member ID: [id number] 24 tablet 0    levothyroxine 50 MCG Oral Tab Take 1 tablet (50 mcg total) by mouth before breakfast. 90 tablet 0    Omeprazole 40 MG Oral Capsule Delayed Release Take one capsule nightly 90 capsule 3    Multiple Vitamins-Minerals (CENTRUM) Oral Tab Take 1 tablet by mouth daily.      Vitamin D3 50 MCG (2000 UT) Oral Cap Take 1 capsule (2,000 Units total) by mouth daily.           Vital Signs:      Height: 154.9 cm (5' 0.98\") (02/29 1021)  Weight: 73 kg (161 lb) (02/29 1021)  BSA (Calculated - sq m): 1.72 sq meters (02/29 1021)  Pulse: 78 (02/29 1021)  BP: 146/79 (02/29 1021)  Temp: 98.1 °F (36.7 °C) (02/29 1021)  Do Not Use - Resp Rate: --  SpO2: 97 % (02/29 1021)      Performance Status:  ECOG 0: Fully active, able to carry on all pre-disease performance without restriction     Physical Examination:    Constitutional: Patient is alert and oriented x 3, not in acute distress.  Respiratory: Clear to auscultation and percussion. No rales.  No wheezes.  Cardiovascular: Regular rate and rhythm. No murmurs.  Gastrointestinal: Soft, non tender with good bowel sounds.  Musculoskeletal: No edema. No calf tenderness.  Lymphatics: There is no palpable lymphadenopathy throughout in the  cervical, supraclavicular, or axillary regions.  Psychiatric: The patient's mood is calm and appropriate for this visit.      Labs reviewed at this visit:     Lab Results   Component Value Date    WBC 8.0 01/02/2024    RBC 4.75 01/02/2024    HGB 13.8 01/02/2024    HCT 40.9 01/02/2024    MCV 91.8 01/02/2024    MCV 86.1 01/02/2024    MCH 29.1 01/02/2024    MCHC 30.6 (L) 01/02/2024    MCHC 33.7 01/02/2024    RDW 13.2 01/02/2024    .0 02/08/2024    MPV 10.4 12/03/2021     Lab Results   Component Value Date     08/23/2023    K 4.0 08/23/2023     08/23/2023    CO2 26.0 08/23/2023    BUN 16 08/23/2023    CREATSERUM 1.01 08/23/2023     (H) 08/23/2023    CA 9.1 08/23/2023    ALKPHO 87 01/03/2024    ALT 26 01/03/2024    AST 18 01/03/2024    BILT 0.3 01/03/2024    ALB 3.8 01/03/2024    TP 7.8 01/03/2024       Radiologic imaging reviewed at this visit:    PET scan on 1/22/2024:  FINDINGS:    Head and neck:  There is increased uptake identified within a hypoattenuating left lobe thyroid nodule measuring 11 x 10 mm. Maximum SUV is 4.1.  Recommend thyroid ultrasound and FNA.  No other foci of abnormal uptake are identified in the head or neck.  There is no  lymphadenopathy.     Chest:  No abnormal FDG accumulation is identified.  There is no lung mass identified.  No mediastinal or hilar lymphadenopathy is identified.     Abdomen and pelvis:  There are multiple, innumerable hepatic hypoattenuating masses with numerous foci of abnormal FDG accumulation within both right and left hepatic lobes consistent with extensive hepatic metastatic disease.  Representative example includes a 3.2 x 3.3 cm  right hepatic lobe mass with a maximum SUV of 26.2.  There is up attic segment 4A mass measuring 2.1 x 1.9 cm with a maximum SUV of 19.3.  Hepatic segment 2 lesion in the lateral segment of the left lobe measures approximately 3.4 x 2.5 cm and has a  maximum SUV of 28.5 there are multiple other additional foci of  metastatic disease subtle which appear less well defined the noncontrast CT images.     There is otherwise physiologic uptake seen within the kidneys, ureters, bowel and bladder.  No lymphadenopathy is identified.  Large exophytic cortical cysts are seen in both kidneys measuring 4.4 x 4.4 cm on the right and 6.6 x 5.8 cm on the left.    There is mild colonic diverticulosis without diverticulitis.     MSK:  There is no abnormal foci of FDG accumulation within the of the visualized osseous structures.     Impression   CONCLUSION:    1. Innumerable hepatic masses with abnormal FDG accumulation consistent with hepatic metastatic disease.  The hepatic lesions are amenable to percutaneous biopsy if indicated clinically.  2. There is focal uptake identified within a hypoattenuating lesion within the left lobe of the thyroid gland.  Thyroid sonogram and fine needle aspiration biopsy recommended.          Assessment/Plan:     Metastatic sigmoid colon cancer:  Liver metastases:    The pathology is being assessed for MMR. We will need HER2 and KRAS/BRAF. I would recommend sending the liver biopsy for [facility]. I will also send liquid biopsy to [facility] so that we can get the results back sooner in order to expedite treatment start date.     I had a long discussion about treatment. I discussed the option of FOLFOX/Nuria. I discussed the way this is given and the potential side effects such as nausea, emesis, low blood counts, infection, neuropathy, and fatigue.     The patient is asymptomatic today. We will try to start treatment within the next 1 to 2 weeks. She will get portacath.    This visit lasted 40 minutes with 35 minutes for discussion of the pathology and management and planned testing. 5 minutes for post visit charting.      Ronan Camarillo MD

## 2024-02-29 NOTE — PROGRESS NOTES
Patient is here for follow after her scope and biopsy.  She is here to discuss  plan of care.   She has pain in her right upper quadrant that increases with a deep breath.  She has been using tramadol Q 6 hours with relief.  She sometimes only takes half a tablet.     Education Record    Learner:  Patient and Family Member    Disease / Diagnosis:     Barriers / Limitations:  None   Comments:    Method:  Discussion   Comments:    General Topics:  Side effects and symptom management   Comments:    Outcome:  Shows understanding   Comments:

## 2024-03-07 ENCOUNTER — APPOINTMENT (OUTPATIENT)
Dept: HEMATOLOGY/ONCOLOGY | Age: 74
End: 2024-03-07
Attending: INTERNAL MEDICINE
Payer: MEDICARE

## 2024-03-12 ENCOUNTER — HOSPITAL ENCOUNTER (EMERGENCY)
Facility: HOSPITAL | Age: 74
Discharge: HOME | End: 2024-03-12
Attending: EMERGENCY MEDICINE
Payer: MEDICARE

## 2024-03-12 ENCOUNTER — APPOINTMENT (OUTPATIENT)
Dept: CT IMAGING | Facility: HOSPITAL | Age: 74
End: 2024-03-12
Attending: EMERGENCY MEDICINE
Payer: MEDICARE

## 2024-03-12 ENCOUNTER — TELEPHONE (OUTPATIENT)
Dept: HEMATOLOGY/ONCOLOGY | Age: 74
End: 2024-03-12

## 2024-03-12 VITALS
DIASTOLIC BLOOD PRESSURE: 64 MMHG | TEMPERATURE: 97 F | BODY MASS INDEX: 30 KG/M2 | HEIGHT: 61 IN | HEART RATE: 68 BPM | RESPIRATION RATE: 18 BRPM | OXYGEN SATURATION: 100 % | SYSTOLIC BLOOD PRESSURE: 127 MMHG

## 2024-03-12 DIAGNOSIS — C18.9 ADENOCARCINOMA OF COLON METASTATIC TO LIVER (HCC): Primary | ICD-10-CM

## 2024-03-12 DIAGNOSIS — R11.10 VOMITING, UNSPECIFIED VOMITING TYPE, UNSPECIFIED WHETHER NAUSEA PRESENT: Primary | ICD-10-CM

## 2024-03-12 DIAGNOSIS — C78.7 ADENOCARCINOMA OF COLON METASTATIC TO LIVER (HCC): Primary | ICD-10-CM

## 2024-03-12 DIAGNOSIS — C18.7 MALIGNANT NEOPLASM OF SIGMOID COLON (HCC): ICD-10-CM

## 2024-03-12 LAB
ALBUMIN SERPL-MCNC: 3.2 G/DL (ref 3.4–5)
ALBUMIN/GLOB SERPL: 0.8 {RATIO} (ref 1–2)
ALP LIVER SERPL-CCNC: 288 U/L
ALT SERPL-CCNC: 45 U/L
ANION GAP SERPL CALC-SCNC: 3 MMOL/L (ref 0–18)
AST SERPL-CCNC: 61 U/L (ref 15–37)
BASOPHILS # BLD AUTO: 0.04 X10(3) UL (ref 0–0.2)
BASOPHILS NFR BLD AUTO: 0.3 %
BILIRUB SERPL-MCNC: 0.8 MG/DL (ref 0.1–2)
BUN BLD-MCNC: 18 MG/DL (ref 9–23)
CALCIUM BLD-MCNC: 9.4 MG/DL (ref 8.5–10.1)
CHLORIDE SERPL-SCNC: 105 MMOL/L (ref 98–112)
CO2 SERPL-SCNC: 28 MMOL/L (ref 21–32)
CREAT BLD-MCNC: 0.85 MG/DL
EGFRCR SERPLBLD CKD-EPI 2021: 72 ML/MIN/1.73M2 (ref 60–?)
EOSINOPHIL # BLD AUTO: 0.8 X10(3) UL (ref 0–0.7)
EOSINOPHIL NFR BLD AUTO: 6.8 %
ERYTHROCYTE [DISTWIDTH] IN BLOOD BY AUTOMATED COUNT: 14.1 %
GLOBULIN PLAS-MCNC: 4.1 G/DL (ref 2.8–4.4)
GLUCOSE BLD-MCNC: 98 MG/DL (ref 70–99)
HCT VFR BLD AUTO: 38.5 %
HGB BLD-MCNC: 12.4 G/DL
IMM GRANULOCYTES # BLD AUTO: 0.05 X10(3) UL (ref 0–1)
IMM GRANULOCYTES NFR BLD: 0.4 %
LIPASE SERPL-CCNC: 16 U/L (ref 13–75)
LYMPHOCYTES # BLD AUTO: 1.13 X10(3) UL (ref 1–4)
LYMPHOCYTES NFR BLD AUTO: 9.6 %
MCH RBC QN AUTO: 27.6 PG (ref 26–34)
MCHC RBC AUTO-ENTMCNC: 32.2 G/DL (ref 31–37)
MCV RBC AUTO: 85.6 FL
MONOCYTES # BLD AUTO: 0.99 X10(3) UL (ref 0.1–1)
MONOCYTES NFR BLD AUTO: 8.4 %
NEUTROPHILS # BLD AUTO: 8.79 X10 (3) UL (ref 1.5–7.7)
NEUTROPHILS # BLD AUTO: 8.79 X10(3) UL (ref 1.5–7.7)
NEUTROPHILS NFR BLD AUTO: 74.5 %
OSMOLALITY SERPL CALC.SUM OF ELEC: 284 MOSM/KG (ref 275–295)
PLATELET # BLD AUTO: 228 10(3)UL (ref 150–450)
POTASSIUM SERPL-SCNC: 4.3 MMOL/L (ref 3.5–5.1)
PROT SERPL-MCNC: 7.3 G/DL (ref 6.4–8.2)
RBC # BLD AUTO: 4.5 X10(6)UL
SODIUM SERPL-SCNC: 136 MMOL/L (ref 136–145)
WBC # BLD AUTO: 11.8 X10(3) UL (ref 4–11)

## 2024-03-12 PROCEDURE — 80053 COMPREHEN METABOLIC PANEL: CPT | Performed by: EMERGENCY MEDICINE

## 2024-03-12 PROCEDURE — 74177 CT ABD & PELVIS W/CONTRAST: CPT | Performed by: EMERGENCY MEDICINE

## 2024-03-12 PROCEDURE — 99284 EMERGENCY DEPT VISIT MOD MDM: CPT

## 2024-03-12 PROCEDURE — 80053 COMPREHEN METABOLIC PANEL: CPT

## 2024-03-12 PROCEDURE — 85025 COMPLETE CBC W/AUTO DIFF WBC: CPT | Performed by: EMERGENCY MEDICINE

## 2024-03-12 PROCEDURE — 99285 EMERGENCY DEPT VISIT HI MDM: CPT

## 2024-03-12 PROCEDURE — 96361 HYDRATE IV INFUSION ADD-ON: CPT

## 2024-03-12 PROCEDURE — 85025 COMPLETE CBC W/AUTO DIFF WBC: CPT

## 2024-03-12 PROCEDURE — 83690 ASSAY OF LIPASE: CPT | Performed by: EMERGENCY MEDICINE

## 2024-03-12 PROCEDURE — 96374 THER/PROPH/DIAG INJ IV PUSH: CPT

## 2024-03-12 RX ORDER — LEVOFLOXACIN 750 MG/1
750 TABLET, FILM COATED ORAL DAILY
Qty: 10 TABLET | Refills: 0 | Status: SHIPPED | OUTPATIENT
Start: 2024-03-12 | End: 2024-03-17

## 2024-03-12 RX ORDER — ONDANSETRON 2 MG/ML
4 INJECTION INTRAMUSCULAR; INTRAVENOUS ONCE
Status: COMPLETED | OUTPATIENT
Start: 2024-03-12 | End: 2024-03-12

## 2024-03-12 RX ORDER — ONDANSETRON 4 MG/1
4 TABLET, ORALLY DISINTEGRATING ORAL EVERY 4 HOURS PRN
Qty: 20 TABLET | Refills: 0 | Status: SHIPPED | OUTPATIENT
Start: 2024-03-12 | End: 2024-03-17

## 2024-03-12 RX ORDER — LEVOFLOXACIN 750 MG/1
750 TABLET, FILM COATED ORAL ONCE
Status: COMPLETED | OUTPATIENT
Start: 2024-03-12 | End: 2024-03-12

## 2024-03-12 NOTE — TELEPHONE ENCOUNTER
Patient is in a lot of pain, not able to eat says abdominal pain and not able to sleep. Please advise has appt Friday can she be seen sooner? Can't take pain anymore.  Needs

## 2024-03-12 NOTE — TELEPHONE ENCOUNTER
Dr Camarillo patient with new diagnosis of adenocarcinoma of the colon with metastasis to the liver. Patient has not started treatment yet. She is do for a follow up appointment with Dr Camarillo this Friday, 3/15/2024.    Anorexia/Abdominal Pain    : Rosa # [id number]    I attempted to reach Lisa. The  left a voice mail message asking her to please call the office so we may do a telephone assessment.

## 2024-03-12 NOTE — TELEPHONE ENCOUNTER
: Olivia # [id number]    Headache/Nausea/Anorexia/Abdominal Pain/Dehydration    Lisa reports that she had been taking tramadol for her abdominal pain. She said the medication caused her to have terrible headaches, lose her appetite and cause constant nausea. She stopped taking the medication and the headaches got better. She was able to eat \"one small meal a day.\" She was able to drink. For the last few days the abdominal pain has gotten much worse. It was \"10/10\" this morning. She was only able to eat 2 cookies and then she had terrible nausea and abdominal pain. She has only been able to drink 2 oz of water every 30 minutes to 1 hr for the last two days. She feels very weak and dehydrated. She has not vomited. She did have a small, brown bowel movement this morning. No hematochezia or melena. It was easy to pass. She could not sleep all last night due to the pain and nausea. She has not been able to shower for a few days due to weakness.    I asked the  and Lisa to please hold. I updated INDIRA Casillas. She asked me to have Lisa present to the  ER. I updated Lisa. She agreed. She said she has to call to find someone to come stay with her autistic grandson and someone to give her a ride. I assured her I would update Dr Camarillo and his nurse. I then called report to the  ER.

## 2024-03-12 NOTE — ED INITIAL ASSESSMENT (HPI)
Pt presents with complaints of headache, nausea and epigastric/lower abd pain. Pt newly diagnosis with colon cancer with mets to the liver. Has not started treatment. Pt  treated with Tramadol but unable to tolerate side-effects. 10/10 pain. Not eating for the last 3 days, insomnia and  weakness, can't take care of herself. Pt ambulatory, alert and oriented, respiration regular and unlabored, skin warm and dry.

## 2024-03-13 ENCOUNTER — HOSPITAL ENCOUNTER (INPATIENT)
Facility: HOSPITAL | Age: 74
LOS: 4 days | Discharge: HOME | End: 2024-03-17
Attending: STUDENT IN AN ORGANIZED HEALTH CARE EDUCATION/TRAINING PROGRAM | Admitting: STUDENT IN AN ORGANIZED HEALTH CARE EDUCATION/TRAINING PROGRAM
Payer: MEDICARE

## 2024-03-13 ENCOUNTER — TELEPHONE (OUTPATIENT)
Dept: HEMATOLOGY/ONCOLOGY | Facility: HOSPITAL | Age: 74
End: 2024-03-13

## 2024-03-13 DIAGNOSIS — C18.9 COLON CANCER METASTASIZED TO LIVER (HCC): Primary | ICD-10-CM

## 2024-03-13 DIAGNOSIS — C78.7 COLON CANCER METASTASIZED TO LIVER (HCC): Primary | ICD-10-CM

## 2024-03-13 DIAGNOSIS — R68.2 DRY MOUTH: ICD-10-CM

## 2024-03-13 DIAGNOSIS — R52 INTRACTABLE PAIN: ICD-10-CM

## 2024-03-13 DIAGNOSIS — R11.2 NAUSEA AND VOMITING IN ADULT: ICD-10-CM

## 2024-03-13 LAB
ALBUMIN SERPL-MCNC: 2.9 G/DL (ref 3.4–5)
ALBUMIN/GLOB SERPL: 0.7 {RATIO} (ref 1–2)
ALP LIVER SERPL-CCNC: 253 U/L
ALT SERPL-CCNC: 41 U/L
ANION GAP SERPL CALC-SCNC: 8 MMOL/L (ref 0–18)
AST SERPL-CCNC: 71 U/L (ref 15–37)
BASOPHILS # BLD AUTO: 0.04 X10(3) UL (ref 0–0.2)
BASOPHILS NFR BLD AUTO: 0.4 %
BILIRUB SERPL-MCNC: 1 MG/DL (ref 0.1–2)
BILIRUB UR QL STRIP.AUTO: NEGATIVE
BUN BLD-MCNC: 13 MG/DL (ref 9–23)
CALCIUM BLD-MCNC: 9.5 MG/DL (ref 8.5–10.1)
CHLORIDE SERPL-SCNC: 108 MMOL/L (ref 98–112)
CLARITY UR REFRACT.AUTO: CLEAR
CO2 SERPL-SCNC: 23 MMOL/L (ref 21–32)
CREAT BLD-MCNC: 0.63 MG/DL
EGFRCR SERPLBLD CKD-EPI 2021: 93 ML/MIN/1.73M2 (ref 60–?)
EOSINOPHIL # BLD AUTO: 0.65 X10(3) UL (ref 0–0.7)
EOSINOPHIL NFR BLD AUTO: 5.7 %
ERYTHROCYTE [DISTWIDTH] IN BLOOD BY AUTOMATED COUNT: 14.5 %
GLOBULIN PLAS-MCNC: 4.2 G/DL (ref 2.8–4.4)
GLUCOSE BLD-MCNC: 94 MG/DL (ref 70–99)
GLUCOSE UR STRIP.AUTO-MCNC: NORMAL MG/DL
HCT VFR BLD AUTO: 39 %
HGB BLD-MCNC: 12.7 G/DL
IMM GRANULOCYTES # BLD AUTO: 0.04 X10(3) UL (ref 0–1)
IMM GRANULOCYTES NFR BLD: 0.4 %
KETONES UR STRIP.AUTO-MCNC: NEGATIVE MG/DL
LEUKOCYTE ESTERASE UR QL STRIP.AUTO: NEGATIVE
LIPASE SERPL-CCNC: 11 U/L (ref 13–75)
LYMPHOCYTES # BLD AUTO: 0.84 X10(3) UL (ref 1–4)
LYMPHOCYTES NFR BLD AUTO: 7.4 %
MCH RBC QN AUTO: 27.9 PG (ref 26–34)
MCHC RBC AUTO-ENTMCNC: 32.6 G/DL (ref 31–37)
MCV RBC AUTO: 85.7 FL
MONOCYTES # BLD AUTO: 0.97 X10(3) UL (ref 0.1–1)
MONOCYTES NFR BLD AUTO: 8.6 %
NEUTROPHILS # BLD AUTO: 8.8 X10 (3) UL (ref 1.5–7.7)
NEUTROPHILS # BLD AUTO: 8.8 X10(3) UL (ref 1.5–7.7)
NEUTROPHILS NFR BLD AUTO: 77.5 %
NITRITE UR QL STRIP.AUTO: NEGATIVE
OSMOLALITY SERPL CALC.SUM OF ELEC: 288 MOSM/KG (ref 275–295)
PH UR STRIP.AUTO: 7 [PH] (ref 5–8)
PLATELET # BLD AUTO: 196 10(3)UL (ref 150–450)
POTASSIUM SERPL-SCNC: 4.6 MMOL/L (ref 3.5–5.1)
PROT SERPL-MCNC: 7.1 G/DL (ref 6.4–8.2)
RBC # BLD AUTO: 4.55 X10(6)UL
RBC UR QL AUTO: NEGATIVE
SODIUM SERPL-SCNC: 139 MMOL/L (ref 136–145)
SP GR UR STRIP.AUTO: 1.03 (ref 1–1.03)
UROBILINOGEN UR STRIP.AUTO-MCNC: NORMAL MG/DL
WBC # BLD AUTO: 11.3 X10(3) UL (ref 4–11)

## 2024-03-13 PROCEDURE — 99223 1ST HOSP IP/OBS HIGH 75: CPT | Performed by: INTERNAL MEDICINE

## 2024-03-13 PROCEDURE — 99222 1ST HOSP IP/OBS MODERATE 55: CPT | Performed by: STUDENT IN AN ORGANIZED HEALTH CARE EDUCATION/TRAINING PROGRAM

## 2024-03-13 RX ORDER — LEVOFLOXACIN 5 MG/ML
750 INJECTION, SOLUTION INTRAVENOUS ONCE
Status: DISCONTINUED | OUTPATIENT
Start: 2024-03-13 | End: 2024-03-13

## 2024-03-13 RX ORDER — ACETAMINOPHEN 500 MG
500 TABLET ORAL EVERY 4 HOURS PRN
Status: DISCONTINUED | OUTPATIENT
Start: 2024-03-13 | End: 2024-03-17

## 2024-03-13 RX ORDER — ONDANSETRON 2 MG/ML
4 INJECTION INTRAMUSCULAR; INTRAVENOUS ONCE
Status: COMPLETED | OUTPATIENT
Start: 2024-03-13 | End: 2024-03-13

## 2024-03-13 RX ORDER — MORPHINE SULFATE 4 MG/ML
2 INJECTION, SOLUTION INTRAMUSCULAR; INTRAVENOUS ONCE
Status: DISCONTINUED | OUTPATIENT
Start: 2024-03-13 | End: 2024-03-13

## 2024-03-13 RX ORDER — MORPHINE SULFATE 2 MG/ML
1 INJECTION, SOLUTION INTRAMUSCULAR; INTRAVENOUS EVERY 2 HOUR PRN
Status: DISCONTINUED | OUTPATIENT
Start: 2024-03-13 | End: 2024-03-17

## 2024-03-13 RX ORDER — ONDANSETRON 4 MG/1
8 TABLET, FILM COATED ORAL EVERY 8 HOURS PRN
Status: DISCONTINUED | OUTPATIENT
Start: 2024-03-13 | End: 2024-03-17

## 2024-03-13 RX ORDER — ECHINACEA PURPUREA EXTRACT 125 MG
1 TABLET ORAL
Status: DISCONTINUED | OUTPATIENT
Start: 2024-03-13 | End: 2024-03-17

## 2024-03-13 RX ORDER — BENZONATATE 200 MG/1
200 CAPSULE ORAL 3 TIMES DAILY PRN
Status: DISCONTINUED | OUTPATIENT
Start: 2024-03-13 | End: 2024-03-17

## 2024-03-13 RX ORDER — KETOROLAC TROMETHAMINE 15 MG/ML
15 INJECTION, SOLUTION INTRAMUSCULAR; INTRAVENOUS EVERY 6 HOURS PRN
Status: DISPENSED | OUTPATIENT
Start: 2024-03-13 | End: 2024-03-15

## 2024-03-13 RX ORDER — BISACODYL 10 MG
10 SUPPOSITORY, RECTAL RECTAL
Status: DISCONTINUED | OUTPATIENT
Start: 2024-03-13 | End: 2024-03-17

## 2024-03-13 RX ORDER — PROCHLORPERAZINE MALEATE 10 MG
10 TABLET ORAL EVERY 6 HOURS PRN
Status: DISCONTINUED | OUTPATIENT
Start: 2024-03-13 | End: 2024-03-17

## 2024-03-13 RX ORDER — SENNOSIDES 8.6 MG
17.2 TABLET ORAL NIGHTLY PRN
Status: DISCONTINUED | OUTPATIENT
Start: 2024-03-13 | End: 2024-03-17

## 2024-03-13 RX ORDER — PROCHLORPERAZINE EDISYLATE 5 MG/ML
10 INJECTION INTRAMUSCULAR; INTRAVENOUS EVERY 6 HOURS PRN
Status: DISCONTINUED | OUTPATIENT
Start: 2024-03-13 | End: 2024-03-17

## 2024-03-13 RX ORDER — ENEMA 19; 7 G/133ML; G/133ML
1 ENEMA RECTAL ONCE AS NEEDED
Status: DISCONTINUED | OUTPATIENT
Start: 2024-03-13 | End: 2024-03-17

## 2024-03-13 RX ORDER — POLYETHYLENE GLYCOL 3350 17 G/17G
17 POWDER, FOR SOLUTION ORAL DAILY PRN
Status: DISCONTINUED | OUTPATIENT
Start: 2024-03-13 | End: 2024-03-17

## 2024-03-13 RX ORDER — LEVOFLOXACIN 5 MG/ML
750 INJECTION, SOLUTION INTRAVENOUS ONCE
Status: COMPLETED | OUTPATIENT
Start: 2024-03-13 | End: 2024-03-13

## 2024-03-13 RX ORDER — LEVOFLOXACIN 5 MG/ML
750 INJECTION, SOLUTION INTRAVENOUS EVERY 24 HOURS
Status: DISCONTINUED | OUTPATIENT
Start: 2024-03-14 | End: 2024-03-16

## 2024-03-13 RX ORDER — MORPHINE SULFATE 4 MG/ML
4 INJECTION, SOLUTION INTRAMUSCULAR; INTRAVENOUS ONCE
Status: DISCONTINUED | OUTPATIENT
Start: 2024-03-13 | End: 2024-03-13

## 2024-03-13 RX ORDER — ENOXAPARIN SODIUM 100 MG/ML
40 INJECTION SUBCUTANEOUS DAILY
Status: DISCONTINUED | OUTPATIENT
Start: 2024-03-13 | End: 2024-03-17

## 2024-03-13 RX ORDER — SODIUM CHLORIDE 9 MG/ML
INJECTION, SOLUTION INTRAVENOUS CONTINUOUS
Status: DISCONTINUED | OUTPATIENT
Start: 2024-03-13 | End: 2024-03-14

## 2024-03-13 RX ORDER — METOCLOPRAMIDE HYDROCHLORIDE 5 MG/ML
10 INJECTION INTRAMUSCULAR; INTRAVENOUS EVERY 8 HOURS PRN
Status: DISCONTINUED | OUTPATIENT
Start: 2024-03-13 | End: 2024-03-13

## 2024-03-13 RX ORDER — MORPHINE SULFATE 2 MG/ML
0.5 INJECTION, SOLUTION INTRAMUSCULAR; INTRAVENOUS EVERY 2 HOUR PRN
Status: DISCONTINUED | OUTPATIENT
Start: 2024-03-13 | End: 2024-03-17

## 2024-03-13 RX ORDER — MORPHINE SULFATE 2 MG/ML
2 INJECTION, SOLUTION INTRAMUSCULAR; INTRAVENOUS EVERY 2 HOUR PRN
Status: DISCONTINUED | OUTPATIENT
Start: 2024-03-13 | End: 2024-03-17

## 2024-03-13 RX ORDER — ONDANSETRON 2 MG/ML
4 INJECTION INTRAMUSCULAR; INTRAVENOUS EVERY 6 HOURS PRN
Status: DISCONTINUED | OUTPATIENT
Start: 2024-03-13 | End: 2024-03-17

## 2024-03-13 RX ORDER — MELATONIN
3 NIGHTLY PRN
Status: DISCONTINUED | OUTPATIENT
Start: 2024-03-13 | End: 2024-03-17

## 2024-03-13 NOTE — CONSULTS
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    History of Present Illness:    Lisa Iqbal is a a(n) 73 year old female. The patient has recent diagnosis of metastatic colon cancer. She has diffuse liver metastases. She had colonoscopy that showed sigmoid colon cancer. She had biopsy of the liver and of the colon confirming the diagnosis. She had [employer] NGS with KRAS p.G13D GOF mutation. BRAF and NRAS are WT. TMB is 4.7 m/MB. Microstatellite stable. MMR normal.    She presents with upper abdominal pain. This has been intractable. She was in the ER yesterday but now returns with increased pain and nausea and emesis. She has poor po intake. She has had progressive weakness. She lives alone and is having trouble taking care of herselft. She has no fever or sweats.    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    Disorder of thyroid     Esophageal reflux     Exposure to medical diagnostic radiation     Last treatment 2018    History of adverse reaction to anesthesia     Has anxiety/nervousness when waking up    Migraines     Nausea and vomiting in adult 3/13/2024    Osteopenia     Osteoporosis     Vertigo     Visual impairment     Glasses       Past Surgical History:   Procedure Laterality Date          x 3    COLONOSCOPY N/A 2024    Procedure: COLONOSCOPY;  Surgeon: Po Aviles MD;  Location:  ENDOSCOPY    HYSTERECTOMY      Bilateral ovaries remain    LARYNGOSCOPY,DIRCT,OP,BIOPSY  01/15/2018       Family History:  Family History   Problem Relation Age of Onset    Cancer Father         liver cancer    Cancer  Brother         liver cancer       Social History:  she reports that she has never smoked. She has never used smokeless tobacco. She reports that she does not drink alcohol and does not use drugs.    Allergies:  Allergies   Allergen Reactions    Septra [Sulfamethoxazole W/Trimethoprim] RASH    Sulfa Antibiotics RASH       Medications:    Current Facility-Administered Medications:     enoxaparin (Lovenox) 40 MG/0.4ML SUBQ injection 40 mg, 40 mg, Subcutaneous, Daily    sodium chloride 0.9% infusion, , Intravenous, Continuous    acetaminophen (Tylenol Extra Strength) tab 500 mg, 500 mg, Oral, Q4H PRN    melatonin tab 3 mg, 3 mg, Oral, Nightly PRN    polyethylene glycol (PEG 3350) (Miralax) 17 g oral packet 17 g, 17 g, Oral, Daily PRN    sennosides (Senokot) tab 17.2 mg, 17.2 mg, Oral, Nightly PRN    bisacodyl (Dulcolax) 10 MG rectal suppository 10 mg, 10 mg, Rectal, Daily PRN    fleet enema (Fleet) 7-19 GM/118ML rectal enema 133 mL, 1 enema, Rectal, Once PRN    ondansetron (Zofran) 4 MG/2ML injection 4 mg, 4 mg, Intravenous, Q6H PRN    benzonatate (Tessalon) cap 200 mg, 200 mg, Oral, TID PRN    guaiFENesin (Robitussin) 100 MG/5 ML oral liquid 200 mg, 200 mg, Oral, Q4H PRN    glycerin-hypromellose- (Artifical Tears) 0.2-0.2-1 % ophthalmic solution 1 drop, 1 drop, Both Eyes, QID PRN    sodium chloride (Saline Mist) 0.65 % nasal solution 1 spray, 1 spray, Each Nare, Q3H PRN    morphINE PF 2 MG/ML injection 0.5 mg, 0.5 mg, Intravenous, Q2H PRN **OR** morphINE PF 2 MG/ML injection 1 mg, 1 mg, Intravenous, Q2H PRN **OR** morphINE PF 2 MG/ML injection 2 mg, 2 mg, Intravenous, Q2H PRN    prochlorperazine (Compazine) 10 MG/2ML injection 10 mg, 10 mg, Intravenous, Q6H PRN    ketorolac (Toradol) 15 MG/ML injection 15 mg, 15 mg, Intravenous, Q6H PRN    pantoprazole (Protonix) 40 mg in sodium chloride 0.9% PF 10 mL IV push, 40 mg, Intravenous, Q12H    [START ON 3/14/2024] levoFLOXacin in dextrose 5% (Levaquin) 750 mg/150mL  IVPB premix 750 mg, 750 mg, Intravenous, Q24H    Review of Systems:  Constitutional: Negative for fevers, chills, night sweats  Eyes: Negative for visual disturbance, irritation and redness.  Respiratory: Negative for cough, hemoptysis, chest pain, or dyspnea.  Cardiovascular: Negative for angina, orthopnea or palpitations.  Integument/breast: Negative for rash, skin lesions, and pruritus.  Hematologic/lymphatic: Negative for easy bruising, bleeding, and lymphadenopathy.  Musculoskeletal: Negative for myalgias, arthralgias, muscle weakness.  Genitourinary: Negative for dysuria or hematuria  Neurological: Negative for headaches, dizziness, speech problems, gait problems and focal weakness.  Psychiatric: The patient's mood was calm and appropriate for this visit.    The pertinent positives and negatives were described. All other systems were negative.    Physical Exam:   Vital Signs: /87 (BP Location: Right arm)   Pulse 82   Temp 98 °F (36.7 °C) (Oral)   Resp 18   Ht 1.524 m (5')   Wt 70 kg (154 lb 6.4 oz)   SpO2 100%   BMI 30.15 kg/m²   Constitutional: Patient is alert and oriented x 3, not in acute distress.  HEENT:  Oropharynx is clear.   Neck: No palpable lymphadenopathy. Neck is supple.  Respiratory: Clear to auscultation and percussion. No rales.  No wheezes.  Cardiovascular: Regular rate and rhythm.   Gastrointestinal: Soft, non tender with good bowel sounds.  Extremities: No edema. No calf tenderness.  Neurological: Grossly intact without focal motor or sensory deficit.  Lymphatics: There is no palpable lymphadenopathy throughout in the cervical, supraclavicular, or axillary regions.      Laboratory Data reviewed at this visit:    Recent Labs   Lab 03/12/24 1743 03/13/24  0949   RBC 4.50 4.55   HGB 12.4 12.7   HCT 38.5 39.0   MCV 85.6 85.7   MCH 27.6 27.9   MCHC 32.2 32.6   RDW 14.1 14.5   NEPRELIM 8.79* 8.80*   WBC 11.8* 11.3*   .0 196.0       Recent Labs   Lab 03/12/24  7773  03/13/24  0949   GLU 98 94   BUN 18 13   CREATSERUM 0.85 0.63   CA 9.4 9.5   ALB 3.2* 2.9*    139   K 4.3 4.6    108   CO2 28.0 23.0   ALKPHO 288* 253*   AST 61* 71*   ALT 45 41   BILT 0.8 1.0   TP 7.3 7.1       Radiologic imaging reviewed at this visit:    CT abd/pelvis yesterday:  FINDINGS:    LIVER:  There is again noted is extensive hepatic metastatic disease with large confluent lesions near the dome of the liver and portions of the left lobe.  The largest lesion spans the right and left lobe of the liver measures 11 x 6.2 cm.  When  comparing to the non CT portion of the PET scan and the lesions which are FDG avid on the PET scan there appears to be some progression.    BILIARY:  No gallstones.  There is some fluid in slight thickening the pericholecystic region which may represent changes of acute cholecystitis.  Sonography recommended.  PANCREAS:  No lesion, fluid collection, ductal dilatation, or atrophy.    SPLEEN:  No enlargement or focal lesion.    KIDNEYS:  No hydronephrosis.  Bilateral simple renal cysts the largest on the right measures 5.3 cm and on the left is 6.7 cm.  ADRENALS:  No mass or enlargement.    AORTA/VASCULAR:  No aneurysm or dissection.    RETROPERITONEUM:  No mass or adenopathy.    BOWEL/MESENTERY:  No free air.  The sigmoid the colon carcinoma measures approximately 7.6 by 2.6 cm.  There is nodularity and infiltration of the surrounding fat.  Along the superior aspect of this process is a small oval fluid collection measuring 2.1  x 1.4 cm seen best on series 9, image 26 and series 5, image 73 either representing abscess or tumor extension.  ABDOMINAL WALL:  Small fat containing umbilical hernia.  URINARY BLADDER:  No visible focal wall thickening, lesion, or calculus.    PELVIC NODES:  No adenopathy.    PELVIC ORGANS:  Status post hysterectomy.  BONES:  No bony lesion or fracture.  Stable degenerative disc disease.  LUNG BASES:  No visible pulmonary or pleural disease.     OTHER:  Negative.       Impression   CONCLUSION:    1. 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  When compared to the previous CT PET scan this appears larger measuring 7.6 x 2.6 cm with nodularity in the adjacent mesentery and a few   adjacent lymph nodes.  In addition there is an irregular oval fluid collection superior to the main tumor mass measuring 2.1 x 1.4 cm which may represent tumor extension or a small abscess.  There is no bowel obstruction.  2. Extensive hepatic metastatic disease which appears to have progressed when compared to the previous CT PET scan.         Impression and Plan:    Patient Active Problem List   Diagnosis    Osteopenia    History of cancer of larynx; glottis    Hypothyroidism    Laryngopharyngeal reflux    Tinnitus    Colon cancer metastasized to liver (HCC)    Intractable pain    Nausea and vomiting in adult       Metastatic sigmoid colon cancer:  Liver metastases  KRAS mutation  MMR/MSI normal    The patient has had progressive decline. I recommend starting chemotherapy as inpatient. We will proceed with FOLFOX at standard dosing. This will start in the and finish after 48 hours (Saturday) She understands the risk of infection, hair loss, nausea, emesis, low blood counts, neuropathy.     She will need a port prior to the second cycle.     Generalized weakness:    Physical therapy to assess. Might need ELIANE after completing chemotherapy.    Cancer related abdominal pain:    Morphine 1 to 2 mg IV PRN. Will see if she has improvement on treatment. Will consider starting long acting pain med.    Nausea:    Compazine and zofran PRN.      Ronan Camarillo MD  3/13/2024  4:53 PM

## 2024-03-13 NOTE — ED INITIAL ASSESSMENT (HPI)
Patient reports recently diagnosed with colon cancer with liver mets, has not yet started chemo. Came to our ED yesterday with increased abdominal pain and nausea, had CT scan that showed progression of cancer with possible abscess or tumor extension. Patient reports it was recommended to stay in the hospital last night however she was not prepared to do so so was discharged home. Patient back for abdominal pain that returned once she got home.

## 2024-03-13 NOTE — ED PROVIDER NOTES
Patient Seen in: [facility] Emergency Department      History     Chief Complaint   Patient presents with    Abdomen/Flank Pain     Stated Complaint: Pain: headache, and abdominal pain    Subjective:   HPI    Patient is a 73-year-old female with a history of colon cancer and liver mets presenting with abdominal pain nausea and vomiting.  The patient was seen yesterday in the emergency room for worsening abdominal pain with associated nausea and vomiting.  She had a CT scan which showed worsening metastatic disease and questionable intra-abdominal abscess.  The patient was encouraged to be admitted however she states she had none of her belongings and she was alone and did not feel comfortable therefore went home.  She is back today because the pain has not improved and she still states she is not tolerating much oral intake.  Of note the patient has not started any treatment for her metastatic cancer.  He tells me she had a normal bowel movement earlier today.    Objective:   No pertinent past medical history.            No pertinent past surgical history.              No pertinent social history.            Review of Systems    Positive for stated complaint: Pain: headache, and abdominal pain  Other systems are as noted in HPI.  Constitutional and vital signs reviewed.      All other systems reviewed and negative except as noted above.    Physical Exam     ED Triage Vitals [03/13/24 0754]   /81   Pulse 93   Resp 16   Temp 97.5 °F (36.4 °C)   Temp src Temporal   SpO2 98 %   O2 Device None (Room air)       Current:/81   Pulse 93   Temp 97.5 °F (36.4 °C) (Temporal)   Resp 16   Ht 152.4 cm (5')   Wt 71.5 kg   SpO2 98%   BMI 30.78 kg/m²         Physical Exam  Vitals and nursing note reviewed.   Constitutional:       General: She is not in acute distress.     Appearance: Normal appearance.   HENT:      Head: Normocephalic.      Nose: Nose normal.      Mouth/Throat:      Mouth: Mucous membranes  are moist.   Eyes:      Extraocular Movements: Extraocular movements intact.      Pupils: Pupils are equal, round, and reactive to light.   Cardiovascular:      Rate and Rhythm: Normal rate and regular rhythm.      Pulses: Normal pulses.   Pulmonary:      Effort: Pulmonary effort is normal.   Abdominal:      General: Abdomen is flat. Bowel sounds are normal. There is no distension.      Palpations: Abdomen is soft.      Tenderness: There is generalized abdominal tenderness. There is no right CVA tenderness, left CVA tenderness, guarding or rebound.      Hernia: No hernia is present.   Musculoskeletal:         General: No swelling or tenderness. Normal range of motion.      Cervical back: Normal range of motion.   Skin:     General: Skin is warm and dry.   Neurological:      Mental Status: She is alert and oriented to person, place, and time. Mental status is at baseline.   Psychiatric:         Mood and Affect: Mood normal.               ED Course     Labs Reviewed   URINALYSIS WITH CULTURE REFLEX - Abnormal; Notable for the following components:       Result Value    Protein Urine Trace (*)     All other components within normal limits   CBC W/ DIFFERENTIAL - Abnormal; Notable for the following components:    WBC 11.3 (*)     Neutrophil Absolute Prelim 8.80 (*)     Neutrophil Absolute 8.80 (*)     Lymphocyte Absolute 0.84 (*)     All other components within normal limits   CBC WITH DIFFERENTIAL WITH PLATELET    Narrative:     The following orders were created for panel order CBC With Differential With Platelet.  Procedure                               Abnormality         Status                     ---------                               -----------         ------                     CBC W/ DIFFERENTIAL[[id number]]          Abnormal            Final result                 Please view results for these tests on the individual orders.   COMP METABOLIC PANEL (14)   LIPASE                      MDM        Admission  disposition: 3/13/2024 10:21 AM       Differential for the patient's symptoms includes pain secondary to progression of patient's tumor, I have considered abscess rupture however the patient is not peritonitic on exam and afebrile here    Patient CBC here today shows a stable leukocytosis again pointing against a rupture of a possible intra-abdominal abscess.    Awaiting her metabolic panel and lipase and UA.    I will still be admitting the patient for pain control and nausea control.    10:40 AM  Spoke with Dr. Conley who agrees with continuing Levaquin.  They will see the patient during the admission.  Patient has been admitted and endorsed to [facility] Aly[facility].                                 Medical Decision Making      Disposition and Plan     Clinical Impression:  1. Colon cancer metastasized to liver (HCC)    2. Intractable pain    3. Nausea and vomiting in adult         Disposition:  Admit  3/13/2024 10:21 am    Follow-up:  No follow-up provider specified.        Medications Prescribed:  Current Discharge Medication List                            Hospital Problems       Present on Admission  Date Reviewed: 2/29/2024            ICD-10-CM Noted POA    * (Principal) Colon cancer metastasized to liver (HCC) C18.9, C78.7 3/12/2024 Unknown

## 2024-03-13 NOTE — ED QUICK NOTES
Orders for admission, patient is aware of plan and ready to go upstairs. Any questions, please call ED RN  at extension [phone].     Patient Covid vaccination status: Fully vaccinated     COVID Test Ordered in ED: None    COVID Suspicion at Admission: N/A    Running Infusions:      Mental Status/LOC at time of transport: a/ox4, abx infusing    Other pertinent information:   CIWA score: N/A   NIH score:  N/A

## 2024-03-13 NOTE — ED PROVIDER NOTES
Patient Seen in: [facility] Emergency Department      History     Chief Complaint   Patient presents with    Abdomen/Flank Pain     Stated Complaint: Abdomain pain, colon cx with mets    Subjective:   HPI    Patient presents with abdominal pain and vomiting.  The patient has been diagnosed with colon cancer and liver metastases.  She states she has been going through a series of tests over the past couple of months.  She has had biopsies as well.  She is scheduled to see her oncologist on Friday to determine her treatment plan.  She states that she has been having some abdominal pain and was prescribed tramadol.  She feels like the side effects of the tramadol are worse than the pain.  It has caused her a headache and nausea.  She has not wanted to eat in the past few days.  She has also had vomiting.  She states that she will drink 2 ounces but vomits 16 ounces.  The pain she states is manageable.  She has not had a fever.  She stopped the tramadol and the symptoms are a little better but not totally resolved.    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              No pertinent social history.            Review of Systems    Positive for stated complaint: Abdomain pain, colon cx with mets  Other systems are as noted in HPI.  Constitutional and vital signs reviewed.      All other systems reviewed and negative except as noted above.    Physical Exam     ED Triage Vitals [03/12/24 1729]   /89   Pulse 89   Resp 18   Temp 97.3 °F (36.3 °C)   Temp src    SpO2 96 %   O2 Device None (Room air)       Current:BP  127/64   Pulse 68   Temp 97.3 °F (36.3 °C)   Resp 18   Ht 154.9 cm (5' 1\")   SpO2 100%   BMI 30.42 kg/m²         Physical Exam    General: Alert and oriented x3 in no acute distress.  HEENT: Normocephalic, atraumatic, pupils equal round and reactive to light, oropharynx clear, mucous membranes slightly dry.  Neck: Supple.  Cardiovascular: Regular rate and rhythm, no murmurs.  Respiratory: Lungs clear to auscultation.  Abdomen: Soft, mild diffuse tenderness to palpation, no rebound or guarding, normal active bowel sounds, no CVA tenderness.  Extremities: No CCE.  Skin: Warm and dry.    ED Course     Labs Reviewed   COMP METABOLIC PANEL (14) - Abnormal; Notable for the following components:       Result Value    AST 61 (*)     Alkaline Phosphatase 288 (*)     Albumin 3.2 (*)     A/G Ratio 0.8 (*)     All other components within normal limits   CBC W/ DIFFERENTIAL - Abnormal; Notable for the following components:    WBC 11.8 (*)     Neutrophil Absolute Prelim 8.79 (*)     Neutrophil Absolute 8.79 (*)     Eosinophil Absolute 0.80 (*)     All other components within normal limits   LIPASE - Normal   CBC WITH DIFFERENTIAL WITH PLATELET    Narrative:     The following orders were created for panel order CBC With Differential With Platelet.  Procedure                               Abnormality         Status                     ---------                               -----------         ------                     CBC W/ DIFFERENTIAL[[id number]]          Abnormal            Final result                 Please view results for these tests on the individual orders.   RAINBOW DRAW LAVENDER   RAINBOW DRAW LIGHT GREEN   RAINBOW DRAW BLUE     CT ABDOMEN+PELVIS(CONTRAST ONLY)(CPT=74177)    Result Date: 3/12/2024  PROCEDURE:  CT ABDOMEN+PELVIS (CONTRAST ONLY) (CPT=74177)  COMPARISON:  HIRAL MURPHY, PET STANDARD BODY SCAN (ONCOLOGY) (CPT=78815), 1/22/2024, 10:23 AM.  INDICATIONS:  Abdomen pain, colon cx with mets  TECHNIQUE:  CT  scanning was performed from the dome of the diaphragm to the pubic symphysis with non-ionic intravenous contrast material. Post contrast coronal MPR imaging was performed.  Dose reduction techniques were used. Dose information is transmitted to the ACR (American College of Radiology) NRDR (National Radiology Data Registry) which includes the Dose Index Registry.  PATIENT STATED HISTORY:(As transcribed by Technologist)  Pt presents with complaints of headache, nausea and epigastric/lower abd pain. Pt newly diagnosis with colon cancer with mets to the liver. Has not started treatment.   CONTRAST USED:  80cc of Isovue 370  FINDINGS:  LIVER:  There is again noted is extensive hepatic metastatic disease with large confluent lesions near the dome of the liver and portions of the left lobe.  The largest lesion spans the right and left lobe of the liver measures 11 x 6.2 cm.  When comparing to the non CT portion of the PET scan and the lesions which are FDG avid on the PET scan there appears to be some progression.  BILIARY:  No gallstones.  There is some fluid in slight thickening the pericholecystic region which may represent changes of acute cholecystitis.  Sonography recommended. PANCREAS:  No lesion, fluid collection, ductal dilatation, or atrophy.  SPLEEN:  No enlargement or focal lesion.  KIDNEYS:  No hydronephrosis.  Bilateral simple renal cysts the largest on the right measures 5.3 cm and on the left is 6.7 cm. ADRENALS:  No mass or enlargement.  AORTA/VASCULAR:  No aneurysm or dissection.  RETROPERITONEUM:  No mass or adenopathy.  BOWEL/MESENTERY:  No free air.  The sigmoid the colon carcinoma measures approximately 7.6 by 2.6 cm.  There is nodularity and infiltration of the surrounding fat.  Along the superior aspect of this process is a small oval fluid collection measuring 2.1 x 1.4 cm seen best on series 9, image 26 and series 5, image 73 either representing abscess or tumor extension. ABDOMINAL WALL:  Small  fat containing umbilical hernia. URINARY BLADDER:  No visible focal wall thickening, lesion, or calculus.  PELVIC NODES:  No adenopathy.  PELVIC ORGANS:  Status post hysterectomy. BONES:  No bony lesion or fracture.  Stable degenerative disc disease. LUNG BASES:  No visible pulmonary or pleural disease.  OTHER:  Negative.             CONCLUSION:  1. 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  When compared to the previous CT PET scan this appears larger measuring 7.6 x 2.6 cm with nodularity in the adjacent mesentery and a few  adjacent lymph nodes.  In addition there is an irregular oval fluid collection superior to the main tumor mass measuring 2.1 x 1.4 cm which may represent tumor extension or a small abscess.  There is no bowel obstruction. 2. Extensive hepatic metastatic disease which appears to have progressed when compared to the previous CT PET scan.    LOCATION:  [place] [address]   Dictated by (CST): Miguel A Hale MD on 3/12/2024 at 8:11 PM     Finalized by (CST): Miguel A Hale MD on 3/12/2024 at 8:25 PM            Medications   ondansetron (Zofran) 4 MG/2ML injection 4 mg (4 mg Intravenous Given 3/12/24 1850)   sodium chloride 0.9 % IV bolus 1,000 mL (0 mL Intravenous Stopped 3/12/24 2102)   iopamidol 76% (ISOVUE-370) injection for power injector (80 mL Intravenous Given 3/12/24 1918)   levoFLOXacin (Levaquin) tab 750 mg (750 mg Oral Given 3/12/24 2102)     The patient was given Zofran and hydrated with normal saline.  She reported feeling much better.  Her pain is tolerable.  She was given Levaquin prior to discharge.       MDM      Patient presents with vomiting and abdominal pain.  Differential diagnosis includes but is not limited to bowel obstruction, pancreatitis and medication side effect.  The patient does not have evidence of a bowel obstruction.  She has progression of her tumor and metastatic disease.  She does have a  leukocytosis and notation of possible small abscess versus tumor extension near her primary mass.  She has a normal lipase and appearance of her pancreas.  I had discussed with the patient coming in the hospital for further evaluation given the findings and she is reluctant to do so.  I discussed her case with Dr. Traylor who felt that the CT findings may represent tumor necrosis as opposed to abscess.  She felt that if the patient was comfortable going home we could start her on Levaquin and give her strict instructions regarding symptoms to return for.  The patient is very happy to leave as she was not prepared to stay today.  We discussed the CT findings and the possibility of bowel perforation.  She understands that she needs to return immediately if she has new or worsening symptoms.  She will call to see if she can be seen sooner in the oncology clinic tomorrow.  In the meantime if she has worsening symptoms she will return to the emergency department.  She will be continued on the Levaquin and given Zofran to use as needed for nausea.  She does not have findings of peritonitis on exam at this time.  She displays decision-making capacity and will be discharged with outpatient follow-up.                           MDM    Disposition and Plan     Clinical Impression:  1. Vomiting, unspecified vomiting type, unspecified whether nausea present    2. Malignant neoplasm of sigmoid colon (HCC)         Disposition:  Discharge  3/12/2024  8:57 pm    Follow-up:  Ronan Camarillo MD  [address] [place] [address]  [address] [facility] [address]  [phone]    Follow up in 1 day(s)            Medications Prescribed:  Discharge Medication List as of 3/12/2024  9:02 PM        START taking these medications    Details   ondansetron 4 MG Oral Tablet Dispersible Take 1 tablet (4 mg total) by mouth every 4 (four) hours as needed for Nausea., Normal, Disp-20 tablet, R-0      levoFLOXacin 750 MG Oral Tab Take 1 tablet  (750 mg total) by mouth daily for 10 days., Normal, Disp-10 tablet, R-0

## 2024-03-13 NOTE — PLAN OF CARE
Received pt via cart alert and oriented X4. VSS. IV saline locked. Med rec and navigators completed. IVF started per MD. Pain meds and anti-emetics given upon request. Pt SBA to bathroom. Fall precautions in place, call light in reach.     NURSING ADMISSION NOTE      Patient admitted via Cart  Oriented to room.  Safety precautions initiated.  Bed in low position.  Call light in reach.

## 2024-03-13 NOTE — TELEPHONE ENCOUNTER
Beryl [phone] MY mother Lisa was in the or on Tuesday 3/12/24 I had to bring her back today they found another spot on her by the old site. Would like a call back from nurse Tami  to see what plan is. Thanks Vianey

## 2024-03-13 NOTE — H&P
[facility]     Lisa Iqbal Patient Status:  Inpatient    10/11/1950 MRN [id number]   Location [facility] 4NW-A Attending Jojo Barba MD   Hosp Day # 0 PCP Anusha Abraham MD     Chief Complaint: Abdominal pain     Subjective:    History of Present Illness:     Lisa Iqbal is a 73 year old female who was in ER yesterday for abdominal pain and imaging revealed worsening  mets disease. Pt returned to ER with N, poor PO intake, vomiting.       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    Disorder of thyroid     Esophageal reflux     Exposure to medical diagnostic radiation     Last treatment 2018    History of adverse reaction to anesthesia     Has anxiety/nervousness when waking up    Migraines     Nausea and vomiting in adult 3/13/2024    Osteopenia     Osteoporosis     Vertigo     Visual impairment     Glasses     Past Surgical History:   Past Surgical History:   Procedure Laterality Date          x 3    COLONOSCOPY N/A 2024    Procedure: COLONOSCOPY;  Surgeon: Po Aviles MD;  Location:  ENDOSCOPY    HYSTERECTOMY      Bilateral ovaries remain    LARYNGOSCOPY,DIRCT,OP,BIOPSY  01/15/2018      Family History:   Family History   Problem Relation Age of Onset    Cancer Father         liver cancer    Cancer Brother         liver cancer     Social History:    reports that she has never smoked. She has never used smokeless tobacco. She reports that she does not drink alcohol and does not use drugs.     Allergies:   Allergies   Allergen Reactions    Septra [Sulfamethoxazole W/Trimethoprim] RASH    Sulfa Antibiotics RASH       Medications:    Current Facility-Administered Medications on File Prior to Encounter   Medication Dose Route Frequency Provider Last Rate Last Admin    [COMPLETED] ondansetron (Zofran) 4 MG/2ML injection 4 mg  4 mg  Intravenous Once Whitney Guerrero MD   4 mg at 24 1850    [COMPLETED] sodium chloride 0.9 % IV bolus 1,000 mL  1,000 mL Intravenous Once Whitney Guerrero MD   Stopped at 24    [COMPLETED] iopamidol 76% (ISOVUE-370) injection for power injector  80 mL Intravenous ONCE PRN Whitney Guerrero MD   80 mL at 24 191    [COMPLETED] levoFLOXacin (Levaquin) tab 750 mg  750 mg Oral Once Whitney Guerrero MD   750 mg at 24    [] midazolam (Versed) 2 MG/2ML injection 1 mg  1 mg Intravenous Q5 Min PRN Clayton Bahena MD   0.5 mg at 24 1008    [] fentaNYL (Sublimaze) 50 mcg/mL injection 50 mcg  50 mcg Intravenous Q5 Min PRN Clayton Bahena MD   25 mcg at 24 1017    [COMPLETED] acetaminophen (Tylenol Extra Strength) tab 1,000 mg  1,000 mg Oral Once Umberto Vee MD   1,000 mg at 24 193    [COMPLETED] iopamidol 76% (ISOVUE-370) injection for power injector  100 mL Intravenous ONCE PRN Maciej Baker MD   100 mL at 24 2237     Current Outpatient Medications on File Prior to Encounter   Medication Sig Dispense Refill    ondansetron 4 MG Oral Tablet Dispersible Take 1 tablet (4 mg total) by mouth every 4 (four) hours as needed for Nausea. 20 tablet 0    levoFLOXacin 750 MG Oral Tab Take 1 tablet (750 mg total) by mouth daily for 10 days. 10 tablet 0    levothyroxine 50 MCG Oral Tab Take 1 tablet (50 mcg total) by mouth before breakfast. 90 tablet 0    Omeprazole 40 MG Oral Capsule Delayed Release Take one capsule nightly 90 capsule 3    Multiple Vitamins-Minerals (CENTRUM) Oral Tab Take 1 tablet by mouth daily.      Vitamin D3 50 MCG (2000 UT) Oral Cap Take 1 capsule (2,000 Units total) by mouth daily.         Review of Systems:   A comprehensive review of systems was completed.    Pertinent positives and negatives noted in the HPI.    Objective:   Physical Exam:    /78 (BP Location: Right arm)   Pulse 71   Temp 97.8 °F (36.6 °C) (Oral)    Resp 18   Ht 5' (1.524 m)   Wt 154 lb 6.4 oz (70 kg)   SpO2 100%   BMI 30.15 kg/m²   General: No acute distress, Alert  Respiratory: No rhonchi, no wheezes  Cardiovascular: S1, S2. Regular rate and rhythm  Abdomen: Soft, Non-tender, non-distended, positive bowel sounds  Neuro: No new focal deficits  Extremities: No edema      Results:    Labs:      Labs Last 24 Hours:    Recent Labs   Lab 03/12/24  1743 03/13/24  0949   RBC 4.50 4.55   HGB 12.4 12.7   HCT 38.5 39.0   MCV 85.6 85.7   MCH 27.6 27.9   MCHC 32.2 32.6   RDW 14.1 14.5   NEPRELIM 8.79* 8.80*   WBC 11.8* 11.3*   .0 196.0       Recent Labs   Lab 03/12/24  1743 03/13/24  0949   GLU 98 94   BUN 18 13   CREATSERUM 0.85 0.63   EGFRCR 72 93   CA 9.4 9.5   ALB 3.2* 2.9*    139   K 4.3 4.6    108   CO2 28.0 23.0   ALKPHO 288* 253*   AST 61* 71*   ALT 45 41   BILT 0.8 1.0   TP 7.3 7.1       Lab Results   Component Value Date    INR 1.07 02/08/2024    INR 1.07 12/19/2017       No results for input(s): \"TROP\", \"TROPHS\", \"CK\" in the last 168 hours.    No results for input(s): \"TROP\", \"PBNP\" in the last 168 hours.    No results for input(s): \"PCT\" in the last 168 hours.    Imaging: Imaging data reviewed in Epic.    Assessment & Plan:      #Abdominal pain due to mets can and possible small abscess  IV ABx  IVFF  CLD  Onc on Cs  Pain control   # h/o esophageal ca           Plan of care discussed with pt     Jojo Barba MD    Supplementary Documentation:     The 21st Century Cures Act makes medical notes like these available to patients in the interest of transparency. Please be advised this is a medical document. Medical documents are intended to carry relevant information, facts as evident, and the clinical opinion of the practitioner. The medical note is intended as peer to peer communication and may appear blunt or direct. It is written in medical language and may contain abbreviations or verbiage that are unfamiliar.                **Certification      PHYSICIAN Certification of Need for Inpatient Hospitalization - Initial Certification    Patient will require inpatient services that will reasonably be expected to span two midnight's based on the clinical documentation in H+P.   Based on patients current state of illness, I anticipate that, after discharge, patient will require TBD.

## 2024-03-13 NOTE — TELEPHONE ENCOUNTER
Called Beryl and patient will be admitted to the hospital today.  Dr. Camarillo thinks that she will likely start chemotherapy while inpatient.  Explained to Beryl the timing of the treatment and that Dr. Camarillo will see her mother while she is in the hospital.  He will direct the plan for outpatient follow up.

## 2024-03-14 PROBLEM — Z51.11 CHEMOTHERAPY MANAGEMENT, ENCOUNTER FOR: Status: ACTIVE | Noted: 2024-03-14

## 2024-03-14 LAB
ALBUMIN SERPL-MCNC: 2.6 G/DL (ref 3.4–5)
ALBUMIN/GLOB SERPL: 0.7 {RATIO} (ref 1–2)
ALP LIVER SERPL-CCNC: 221 U/L
ALT SERPL-CCNC: 31 U/L
ANION GAP SERPL CALC-SCNC: 4 MMOL/L (ref 0–18)
AST SERPL-CCNC: 49 U/L (ref 15–37)
BASOPHILS # BLD AUTO: 0.04 X10(3) UL (ref 0–0.2)
BASOPHILS NFR BLD AUTO: 0.4 %
BILIRUB SERPL-MCNC: 0.8 MG/DL (ref 0.1–2)
BUN BLD-MCNC: 12 MG/DL (ref 9–23)
CALCIUM BLD-MCNC: 8.9 MG/DL (ref 8.5–10.1)
CHLORIDE SERPL-SCNC: 110 MMOL/L (ref 98–112)
CO2 SERPL-SCNC: 24 MMOL/L (ref 21–32)
CREAT BLD-MCNC: 0.69 MG/DL
EGFRCR SERPLBLD CKD-EPI 2021: 91 ML/MIN/1.73M2 (ref 60–?)
EOSINOPHIL # BLD AUTO: 0.69 X10(3) UL (ref 0–0.7)
EOSINOPHIL NFR BLD AUTO: 6.8 %
ERYTHROCYTE [DISTWIDTH] IN BLOOD BY AUTOMATED COUNT: 14.4 %
GLOBULIN PLAS-MCNC: 3.6 G/DL (ref 2.8–4.4)
GLUCOSE BLD-MCNC: 86 MG/DL (ref 70–99)
HCT VFR BLD AUTO: 35 %
HGB BLD-MCNC: 11.5 G/DL
IMM GRANULOCYTES # BLD AUTO: 0.05 X10(3) UL (ref 0–1)
IMM GRANULOCYTES NFR BLD: 0.5 %
INR BLD: 1.39 (ref 0.8–1.2)
LYMPHOCYTES # BLD AUTO: 0.93 X10(3) UL (ref 1–4)
LYMPHOCYTES NFR BLD AUTO: 9.2 %
MCH RBC QN AUTO: 28.3 PG (ref 26–34)
MCHC RBC AUTO-ENTMCNC: 32.9 G/DL (ref 31–37)
MCV RBC AUTO: 86.2 FL
MONOCYTES # BLD AUTO: 0.88 X10(3) UL (ref 0.1–1)
MONOCYTES NFR BLD AUTO: 8.7 %
NEUTROPHILS # BLD AUTO: 7.5 X10 (3) UL (ref 1.5–7.7)
NEUTROPHILS # BLD AUTO: 7.5 X10(3) UL (ref 1.5–7.7)
NEUTROPHILS NFR BLD AUTO: 74.4 %
OSMOLALITY SERPL CALC.SUM OF ELEC: 285 MOSM/KG (ref 275–295)
PLATELET # BLD AUTO: 179 10(3)UL (ref 150–450)
POTASSIUM SERPL-SCNC: 4.2 MMOL/L (ref 3.5–5.1)
PROT SERPL-MCNC: 6.2 G/DL (ref 6.4–8.2)
PROTHROMBIN TIME: 17.1 SECONDS (ref 11.6–14.8)
RBC # BLD AUTO: 4.06 X10(6)UL
SODIUM SERPL-SCNC: 138 MMOL/L (ref 136–145)
WBC # BLD AUTO: 10.1 X10(3) UL (ref 4–11)

## 2024-03-14 PROCEDURE — 3E03305 INTRODUCTION OF OTHER ANTINEOPLASTIC INTO PERIPHERAL VEIN, PERCUTANEOUS APPROACH: ICD-10-PCS | Performed by: INTERNAL MEDICINE

## 2024-03-14 PROCEDURE — 99232 SBSQ HOSP IP/OBS MODERATE 35: CPT | Performed by: STUDENT IN AN ORGANIZED HEALTH CARE EDUCATION/TRAINING PROGRAM

## 2024-03-14 PROCEDURE — 99233 SBSQ HOSP IP/OBS HIGH 50: CPT | Performed by: INTERNAL MEDICINE

## 2024-03-14 RX ORDER — CETIRIZINE HYDROCHLORIDE 10 MG/1
10 TABLET ORAL DAILY PRN
Status: DISCONTINUED | OUTPATIENT
Start: 2024-03-14 | End: 2024-03-17

## 2024-03-14 RX ORDER — PSEUDOEPHEDRINE HCL 120 MG/1
120 TABLET, FILM COATED, EXTENDED RELEASE ORAL DAILY PRN
Status: DISCONTINUED | OUTPATIENT
Start: 2024-03-14 | End: 2024-03-17

## 2024-03-14 NOTE — PROGRESS NOTES
Pt A&Ox4 on room air, mainly Malawian speaking, can understand some english. VSS. Complaints of pain and nausea, prn medication given per mar. Tolerating medications well per mar. Fall precautions in place. Chemo to start tomorrow. Call light within reach, frequent checks made, needs , met.

## 2024-03-14 NOTE — CM/SW NOTE
SW met with patient at bedside to discuss DC planning and home health recommendation. SW presented patient with home health choice list and she is agreeable to [facility].     Patient reported that she lives alone and both of her daughters have children that have needs. Patient is the caregiver for one of her grandchildren and stated that she does not plan to do that after DC. Patient is worried about doing stairs to get into her home and stated that she has 18 stairs to climb.     Patient also wanted to discuss financial assistance for after DC. SW informed her that she will likely need to have her daughter help her in calling the Billing Department after DC. Patient's bill will need to be completed in order to establish a financial plan or payment plan. Patient is aware and stated that her daughter who works in the [employer] system will likely be able to help her.     SW reserved [facility] and updated AVS.     SW will continue to follow for plan of care changes and remain available for any additional DC needs or concerns.     Harika Haro MSW, LSW  Discharge Planner   [id number]

## 2024-03-14 NOTE — PLAN OF CARE
Pt received alert and oriented x4, c/o abdominal pain 4-5/10. Tolerating clear liquids, advancing diet as tolerated. All medications per MAR. Tolerating first cycle of chemotherapy without signs of reaction. Ambulating with assistance.       Problem: GASTROINTESTINAL - ADULT  Goal: Minimal or absence of nausea and vomiting  Description: INTERVENTIONS:  - Maintain adequate hydration with IV or PO as ordered and tolerated  - Nasogastric tube to low intermittent suction as ordered  - Evaluate effectiveness of ordered antiemetic medications  - Provide nonpharmacologic comfort measures as appropriate  - Advance diet as tolerated, if ordered  - Obtain nutritional consult as needed  - Evaluate fluid balance  Outcome: Progressing     Problem: HEMATOLOGIC - ADULT  Goal: Maintains hematologic stability  Description: INTERVENTIONS  - Assess for signs and symptoms of bleeding or hemorrhage  - Monitor labs and vital signs for trends  - Administer supportive blood products/factors, fluids and medications as ordered and appropriate  - Administer supportive blood products/factors as ordered and appropriate  Outcome: Progressing

## 2024-03-14 NOTE — DIETARY NOTE
[facility]   [facility]    NUTRITION ASSESSMENT    Pt meets moderate malnutrition criteria at this time.      NUTRITION INTERVENTION:      Meal and Snacks - Monitor and encourage adequate PO intake.   Medical Food Supplements - Pt not interested at this time. Rationale/use for oral supplements discussed.  Nutrition Education - Maximizing Nutrition during Chemo Treatment handout provided.  Vitamin and Mineral Supplements - adding Multivitamin with minerals    PATIENT STATUS: 03/14/24 73 year old female with increased pain and nausea and emesis, progressive weakness.  Plan for chemotherapy as inpatient. General diet appropriate. Chart reviewed for +MST2. Reports a decrease in PO for about 2 days PTA but overall eating less due to all the recent outpt testing. Tolerates all foods, no constipation or diarrhea. No concerns chewing or swallowing. On RA    PMHx recent diagnosis of metastatic colon cancer, liver metastases.     ANTHROPOMETRICS:  Ht: 152.4 cm (5')  Wt: 70 kg (154 lb 6.4 oz).   BMI: Body mass index is 30.15 kg/m².  IBW: 45 kg  UBW :167#    WEIGHT HISTORY:   Weight loss: yes, 13# (7.7%) x 2 months, 11# (6.6%) x 2 weeks    Wt Readings from Last 10 Encounters:   03/13/24 70 kg (154 lb 6.4 oz)   02/29/24 73 kg (161 lb)   02/27/24 74.8 kg (165 lb)   01/25/24 75.3 kg (166 lb)   02/04/24 74.8 kg (165 lb)   01/15/24 76.7 kg (169 lb)   01/11/24 76.1 kg (167 lb 12.8 oz)   01/02/24 73.9 kg (163 lb)   08/29/23 78 kg (172 lb)   08/25/23 78 kg (172 lb)        NUTRITION:  Diet:       Procedures    Regular/General diet Is Patient on Accuchecks? No      Food Allergies: No  Cultural/Ethnic/Hindu Preferences Addressed: Yes    Percent Meals Eaten (last 3 days)       Date/Time Percent Meals Eaten (%)    03/14/24 0900 50 %            GI system review: WNL Last BM: PTA  Skin and wounds: Intact    NUTRITION RELATED PHYSICAL FINDINGS:     1. Body Fat/Muscle Mass: no wasting noted     2. Fluid Accumulation: none  per RN documentation     NUTRITION PRESCRIPTION: 70kg  Calories:1540-1750calories/day (22-25 kcal/kg)  Protein:  70-105grams protein/day (1.0-1.5 grams protein per kg)  Fluid: ~1 ml/kcal or per MD discretion    NUTRITION DIAGNOSIS/PROBLEM:  Malnutrition related to physiological causes as evidenced by documented/reported insufficient oral intake and documented/reported unintentional weight loss      MONITOR AND EVALUATE/NUTRITION GOALS:  PO intake of 75% of meals TID - New  Weight stable within 1 to 2 lbs during admission - New      MEDICATIONS:  Reviewed    LABS:  Reviewed    Pt is at Moderate nutrition risk    Noemi Mendiola RD, LDN  Clinical Nutrition  [id number]'

## 2024-03-14 NOTE — PROGRESS NOTES
Heme/Onc Progress Note - [facility]      Chief Complaint:    Follow up for evaluation and management of metastatic colon cancer.    Interim History:      She has better control of her pain. She has no new complaints. She has no nausea at this visit.    Physical Examination:    Vital Signs: /71 (BP Location: Right arm)   Pulse 66   Temp 98.1 °F (36.7 °C) (Oral)   Resp 18   Ht 1.524 m (5')   Wt 70 kg (154 lb 6.4 oz)   SpO2 95%   BMI 30.15 kg/m²     General: Patient is alert and oriented x 3, not in acute distress.  HEENT: Anicteric Sclera. Pink conjunctiva.  Oropharynx is clear.   Neck: No palpable lymphadenopathy. Neck is supple.  Chest: Clear to auscultation. No rales and no wheezes.  Heart: Regular rate and rhythm.   Abdomen: Soft, non tender with good bowel sounds.  Extremities: No edema. Non-tender.  Skin:  Warm, dry.  Neurological: Grossly intact.       Labs reviewed at this visit:     Recent Labs   Lab 03/12/24  1743 03/13/24  0949 03/14/24  0646   RBC 4.50 4.55 4.06   HGB 12.4 12.7 11.5*   HCT 38.5 39.0 35.0   MCV 85.6 85.7 86.2   MCH 27.6 27.9 28.3   MCHC 32.2 32.6 32.9   RDW 14.1 14.5 14.4   NEPRELIM 8.79* 8.80* 7.50   WBC 11.8* 11.3* 10.1   .0 196.0 179.0       Recent Labs   Lab 03/12/24  1743 03/13/24  0949 03/14/24  0646   GLU 98 94 86   BUN 18 13 12   CREATSERUM 0.85 0.63 0.69   CA 9.4 9.5 8.9   ALB 3.2* 2.9* 2.6*    139 138   K 4.3 4.6 4.2    108 110   CO2 28.0 23.0 24.0   ALKPHO 288* 253* 221*   AST 61* 71* 49*   ALT 45 41 31   BILT 0.8 1.0 0.8   TP 7.3 7.1 6.2*       Radiologic imaging reviewed at this visit:    CT of Abd/Pelvis on 3/12/2024:  INDINGS:    LIVER:  There is again noted is extensive hepatic metastatic disease with large confluent lesions near the dome of the liver and portions of the left lobe.  The largest lesion spans the right and left lobe of the liver measures 11 x 6.2 cm.  When  comparing to the non CT portion of the PET scan and the lesions which are FDG  avid on the PET scan there appears to be some progression.    BILIARY:  No gallstones.  There is some fluid in slight thickening the pericholecystic region which may represent changes of acute cholecystitis.  Sonography recommended.  PANCREAS:  No lesion, fluid collection, ductal dilatation, or atrophy.    SPLEEN:  No enlargement or focal lesion.    KIDNEYS:  No hydronephrosis.  Bilateral simple renal cysts the largest on the right measures 5.3 cm and on the left is 6.7 cm.  ADRENALS:  No mass or enlargement.    AORTA/VASCULAR:  No aneurysm or dissection.    RETROPERITONEUM:  No mass or adenopathy.    BOWEL/MESENTERY:  No free air.  The sigmoid the colon carcinoma measures approximately 7.6 by 2.6 cm.  There is nodularity and infiltration of the surrounding fat.  Along the superior aspect of this process is a small oval fluid collection measuring 2.1  x 1.4 cm seen best on series 9, image 26 and series 5, image 73 either representing abscess or tumor extension.  ABDOMINAL WALL:  Small fat containing umbilical hernia.  URINARY BLADDER:  No visible focal wall thickening, lesion, or calculus.    PELVIC NODES:  No adenopathy.    PELVIC ORGANS:  Status post hysterectomy.  BONES:  No bony lesion or fracture.  Stable degenerative disc disease.  LUNG BASES:  No visible pulmonary or pleural disease.    OTHER:  Negative.       Impression   CONCLUSION:    1. 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  When compared to the previous CT PET scan this appears larger measuring 7.6 x 2.6 cm with nodularity in the adjacent mesentery and a few   adjacent lymph nodes.  In addition there is an irregular oval fluid collection superior to the main tumor mass measuring 2.1 x 1.4 cm which may represent tumor extension or a small abscess.  There is no bowel obstruction.  2. Extensive hepatic metastatic disease which appears to have progressed when compared to the previous  CT PET scan.         Impression/Plan:     Metastatic sigmoid colon cancer:  Liver metastases  KRAS mutation  MMR/MSI normal     The patient has had progressive decline. I recommend starting chemotherapy as inpatient. We will proceed with FOLFOX at standard dosing today. This will start in the and finish after 48 hours (Saturday) She understands the risk of infection, hair loss, nausea, emesis, low blood counts, neuropathy.      She will need a port prior to the second cycle. We will arrange this as an outpatient.     Physical therapy will continue to assess. Might need ELIANE after completing chemotherapy.     Cancer related abdominal pain:     Morphine 1 to 2 mg IV PRN. Will see if she has improvement on treatment. Will consider starting long acting pain med.     Nausea:     Compazine and zofran PRN.       Ronan Camarillo MD  [facility]

## 2024-03-14 NOTE — HOME CARE LIAISON
Received referral via [facility] for Home Health services. Spoke w/ patient at the bedside along with daughter and is agreeable for home health services. Updated address and contact information. Updated AVS

## 2024-03-14 NOTE — PROGRESS NOTES
[facility]   part of [facility]     Hospitalist Progress Note     Lisa Iqbal Patient Status:  Inpatient    10/11/1950 MRN [id number]   Location [facility] 4N-A Attending Jojo Barba MD   Hosp Day # 1 PCP Anusha Abraham MD     Chief Complaint: Abdominal pain, N     Subjective:     Patient doing markedly better.     Objective:    Review of Systems:   A comprehensive review of systems was completed; pertinent positive and negatives stated in subjective.    Vital signs:  Temp:  [97.8 °F (36.6 °C)-98.6 °F (37 °C)] 98.1 °F (36.7 °C)  Pulse:  [66-82] 66  Resp:  [18-20] 18  BP: (124-142)/(64-96) 142/71  SpO2:  [95 %-100 %] 95 %    Physical Exam:    General: No acute distress  Respiratory: No wheezes, no rhonchi  Cardiovascular: S1, S2, regular rate and rhythm  Abdomen: Soft, Non-tender, non-distended, positive bowel sounds  Neuro: No new focal deficits.   Extremities: No edema      Diagnostic Data:    Labs:  Recent Labs   Lab 24  1743 24  0949 24  0646   WBC 11.8* 11.3* 10.1   HGB 12.4 12.7 11.5*   MCV 85.6 85.7 86.2   .0 196.0 179.0   INR  --   --  1.39*       Recent Labs   Lab 24  1743 24  0949 24  0646   GLU 98 94 86   BUN 18 13 12   CREATSERUM 0.85 0.63 0.69   CA 9.4 9.5 8.9   ALB 3.2* 2.9* 2.6*    139 138   K 4.3 4.6 4.2    108 110   CO2 28.0 23.0 24.0   ALKPHO 288* 253* 221*   AST 61* 71* 49*   ALT 45 41 31   BILT 0.8 1.0 0.8   TP 7.3 7.1 6.2*       Estimated Creatinine Clearance: 51.4 mL/min (based on SCr of 0.69 mg/dL).    No results for input(s): \"TROP\", \"TROPHS\", \"CK\" in the last 168 hours.    Recent Labs   Lab 24  0646   PTP 17.1*   INR 1.39*                  Microbiology    No results found for this visit on 24.      Imaging: Reviewed in Epic.    Medications:    enoxaparin  40 mg Subcutaneous Daily    pantoprazole  40 mg Intravenous Q12H    levofloxacin  750 mg Intravenous Q24H    fluorouracil (Adrucil)  650 mg IV push  400 mg/m2 Intravenous Once       Assessment & Plan:        #Abdominal pain due to mets can and possible small abscess  IV ABx  IVFF  CLD  Onc on Cs- chemo   Pain control   # h/o esophageal ca     Jojo Barba MD    Supplementary Documentation:     Quality:  DVT Mechanical Prophylaxis:   SCDs, Early ambuation  DVT Pharmacologic Prophylaxis   Medication    enoxaparin (Lovenox) 40 MG/0.4ML SUBQ injection 40 mg                Code Status: Not on file  Cuellar: No urinary catheter in place  Cuellar Duration (in days):   Central line:    ALESHA:     Discharge is dependent on: clinical   At this point Ms. Félix Iqbal is expected to be discharge to: home    The 21st Century Cures Act makes medical notes like these available to patients in the interest of transparency. Please be advised this is a medical document. Medical documents are intended to carry relevant information, facts as evident, and the clinical opinion of the practitioner. The medical note is intended as peer to peer communication and may appear blunt or direct. It is written in medical language and may contain abbreviations or verbiage that are unfamiliar.

## 2024-03-14 NOTE — PHYSICAL THERAPY NOTE
PHYSICAL THERAPY EVALUATION - INPATIENT     Room Number: 412/412-A  Evaluation Date: 3/14/2024  Type of Evaluation: Initial  Physician Order: PT Eval and Treat    Presenting Problem: colon ca mets to liver  Co-Morbidities : met colon ca  Reason for Therapy: Mobility Dysfunction and Discharge Planning    PHYSICAL THERAPY ASSESSMENT   Patient is currently functioning near baseline with bed mobility, transfers, gait, and stair negotiation.  Prior to admission, patient's baseline is indep.  Patient is requiring supervision as a result of the following impairments: decreased muscular endurance.  Physical Therapy will continue to follow for duration of hospitalization.    Patient will benefit from continued skilled PT Services at discharge to promote functional independence and safety with additional support and return home with home health PT.    PLAN  PT Treatment Plan: Bed mobility;Body mechanics;Coordination;Endurance;Energy conservation;Patient education;Family education;Gait training;Neuromuscular re-educate;Range of motion;Strengthening;Stoop training;Stair training;Transfer training;Balance training  Rehab Potential : Good  Frequency (Obs): 3-5x/week  Number of Visits to Meet Established Goals: 5      CURRENT GOALS    Goal #1 Patient is able to demonstrate supine - sit EOB @ level: independent     Goal #2 Patient is able to demonstrate transfers EOB to/from Chair/Wheelchair at assistance level: independent     Goal #3 Patient is able to ambulate 150 feet with assist device:  LRAD  at assistance level: independent     Goal #4 Patient will navigate stairs mod I   Goal #5    Goal #6    Goal Comments: Goals established on 3/14/2024      PHYSICAL THERAPY MEDICAL/SOCIAL HISTORY  History related to current admission: Patient is a 73 year old female admitted on 3/13/2024: Presented with abdominal pain imaging revealing worsening mets (liver)  in the setting of metastatic colon ca. Plan to start chemo IP.     HOME  SITUATION  Type of Home: House   Home Layout: Two level  Stairs to Enter : 18  Railing: Yes          Lives With: Alone  Drives: Yes  Patient Owned Equipment: None  Patient Regularly Uses: Glasses    Prior Level of [place]: indep, no hx of falls, pt reports lives alone and has no family that would be able to assist her at dc    SUBJECTIVE  \" I speak english we don't need that\" - pt declined language line        OBJECTIVE  Precautions: None  Fall Risk: Standard fall risk    WEIGHT BEARING RESTRICTION  Weight Bearing Restriction: None                PAIN ASSESSMENT  Ratin  Location: abdomen  Management Techniques: Activity promotion;Body mechanics;Repositioning    COGNITION  Overall Cognitive Status:  WFL - within functional limits    RANGE OF MOTION AND STRENGTH ASSESSMENT  Upper extremity ROM and strength are within functional limits     Lower extremity ROM is within functional limits     Lower extremity strength is within functional limits       BALANCE  Static Sitting: Good  Dynamic Sitting: Good  Static Standing: Good  Dynamic Standing: Good    ADDITIONAL TESTS                                    ACTIVITY TOLERANCE                         O2 WALK       NEUROLOGICAL FINDINGS                        AM-PAC '6-Clicks' INPATIENT SHORT FORM - BASIC MOBILITY  How much difficulty does the patient currently have...  Patient Difficulty: Turning over in bed (including adjusting bedclothes, sheets and blankets)?: None   Patient Difficulty: Sitting down on and standing up from a chair with arms (e.g., wheelchair, bedside commode, etc.): None   Patient Difficulty: Moving from lying on back to sitting on the side of the bed?: None   How much help from another person does the patient currently need...   Help from Another: Moving to and from a bed to a chair (including a wheelchair)?: None   Help from Another: Need to walk in hospital room?: None   Help from Another: Climbing 3-5 steps with a railing?: None        AM-PAC Score:  Raw Score: 24   Approx Degree of Impairment: 0%   Standardized Score (AM-PAC Scale): 61.14   CMS Modifier (G-Code): CH    FUNCTIONAL ABILITY STATUS  Gait Assessment   Functional Mobility/Gait Assessment  Gait Assistance: Supervision  Distance (ft): 300  Assistive Device: None  Pattern: Within Functional Limits  Stairs: Stairs  How Many Stairs: 4  Device: 1 Rail  Assist: Supervision  Pattern: Ascend and Descend  Ascend and Descend : Reciprocal    Skilled Therapy Provided     Bed Mobility:  Rolling: indep  Supine to sit: indep     Transfer Mobility:  Sit to stand: indep   Stand to sit: indep  Gait = SBA    Therapist's Comments: pt educated on energy conservation techniques including slower gait speed, standing/seated rest breaks, pacing activities, and possible use of assistive device pending how she feels during/post chemo. Recommended and participated in stair training with cues for step to pattern. Encouraged QID ambulation with staff as tolerated      Exercise/Education Provided:  Energy conservation  Functional activity tolerated  Gait training    Patient End of Session: Up in chair;Needs met;Call light within reach;RN aware of session/findings;All patient questions and concerns addressed;With  staff      Patient Evaluation Complexity Level:  History Low - no personal factors and/or co-morbidities   Examination of body systems low   Clinical Presentation Low - StableLow - addressing 1-2 elements   Clinical Decision Making Low - Stable       PT Session Time: 20 minutes  Gait Training:  minutes  Therapeutic Activity: 10 minutes  Neuromuscular Re-education:  minutes  Therapeutic Exercise:  minutes           Unknown if ever smoked

## 2024-03-14 NOTE — DISCHARGE INSTRUCTIONS
Sometimes managing your health at home requires assistance.  The [facility] team has recognized your preference to use [facility].  They can be reached by phone at [phone].  The fax number for your reference is [phone].. A representative from the home health agency will contact you or your family to schedule your first visit.

## 2024-03-14 NOTE — PAYOR COMM NOTE
--------------  ADMISSION REVIEW     Payor: LUCIAN COLINDRES [facility]  Subscriber #:  [id number]  Authorization Number: [id number]    Admit date: 3/13/24  Admit time: 12:36 PM       REVIEW DOCUMENTATION:     ED Provider Notes          Patient Seen in: [facility] Emergency Department      History     Chief Complaint   Patient presents with    Abdomen/Flank Pain     Stated Complaint: Pain: headache, and abdominal pain    Subjective:   HPI    Patient is a 73-year-old female with a history of colon cancer and liver mets presenting with abdominal pain nausea and vomiting.  The patient was seen yesterday in the emergency room for worsening abdominal pain with associated nausea and vomiting.  She had a CT scan which showed worsening metastatic disease and questionable intra-abdominal abscess.  The patient was encouraged to be admitted however she states she had none of her belongings and she was alone and did not feel comfortable therefore went home.  She is back today because the pain has not improved and she still states she is not tolerating much oral intake.  Of note the patient has not started any treatment for her metastatic cancer.  He tells me she had a normal bowel movement earlier today.    Review of Systems    Positive for stated complaint: Pain: headache, and abdominal pain  Other systems are as noted in HPI.  Constitutional and vital signs reviewed.      All other systems reviewed and negative except as noted above.    Physical Exam     ED Triage Vitals [03/13/24 0754]   /81   Pulse 93   Resp 16   Temp 97.5 °F (36.4 °C)   Temp src Temporal   SpO2 98 %   O2 Device None (Room air)       Current:/81   Pulse 93   Temp 97.5 °F (36.4 °C) (Temporal)   Resp 16   Ht 152.4 cm (5')   Wt 71.5 kg   SpO2 98%   BMI 30.78 kg/m²         Physical Exam  Vitals and nursing note reviewed.   Constitutional:       General: She is not in acute distress.     Appearance: Normal appearance.   HENT:      Head: Normocephalic.       Nose: Nose normal.      Mouth/Throat:      Mouth: Mucous membranes are moist.   Eyes:      Extraocular Movements: Extraocular movements intact.      Pupils: Pupils are equal, round, and reactive to light.   Cardiovascular:      Rate and Rhythm: Normal rate and regular rhythm.      Pulses: Normal pulses.   Pulmonary:      Effort: Pulmonary effort is normal.   Abdominal:      General: Abdomen is flat. Bowel sounds are normal. There is no distension.      Palpations: Abdomen is soft.      Tenderness: There is generalized abdominal tenderness. There is no right CVA tenderness, left CVA tenderness, guarding or rebound.      Hernia: No hernia is present.   Musculoskeletal:         General: No swelling or tenderness. Normal range of motion.      Cervical back: Normal range of motion.   Skin:     General: Skin is warm and dry.   Neurological:      Mental Status: She is alert and oriented to person, place, and time. Mental status is at baseline.   Psychiatric:         Mood and Affect: Mood normal.               ED Course     Labs Reviewed   URINALYSIS WITH CULTURE REFLEX - Abnormal; Notable for the following components:       Result Value    Protein Urine Trace (*)     All other components within normal limits   CBC W/ DIFFERENTIAL - Abnormal; Notable for the following components:    WBC 11.3 (*)     Neutrophil Absolute Prelim 8.80 (*)     Neutrophil Absolute 8.80 (*)     Lymphocyte Absolute 0.84 (*)     All other components within normal limits   CBC WITH DIFFERENTIAL WITH PLATELET    Narrative:     The following orders were created for panel order CBC With Differential With Platelet.  Procedure                               Abnormality         Status                     ---------                               -----------         ------                     CBC W/ DIFFERENTIAL[[id number]]          Abnormal            Final result                 Please view results for these tests on the individual orders.   COMP METABOLIC  PANEL (14)   LIPASE     MDM        Admission disposition: 3/13/2024 10:21 AM    Disposition and Plan     Clinical Impression:  1. Colon cancer metastasized to liver (HCC)    2. Intractable pain    3. Nausea and vomiting in adult         Disposition:  Admit  3/13/2024 10:21 am       Hospital Problems       Present on Admission  Date Reviewed: 2/29/2024            ICD-10-CM Noted POA    * (Principal) Colon cancer metastasized to liver (HCC) C18.9, C78.7 3/12/2024 Unknown             Signed by Bernadette Vick MD on 3/13/2024 10:41 AM       [facility]  History and Physical     Chief Complaint: Abdominal pain      Subjective:    History of Present Illness:      Lisa Iqbal is a 73 year old female who was in ER yesterday for abdominal pain and imaging revealed worsening  mets disease. Pt returned to ER with N, poor PO intake, vomiting.       Assessment & Plan:       #Abdominal pain due to mets can and possible small abscess  IV ABx  IVFF  CLD  Onc on Cs  Pain control   # h/o esophageal ca               Plan of care discussed with pt      Jooj Barba MD         CONSULT  Impression and Plan:        Patient Active Problem List   Diagnosis    Osteopenia    History of cancer of larynx; glottis    Hypothyroidism    Laryngopharyngeal reflux    Tinnitus    Colon cancer metastasized to liver (HCC)    Intractable pain    Nausea and vomiting in adult         Metastatic sigmoid colon cancer:  Liver metastases  KRAS mutation  MMR/MSI normal     The patient has had progressive decline. I recommend starting chemotherapy as inpatient. We will proceed with FOLFOX at standard dosing. This will start in the and finish after 48 hours (Saturday) She understands the risk of infection, hair loss, nausea, emesis, low blood counts, neuropathy.      She will need a port prior to the second cycle.      Generalized weakness:     Physical therapy to assess. Might need ELIANE after completing chemotherapy.     Cancer related  abdominal pain:     Morphine 1 to 2 mg IV PRN. Will see if she has improvement on treatment. Will consider starting long acting pain med.     Nausea:     Compazine and zofran PRN.        Ronan Camarillo MD  3/13/2024  4:53 PM       3/14  Chief Complaint: Abdominal pain, N      Subjective:      Patient doing markedly better.      Objective:    Review of Systems:   A comprehensive review of systems was completed; pertinent positive and negatives stated in subjective.     Vital signs:  Temp:  [97.8 °F (36.6 °C)-98.6 °F (37 °C)] 98.1 °F (36.7 °C)  Pulse:  [66-82] 66  Resp:  [18-20] 18  BP: (124-142)/(64-96) 142/71  SpO2:  [95 %-100 %] 95 %     Physical Exam:    General: No acute distress  Respiratory: No wheezes, no rhonchi  Cardiovascular: S1, S2, regular rate and rhythm  Abdomen: Soft, Non-tender, non-distended, positive bowel sounds  Neuro: No new focal deficits.   Extremities: No edema        Diagnostic Data:    Labs:        Recent Labs   Lab 03/12/24  1743 03/13/24  0949 03/14/24  0646   WBC 11.8* 11.3* 10.1   HGB 12.4 12.7 11.5*   MCV 85.6 85.7 86.2   .0 196.0 179.0   INR  --   --  1.39*               Recent Labs   Lab 03/12/24 1743 03/13/24  0949 03/14/24  0646   GLU 98 94 86   BUN 18 13 12   CREATSERUM 0.85 0.63 0.69   CA 9.4 9.5 8.9   ALB 3.2* 2.9* 2.6*    139 138   K 4.3 4.6 4.2    108 110   CO2 28.0 23.0 24.0   ALKPHO 288* 253* 221*   AST 61* 71* 49*   ALT 45 41 31   BILT 0.8 1.0 0.8   TP 7.3 7.1 6.2*         Estimated Creatinine Clearance: 51.4 mL/min (based on SCr of 0.69 mg/dL).     No results for input(s): \"TROP\", \"TROPHS\", \"CK\" in the last 168 hours.         Recent Labs   Lab 03/14/24  0646   PTP 17.1*   INR 1.39*                     Microbiology     No results found for this visit on 03/13/24.        Imaging: Reviewed in Epic.     Medications:    enoxaparin  40 mg Subcutaneous Daily    pantoprazole  40 mg Intravenous Q12H    levofloxacin  750 mg Intravenous Q24H    fluorouracil  (Adrucil) 650 mg IV push  400 mg/m2 Intravenous Once         Assessment & Plan:          #Abdominal pain due to mets can and possible small abscess  IV ABx  IVFF  CLD  Onc on Cs- chemo   Pain control   # h/o esophageal ca      Jojo Barba MD  MEDICATIONS ADMINISTERED IN LAST 1 DAY:  cetirizine (ZyrTEC) tab 10 mg       Date Action Dose Route User    3/14/2024 1239 Given 10 mg Oral Noemi Aguero RN          enoxaparin (Lovenox) 40 MG/0.4ML SUBQ injection 40 mg       Date Action Dose Route User    3/14/2024 0823 Given 40 mg Subcutaneous (Right Lower Abdomen) Noemi Aguero RN    3/13/2024 1315 Given 40 mg Subcutaneous (Left Lower Abdomen) Ld Brothers RN          ketorolac (Toradol) 15 MG/ML injection 15 mg       Date Action Dose Route User    3/13/2024 1950 Given 15 mg Intravenous Noemi Portillo RN    3/13/2024 1340 Given 15 mg Intravenous Ld Brothers RN          leucovorin 650 mg in dextrose 5% 250 mL IVPB       Date Action Dose Route User    3/14/2024 1016 New Bag 650 mg Intravenous Noemi Aguero RN          levoFLOXacin in dextrose 5% (Levaquin) 750 mg/150mL IVPB premix 750 mg       Date Action Dose Route User    3/14/2024 1239 New Bag 750 mg Intravenous Noemi Aguero RN          melatonin tab 3 mg       Date Action Dose Route User    3/13/2024 1956 Given 3 mg Oral Noemi Portillo RN          morphINE PF 2 MG/ML injection 1 mg       Date Action Dose Route User    3/14/2024 0835 Given 1 mg Intravenous Noemi Aguero RN    3/13/2024 2307 Given 1 mg Intravenous Noemi Portillo RN          ondansetron (Zofran) 16 mg, dexAMETHasone (Decadron) 20 mg in sodium chloride 0.9% 110 mL IVPB       Date Action Dose Route User    3/14/2024 0941 Given (none) Intravenous Noemi Aguero RN          ondansetron (Zofran) 4 MG/2ML injection 4 mg       Date Action Dose Route User    3/13/2024 1957 Given 4 mg Intravenous Noemi Portillo RN    3/13/2024 1334 Given 4 mg Intravenous Cupps, Ld, RN          oxaliplatin (Eloxatin) 140 mg in dextrose 5%  278 mL infusion       Date Action Dose Route User    3/14/2024 1016 New Bag 140 mg Intravenous Noemi Aguero RN          pantoprazole (Protonix) 40 mg in sodium chloride 0.9% PF 10 mL IV push       Date Action Dose Route User    3/14/2024 0823 Given 40 mg Intravenous Noemi Aguero RN    3/13/2024 1950 Given 40 mg Intravenous Noemi Portillo RN    3/13/2024 1339 Given 40 mg Intravenous Ld Brothers RN          sodium chloride 0.9% infusion       Date Action Dose Route User    3/14/2024 0244 New Bag (none) Intravenous Jessenia Cleaning RN    3/13/2024 1340 New Bag (none) Intravenous Ld Brothers RN            Vitals (last day)       Date/Time Temp Pulse Resp BP SpO2 Weight O2 Device O2 Flow Rate (L/min) [facility]    03/14/24 1251 98.1 °F (36.7 °C) 68 18 149/86 94 % -- None (Room air) --     03/14/24 0750 98.1 °F (36.7 °C) 66 18 142/71 95 % -- None (Room air) --     03/14/24 0243 98.1 °F (36.7 °C) 67 20 127/96 95 % -- None (Room air) --     03/13/24 2038 98.6 °F (37 °C) 78 18 126/64 96 % -- None (Room air) --     03/13/24 1504 98 °F (36.7 °C) 82 18 124/87 100 % -- None (Room air) --     03/13/24 1247 97.8 °F (36.6 °C) 71 18 124/78 100 % 154 lb 6.4 oz None (Room air) --     03/13/24 1238 -- -- -- -- -- 155 lb 9.6 oz -- --     03/13/24 1045 -- 68 -- 97/71 98 % -- None (Room air) -- AL    03/13/24 0754 97.5 °F (36.4 °C) 93 16 103/81 98 % 157 lb 10.1 oz None (Room air) -- BM

## 2024-03-14 NOTE — PROGRESS NOTES
Chemotherapy Order Clarification     Oxaliplatin 140 mg (rounded from 142 mg) in D5W 278 mL to run over 2 hours per pharmacy protocol.     Leucovorin 650 mg (rounded from 668 mg) in D5W 250 mL to run over 2 hours per pharmacy protocol.     Fluorouracil 650 mg (rounded from 668 mg) IV bolus over 5 minutes per pharmacy protocol.     Fluorouracil 4000 mg (rounded from 4008 mg) in Sodium Chloride 0.9% to run over 46 hours per pharmacy protocol.     Laverne Rogers, PharmD  3/13/2024 [id number]

## 2024-03-15 ENCOUNTER — APPOINTMENT (OUTPATIENT)
Dept: HEMATOLOGY/ONCOLOGY | Age: 74
End: 2024-03-15
Attending: INTERNAL MEDICINE
Payer: MEDICARE

## 2024-03-15 PROCEDURE — 99232 SBSQ HOSP IP/OBS MODERATE 35: CPT | Performed by: STUDENT IN AN ORGANIZED HEALTH CARE EDUCATION/TRAINING PROGRAM

## 2024-03-15 PROCEDURE — 99233 SBSQ HOSP IP/OBS HIGH 50: CPT | Performed by: INTERNAL MEDICINE

## 2024-03-15 RX ORDER — PROCHLORPERAZINE MALEATE 10 MG
10 TABLET ORAL EVERY 6 HOURS PRN
Qty: 30 TABLET | Refills: 0 | Status: SHIPPED | OUTPATIENT
Start: 2024-03-15 | End: 2024-03-20

## 2024-03-15 RX ORDER — NAPROXEN 250 MG/1
250 TABLET ORAL 2 TIMES DAILY PRN
Status: DISCONTINUED | OUTPATIENT
Start: 2024-03-15 | End: 2024-03-17

## 2024-03-15 RX ORDER — MORPHINE SULFATE 15 MG/1
15 TABLET, FILM COATED, EXTENDED RELEASE ORAL EVERY 12 HOURS SCHEDULED
Status: DISCONTINUED | OUTPATIENT
Start: 2024-03-15 | End: 2024-03-17

## 2024-03-15 RX ORDER — SODIUM CHLORIDE 9 MG/ML
INJECTION, SOLUTION INTRAVENOUS CONTINUOUS
Status: DISCONTINUED | OUTPATIENT
Start: 2024-03-15 | End: 2024-03-17

## 2024-03-15 RX ORDER — MORPHINE SULFATE 15 MG/1
15 TABLET, FILM COATED, EXTENDED RELEASE ORAL EVERY 12 HOURS SCHEDULED
Qty: 60 TABLET | Refills: 0 | Status: SHIPPED | OUTPATIENT
Start: 2024-03-15

## 2024-03-15 RX ORDER — ONDANSETRON HYDROCHLORIDE 8 MG/1
8 TABLET, FILM COATED ORAL EVERY 8 HOURS PRN
Qty: 30 TABLET | Refills: 0 | Status: SHIPPED | OUTPATIENT
Start: 2024-03-15

## 2024-03-15 NOTE — PLAN OF CARE
Problem: GASTROINTESTINAL - ADULT  Goal: Minimal or absence of nausea and vomiting  Description: INTERVENTIONS:  - Maintain adequate hydration with IV or PO as ordered and tolerated  - Nasogastric tube to low intermittent suction as ordered  - Evaluate effectiveness of ordered antiemetic medications  - Provide nonpharmacologic comfort measures as appropriate  - Advance diet as tolerated, if ordered  - Obtain nutritional consult as needed  - Evaluate fluid balance  Outcome: Progressing     Problem: HEMATOLOGIC - ADULT  Goal: Maintains hematologic stability  Description: INTERVENTIONS  - Assess for signs and symptoms of bleeding or hemorrhage  - Monitor labs and vital signs for trends  - Administer supportive blood products/factors, fluids and medications as ordered and appropriate  - Administer supportive blood products/factors as ordered and appropriate  Outcome: Progressing   Alert and orientated x4.  Fluorouracil infusing per extended IV without difficulty, good blood return.  Voiding jason urine.  No edema to lower extremities.  One episode of nasuea and given zofran.  Temp 99.6.  other vital signs stable.

## 2024-03-15 NOTE — PLAN OF CARE
Pt received alert and oriented x4, c/o abdominal pain 4-5/10. Occasional nausea, prn zofran given with relief. Poor appetite today. IVF restarted. Continuous chemo infusion to finish tomorrow afternoon. Fall precautions in place. Call light within reach.       Problem: GASTROINTESTINAL - ADULT  Goal: Minimal or absence of nausea and vomiting  Description: INTERVENTIONS:  - Maintain adequate hydration with IV or PO as ordered and tolerated  - Nasogastric tube to low intermittent suction as ordered  - Evaluate effectiveness of ordered antiemetic medications  - Provide nonpharmacologic comfort measures as appropriate  - Advance diet as tolerated, if ordered  - Obtain nutritional consult as needed  - Evaluate fluid balance  Outcome: Not Progressing     Problem: HEMATOLOGIC - ADULT  Goal: Maintains hematologic stability  Description: INTERVENTIONS  - Assess for signs and symptoms of bleeding or hemorrhage  - Monitor labs and vital signs for trends  - Administer supportive blood products/factors, fluids and medications as ordered and appropriate  - Administer supportive blood products/factors as ordered and appropriate  Outcome: Progressing

## 2024-03-15 NOTE — PROGRESS NOTES
[facility]   part of [facility]     Hospitalist Progress Note     Lisa Iqbal Patient Status:  Inpatient    10/11/1950 MRN [id number]   Location [facility] 4NW-A Attending Jojo Barba MD   Hosp Day # 2 PCP Anusha Abraham MD     Chief Complaint: Abdominal pain, N     Subjective:     Patient w nausea, headache     Objective:    Review of Systems:   A comprehensive review of systems was completed; pertinent positive and negatives stated in subjective.    Vital signs:  Temp:  [98 °F (36.7 °C)-99.6 °F (37.6 °C)] 99.6 °F (37.6 °C)  Pulse:  [68-88] 88  Resp:  [16-18] 18  BP: (125-154)/(61-86) 154/72  SpO2:  [93 %-94 %] 94 %    Physical Exam:    General: No acute distress  Respiratory: No wheezes, no rhonchi  Cardiovascular: S1, S2, regular rate and rhythm  Abdomen: Soft, Non-tender, non-distended, positive bowel sounds  Neuro: No new focal deficits.   Extremities: No edema      Diagnostic Data:    Labs:  Recent Labs   Lab 24  1743 24  0949 24  0646   WBC 11.8* 11.3* 10.1   HGB 12.4 12.7 11.5*   MCV 85.6 85.7 86.2   .0 196.0 179.0   INR  --   --  1.39*       Recent Labs   Lab 24  1743 24  0949 24  0646   GLU 98 94 86   BUN 18 13 12   CREATSERUM 0.85 0.63 0.69   CA 9.4 9.5 8.9   ALB 3.2* 2.9* 2.6*    139 138   K 4.3 4.6 4.2    108 110   CO2 28.0 23.0 24.0   ALKPHO 288* 253* 221*   AST 61* 71* 49*   ALT 45 41 31   BILT 0.8 1.0 0.8   TP 7.3 7.1 6.2*       Estimated Creatinine Clearance: 51.4 mL/min (based on SCr of 0.69 mg/dL).    No results for input(s): \"TROP\", \"TROPHS\", \"CK\" in the last 168 hours.    Recent Labs   Lab 24  0646   PTP 17.1*   INR 1.39*                  Microbiology    No results found for this visit on 24.      Imaging: Reviewed in Epic.    Medications:    morphINE ER  15 mg Oral 2 times per day    enoxaparin  40 mg Subcutaneous Daily    pantoprazole  40 mg Intravenous Q12H    levofloxacin  750 mg  Intravenous Q24H       Assessment & Plan:        #Abdominal pain due to mets can and possible small abscess, started on chemo   IV ABx  IVFF  CLD  Onc on Cs- chemo   Pain control   # h/o esophageal ca     Jojo Barba MD    Supplementary Documentation:     Quality:  DVT Mechanical Prophylaxis:   SCDs, Early ambuation  DVT Pharmacologic Prophylaxis   Medication    enoxaparin (Lovenox) 40 MG/0.4ML SUBQ injection 40 mg                Code Status: Not on file  Cuellar: No urinary catheter in place  Cuellar Duration (in days):   Central line:    ALESHA:     Discharge is dependent on: clinical   At this point Ms. Félix Iqbal is expected to be discharge to: home    The 21st Century Cures Act makes medical notes like these available to patients in the interest of transparency. Please be advised this is a medical document. Medical documents are intended to carry relevant information, facts as evident, and the clinical opinion of the practitioner. The medical note is intended as peer to peer communication and may appear blunt or direct. It is written in medical language and may contain abbreviations or verbiage that are unfamiliar.

## 2024-03-15 NOTE — CDS QUERY
Dear Dr. Barba,  Please clarify the nutritional status .  ( x ) Moderate malnutrition   (  ) Other (please specify)    Clinical indicators:  BMI 30 Poor po intake . nausea vomiting MST 2  3/14 Dietitian consult; Pt meets moderate malnutrition criteria at this time.  Reports a decrease in PO for about 2 days PTA but overall eating less due to all the recent outpt testing. Tolerates all foods, no constipation or diarrhea. No concerns chewing or swallowing.      NUTRITION DIAGNOSIS/PROBLEM:  Malnutrition related to physiological causes as evidenced by documented/reported insufficient oral intake and documented/reported unintentional weight loss.     Risk factors: Colon cancer.   Treatment: Dietitian consult, monitor po intake . Meals  and snacks, supplements   Use of terms such as suspected, possible, or probable (associated with a specific diagnosis that is being evaluated, monitored, or treated as if it exists) are acceptable and can be coded in the inpatient setting, when documented at the time of discharge.     Please add any additional documentation to your progress note and continue to document this through discharge.    For questions regarding this query, please contact Clinical Documentation : Brennon Bridges RN, BSN, CCDS Ext. [phone]                                                                                 THIS FORM IS A PERMANENT PART OF THE MEDICAL RECORD

## 2024-03-15 NOTE — SPIRITUAL CARE NOTE
Spiritual Care Visit Note    Patient Name: Lisa Iqbal Date of Spiritual Care Visit: 03/15/24   : 10/11/1950 Primary Dx: Colon cancer metastasized to liver (HCC)       Referred By: Referral From: Other (Comment)    Spiritual Care Taxonomy:    Intended Effects: Demonstrate caring and concern    Methods: Offer support    Interventions: Acknowledge current situation;Active listening;Explain  role    Visit Type/Summary:     - Spiritual Care: Consulted with RN prior to visit. Patient and family expressed appreciation for  visit.  remains available as needed for follow up.    Spiritual Care support can be requested via an Epic consult. For urgent/immediate needs, please contact the On Call  at: [place]: ext [phone]

## 2024-03-15 NOTE — PROGRESS NOTES
Heme/Onc Progress Note - [facility]      Chief Complaint:    Follow up for evaluation and management of metastatic colon cancer.    Interim History:      She has started FOLFOX cycle 1, day 2. She is getting infusion 5FU. She has some intermittent nausea and intermittent abdominal pain. She has an appetite. She has no fever.     Physical Examination:    Vital Signs: /72 (BP Location: Right arm)   Pulse 88   Temp 99.6 °F (37.6 °C) (Oral)   Resp 18   Ht 1.524 m (5')   Wt 71.7 kg (158 lb 1.6 oz)   SpO2 94%   BMI 30.88 kg/m²     General: Patient is alert and oriented x 3, not in acute distress.  HEENT: Anicteric Sclera. Pink conjunctiva.  Oropharynx is clear.   Neck: No palpable lymphadenopathy. Neck is supple.  Chest: Clear to auscultation. No rales and no wheezes.  Heart: Regular rate and rhythm.   Abdomen: Soft, non tender with good bowel sounds.  Extremities: No edema. Non-tender.  Skin:  Warm, dry.      Labs reviewed at this visit:     Recent Labs   Lab 03/12/24  1743 03/13/24  0949 03/14/24  0646   RBC 4.50 4.55 4.06   HGB 12.4 12.7 11.5*   HCT 38.5 39.0 35.0   MCV 85.6 85.7 86.2   MCH 27.6 27.9 28.3   MCHC 32.2 32.6 32.9   RDW 14.1 14.5 14.4   NEPRELIM 8.79* 8.80* 7.50   WBC 11.8* 11.3* 10.1   .0 196.0 179.0       Recent Labs   Lab 03/12/24  1743 03/13/24  0949 03/14/24  0646   GLU 98 94 86   BUN 18 13 12   CREATSERUM 0.85 0.63 0.69   CA 9.4 9.5 8.9   ALB 3.2* 2.9* 2.6*    139 138   K 4.3 4.6 4.2    108 110   CO2 28.0 23.0 24.0   ALKPHO 288* 253* 221*   AST 61* 71* 49*   ALT 45 41 31   BILT 0.8 1.0 0.8   TP 7.3 7.1 6.2*       Radiologic imaging reviewed at this visit:    CT of Abd/Pelvis on 3/12/2024:  INDINGS:    LIVER:  There is again noted is extensive hepatic metastatic disease with large confluent lesions near the dome of the liver and portions of the left lobe.  The largest lesion spans the right and left lobe of the liver measures 11 x 6.2 cm.  When  comparing to the non CT  portion of the PET scan and the lesions which are FDG avid on the PET scan there appears to be some progression.    BILIARY:  No gallstones.  There is some fluid in slight thickening the pericholecystic region which may represent changes of acute cholecystitis.  Sonography recommended.  PANCREAS:  No lesion, fluid collection, ductal dilatation, or atrophy.    SPLEEN:  No enlargement or focal lesion.    KIDNEYS:  No hydronephrosis.  Bilateral simple renal cysts the largest on the right measures 5.3 cm and on the left is 6.7 cm.  ADRENALS:  No mass or enlargement.    AORTA/VASCULAR:  No aneurysm or dissection.    RETROPERITONEUM:  No mass or adenopathy.    BOWEL/MESENTERY:  No free air.  The sigmoid the colon carcinoma measures approximately 7.6 by 2.6 cm.  There is nodularity and infiltration of the surrounding fat.  Along the superior aspect of this process is a small oval fluid collection measuring 2.1  x 1.4 cm seen best on series 9, image 26 and series 5, image 73 either representing abscess or tumor extension.  ABDOMINAL WALL:  Small fat containing umbilical hernia.  URINARY BLADDER:  No visible focal wall thickening, lesion, or calculus.    PELVIC NODES:  No adenopathy.    PELVIC ORGANS:  Status post hysterectomy.  BONES:  No bony lesion or fracture.  Stable degenerative disc disease.  LUNG BASES:  No visible pulmonary or pleural disease.    OTHER:  Negative.       Impression   CONCLUSION:    1. 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  When compared to the previous CT PET scan this appears larger measuring 7.6 x 2.6 cm with nodularity in the adjacent mesentery and a few   adjacent lymph nodes.  In addition there is an irregular oval fluid collection superior to the main tumor mass measuring 2.1 x 1.4 cm which may represent tumor extension or a small abscess.  There is no bowel obstruction.  2. Extensive hepatic metastatic disease which  appears to have progressed when compared to the previous CT PET scan.         Impression/Plan:     Metastatic sigmoid colon cancer:  Liver metastases  KRAS mutation  MMR/MSI normal     The patient has had progressive decline and abdominal pain. She was admitted for pain control and for cycle 1 of chemotherapy. She continues on day 2 of cycle 1 FOLFOX. She will continue with zofran and compazine for nausea. She should be able to go home tomorrow with oral zofran and compazine. I would recommend that she have APN visit mid week next week. She will see me in two weeks for cycle 2. We will get weekly labs.      She will need a port prior to the second cycle. We will arrange this as an outpatient.     Physical therapy will continue to assess. At this point she looks to be strong enough to go home.     Cancer related abdominal pain:     Morphine 1 to 2 mg IV PRN. Will see if she has improvement on treatment. Will start MS Contin 15 mg BID today.      Nausea:     Compazine and zofran PRN.       Ronan Camarillo MD  [facility]

## 2024-03-16 ENCOUNTER — APPOINTMENT (OUTPATIENT)
Dept: GENERAL RADIOLOGY | Facility: HOSPITAL | Age: 74
End: 2024-03-16
Attending: INTERNAL MEDICINE
Payer: MEDICARE

## 2024-03-16 PROBLEM — G89.3 NEOPLASM RELATED PAIN (ACUTE) (CHRONIC): Status: ACTIVE | Noted: 2024-03-16

## 2024-03-16 PROBLEM — R50.9 FEVER: Status: ACTIVE | Noted: 2024-03-16

## 2024-03-16 LAB
ALBUMIN SERPL-MCNC: 2.1 G/DL (ref 3.4–5)
ALBUMIN/GLOB SERPL: 0.6 {RATIO} (ref 1–2)
ALP LIVER SERPL-CCNC: 165 U/L
ALT SERPL-CCNC: 44 U/L
ANION GAP SERPL CALC-SCNC: 9 MMOL/L (ref 0–18)
AST SERPL-CCNC: 113 U/L (ref 15–37)
BILIRUB SERPL-MCNC: 0.7 MG/DL (ref 0.1–2)
BUN BLD-MCNC: 17 MG/DL (ref 9–23)
CALCIUM BLD-MCNC: 8.2 MG/DL (ref 8.5–10.1)
CHLORIDE SERPL-SCNC: 109 MMOL/L (ref 98–112)
CO2 SERPL-SCNC: 18 MMOL/L (ref 21–32)
CREAT BLD-MCNC: 0.74 MG/DL
EGFRCR SERPLBLD CKD-EPI 2021: 85 ML/MIN/1.73M2 (ref 60–?)
FLUAV + FLUBV RNA SPEC NAA+PROBE: NEGATIVE
FLUAV + FLUBV RNA SPEC NAA+PROBE: NEGATIVE
GLOBULIN PLAS-MCNC: 3.3 G/DL (ref 2.8–4.4)
GLUCOSE BLD-MCNC: 115 MG/DL (ref 70–99)
OSMOLALITY SERPL CALC.SUM OF ELEC: 284 MOSM/KG (ref 275–295)
POTASSIUM SERPL-SCNC: 3.5 MMOL/L (ref 3.5–5.1)
PROT SERPL-MCNC: 5.4 G/DL (ref 6.4–8.2)
RSV RNA SPEC NAA+PROBE: NEGATIVE
SARS-COV-2 RNA RESP QL NAA+PROBE: NOT DETECTED
SODIUM SERPL-SCNC: 136 MMOL/L (ref 136–145)

## 2024-03-16 PROCEDURE — 71045 X-RAY EXAM CHEST 1 VIEW: CPT | Performed by: INTERNAL MEDICINE

## 2024-03-16 PROCEDURE — 99233 SBSQ HOSP IP/OBS HIGH 50: CPT | Performed by: STUDENT IN AN ORGANIZED HEALTH CARE EDUCATION/TRAINING PROGRAM

## 2024-03-16 PROCEDURE — 99233 SBSQ HOSP IP/OBS HIGH 50: CPT | Performed by: INTERNAL MEDICINE

## 2024-03-16 RX ORDER — LEVOTHYROXINE SODIUM 0.05 MG/1
50 TABLET ORAL
Status: DISCONTINUED | OUTPATIENT
Start: 2024-03-16 | End: 2024-03-16

## 2024-03-16 RX ORDER — LEVOTHYROXINE SODIUM 0.03 MG/1
25 TABLET ORAL
Status: DISCONTINUED | OUTPATIENT
Start: 2024-03-16 | End: 2024-03-17

## 2024-03-16 RX ORDER — SALIVA STIMULANT COMB. NO.3
SPRAY, NON-AEROSOL (ML) MUCOUS MEMBRANE AS NEEDED
Status: DISCONTINUED | OUTPATIENT
Start: 2024-03-16 | End: 2024-03-17

## 2024-03-16 NOTE — PLAN OF CARE
Pt primarily Yoruba speaking, A+Ox4.  C/o dry mouth, Biotene spray PRN with very good relief.  Pt verbalizes understanding re no ice or cold products,  Poor appetite.  Zofran and Compazine PRN given for nausea with adequate relief.  Daughter at bedside.  5FU infusing through L extended PIV with no S/S infiltration at 23.5cc/hr.  VSS, has been afebrile throughout shift.  Anbxs changed to Zosyn.  Call light within reach.      Problem: GASTROINTESTINAL - ADULT  Goal: Minimal or absence of nausea and vomiting  Description: INTERVENTIONS:  - Maintain adequate hydration with IV or PO as ordered and tolerated  - Nasogastric tube to low intermittent suction as ordered  - Evaluate effectiveness of ordered antiemetic medications  - Provide nonpharmacologic comfort measures as appropriate  - Advance diet as tolerated, if ordered  - Obtain nutritional consult as needed  - Evaluate fluid balance  Outcome: Progressing     Problem: HEMATOLOGIC - ADULT  Goal: Maintains hematologic stability  Description: INTERVENTIONS  - Assess for signs and symptoms of bleeding or hemorrhage  - Monitor labs and vital signs for trends  - Administer supportive blood products/factors, fluids and medications as ordered and appropriate  - Administer supportive blood products/factors as ordered and appropriate  Outcome: Progressing     Problem: PAIN - ADULT  Goal: Verbalizes/displays adequate comfort level or patient's stated pain goal  Description: INTERVENTIONS:  - Encourage pt to monitor pain and request assistance  - Assess pain using appropriate pain scale  - Administer analgesics based on type and severity of pain and evaluate response  - Implement non-pharmacological measures as appropriate and evaluate response  - Consider cultural and social influences on pain and pain management  - Manage/alleviate anxiety  - Utilize distraction and/or relaxation techniques  - Monitor for opioid side effects  - Notify MD/LIP if interventions unsuccessful or  patient reports new pain  - Anticipate increased pain with activity and pre-medicate as appropriate  Outcome: Progressing

## 2024-03-16 NOTE — PLAN OF CARE
Pt a/o x4. Tmax 101.4. Other VSS on RA. C/o pain & nausea. Meds per MAR. IVF per order. 5FU infusing, plan to finish around noon. Up to bathroom w/ SBA.     Fall risk protocol followed, call light within reach.     4lb weight gain noted & pt reports some intermittent R eye cloudiness/blindness when opening eye. MD updated.

## 2024-03-16 NOTE — PROGRESS NOTES
[facility]   part of [facility]     Hospitalist Progress Note     Lisa Iqbal Patient Status:  Inpatient    10/11/1950 MRN [id number]   Location [facility] 4NW-A Attending Jojo Barba MD   Hosp Day # 3 PCP Anusha Abraham MD     Chief Complaint: Abdominal pain, N     Subjective:     Patient febrile, poor appetite, nausea.     Objective:    Review of Systems:   A comprehensive review of systems was completed; pertinent positive and negatives stated in subjective.    Vital signs:  Temp:  [97.9 °F (36.6 °C)-101.4 °F (38.6 °C)] 98.5 °F (36.9 °C)  Pulse:  [76-96] 76  Resp:  [15-17] 17  BP: ()/(49-69) 92/49  SpO2:  [91 %-94 %] 92 %    Physical Exam:    General: No acute distress  Respiratory: No wheezes, no rhonchi  Cardiovascular: S1, S2, regular rate and rhythm  Abdomen: Soft, Non-tender, non-distended, positive bowel sounds  Neuro: No new focal deficits.   Extremities: No edema      Diagnostic Data:    Labs:  Recent Labs   Lab 24  1743 24  0949 24  0646   WBC 11.8* 11.3* 10.1   HGB 12.4 12.7 11.5*   MCV 85.6 85.7 86.2   .0 196.0 179.0   INR  --   --  1.39*       Recent Labs   Lab 24  0949 24  0646 24  0613   GLU 94 86 115*   BUN 13 12 17   CREATSERUM 0.63 0.69 0.74   CA 9.5 8.9 8.2*   ALB 2.9* 2.6* 2.1*    138 136   K 4.6 4.2 3.5    110 109   CO2 23.0 24.0 18.0*   ALKPHO 253* 221* 165*   AST 71* 49* 113*   ALT 41 31 44   BILT 1.0 0.8 0.7   TP 7.1 6.2* 5.4*       Estimated Creatinine Clearance: 47.9 mL/min (based on SCr of 0.74 mg/dL).    No results for input(s): \"TROP\", \"TROPHS\", \"CK\" in the last 168 hours.    Recent Labs   Lab 24  0646   PTP 17.1*   INR 1.39*                  Microbiology    No results found for this visit on 24.      Imaging: Reviewed in Epic.    Medications:    levothyroxine  25 mcg Oral Before breakfast    piperacillin-tazobactam  3.375 g Intravenous Q8H    morphINE ER  15 mg Oral 2 times  per day    enoxaparin  40 mg Subcutaneous Daily    pantoprazole  40 mg Intravenous Q12H       Assessment & Plan:        #Abdominal pain due to mets can and possible small abscess, started on chemo   IV ABx  IVFF  CLD  Onc on Cs- chemo   Pain control   # h/o esophageal ca   3fever overnight 3/15- 3/16  Pan Cx  Changed Abx from levaquin-->Pip tazo 3/16       Jojo Barba MD    Supplementary Documentation:     Quality:  DVT Mechanical Prophylaxis:   SCDs, Early ambuation  DVT Pharmacologic Prophylaxis   Medication    enoxaparin (Lovenox) 40 MG/0.4ML SUBQ injection 40 mg                Code Status: Not on file  Cuellar: No urinary catheter in place  Cuellar Duration (in days):   Central line:    ALESHA: 3/16/2024    Discharge is dependent on: clinical   At this point Ms. Félix Iqbal is expected to be discharge to: home    The 21st Century Cures Act makes medical notes like these available to patients in the interest of transparency. Please be advised this is a medical document. Medical documents are intended to carry relevant information, facts as evident, and the clinical opinion of the practitioner. The medical note is intended as peer to peer communication and may appear blunt or direct. It is written in medical language and may contain abbreviations or verbiage that are unfamiliar.

## 2024-03-16 NOTE — PROGRESS NOTES
Hematology/Oncology Progress Note    Patient Name: Lisa Iqbal  Medical Record Number: [id number]    YOB: 1950     Reason for Consultation:  Lisa Iqbal was seen today for the diagnosis of metastatic colon cancer    Interval events:      - D3 of FOLFOX Today  - reports abdominal pain is better  -- had a fever Tmax 101.4 last night but defervesced without intervention  - she denies any shortness of breath    Inpatient Meds:   levothyroxine  50 mcg Oral Before breakfast    morphINE ER  15 mg Oral 2 times per day    enoxaparin  40 mg Subcutaneous Daily    pantoprazole  40 mg Intravenous Q12H    levofloxacin  750 mg Intravenous Q24H      sodium chloride 100 mL/hr at 03/16/24 0137    fluorouracil (Adrucil) 4,000 mg in sodium chloride 0.9% 1,080 mL infusion 4,000 mg (03/14/24 1346)       PRN Meds:  naproxen, cetirizine **AND** pseudoephedrine ER, acetaminophen, melatonin, polyethylene glycol (PEG 3350), sennosides, bisacodyl, fleet enema, ondansetron, benzonatate, guaiFENesin, glycerin-hypromellose-, sodium chloride, morphINE **OR** morphINE **OR** morphINE, prochlorperazine, ondansetron, prochlorperazine    Allergies:   Allergies   Allergen Reactions    Septra [Sulfamethoxazole W/Trimethoprim] RASH    Sulfa Antibiotics RASH       Vital Signs:  Height: --  Weight: 73.6 kg (162 lb 3.2 oz) (03/16 0510)  BSA (Calculated - sq m): --  Pulse: 83 (03/16 0735)  BP: 109/51 (03/16 0735)  Temp: 98.7 °F (37.1 °C) (03/16 0912)  Do Not Use - Resp Rate: --  SpO2: 91 % (03/16 0735)    Wt Readings from Last 6 Encounters:   03/16/24 73.6 kg (162 lb 3.2 oz)   02/29/24 73 kg (161 lb)   02/27/24 74.8 kg (165 lb)   01/25/24 75.3 kg (166 lb)   02/04/24 74.8 kg (165 lb)   01/15/24 76.7 kg (169 lb)       Physical Examination:  General: Patient is alert and oriented, not in acute distress  Psych: Mood and affect are appropriate  Eyes: EOMI, PERRL  ENT: Oropharynx is clear, no adenopathy  CV: no LE  edema  Respiratory: Non-labored respirations  GI/Abd: Soft, slightly tender  Neurological: Grossly intact   Skin: no rashes or petechiae    Laboratory:  Recent Labs   Lab 03/12/24  1743 03/13/24  0949 03/14/24  0646   WBC 11.8* 11.3* 10.1   HGB 12.4 12.7 11.5*   HCT 38.5 39.0 35.0   .0 196.0 179.0   MCV 85.6 85.7 86.2   RDW 14.1 14.5 14.4   NEPRELIM 8.79* 8.80* 7.50       Recent Labs   Lab 03/13/24  0949 03/14/24  0646 03/16/24  0613    138 136   K 4.6 4.2 3.5    110 109   CO2 23.0 24.0 18.0*   BUN 13 12 17   CREATSERUM 0.63 0.69 0.74   GLU 94 86 115*   CA 9.5 8.9 8.2*   TP 7.1 6.2* 5.4*   ALB 2.9* 2.6* 2.1*   ALKPHO 253* 221* 165*   AST 71* 49* 113*   ALT 41 31 44   BILT 1.0 0.8 0.7       Recent Labs   Lab 03/14/24  0646   INR 1.39*       Impression & Plan:     Fever  - obtain CXR, blood cultures x2, RVP swab  - already on levofloxacin    Metastatic colon cancer  - D3 of FOLFOX today, finishing up 5-FU today    Cancer associated pain  - MS Contin 15mg BID  - has morphine 1-2mg IV PRN ordered by she is not using this much    Possible DC tomorrow    Ronan Novoa MD  Hematology/Medical Oncology  [facility]

## 2024-03-16 NOTE — PHYSICAL THERAPY NOTE
PHYSICAL THERAPY TREATMENT NOTE - INPATIENT    Room Number: 412/412-A     Session: 1     Number of Visits to Meet Established Goals: 5    Presenting Problem: colon ca mets to liver  Co-Morbidities : met colon ca      PHYSICAL THERAPY MEDICAL/SOCIAL HISTORY  History related to current admission: Patient is a 73 year old female admitted on 3/13/2024: Presented with abdominal pain imaging revealing worsening mets (liver)  in the setting of metastatic colon ca. Plan to start chemo IP.      HOME SITUATION  Type of Home: House   Home Layout: Two level  Stairs to Enter : 18  Railing: Yes     Lives With: Alone  Drives: Yes  Patient Owned Equipment: None  Patient Regularly Uses: Glasses     Prior Level of [place]: indep, no hx of falls, pt reports lives alone and has no family that would be able to assist her at dc  ASSESSMENT   Patient demonstrates good  progress this session, goals  remain in progress.    Patient continues to function near baseline with bed mobility, transfers, and gait.  Contributing factors to remaining limitations include decreased endurance/aerobic capacity, pain, and decreased muscular endurance.  Next session anticipate patient to progress gait and stair negotiation.  Physical Therapy will continue to follow patient for duration of hospitalization.    Patient continues to benefit from continued skilled PT services: at discharge to promote functional independence in home.  Anticipate patient will return home with home health PT.    PLAN  PT Treatment Plan: Bed mobility;Body mechanics;Coordination;Endurance;Energy conservation;Patient education;Family education;Gait training;Neuromuscular re-educate;Range of motion;Strengthening;Stoop training;Stair training;Transfer training;Balance training  Rehab Potential : Good  Frequency (Obs): 3-5x/week    CURRENT GOALS     Goal #1 Patient is able to demonstrate supine - sit EOB @ level: independent   -MET 3/16   Goal #2 Patient is able to demonstrate  transfers EOB to/from Chair/Wheelchair at assistance level: independent   -MET 3/16   Goal #3 Patient is able to ambulate 150 feet with assist device:  LRAD  at assistance level: independent      Goal #4 Patient will navigate stairs mod I   Goal #5     Goal #6     Goal Comments: Goals established on 3/14/2024     3/16/2024 all goals ongoing    SUBJECTIVE  \"I am very tired\"    OBJECTIVE  Precautions: None    WEIGHT BEARING RESTRICTION  Weight Bearing Restriction: None                PAIN ASSESSMENT   Rating: Unable to rate  Location: abdomen  Management Techniques: Activity promotion;Body mechanics;Repositioning    BALANCE                                                                                                                       Static Sitting: Good  Dynamic Sitting: Good           Static Standing: Good  Dynamic Standing: Good    ACTIVITY TOLERANCE                         O2 WALK         AM-PAC '6-Clicks' INPATIENT SHORT FORM - BASIC MOBILITY  How much difficulty does the patient currently have...  Patient Difficulty: Turning over in bed (including adjusting bedclothes, sheets and blankets)?: None   Patient Difficulty: Sitting down on and standing up from a chair with arms (e.g., wheelchair, bedside commode, etc.): None   Patient Difficulty: Moving from lying on back to sitting on the side of the bed?: None   How much help from another person does the patient currently need...   Help from Another: Moving to and from a bed to a chair (including a wheelchair)?: None   Help from Another: Need to walk in hospital room?: None   Help from Another: Climbing 3-5 steps with a railing?: None       AM-PAC Score:  Raw Score: 24   Approx Degree of Impairment: 0%   Standardized Score (AM-PAC Scale): 61.14   CMS Modifier (G-Code): CH    FUNCTIONAL ABILITY STATUS  Gait Assessment   Functional Mobility/Gait Assessment  Gait Assistance: Modified independent  Distance (ft): 50,50  Assistive Device: None  Pattern: Within  Functional Limits  Stairs: Stairs  How Many Stairs: 4  Device: 1 Rail  Assist: Supervision  Pattern: Ascend and Descend  Ascend and Descend : Reciprocal    Skilled Therapy Provided    Bed Mobility:  Rolling: IND   Supine<>Sit: IND   Sit<>Supine: IND     Transfer Mobility:  Sit<>Stand: IND    Stand<>Sit: IND   Gait: Mod I 50ft x 2 no assisted device    Therapist's Comments: Pt was observed with no LOB when ambulating. Pt will benefit with one more therapy session to attempt stairs and longer gait distance as pt reports feeling fatigued today.        Patient End of Session: In bed;Needs met;Call light within reach;All patient questions and concerns addressed    PT Session Time: 15 minutes  Therapeutic Activity: 15 minutes

## 2024-03-17 VITALS
HEART RATE: 71 BPM | RESPIRATION RATE: 19 BRPM | DIASTOLIC BLOOD PRESSURE: 52 MMHG | OXYGEN SATURATION: 92 % | HEIGHT: 60 IN | TEMPERATURE: 99 F | WEIGHT: 166.69 LBS | BODY MASS INDEX: 32.73 KG/M2 | SYSTOLIC BLOOD PRESSURE: 90 MMHG

## 2024-03-17 LAB
BASOPHILS # BLD AUTO: 0.01 X10(3) UL (ref 0–0.2)
BASOPHILS NFR BLD AUTO: 0.1 %
EOSINOPHIL # BLD AUTO: 0.48 X10(3) UL (ref 0–0.7)
EOSINOPHIL NFR BLD AUTO: 6.3 %
ERYTHROCYTE [DISTWIDTH] IN BLOOD BY AUTOMATED COUNT: 15.2 %
HCT VFR BLD AUTO: 34.6 %
HGB BLD-MCNC: 11.1 G/DL
IMM GRANULOCYTES # BLD AUTO: 0.05 X10(3) UL (ref 0–1)
IMM GRANULOCYTES NFR BLD: 0.7 %
LYMPHOCYTES # BLD AUTO: 0.2 X10(3) UL (ref 1–4)
LYMPHOCYTES NFR BLD AUTO: 2.6 %
MCH RBC QN AUTO: 27.6 PG (ref 26–34)
MCHC RBC AUTO-ENTMCNC: 32.1 G/DL (ref 31–37)
MCV RBC AUTO: 86.1 FL
MONOCYTES # BLD AUTO: 0.02 X10(3) UL (ref 0.1–1)
MONOCYTES NFR BLD AUTO: 0.3 %
NEUTROPHILS # BLD AUTO: 6.83 X10 (3) UL (ref 1.5–7.7)
NEUTROPHILS # BLD AUTO: 6.83 X10(3) UL (ref 1.5–7.7)
NEUTROPHILS NFR BLD AUTO: 90 %
PLATELET # BLD AUTO: 114 10(3)UL (ref 150–450)
RBC # BLD AUTO: 4.02 X10(6)UL
WBC # BLD AUTO: 7.6 X10(3) UL (ref 4–11)

## 2024-03-17 PROCEDURE — 99233 SBSQ HOSP IP/OBS HIGH 50: CPT | Performed by: INTERNAL MEDICINE

## 2024-03-17 PROCEDURE — 99239 HOSP IP/OBS DSCHRG MGMT >30: CPT | Performed by: STUDENT IN AN ORGANIZED HEALTH CARE EDUCATION/TRAINING PROGRAM

## 2024-03-17 RX ORDER — AMOXICILLIN AND CLAVULANATE POTASSIUM 875; 125 MG/1; MG/1
1 TABLET, FILM COATED ORAL 2 TIMES DAILY
Qty: 20 TABLET | Refills: 0 | Status: SHIPPED | OUTPATIENT
Start: 2024-03-17 | End: 2024-03-25

## 2024-03-17 NOTE — PROGRESS NOTES
NURSING DISCHARGE NOTE    Discharged Home via Wheelchair.  Accompanied by Family member  Belongings Taken by patient/family.    Pt's compazine, zofran and morphine ER returned to pt's daughter.  L PIV and R PIV removed.  Discharge instructions reviewed w both pt and daughter as .  Both verbalize understanding.

## 2024-03-17 NOTE — DISCHARGE SUMMARY
[facility]     Lisa Iqbal Patient Status:  Inpatient    10/11/1950 MRN [id number]   Location [facility] 4NW-A Attending Jojo Barba MD   Hosp Day # 4 PCP Anusha Abraham MD     Date of Admission: 3/13/2024  Date of Discharge:   3/17/2024     Discharge Disposition: Home or Self Care    Discharge Diagnosis:  #Abdominal pain due to mets can and possible small abscess, started on chemo   IV ABx  IVFF  CLD  Onc on Cs- chemo   Pain control   # h/o esophageal ca   3fever overnight 3/15- 3/16  Pan Cx  Changed Abx from levaquin-->Pip tazo 3/16        History of Present Illness:   Lisa Iqbal is a 73 year old female who was in ER yesterday for abdominal pain and imaging revealed worsening  mets disease. Pt returned to ER with N, poor PO intake, vomiting.     Brief Synopsis:   Pt admitted, Received IVF. Starte don Chemotherapy. DC deferred on 3/16 due to fever overnight- IV Abx changed from levaquin--> Pip tazo and blood Cx collected 3/16-  NGTD. DC home with follow up with on on 3/20/24    Lace+ Score: 72  59-90 High Risk  29-58 Medium Risk  0-28   Low Risk       TCM Follow-Up Recommendation:  LACE > 58: High Risk of readmission after discharge from the hospital.      Procedures during hospitalization:   no    Incidental or significant findings and recommendations (brief descriptions):  CONCLUSION:    1. 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  When compared to the previous CT PET scan this appears larger measuring 7.6 x 2.6 cm with nodularity in the adjacent mesentery and a few    adjacent lymph nodes.  In addition there is an irregular oval fluid collection superior to the main tumor mass measuring 2.1 x 1.4 cm which may represent tumor extension or a small abscess.  There is no bowel obstruction.   2. Extensive hepatic metastatic disease which appears to have progressed when  compared to the previous CT PET scan.          Lab/Test results pending at Discharge:   Blood Cx     Consultants:  Onc     Discharge Medication List:     Discharge Medications        START taking these medications        Instructions Prescription details   amoxicillin clavulanate 875-125 MG Tabs  Commonly known as: Augmentin      Take 1 tablet by mouth 2 (two) times daily for 10 days.   Stop taking on: March 27, 2024  Quantity: 20 tablet  Refills: 0     morphINE ER 15 MG Tbcr  Commonly known as: MS Contin      Take 1 tablet (15 mg total) by mouth every 12 (twelve) hours.   Quantity: 60 tablet  Refills: 0     ondansetron 8 MG tablet  Commonly known as: Zofran      Take 1 tablet (8 mg total) by mouth every 8 (eight) hours as needed.   Quantity: 30 tablet  Refills: 0     prochlorperazine 10 mg tablet  Commonly known as: Compazine      Take 1 tablet (10 mg total) by mouth every 6 (six) hours as needed for Nausea or vomiting.   Quantity: 30 tablet  Refills: 0            CONTINUE taking these medications        Instructions Prescription details   Centrum Tabs      Take 1 tablet by mouth daily.   Refills: 0     cholecalciferol 50 MCG (2000 UT) Caps  Generic drug: Cholecalciferol      Take 1 capsule (2,000 Units total) by mouth daily.   Refills: 0     levothyroxine 50 MCG Tabs  Commonly known as: Synthroid      Take 1 tablet (50 mcg total) by mouth before breakfast.   Quantity: 90 tablet  Refills: 0     Omeprazole 40 MG Cpdr      Take one capsule nightly   Quantity: 90 capsule  Refills: 3            STOP taking these medications      levoFLOXacin 750 MG Tabs  Commonly known as: Levaquin        ondansetron 4 MG Tbdp  Commonly known as: Zofran-ODT                  Where to Get Your Medications        These medications were sent to [place], [place] - [address] [facility], [address] [phone], [phone]  [address] [place] [address], Suite [facility], [facility] [address]      Phone: [phone]   morphINE ER 15 MG  Tbcr  ondansetron 8 MG tablet  prochlorperazine 10 mg tablet       These medications were sent to [id number] - [place], [place] - [address] [facility] [address], [phone], [phone]  [address], [facility] [address]      Phone: [phone]   amoxicillin clavulanate 875-125 MG Tabs         [place] reviewed[place]  [place]     Follow-up appointment:   Ronan Camarillo MD  [address] [place] [address] [phone]    Follow up on 3/28/2024  MD visit and chemo    Yuliet Martinez APRN  [address] [place] [address] 111  [facility] [address] [phone]    Follow up on 3/21/2024  cbc and assessment    Appointments for Next 30 Days 3/17/2024 - 4/16/2024        Date Arrival Time Visit Type Length Department Provider     3/20/2024 10:45 AM  FOLLOW UP-HEM/ONC [8791] 15 min [facility] in [place] Yuliet Martinez APRN    Patient Instructions:         Location Instructions:     **IF YOU NEED LABWORK OR AN INFUSION ALONG WITH YOUR APPOINTMENT, YOU MUST CALL TO SCHEDULE.**  Your appointment is on the [facility] campus in the Cancer Center. The address is [address] [place] [address]. Please register at the [facility]  on the second floor.  Masks are optional for all patients and visitors, unless otherwise indicated.               3/28/2024 10:30 AM  CHEMOTHERAPY [2076] 60 min [facility] in [place] PF TX RN4    Patient Instructions:         Location Instructions:     Your appointment is scheduled at the [facility] in [place]. The address is [address]. Please park in the GREEN LOT and enter through the CANCER CENTER ENTRANCE of BUILDING A.. Once you arrive, please register at the [facility]  on the 1st floor.  Masks are optional for all patients and visitors, unless otherwise indicated. No care partners/visitors under 18 years of age are allowed in the infusion room.                3/28/2024 10:45 AM  ON TREATMENT VISIT FOLLOW-UP [2641] 15 min [facility] in [place] Ronan Camarillo MD    Patient Instructions:         Location Instructions:     **IF YOU NEED LABWORK OR AN INFUSION ALONG WITH YOUR APPOINTMENT, YOU MUST CALL TO SCHEDULE.**  Your appointment is scheduled at the [facility] in [place]. The address is [address] [place] [address]. Please park in the GREEN LOT and enter through the CANCER CENTER ENTRANCE of BUILDING A. Once you arrive, please register at the [facility]  on the 1st floor.  Masks are optional for all patients and visitors, unless otherwise indicated.                      Vital signs:  Temp:  [98.4 °F (36.9 °C)-98.9 °F (37.2 °C)] 98.6 °F (37 °C)  Pulse:  [69-77] 71  Resp:  [16-18] 17  BP: ()/(52-61) 106/61  SpO2:  [90 %-91 %] 91 %    Physical Exam:    General: No acute distress   Lungs: clear to auscultation  Cardiovascular: S1, S2  Abdomen: Soft      -----------------------------------------------------------------------------------------------  PATIENT DISCHARGE INSTRUCTIONS: See electronic chart    Jojo Barba MD    Total time spent on discharge plannin minutes     The  Century Cures Act makes medical notes like these available to patients in the interest of transparency. Please be advised this is a medical document. Medical documents are intended to carry relevant information, facts as evident, and the clinical opinion of the practitioner. The medical note is intended as peer to peer communication and may appear blunt or direct. It is written in medical language and may contain abbreviations or verbiage that are unfamiliar.

## 2024-03-17 NOTE — PLAN OF CARE
Pt a/o x4. VSS on RA. C/o pain & nausea. Prns given. Meds per MAR. IVF per order. Pt's home morphine ER med stored in pharmacy until pt dc. Security tag placed in pt bin w/ other home medication.     Fall risk protocol followed, call light within reach.     Problem: GASTROINTESTINAL - ADULT  Goal: Minimal or absence of nausea and vomiting  Description: INTERVENTIONS:  - Maintain adequate hydration with IV or PO as ordered and tolerated  - Nasogastric tube to low intermittent suction as ordered  - Evaluate effectiveness of ordered antiemetic medications  - Provide nonpharmacologic comfort measures as appropriate  - Advance diet as tolerated, if ordered  - Obtain nutritional consult as needed  - Evaluate fluid balance  Outcome: Progressing     Problem: PAIN - ADULT  Goal: Verbalizes/displays adequate comfort level or patient's stated pain goal  Description: INTERVENTIONS:  - Encourage pt to monitor pain and request assistance  - Assess pain using appropriate pain scale  - Administer analgesics based on type and severity of pain and evaluate response  - Implement non-pharmacological measures as appropriate and evaluate response  - Consider cultural and social influences on pain and pain management  - Manage/alleviate anxiety  - Utilize distraction and/or relaxation techniques  - Monitor for opioid side effects  - Notify MD/LIP if interventions unsuccessful or patient reports new pain  - Anticipate increased pain with activity and pre-medicate as appropriate  Outcome: Progressing

## 2024-03-17 NOTE — PROGRESS NOTES
Hematology/Oncology Progress Note    Patient Name: Lisa Iqbal  Medical Record Number: [id number]    YOB: 1950     Reason for Consultation:  Lisa Iqbal was seen today for the diagnosis of metastatic colon cancer    Interval events:      - afebrile last 24 hours  - stable abdominal pain, controlled  - says she feels ok to go home today    Inpatient Meds:   levothyroxine  25 mcg Oral Before breakfast    piperacillin-tazobactam  3.375 g Intravenous Q8H    morphINE ER  15 mg Oral 2 times per day    enoxaparin  40 mg Subcutaneous Daily    pantoprazole  40 mg Intravenous Q12H      sodium chloride 100 mL/hr at 03/16/24 2113       PRN Meds:  artificial saliva substitute, naproxen, cetirizine **AND** pseudoephedrine ER, acetaminophen, melatonin, polyethylene glycol (PEG 3350), sennosides, bisacodyl, fleet enema, ondansetron, benzonatate, guaiFENesin, glycerin-hypromellose-, sodium chloride, morphINE **OR** morphINE **OR** morphINE, prochlorperazine, ondansetron, prochlorperazine    Allergies:   Allergies   Allergen Reactions    Septra [Sulfamethoxazole W/Trimethoprim] RASH    Sulfa Antibiotics RASH       Vital Signs:  Height: --  Weight: 75.6 kg (166 lb 11.2 oz) (03/17 0605)  BSA (Calculated - sq m): --  Pulse: 71 (03/17 0745)  BP: 106/61 (03/17 0745)  Temp: 98.6 °F (37 °C) (03/17 0745)  Do Not Use - Resp Rate: --  SpO2: 91 % (03/17 0745)    Wt Readings from Last 6 Encounters:   03/17/24 75.6 kg (166 lb 11.2 oz)   02/29/24 73 kg (161 lb)   02/27/24 74.8 kg (165 lb)   01/25/24 75.3 kg (166 lb)   02/04/24 74.8 kg (165 lb)   01/15/24 76.7 kg (169 lb)       Physical Examination:  General: Patient is alert and oriented, not in acute distress  Psych: Mood and affect are appropriate  Eyes: EOMI, PERRL  ENT: Oropharynx is clear, no adenopathy  CV: no LE edema  Respiratory: Non-labored respirations  GI/Abd: Soft, slightly tender  Neurological: Grossly intact   Skin: no rashes or  petechiae    Laboratory:  Recent Labs   Lab 03/13/24  0949 03/14/24  0646 03/17/24  0600   WBC 11.3* 10.1 7.6   HGB 12.7 11.5* 11.1*   HCT 39.0 35.0 34.6*   .0 179.0 114.0*   MCV 85.7 86.2 86.1   RDW 14.5 14.4 15.2   NEPRELIM 8.80* 7.50 6.83       Recent Labs   Lab 03/13/24  0949 03/14/24  0646 03/16/24  0613    138 136   K 4.6 4.2 3.5    110 109   CO2 23.0 24.0 18.0*   BUN 13 12 17   CREATSERUM 0.63 0.69 0.74   GLU 94 86 115*   CA 9.5 8.9 8.2*   TP 7.1 6.2* 5.4*   ALB 2.9* 2.6* 2.1*   ALKPHO 253* 221* 165*   AST 71* 49* 113*   ALT 41 31 44   BILT 1.0 0.8 0.7       Recent Labs   Lab 03/14/24  0646   INR 1.39*       Impression & Plan:     Fever  - afebrile; RVP swab negative, blood cxs pending    Metastatic colon cancer  - s/p C1 FOLFOX    Cancer associated pain  - MS Contin 15mg BID  - has morphine 1-2mg IV PRN ordered by she is not using this much    Ok to DC; she has follow up 3/20    Ronan Novoa MD  Hematology/Medical Oncology  [facility]

## 2024-03-17 NOTE — PLAN OF CARE
Pt A+Ox4, Slovak speaking.  C/o headache this morning unrelieved with plain Tylenol.  Given Naprosyn with better relief.  No c/o N/V today, tolerated eating some strawberry parfait.  Up to chair for lunch.  R PIV and L PIV removed for discharge.  Discharge instructions reviewed with both patient and pt's daughter, Beryl, as .  Pt and daughter verbalizes understanding.  Teaching material given re side effects of FOLFOX, pt and daughter aware to avoid ice and cold products for 7 days since Oxaliplatin.  Daughter reports home health nurse is scheduled to come tomorrow to house.    Problem: GASTROINTESTINAL - ADULT  Goal: Minimal or absence of nausea and vomiting  Description: INTERVENTIONS:  - Maintain adequate hydration with IV or PO as ordered and tolerated  - Nasogastric tube to low intermittent suction as ordered  - Evaluate effectiveness of ordered antiemetic medications  - Provide nonpharmacologic comfort measures as appropriate  - Advance diet as tolerated, if ordered  - Obtain nutritional consult as needed  - Evaluate fluid balance  Outcome: Adequate for Discharge     Problem: PAIN - ADULT  Goal: Verbalizes/displays adequate comfort level or patient's stated pain goal  Description: INTERVENTIONS:  - Encourage pt to monitor pain and request assistance  - Assess pain using appropriate pain scale  - Administer analgesics based on type and severity of pain and evaluate response  - Implement non-pharmacological measures as appropriate and evaluate response  - Consider cultural and social influences on pain and pain management  - Manage/alleviate anxiety  - Utilize distraction and/or relaxation techniques  - Monitor for opioid side effects  - Notify MD/LIP if interventions unsuccessful or patient reports new pain  - Anticipate increased pain with activity and pre-medicate as appropriate  Outcome: Adequate for Discharge

## 2024-03-18 ENCOUNTER — TELEPHONE (OUTPATIENT)
Dept: INTERNAL MEDICINE CLINIC | Facility: CLINIC | Age: 74
End: 2024-03-18

## 2024-03-18 NOTE — PAT NURSING NOTE
PreOp Instructions     You are scheduled for: an Interventional Radiology Procedure     Date of Procedure: 03/27/24. CHECK IN AT 10:00 AM     Diet Instructions: Do not eat or drink anything after midnight     Medications: Medications you are allowed to take can be taken with a sip of water the morning of your procedure     Skin Prep: Shower with antibacterial soap using a clean washcloth, prior to procedure     Driving After Procedure: If sedation is given, you WILL NOT be able to drive home. You will need a responsible adult  to drive you home.     Discharge Teaching: Your nurse will give you specific instructions before discharge, Most people can resume normal activities in 2-3 days, Any questions, please call the physician's office

## 2024-03-18 NOTE — TELEPHONE ENCOUNTER
Dena with [facility] called to give update on patient    Patient was admitted to Home Health and will be seen by nurse once per week for 3 weeks.  Patient will be seen by Physical Therapy on 3/20 and will be seen by Occupational Therapy on 3/21.    Phone: [phone]  Fax: [phone]

## 2024-03-18 NOTE — PAYOR COMM NOTE
--------------  DISCHARGE REVIEW    [place]or: [place] [facility]  Subscriber #:  [id number]  Authorization Number: [id number]    Admit date: 3/13/24  Admit time:  12:36 PM  Discharge Date: 3/17/2024  2:31 PM     Admitting Physician: Jojo Barba MD  Attending Physician:  No att. providers found  Primary Care Physician: Anusha Lucio MD          Discharge Summary Notes        Discharge Summary signed by Jojo Barba MD at 3/17/2024 11:03 AM       Author: Jojo Barba MD Specialty: HOSPITALIST, Internal Medicine Author Type: Physician    Filed: 3/17/2024 11:03 AM Date of Service: 3/17/2024 11:01 AM Status: Signed    : Jojo Barba MD (Physician)           [facility]  DISCHARGE SUMMARY     Lisa Iqbal Patient Status:  Inpatient    10/11/1950 MRN [id number]   Location [facility] 4N-A Attending Jojo Barba MD   Hosp Day # 4 PCP Anusha Abraham MD     Date of Admission: 3/13/2024  Date of Discharge:   3/17/2024     Discharge Disposition: Home or Self Care    Discharge Diagnosis:  #Abdominal pain due to mets can and possible small abscess, started on chemo   IV ABx  IVFF  CLD  Onc on Cs- chemo   Pain control   # h/o esophageal ca   3fever overnight 3/15- 3/16  Pan Cx  Changed Abx from levaquin-->Pip tazo 3/16        History of Present Illness:   Lisa Iqbal is a 73 year old female who was in ER yesterday for abdominal pain and imaging revealed worsening  mets disease. Pt returned to ER with N, poor PO intake, vomiting.     Brief Synopsis:   Pt admitted, Received IVF. Starte don Chemotherapy. DC deferred on 3/16 due to fever overnight- IV Abx changed from levaquin--> Pip tazo and blood Cx collected 3/16-  NGTD. DC home with follow up with on on 3/20/24    Lace+ Score: 72  59-90 High Risk  29-58 Medium Risk  0-28   Low Risk       TCM Follow-Up Recommendation:  LACE > 58: High Risk of readmission after discharge from the hospital.      Procedures during  hospitalization:   no    Incidental or significant findings and recommendations (brief descriptions):  CONCLUSION:    1. 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  When compared to the previous CT PET scan this appears larger measuring 7.6 x 2.6 cm with nodularity in the adjacent mesentery and a few    adjacent lymph nodes.  In addition there is an irregular oval fluid collection superior to the main tumor mass measuring 2.1 x 1.4 cm which may represent tumor extension or a small abscess.  There is no bowel obstruction.   2. Extensive hepatic metastatic disease which appears to have progressed when compared to the previous CT PET scan.          Lab/Test results pending at Discharge:   Blood Cx     Consultants:  Onc     Discharge Medication List:     Discharge Medications        START taking these medications        Instructions Prescription details   amoxicillin clavulanate 875-125 MG Tabs  Commonly known as: Augmentin      Take 1 tablet by mouth 2 (two) times daily for 10 days.   Stop taking on: March 27, 2024  Quantity: 20 tablet  Refills: 0     morphINE ER 15 MG Tbcr  Commonly known as: MS Contin      Take 1 tablet (15 mg total) by mouth every 12 (twelve) hours.   Quantity: 60 tablet  Refills: 0     ondansetron 8 MG tablet  Commonly known as: Zofran      Take 1 tablet (8 mg total) by mouth every 8 (eight) hours as needed.   Quantity: 30 tablet  Refills: 0     prochlorperazine 10 mg tablet  Commonly known as: Compazine      Take 1 tablet (10 mg total) by mouth every 6 (six) hours as needed for Nausea or vomiting.   Quantity: 30 tablet  Refills: 0            CONTINUE taking these medications        Instructions Prescription details   Centrum Tabs      Take 1 tablet by mouth daily.   Refills: 0     cholecalciferol 50 MCG (2000 UT) Caps  Generic drug: Cholecalciferol      Take 1 capsule (2,000 Units total) by mouth daily.   Refills: 0      levothyroxine 50 MCG Tabs  Commonly known as: Synthroid      Take 1 tablet (50 mcg total) by mouth before breakfast.   Quantity: 90 tablet  Refills: 0     Omeprazole 40 MG Cpdr      Take one capsule nightly   Quantity: 90 capsule  Refills: 3            STOP taking these medications      levoFLOXacin 750 MG Tabs  Commonly known as: Levaquin        ondansetron 4 MG Tbdp  Commonly known as: Zofran-ODT                  Where to Get Your Medications        These medications were sent to [facility] [address] [facility], [facility] [address] [phone], [phone]  [address] [address], [facility] [address]      Phone: [phone]   morphINE ER 15 MG Tbcr  ondansetron 8 MG tablet  prochlorperazine 10 mg tablet       These medications were sent to [id number] - [place], [place] - [address] AT [facility] [address], [phone], [phone]  [address], [facility] [address]      Phone: [phone]   amoxicillin clavulanate 875-125 MG Tabs         [place]     Follow-up appointment:   Ronan Camarillo MD  [address] [address] [phone]    Follow up on 3/28/2024  MD visit and chemo    Yuliet Martinez APRN  [address] [place] [address] [address]  [facility] [address] [phone]    Follow up on 3/21/2024  cbc and assessment    Appointments for Next 30 Days 3/17/2024 - 4/16/2024        Date Arrival Time Visit Type Length Department Provider     3/20/2024 10:45 AM  FOLLOW UP-HEM/ONC [7591] 15 min [facility] in [place] Yuliet Martinez APRN    Patient Instructions:         Location Instructions:     **IF YOU NEED LABWORK OR AN INFUSION ALONG WITH YOUR APPOINTMENT, YOU MUST CALL TO SCHEDULE.**  Your appointment is on the [facility] campus in the Cancer Center. The address is [address] [place] [address]. Please register at the Cancer Center  on the second floor.  Masks are optional for all patients  and visitors, unless otherwise indicated.               3/28/2024 10:30 AM  CHEMOTHERAPY [] 60 min [facility] in [place] PF TX RN4    Patient Instructions:         Location Instructions:     Your appointment is scheduled at the [facility] in [place]. The address is [address] [place] [address]. Please park in the GREEN LOT and enter through the CANCER CENTER ENTRANCE of BUILDING A.. Once you arrive, please register at the Cancer Center  on the 1st floor.  Masks are optional for all patients and visitors, unless otherwise indicated. No care partners/visitors under 18 years of age are allowed in the infusion room.               3/28/2024 10:45 AM  ON TREATMENT VISIT FOLLOW-UP [2641] 15 min [facility] Ronan Camarillo MD    Patient Instructions:         Location Instructions:     **IF YOU NEED LABWORK OR AN INFUSION ALONG WITH YOUR APPOINTMENT, YOU MUST CALL TO SCHEDULE.**  Your appointment is scheduled at the [facility] in [place]. The address is [address] [place] [address]. Please park in the GREEN LOT and enter through the CANCER CENTER ENTRANCE of BUILDING A. Once you arrive, please register at the [facility]  on the 1st floor.  Masks are optional for all patients and visitors, unless otherwise indicated.                      Vital signs:  Temp:  [98.4 °F (36.9 °C)-98.9 °F (37.2 °C)] 98.6 °F (37 °C)  Pulse:  [69-77] 71  Resp:  [16-18] 17  BP: ()/(52-61) 106/61  SpO2:  [90 %-91 %] 91 %    Physical Exam:    General: No acute distress   Lungs: clear to auscultation  Cardiovascular: S1, S2  Abdomen: Soft      -----------------------------------------------------------------------------------------------  PATIENT DISCHARGE INSTRUCTIONS: See electronic chart    Jojo Barba MD    Total time spent on discharge plannin minutes     The  Century Cures Act makes medical notes like these available to  patients in the interest of transparency. Please be advised this is a medical document. Medical documents are intended to carry relevant information, facts as evident, and the clinical opinion of the practitioner. The medical note is intended as peer to peer communication and may appear blunt or direct. It is written in medical language and may contain abbreviations or verbiage that are unfamiliar.       Electronically signed by Jojo Barba MD on 3/17/2024 11:03 AM         REVIEWER COMMENTS

## 2024-03-19 ENCOUNTER — PATIENT OUTREACH (OUTPATIENT)
Dept: CASE MANAGEMENT | Age: 74
End: 2024-03-19

## 2024-03-20 ENCOUNTER — OFFICE VISIT (OUTPATIENT)
Dept: HEMATOLOGY/ONCOLOGY | Facility: HOSPITAL | Age: 74
End: 2024-03-20
Attending: INTERNAL MEDICINE
Payer: MEDICARE

## 2024-03-20 ENCOUNTER — DOCUMENTATION ONLY (OUTPATIENT)
Dept: HEMATOLOGY/ONCOLOGY | Facility: HOSPITAL | Age: 74
End: 2024-03-20

## 2024-03-20 VITALS
SYSTOLIC BLOOD PRESSURE: 127 MMHG | RESPIRATION RATE: 16 BRPM | BODY MASS INDEX: 31.49 KG/M2 | WEIGHT: 166.81 LBS | HEART RATE: 84 BPM | TEMPERATURE: 97 F | OXYGEN SATURATION: 91 % | HEIGHT: 60.98 IN | DIASTOLIC BLOOD PRESSURE: 79 MMHG

## 2024-03-20 DIAGNOSIS — G89.3 CANCER RELATED PAIN: ICD-10-CM

## 2024-03-20 DIAGNOSIS — R63.0 POOR APPETITE: ICD-10-CM

## 2024-03-20 DIAGNOSIS — R11.2 CHEMOTHERAPY INDUCED NAUSEA AND VOMITING: ICD-10-CM

## 2024-03-20 DIAGNOSIS — C18.9 ADENOCARCINOMA OF COLON METASTATIC TO LIVER (HCC): Primary | ICD-10-CM

## 2024-03-20 DIAGNOSIS — C78.7 ADENOCARCINOMA OF COLON METASTATIC TO LIVER (HCC): Primary | ICD-10-CM

## 2024-03-20 DIAGNOSIS — T45.1X5A CHEMOTHERAPY INDUCED NAUSEA AND VOMITING: ICD-10-CM

## 2024-03-20 LAB
ALBUMIN SERPL-MCNC: 2.3 G/DL (ref 3.4–5)
ALBUMIN/GLOB SERPL: 0.7 {RATIO} (ref 1–2)
ALP LIVER SERPL-CCNC: 164 U/L
ALT SERPL-CCNC: 24 U/L
ANION GAP SERPL CALC-SCNC: 5 MMOL/L (ref 0–18)
AST SERPL-CCNC: 48 U/L (ref 15–37)
BASOPHILS # BLD AUTO: 0.02 X10(3) UL (ref 0–0.2)
BASOPHILS NFR BLD AUTO: 0.2 %
BILIRUB SERPL-MCNC: 0.9 MG/DL (ref 0.1–2)
BUN BLD-MCNC: 12 MG/DL (ref 9–23)
CALCIUM BLD-MCNC: 9 MG/DL (ref 8.5–10.1)
CHLORIDE SERPL-SCNC: 106 MMOL/L (ref 98–112)
CO2 SERPL-SCNC: 28 MMOL/L (ref 21–32)
CREAT BLD-MCNC: 0.64 MG/DL
EGFRCR SERPLBLD CKD-EPI 2021: 93 ML/MIN/1.73M2 (ref 60–?)
EOSINOPHIL # BLD AUTO: 0.3 X10(3) UL (ref 0–0.7)
EOSINOPHIL NFR BLD AUTO: 2.6 %
ERYTHROCYTE [DISTWIDTH] IN BLOOD BY AUTOMATED COUNT: 15.4 %
FASTING STATUS PATIENT QL REPORTED: NO
GLOBULIN PLAS-MCNC: 3.5 G/DL (ref 2.8–4.4)
GLUCOSE BLD-MCNC: 91 MG/DL (ref 70–99)
HCT VFR BLD AUTO: 37.7 %
HGB BLD-MCNC: 12.1 G/DL
IMM GRANULOCYTES # BLD AUTO: 0.14 X10(3) UL (ref 0–1)
IMM GRANULOCYTES NFR BLD: 1.2 %
LYMPHOCYTES # BLD AUTO: 0.44 X10(3) UL (ref 1–4)
LYMPHOCYTES NFR BLD AUTO: 3.8 %
MCH RBC QN AUTO: 27.2 PG (ref 26–34)
MCHC RBC AUTO-ENTMCNC: 32.1 G/DL (ref 31–37)
MCV RBC AUTO: 84.7 FL
MONOCYTES # BLD AUTO: 0.07 X10(3) UL (ref 0.1–1)
MONOCYTES NFR BLD AUTO: 0.6 %
NEUTROPHILS # BLD AUTO: 10.48 X10 (3) UL (ref 1.5–7.7)
NEUTROPHILS # BLD AUTO: 10.48 X10(3) UL (ref 1.5–7.7)
NEUTROPHILS NFR BLD AUTO: 91.6 %
OSMOLALITY SERPL CALC.SUM OF ELEC: 287 MOSM/KG (ref 275–295)
PLATELET # BLD AUTO: 128 10(3)UL (ref 150–450)
POTASSIUM SERPL-SCNC: 4.1 MMOL/L (ref 3.5–5.1)
PROT SERPL-MCNC: 5.8 G/DL (ref 6.4–8.2)
RBC # BLD AUTO: 4.45 X10(6)UL
SODIUM SERPL-SCNC: 139 MMOL/L (ref 136–145)
WBC # BLD AUTO: 11.5 X10(3) UL (ref 4–11)

## 2024-03-20 PROCEDURE — G2211 COMPLEX E/M VISIT ADD ON: HCPCS | Performed by: NURSE PRACTITIONER

## 2024-03-20 PROCEDURE — 99215 OFFICE O/P EST HI 40 MIN: CPT | Performed by: NURSE PRACTITIONER

## 2024-03-20 RX ORDER — OLANZAPINE 2.5 MG/1
2.5 TABLET, FILM COATED ORAL NIGHTLY
Qty: 30 TABLET | Refills: 0 | Status: SHIPPED | OUTPATIENT
Start: 2024-03-20

## 2024-03-20 NOTE — PROGRESS NOTES
ONCOLOGY NUTRITION SCREEN complete as triggered by [facility] dx of metastatic colon cancer. Chart reviewed. Pt appears nutritionally stable at present. Noted pt receiving tx at location when RD not physically present; however, RD available on consult via phone pending oncologist discretion.

## 2024-03-20 NOTE — PATIENT INSTRUCTIONS
Start olanzapine 2.5mg at bedtime, this was sent to pharmacy today.     During the day, take Zofran (ondansetron) as needed every 8 hours for nausea.     Stop prochlorperazine (Compazine) for nausea.

## 2024-03-20 NOTE — PROGRESS NOTES
Chief Complaint   Patient presents with    Follow - Up     Pt is here for follow up - Day 8 Folfox with [facility]; had first cycle inpatient. She reports to feeling unfell, mostly still feeling nausea and vomiting, complicated by morphine/antibiotics. Not eating and drinking as much. Denies diarrhea and constipation; some gas. Pain is improved; has been able to sleep.    Education Record    Learner:  Patient and Family Member    Disease / Diagnosis: colon cancer    Barriers / Limitations:  Language   Comments:    Method:  Brief focused and  utilized   Comments:    General Topics:  Diet, Medication, Pain, Side effects and symptom management, and Plan of care reviewed   Comments:    Outcome:  Shows understanding   Comments:

## 2024-03-20 NOTE — PROGRESS NOTES
Cancer Center Progress Note    Patient Name: Lisa Iqbal   YOB: 1950   Medical Record Number: [id number]   CSN: [id number]   Date of visit: 3/20/2024     Chief Complaint/Reason for Visit:  Chief Complaint   Patient presents with    Follow - Up      History of Present Illness: Lisa presents today for follow up of metastatic colon cancer, she was diagnosed last month in 2024. She was recently hospitalized at [facility] 3/13/2024-3/17/2024 for abdominal pain. She started chemotherapy FOLFOX during hospitalization, today is cycle 1 day 7.     Patient reports that since being home her abdominal pain is controlled with morphine ER, she rates pain as a 2 out of 10. She reports ongoing nausea that is worse while taking oral antibiotic. She is using Zofran and Compazine but reports that she is vomiting shortly after taking antiemetics at times. Appetite is poor. She denies any bowel changes. She has dry chapped lips.     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    Disorder of thyroid     Esophageal reflux     Exposure to medical diagnostic radiation     Last treatment 2018    History of adverse reaction to anesthesia     Has anxiety/nervousness when waking up    Migraines     Nausea and vomiting in adult 3/13/2024    Osteopenia     Osteoporosis     Vertigo     Visual impairment     Glasses       Surgical History:  Past Surgical History:   Procedure Laterality Date          x 3    COLONOSCOPY N/A 2024    Procedure: COLONOSCOPY;  Surgeon: Po Aviles MD;   Location:  ENDOSCOPY    HYSTERECTOMY      Bilateral ovaries remain    LARYNGOSCOPY,DIRCT,OP,BIOPSY  01/15/2018       Allergies:  Allergies   Allergen Reactions    Septra [Sulfamethoxazole W/Trimethoprim] RASH    Sulfa Antibiotics RASH       Family History:  Family History   Problem Relation Age of Onset    Cancer Father         liver cancer    Cancer Brother         liver cancer       Social History:  Social History     Socioeconomic History    Marital status: Single     Spouse name: Not on file    Number of children: 2    Years of education: Not on file    Highest education level: Not on file   Occupational History    Not on file   Tobacco Use    Smoking status: Never    Smokeless tobacco: Never   Vaping Use    Vaping Use: Never used   Substance and Sexual Activity    Alcohol use: No    Drug use: No    Sexual activity: Not on file   Other Topics Concern    Caffeine Concern No    Exercise Yes    Seat Belt No    Special Diet Yes     Comment: Balanced    Stress Concern No    Weight Concern Not Asked   Social History Narrative    ** Merged History Encounter **          Social Determinants of Health     Financial Resource Strain: Not on file   Food Insecurity: No Food Insecurity (3/13/2024)    Food Insecurity     Food Insecurity: Never true   Transportation Needs: No Transportation Needs (3/13/2024)    Transportation Needs     Lack of Transportation: No   Physical Activity: Not on file   Stress: Not on file   Social Connections: Not on file   Housing Stability: Low Risk  (3/13/2024)    Housing Stability     Housing Instability: No     Housing Instability Emergency: Not on file       Medications:    Current Outpatient Medications:     OLANZapine 2.5 MG Oral Tab, Take 1 tablet (2.5 mg total) by mouth nightly., Disp: 30 tablet, Rfl: 0    amoxicillin clavulanate 875-125 MG Oral Tab, Take 1 tablet by mouth 2 (two) times daily for 10 days., Disp: 20 tablet, Rfl: 0    prochlorperazine (COMPAZINE) 10 mg tablet, Take 1 tablet  (10 mg total) by mouth every 6 (six) hours as needed for Nausea or vomiting., Disp: 30 tablet, Rfl: 0    ondansetron (ZOFRAN) 8 MG tablet, Take 1 tablet (8 mg total) by mouth every 8 (eight) hours as needed., Disp: 30 tablet, Rfl: 0    morphINE ER 15 MG Oral Tab CR, Take 1 tablet (15 mg total) by mouth every 12 (twelve) hours., Disp: 60 tablet, Rfl: 0    levothyroxine 50 MCG Oral Tab, Take 1 tablet (50 mcg total) by mouth before breakfast., Disp: 90 tablet, Rfl: 0    Multiple Vitamins-Minerals (CENTRUM) Oral Tab, Take 1 tablet by mouth daily. (Patient not taking: Reported on 3/20/2024), Disp: , Rfl:     Vitamin D3 50 MCG (2000 UT) Oral Cap, Take 1 capsule (2,000 Units total) by mouth daily. (Patient not taking: Reported on 3/20/2024), Disp: , Rfl:     Review of Systems:  A comprehensive 14 point review of systems was completed.  Pertinent positives and negatives noted in the HPI.    Performance Status: ECOG 2-3    Physical Examination:  General: Patient is alert and oriented x 3, not in acute distress, in wheelchair  Vital Signs: Height: 154.9 cm (5' 0.98\") (03/20 1029)  Weight: 75.7 kg (166 lb 12.8 oz) (03/20 1029)  BSA (Calculated - sq m): 1.75 sq meters (03/20 1029)  Pulse: 84 (03/20 1029)  BP: 127/79 (03/20 1029)  Temp: 97.4 °F (36.3 °C) (03/20 1029)  Do Not Use - Resp Rate: --  SpO2: 91 % (03/20 1029)  HEENT: Anicteric, conjunctivae and sclerae clear, no oropharyngeal lesion/thrush, mucous membranes are dry, chapped lips   Chest: Clear to auscultation. Respirations unlabored.   Heart: Regular rate and rhythm.   Abdomen: Soft, non-distended, non-tender with present bowel sounds.  Extremities: No edema.  Neurological: Grossly intact.   Skin: warm, dry, no erythema or rash   Psych/Depression: mood and affect are appropriate.     Labs:     Recent Results (from the past 72 hour(s))   COMP METABOLIC PANEL [E]    Collection Time: 03/20/24 10:20 AM   Result Value Ref Range    Glucose 91 70 - 99 mg/dL    Sodium 139 136  - 145 mmol/L    Potassium 4.1 3.5 - 5.1 mmol/L    Chloride 106 98 - 112 mmol/L    CO2 28.0 21.0 - 32.0 mmol/L    Anion Gap 5 0 - 18 mmol/L    BUN 12 9 - 23 mg/dL    Creatinine 0.64 0.55 - 1.02 mg/dL    Calcium, Total 9.0 8.5 - 10.1 mg/dL    Calculated Osmolality 287 275 - 295 mOsm/kg    eGFR-Cr 93 >=60 mL/min/1.73m2    AST 48 (H) 15 - 37 U/L    ALT 24 13 - 56 U/L    Alkaline Phosphatase 164 (H) 55 - 142 U/L    Bilirubin, Total 0.9 0.1 - 2.0 mg/dL    Total Protein 5.8 (L) 6.4 - 8.2 g/dL    Albumin 2.3 (L) 3.4 - 5.0 g/dL    Globulin  3.5 2.8 - 4.4 g/dL    A/G Ratio 0.7 (L) 1.0 - 2.0    Patient Fasting for CMP? No    CBC W/ DIFFERENTIAL    Collection Time: 03/20/24 10:20 AM   Result Value Ref Range    WBC 11.5 (H) 4.0 - 11.0 x10(3) uL    RBC 4.45 3.80 - 5.30 x10(6)uL    HGB 12.1 12.0 - 16.0 g/dL    HCT 37.7 35.0 - 48.0 %    .0 (L) 150.0 - 450.0 10(3)uL    MCV 84.7 80.0 - 100.0 fL    MCH 27.2 26.0 - 34.0 pg    MCHC 32.1 31.0 - 37.0 g/dL    RDW 15.4 %    Neutrophil Absolute Prelim 10.48 (H) 1.50 - 7.70 x10 (3) uL    Neutrophil Absolute 10.48 (H) 1.50 - 7.70 x10(3) uL    Lymphocyte Absolute 0.44 (L) 1.00 - 4.00 x10(3) uL    Monocyte Absolute 0.07 (L) 0.10 - 1.00 x10(3) uL    Eosinophil Absolute 0.30 0.00 - 0.70 x10(3) uL    Basophil Absolute 0.02 0.00 - 0.20 x10(3) uL    Immature Granulocyte Absolute 0.14 0.00 - 1.00 x10(3) uL    Neutrophil % 91.6 %    Lymphocyte % 3.8 %    Monocyte % 0.6 %    Eosinophil % 2.6 %    Basophil % 0.2 %    Immature Granulocyte % 1.2 %   Scan slide    Collection Time: 03/20/24 10:20 AM   Result Value Ref Range    Slide Review       Slide reviewed, normal WBC, RBC, and platelet morphology.       Impression/Plan    Metastatic colon cancer: metastases to liver. Started on FOLFOX chemotherapy on 3/14/2024. Tolerated with expected side effects, CBC and CMP reviewed. Scheduled for port-a-cath on 3/27/2024 prior to cycle 2.     Nausea: related to chemotherapy and malignancy. Start olanzapine  2.5mg at bedtime and titrate up if tolerated. Continue Zofran ODT PRN during the day, stop Compazine. Reviewed non-pharmacologic nausea measures    Poor appetite: weight is stable, reviewed oral supplementation with protein shakes. Olanzapine also may help as appetite stimulant     Cancer related pain: on morphine ER 15mg BID with control of pain    Planned Follow Up: 3/28/2024 follow up with Dr. Camarillo, labs and chemo    Risk Level: HIGH metastatic colon cancer receiving chemotherapy requiring close monitoring     The 21st Century Cures Act makes medical notes like these available to patients in the interest of transparency. Please be advised this is a medical document. Medical documents are intended to carry relevant information, facts as evident, and the clinical opinion of the practitioner. The medical note is intended as peer to peer communication and may appear blunt or direct. It is written in medical language and may contain abbreviations or verbiage that are unfamiliar.     Electronically Signed by:    INDIRA Lerma, NP-C  Nurse Practitioner  [facility]

## 2024-03-22 ENCOUNTER — OFFICE VISIT (OUTPATIENT)
Dept: HEMATOLOGY/ONCOLOGY | Facility: HOSPITAL | Age: 74
End: 2024-03-22
Attending: INTERNAL MEDICINE
Payer: MEDICARE

## 2024-03-22 VITALS
OXYGEN SATURATION: 91 % | RESPIRATION RATE: 18 BRPM | DIASTOLIC BLOOD PRESSURE: 80 MMHG | SYSTOLIC BLOOD PRESSURE: 132 MMHG | HEART RATE: 101 BPM | TEMPERATURE: 98 F

## 2024-03-22 DIAGNOSIS — C78.7 COLON CANCER METASTASIZED TO LIVER (HCC): Primary | ICD-10-CM

## 2024-03-22 DIAGNOSIS — C18.9 COLON CANCER METASTASIZED TO LIVER (HCC): Primary | ICD-10-CM

## 2024-03-22 PROCEDURE — 96365 THER/PROPH/DIAG IV INF INIT: CPT

## 2024-03-22 NOTE — PROGRESS NOTES
Education Record    Learner:  Patient    Disease / Diagnosis:IV fluids    Barriers / Limitations:  Language   Comments:    Method:  Discussion   Comments:    General Topics:  Plan of care reviewed   Comments:    Outcome:  Shows understanding   Comments:Tolerated iv fluids without incident. Discharged to home accompanied by daughter.

## 2024-03-24 ENCOUNTER — APPOINTMENT (OUTPATIENT)
Dept: ULTRASOUND IMAGING | Facility: HOSPITAL | Age: 74
End: 2024-03-24
Attending: STUDENT IN AN ORGANIZED HEALTH CARE EDUCATION/TRAINING PROGRAM
Payer: MEDICARE

## 2024-03-24 ENCOUNTER — APPOINTMENT (OUTPATIENT)
Dept: GENERAL RADIOLOGY | Facility: HOSPITAL | Age: 74
End: 2024-03-24
Attending: STUDENT IN AN ORGANIZED HEALTH CARE EDUCATION/TRAINING PROGRAM
Payer: MEDICARE

## 2024-03-24 ENCOUNTER — HOSPITAL ENCOUNTER (INPATIENT)
Facility: HOSPITAL | Age: 74
LOS: 1 days | Discharge: HOME HEALTH | End: 2024-03-25
Attending: STUDENT IN AN ORGANIZED HEALTH CARE EDUCATION/TRAINING PROGRAM | Admitting: STUDENT IN AN ORGANIZED HEALTH CARE EDUCATION/TRAINING PROGRAM
Payer: MEDICARE

## 2024-03-24 ENCOUNTER — HOSPITAL ENCOUNTER (OUTPATIENT)
Age: 74
Discharge: ANOTHER HOSPITAL | End: 2024-03-24
Attending: EMERGENCY MEDICINE
Payer: MEDICARE

## 2024-03-24 VITALS
HEIGHT: 64 IN | DIASTOLIC BLOOD PRESSURE: 61 MMHG | SYSTOLIC BLOOD PRESSURE: 109 MMHG | HEART RATE: 83 BPM | BODY MASS INDEX: 27.66 KG/M2 | OXYGEN SATURATION: 94 % | WEIGHT: 162 LBS | TEMPERATURE: 98 F | RESPIRATION RATE: 20 BRPM

## 2024-03-24 DIAGNOSIS — M79.89 LEG SWELLING: Primary | ICD-10-CM

## 2024-03-24 DIAGNOSIS — R60.9 EDEMA, UNSPECIFIED TYPE: Primary | ICD-10-CM

## 2024-03-24 LAB
ALBUMIN SERPL-MCNC: 2.7 G/DL (ref 3.4–5)
ALBUMIN/GLOB SERPL: 0.7 {RATIO} (ref 1–2)
ALP LIVER SERPL-CCNC: 246 U/L
ALT SERPL-CCNC: 25 U/L
ANION GAP SERPL CALC-SCNC: 7 MMOL/L (ref 0–18)
AST SERPL-CCNC: 43 U/L (ref 15–37)
BASOPHILS # BLD AUTO: 0.01 X10(3) UL (ref 0–0.2)
BASOPHILS NFR BLD AUTO: 0.3 %
BILIRUB SERPL-MCNC: 1.1 MG/DL (ref 0.1–2)
BUN BLD-MCNC: 11 MG/DL (ref 9–23)
CALCIUM BLD-MCNC: 9.1 MG/DL (ref 8.5–10.1)
CHLORIDE SERPL-SCNC: 101 MMOL/L (ref 98–112)
CO2 SERPL-SCNC: 30 MMOL/L (ref 21–32)
CREAT BLD-MCNC: 0.72 MG/DL
EGFRCR SERPLBLD CKD-EPI 2021: 88 ML/MIN/1.73M2 (ref 60–?)
EOSINOPHIL # BLD AUTO: 0.35 X10(3) UL (ref 0–0.7)
EOSINOPHIL NFR BLD AUTO: 10.1 %
ERYTHROCYTE [DISTWIDTH] IN BLOOD BY AUTOMATED COUNT: 15.1 %
GLOBULIN PLAS-MCNC: 3.8 G/DL (ref 2.8–4.4)
GLUCOSE BLD-MCNC: 114 MG/DL (ref 70–99)
HCT VFR BLD AUTO: 34.9 %
HGB BLD-MCNC: 11.1 G/DL
IMM GRANULOCYTES # BLD AUTO: 0.03 X10(3) UL (ref 0–1)
IMM GRANULOCYTES NFR BLD: 0.9 %
LYMPHOCYTES # BLD AUTO: 0.57 X10(3) UL (ref 1–4)
LYMPHOCYTES NFR BLD AUTO: 16.5 %
MCH RBC QN AUTO: 26.9 PG (ref 26–34)
MCHC RBC AUTO-ENTMCNC: 31.8 G/DL (ref 31–37)
MCV RBC AUTO: 84.7 FL
MONOCYTES # BLD AUTO: 0.15 X10(3) UL (ref 0.1–1)
MONOCYTES NFR BLD AUTO: 4.3 %
NEUTROPHILS # BLD AUTO: 2.34 X10 (3) UL (ref 1.5–7.7)
NEUTROPHILS # BLD AUTO: 2.34 X10(3) UL (ref 1.5–7.7)
NEUTROPHILS NFR BLD AUTO: 67.9 %
NT-PROBNP SERPL-MCNC: 534 PG/ML (ref ?–125)
OSMOLALITY SERPL CALC.SUM OF ELEC: 286 MOSM/KG (ref 275–295)
PLATELET # BLD AUTO: 142 10(3)UL (ref 150–450)
POTASSIUM SERPL-SCNC: 3.6 MMOL/L (ref 3.5–5.1)
PROT SERPL-MCNC: 6.5 G/DL (ref 6.4–8.2)
RBC # BLD AUTO: 4.12 X10(6)UL
SODIUM SERPL-SCNC: 138 MMOL/L (ref 136–145)
TROPONIN I SERPL HS-MCNC: 5 NG/L
WBC # BLD AUTO: 3.5 X10(3) UL (ref 4–11)

## 2024-03-24 PROCEDURE — 99223 1ST HOSP IP/OBS HIGH 75: CPT | Performed by: STUDENT IN AN ORGANIZED HEALTH CARE EDUCATION/TRAINING PROGRAM

## 2024-03-24 PROCEDURE — 99213 OFFICE O/P EST LOW 20 MIN: CPT

## 2024-03-24 PROCEDURE — 93970 EXTREMITY STUDY: CPT | Performed by: STUDENT IN AN ORGANIZED HEALTH CARE EDUCATION/TRAINING PROGRAM

## 2024-03-24 PROCEDURE — 71045 X-RAY EXAM CHEST 1 VIEW: CPT | Performed by: STUDENT IN AN ORGANIZED HEALTH CARE EDUCATION/TRAINING PROGRAM

## 2024-03-24 RX ORDER — FUROSEMIDE 10 MG/ML
20 INJECTION INTRAMUSCULAR; INTRAVENOUS ONCE
Status: COMPLETED | OUTPATIENT
Start: 2024-03-24 | End: 2024-03-24

## 2024-03-24 NOTE — ED INITIAL ASSESSMENT (HPI)
Patient arrives to the ED via walk-in in wheelchair with c/o bilateral leg swelling since this afternoon. Denies cardiac hx. States she recently rec'd treatment for colon cancer with mets to the liver. Per ED MD Oh, \"She denies any chest pain shortness of breath. No prior history of congestive heart failure. She denies any calf pain. No prior history of DVT or PE. Patient states that she is currently undergoing chemotherapy. \"    Later in triage, patient states she went 5 or 6 days without pooping, and now that she has been pooping (diarrhea) she noticed the swelling in her lower extremities has started.

## 2024-03-24 NOTE — ED QUICK NOTES
Unsuccessful on IV attempt and blood draw. Patient has a lot of bruising to left arm. Another RN asked to attempt with US

## 2024-03-24 NOTE — ED PROVIDER NOTES
Patient Seen in: [facility] Emergency Department      History     Chief Complaint   Patient presents with    Swelling Edema     Stated Complaint: BLE swelling    Subjective:   HPI    The patient is a 73-year-old female with a history of sigmoid colon cancer presented to the ER with reports of bilateral lower extremity swelling that started around noon.  She also notes that she has had several episodes of diarrhea since this afternoon as well.  Patient tells me she recently started chemotherapy for colon cancer and was also placed on amoxicillin.  She denies any shortness of breath or chest pain.  Patient went to immediate care but was recommended to come to the ER for further evaluation.    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    Disorder of thyroid     Esophageal reflux     Exposure to medical diagnostic radiation     Last treatment 2018    History of adverse reaction to anesthesia     Has anxiety/nervousness when waking up    Migraines     Nausea and vomiting in adult 3/13/2024    Osteopenia     Osteoporosis     Vertigo     Visual impairment     Glasses              Past Surgical History:   Procedure Laterality Date          x 3    COLONOSCOPY N/A 2024    Procedure: COLONOSCOPY;  Surgeon: Po Aviles MD;  Location:  ENDOSCOPY    HYSTERECTOMY      Bilateral ovaries remain    LARYNGOSCOPY,DIRCT,OP,BIOPSY  01/15/2018                Social History     Socioeconomic History    Marital status: Single    Number of children: 2   Tobacco Use    Smoking status: Never    Smokeless tobacco: Never   Vaping Use    Vaping Use: Never used   Substance and Sexual Activity    Alcohol use: No    Drug use: No   Other Topics Concern    Caffeine Concern No    Exercise Yes    Seat Belt No    Special Diet Yes     Comment: Balanced    Stress Concern No   Social History Narrative    ** Merged History Encounter **          Social Determinants of  Health     Food Insecurity: No Food Insecurity (3/13/2024)    Food Insecurity     Food Insecurity: Never true   Transportation Needs: No Transportation Needs (3/13/2024)    Transportation Needs     Lack of Transportation: No   Housing Stability: Low Risk  (3/13/2024)    Housing Stability     Housing Instability: No              Review of Systems    Positive for stated complaint: BLE swelling  Other systems are as noted in HPI.  Constitutional and vital signs reviewed.      All other systems reviewed and negative except as noted above.    Physical Exam     ED Triage Vitals [03/24/24 1619]   /69   Pulse 83   Resp 14   Temp 98.7 °F (37.1 °C)   Temp src Temporal   SpO2 94 %   O2 Device None (Room air)       Current:/72   Pulse 72   Temp 98.7 °F (37.1 °C) (Temporal)   Resp 16   SpO2 98%         Physical Exam  Vitals and nursing note reviewed.   Constitutional:       General: She is not in acute distress.     Appearance: Normal appearance.   HENT:      Head: Normocephalic.      Nose: Nose normal.      Mouth/Throat:      Mouth: Mucous membranes are moist.   Eyes:      Extraocular Movements: Extraocular movements intact.      Pupils: Pupils are equal, round, and reactive to light.   Cardiovascular:      Rate and Rhythm: Normal rate and regular rhythm.      Pulses: Normal pulses.   Pulmonary:      Effort: Pulmonary effort is normal.   Abdominal:      General: Abdomen is flat. Bowel sounds are normal. There is no distension.      Palpations: Abdomen is soft.      Tenderness: There is no abdominal tenderness. There is no right CVA tenderness, left CVA tenderness, guarding or rebound.      Hernia: No hernia is present.   Musculoskeletal:         General: No swelling or tenderness. Normal range of motion.      Cervical back: Normal range of motion.      Right lower leg: Edema present.      Left lower leg: Edema present.   Skin:     General: Skin is warm and dry.   Neurological:      Mental Status: She is alert  and oriented to person, place, and time. Mental status is at baseline.   Psychiatric:         Mood and Affect: Mood normal.               ED Course     Labs Reviewed   COMP METABOLIC PANEL (14) - Abnormal; Notable for the following components:       Result Value    Glucose 114 (*)     AST 43 (*)     Alkaline Phosphatase 246 (*)     Albumin 2.7 (*)     A/G Ratio 0.7 (*)     All other components within normal limits   PRO BETA NATRIURETIC PEPTIDE - Abnormal; Notable for the following components:    Pro-Beta Natriuretic Peptide 534 (*)     All other components within normal limits   CBC W/ DIFFERENTIAL - Abnormal; Notable for the following components:    WBC 3.5 (*)     HGB 11.1 (*)     HCT 34.9 (*)     .0 (*)     Lymphocyte Absolute 0.57 (*)     All other components within normal limits   TROPONIN I HIGH SENSITIVITY - Normal   CBC WITH DIFFERENTIAL WITH PLATELET    Narrative:     The following orders were created for panel order CBC With Differential With Platelet.  Procedure                               Abnormality         Status                     ---------                               -----------         ------                     CBC W/ DIFFERENTIAL[[id number]]          Abnormal            Final result                 Please view results for these tests on the individual orders.   SCAN SLIDE   URINALYSIS, ROUTINE   RAINBOW DRAW BLUE   GI STOOL PANEL BY PCR   C. DIFFICILE(TOXIGENIC)PCR       US VENOUS DOPPLER LEG BILAT - DIAG IMG (CPT=93970)    Result Date: 3/24/2024  CONCLUSION:  Unremarkable bilateral lower extremity venous ultrasound examination.   LOCATION:  [facility]   Dictated by (CST): Isreal Gutiérrez MD on 3/24/2024 at 9:28 PM     Finalized by (CST): Isreal Gutiérrez MD on 3/24/2024 at 9:29 PM       XR CHEST AP PORTABLE  (CPT=71045)    Result Date: 3/24/2024  CONCLUSION:  1. There is improvement in the appearance of the chest radiograph since previous study with resolution of most of the findings of  vascular congestion. 2. There is persistent reticular increased density at lung bases which could represent residual or recurrent edema, interstitial lung disease or dependent atelectasis.    LOCATION:  [facility]      Dictated by (CST): Isreal Gutiérrez MD on 3/24/2024 at 6:06 PM     Finalized by (CST): Isreal Gutiérrez MD on 3/24/2024 at 6:07 PM            MDM      The differential includes the following  Congestive heart failure with exacerbation, DVT which is a life threat,  Diarrhea could be secondary to gastroenteritis, C. difficile given that she is on antibiotics    Pertinent comorbidities include  As listed above    Pertinent social history includes  As listed above    ER course  Arrival to the ER patient normotensive and hemodynamically stable.  She states that she had 1 episode of diarrhea but it was inside of her depends therefore we did not get to obtain a sample.  Her labs are notable for elevated proBNP of 534.    Ultimately patient with episodes of hypoxia down to 86% even when I was talking with her and she was sitting up wide-awake.  Given this and her lower extremity edema I will be admitting for echocardiogram and further evaluation.  I have discussed her case with the [facility] hospitalist.  Of note I did also speak with Dr. Trevizo to discuss cessation of her amoxicillin which is due to be stopped on March 27.  He did not think it was unreasonable.  Stool studies have been ordered to test for C. difficile once patient provides a sample.      Imaging studies  I reviewed the images and agree with the radiologist report that showed the following no evidence of DVT. CXR with some vascular congestion though improved       Admission disposition: 3/24/2024 10:16 PM           Medical Decision Making      Disposition and Plan     Clinical Impression:  1. Edema, unspecified type         Disposition:  Admit  3/24/2024 10:16 pm    Follow-up:  No follow-up provider specified.        Medications Prescribed:  Current  Discharge Medication List                            Hospital Problems       Present on Admission  Date Reviewed: 3/20/2024            ICD-10-CM Noted POA    * (Principal) Edema, unspecified type R60.9 3/24/2024 Unknown

## 2024-03-24 NOTE — ED PROVIDER NOTES
Patient Seen in: [facility]      History     Chief Complaint   Patient presents with    Swelling Edema     Stated Complaint: swelling    Subjective:   HPI    73-year-old female with a history of sigmoid colon cancer hypothyroidism, gastroesophageal reflux, migraines presents to the immediate care for complaints of increased swelling to both legs.  She denies any chest pain shortness of breath.  No prior history of congestive heart failure.  She denies any calf pain.  No prior history of DVT or PE.  Patient states that she is currently undergoing chemotherapy.    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    Disorder of thyroid     Esophageal reflux     Exposure to medical diagnostic radiation     Last treatment 2018    History of adverse reaction to anesthesia     Has anxiety/nervousness when waking up    Migraines     Nausea and vomiting in adult 3/13/2024    Osteopenia     Osteoporosis     Vertigo     Visual impairment     Glasses              Past Surgical History:   Procedure Laterality Date          x 3    COLONOSCOPY N/A 2024    Procedure: COLONOSCOPY;  Surgeon: Po Aviles MD;  Location:  ENDOSCOPY    HYSTERECTOMY      Bilateral ovaries remain    LARYNGOSCOPY,DIRCT,OP,BIOPSY  01/15/2018                Social History     Socioeconomic History    Marital status: Single    Number of children: 2   Tobacco Use    Smoking status: Never    Smokeless tobacco: Never   Vaping Use    Vaping Use: Never used   Substance and Sexual Activity    Alcohol use: No    Drug use: No   Other Topics Concern    Caffeine Concern No    Exercise Yes    Seat Belt No    Special Diet Yes     Comment: Balanced    Stress Concern No   Social History Narrative    ** Merged History Encounter **          Social Determinants of Health     Food Insecurity: No Food Insecurity (3/13/2024)    Food Insecurity     Food Insecurity: Never true    Transportation Needs: No Transportation Needs (3/13/2024)    Transportation Needs     Lack of Transportation: No   Housing Stability: Low Risk  (3/13/2024)    Housing Stability     Housing Instability: No              Review of Systems    Positive for stated complaint: swelling  Other systems are as noted in HPI.  Constitutional and vital signs reviewed.      All other systems reviewed and negative except as noted above.    Physical Exam     ED Triage Vitals [03/24/24 1502]   BP (!) 172/108   Pulse 94   Resp 20   Temp 98.2 °F (36.8 °C)   Temp src Temporal   SpO2 96 %   O2 Device None (Room air)       Current:/61   Pulse 83   Temp 98.2 °F (36.8 °C) (Temporal)   Resp 20   Ht 162.6 cm (5' 4\")   Wt 73.5 kg   SpO2 94%   BMI 27.81 kg/m²         Physical Exam  General: Alert and oriented. No acute distress.  HEENT: Normocephalic. No evidence of trauma. Extraocular movements are intact.  Cardiovascular exam: Regular rate and rhythm  Lungs: Clear to auscultation bilaterally.  Abdomen: Soft, nondistended, nontender.  Extremities: No evidence of deformity.  Patient noted bilateral pitting edema 2+.  Neuro: No focal deficit is noted.       ED Course   Labs Reviewed - No data to display     With patient's complaint of new onset pedal edema, patient is recommended to undergo venous Doppler ultrasound.  Unfortunately patient presented at the close of clinic today and we are not able to get this done in a timely fashion.  She will be referred to the emergency department for further evaluation.         MDM   Patient was screened and evaluated during this visit.   As a treating physician attending to the patient, I determined, within reasonable clinical confidence and prior to discharge, that an emergency medical condition was not or was no longer present.  There was no indication for further evaluation, treatment or admission on an emergency basis.  Comprehensive verbal and written discharge and follow-up instructions  were provided to help prevent relapse or worsening.  Patient was instructed to follow-up with her primary care provider for further evaluation and treatment, but to return immediately to the ER for worsening, concerning, new, changing or persisting symptoms.  I discussed the case with the patient and they had no questions, complaints, or concerns.  Patient felt comfortable going home.    ^^Please note that this report has been produced using speech recognition software and may contain errors related to that system including, but not limited to, errors in grammar, punctuation, and spelling, as well as words and phrases that possibly may have been recognized inappropriately.  If there are any questions or concerns, contact the dictating provider for clarification                                   Medical Decision Making      Disposition and Plan     Clinical Impression:  1. Leg swelling         Disposition:  Ic to ed  3/24/2024  3:20 pm    Follow-up:  No follow-up provider specified.        Medications Prescribed:  Current Discharge Medication List

## 2024-03-25 ENCOUNTER — APPOINTMENT (OUTPATIENT)
Dept: CV DIAGNOSTICS | Facility: HOSPITAL | Age: 74
End: 2024-03-25
Attending: STUDENT IN AN ORGANIZED HEALTH CARE EDUCATION/TRAINING PROGRAM
Payer: MEDICARE

## 2024-03-25 ENCOUNTER — TELEPHONE (OUTPATIENT)
Dept: INTERNAL MEDICINE CLINIC | Facility: CLINIC | Age: 74
End: 2024-03-25

## 2024-03-25 VITALS
HEART RATE: 72 BPM | OXYGEN SATURATION: 95 % | TEMPERATURE: 100 F | BODY MASS INDEX: 28 KG/M2 | DIASTOLIC BLOOD PRESSURE: 55 MMHG | RESPIRATION RATE: 18 BRPM | WEIGHT: 162 LBS | SYSTOLIC BLOOD PRESSURE: 104 MMHG

## 2024-03-25 PROBLEM — D69.6 THROMBOCYTOPENIA (HCC): Status: ACTIVE | Noted: 2024-03-25

## 2024-03-25 PROBLEM — C78.7 METASTATIC COLON CANCER TO LIVER (HCC): Status: ACTIVE | Noted: 2024-03-25

## 2024-03-25 PROBLEM — R19.7 DIARRHEA: Status: ACTIVE | Noted: 2024-03-25

## 2024-03-25 PROBLEM — D63.0 ANEMIA COMPLICATING NEOPLASTIC DISEASE: Status: ACTIVE | Noted: 2024-03-25

## 2024-03-25 PROBLEM — D61.818 PANCYTOPENIA (HCC): Status: ACTIVE | Noted: 2024-03-25

## 2024-03-25 PROBLEM — C18.9 METASTATIC COLON CANCER TO LIVER (HCC): Status: ACTIVE | Noted: 2024-03-25

## 2024-03-25 PROBLEM — C18.9 MALIGNANT NEOPLASM OF COLON (HCC): Status: ACTIVE | Noted: 2024-03-12

## 2024-03-25 LAB
ADENOVIRUS F 40/41 PCR: NEGATIVE
ASTROVIRUS PCR: NEGATIVE
BILIRUB UR QL STRIP.AUTO: NEGATIVE
C CAYETANENSIS DNA SPEC QL NAA+PROBE: NEGATIVE
C DIFF TOX B STL QL: NEGATIVE
CAMPY SP DNA.DIARRHEA STL QL NAA+PROBE: NEGATIVE
CEA SERPL-MCNC: 486.5 NG/ML (ref ?–5)
CLARITY UR REFRACT.AUTO: CLEAR
COLOR UR AUTO: COLORLESS
CRYPTOSP DNA SPEC QL NAA+PROBE: NEGATIVE
EAEC PAA PLAS AGGR+AATA ST NAA+NON-PRB: NEGATIVE
EC STX1+STX2 + H7 FLIC SPEC NAA+PROBE: NEGATIVE
ENTAMOEBA HISTOLYTICA PCR: NEGATIVE
EPEC EAE GENE STL QL NAA+NON-PROBE: NEGATIVE
ETEC LTA+ST1A+ST1B TOX ST NAA+NON-PROBE: NEGATIVE
GIARDIA LAMBLIA PCR: NEGATIVE
GLUCOSE UR STRIP.AUTO-MCNC: NORMAL MG/DL
KETONES UR STRIP.AUTO-MCNC: NEGATIVE MG/DL
LEUKOCYTE ESTERASE UR QL STRIP.AUTO: NEGATIVE
NITRITE UR QL STRIP.AUTO: NEGATIVE
NOROVIRUS GI/GII PCR: NEGATIVE
P SHIGELLOIDES DNA STL QL NAA+PROBE: NEGATIVE
PH UR STRIP.AUTO: 6.5 [PH] (ref 5–8)
PROT UR STRIP.AUTO-MCNC: NEGATIVE MG/DL
RBC UR QL AUTO: NEGATIVE
ROTAVIRUS A PCR: NEGATIVE
SALMONELLA DNA SPEC QL NAA+PROBE: NEGATIVE
SAPOVIRUS PCR: NEGATIVE
SHIGELLA SP+EIEC IPAH ST NAA+NON-PROBE: NEGATIVE
SP GR UR STRIP.AUTO: 1 (ref 1–1.03)
UROBILINOGEN UR STRIP.AUTO-MCNC: NORMAL MG/DL
V CHOLERAE DNA SPEC QL NAA+PROBE: NEGATIVE
VIBRIO DNA SPEC NAA+PROBE: NEGATIVE
YERSINIA DNA SPEC NAA+PROBE: NEGATIVE

## 2024-03-25 PROCEDURE — 93306 TTE W/DOPPLER COMPLETE: CPT | Performed by: STUDENT IN AN ORGANIZED HEALTH CARE EDUCATION/TRAINING PROGRAM

## 2024-03-25 PROCEDURE — 99223 1ST HOSP IP/OBS HIGH 75: CPT | Performed by: INTERNAL MEDICINE

## 2024-03-25 PROCEDURE — 99239 HOSP IP/OBS DSCHRG MGMT >30: CPT | Performed by: INTERNAL MEDICINE

## 2024-03-25 RX ORDER — MORPHINE SULFATE 15 MG/1
15 TABLET, FILM COATED, EXTENDED RELEASE ORAL EVERY 12 HOURS SCHEDULED
Status: DISCONTINUED | OUTPATIENT
Start: 2024-03-25 | End: 2024-03-25

## 2024-03-25 RX ORDER — LEVOTHYROXINE SODIUM 0.03 MG/1
25 TABLET ORAL
COMMUNITY

## 2024-03-25 RX ORDER — ACETAMINOPHEN 325 MG/1
650 TABLET ORAL EVERY 4 HOURS PRN
Status: DISCONTINUED | OUTPATIENT
Start: 2024-03-25 | End: 2024-03-25

## 2024-03-25 RX ORDER — HYDROCODONE BITARTRATE AND ACETAMINOPHEN 5; 325 MG/1; MG/1
1 TABLET ORAL EVERY 4 HOURS PRN
Status: DISCONTINUED | OUTPATIENT
Start: 2024-03-25 | End: 2024-03-25

## 2024-03-25 RX ORDER — ONDANSETRON 2 MG/ML
4 INJECTION INTRAMUSCULAR; INTRAVENOUS EVERY 6 HOURS PRN
Status: DISCONTINUED | OUTPATIENT
Start: 2024-03-25 | End: 2024-03-25

## 2024-03-25 RX ORDER — POLYETHYLENE GLYCOL 3350 17 G/17G
17 POWDER, FOR SOLUTION ORAL DAILY PRN
Status: DISCONTINUED | OUTPATIENT
Start: 2024-03-25 | End: 2024-03-25

## 2024-03-25 RX ORDER — ONDANSETRON 4 MG/1
4 TABLET, FILM COATED ORAL ONCE
Status: COMPLETED | OUTPATIENT
Start: 2024-03-25 | End: 2024-03-25

## 2024-03-25 RX ORDER — LEVOTHYROXINE SODIUM 0.03 MG/1
25 TABLET ORAL
Status: DISCONTINUED | OUTPATIENT
Start: 2024-03-25 | End: 2024-03-25

## 2024-03-25 RX ORDER — SENNOSIDES 8.6 MG
17.2 TABLET ORAL NIGHTLY PRN
Status: DISCONTINUED | OUTPATIENT
Start: 2024-03-25 | End: 2024-03-25

## 2024-03-25 RX ORDER — BISACODYL 10 MG
10 SUPPOSITORY, RECTAL RECTAL
Status: DISCONTINUED | OUTPATIENT
Start: 2024-03-25 | End: 2024-03-25

## 2024-03-25 RX ORDER — ECHINACEA PURPUREA EXTRACT 125 MG
1 TABLET ORAL
Status: DISCONTINUED | OUTPATIENT
Start: 2024-03-25 | End: 2024-03-25

## 2024-03-25 RX ORDER — MELATONIN
3 NIGHTLY PRN
Status: DISCONTINUED | OUTPATIENT
Start: 2024-03-25 | End: 2024-03-25

## 2024-03-25 RX ORDER — ENOXAPARIN SODIUM 100 MG/ML
40 INJECTION SUBCUTANEOUS DAILY
Status: DISCONTINUED | OUTPATIENT
Start: 2024-03-25 | End: 2024-03-25

## 2024-03-25 RX ORDER — BENZONATATE 200 MG/1
200 CAPSULE ORAL 3 TIMES DAILY PRN
Status: DISCONTINUED | OUTPATIENT
Start: 2024-03-25 | End: 2024-03-25

## 2024-03-25 RX ORDER — OLANZAPINE 2.5 MG/1
2.5 TABLET, FILM COATED ORAL NIGHTLY
Status: DISCONTINUED | OUTPATIENT
Start: 2024-03-25 | End: 2024-03-25

## 2024-03-25 RX ORDER — HYDROCODONE BITARTRATE AND ACETAMINOPHEN 5; 325 MG/1; MG/1
2 TABLET ORAL EVERY 4 HOURS PRN
Status: DISCONTINUED | OUTPATIENT
Start: 2024-03-25 | End: 2024-03-25

## 2024-03-25 NOTE — H&P
[facility]     Lisa Iqbal Patient Status:  Emergency    10/11/1950 MRN [id number]   Location [facility] EMERGENCY DEPARTMENT Attending Bernadette Vick MD   Hosp Day # 0 PCP Anusha Abraham MD     Chief Complaint: Lower extremity swelling, diarrhea    History of Present Illness: Lisa Iqbal is a 73 year old female with past medical history of hypothyroidism, sigmoid colon cancer, GERD, who presents for lower extremity edema and diarrhea.    Patient notes that she had sudden onset increased bilateral lower extremity edema starting today, states that she never had prior history of lower extremity edema, DVT.  Denies any significant associated calf pain, chest pain, fevers, chills, shortness of breath.  No prior history of CHF.  Also notes that she has been having significant diarrhea today, multiple loose watery bowel movements.  Does note that she recently started chemotherapy and is on antibiotic therapy prior to port placement.  Denies any abdominal pain, fevers.  Does endorse mild nausea currently.    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    Disorder of thyroid     Esophageal reflux     Exposure to medical diagnostic radiation     Last treatment 2018    History of adverse reaction to anesthesia     Has anxiety/nervousness when waking up    Migraines     Nausea and vomiting in adult 3/13/2024    Osteopenia     Osteoporosis     Vertigo     Visual impairment     Glasses        Past Surgical History:   Past Surgical History:   Procedure Laterality Date          x 3    COLONOSCOPY N/A 2024    Procedure: COLONOSCOPY;  Surgeon: Po Aviles MD;  Location:  ENDOSCOPY    HYSTERECTOMY      Bilateral ovaries remain    LARYNGOSCOPY,DIRCT,OP,BIOPSY  01/15/2018       Social History:  reports that she has never smoked. She has never used smokeless tobacco. She reports  that she does not drink alcohol and does not use drugs.    Family History:   Family History   Problem Relation Age of Onset    Cancer Father         liver cancer    Cancer Brother         liver cancer       Allergies:   Allergies   Allergen Reactions    Septra [Sulfamethoxazole W/Trimethoprim] RASH    Sulfa Antibiotics RASH       Medications:    Current Facility-Administered Medications on File Prior to Encounter   Medication Dose Route Frequency Provider Last Rate Last Admin    [COMPLETED] sodium chloride 0.9 % IV bolus 1,000 mL  1,000 mL Intravenous Once Yuliet Martinez APRN   Stopped at 24 1503    [COMPLETED] ondansetron (Zofran) 4 MG/2ML injection 4 mg  4 mg Intravenous Once Bernadette Vick MD   4 mg at 24 1016    [COMPLETED] levoFLOXacin in dextrose 5% (Levaquin) 750 mg/150mL IVPB premix 750 mg  750 mg Intravenous Once Bernadette Vick  mL/hr at 24 1103 750 mg at 24 1103    [] ketorolac (Toradol) 15 MG/ML injection 15 mg  15 mg Intravenous Q6H PRN Jojo Barba MD   15 mg at 24 1950    [COMPLETED] oxaliplatin (Eloxatin) 140 mg in dextrose 5% 278 mL infusion  85 mg/m2 Intravenous Once Ronan Camarillo  mL/hr at 24 1016 140 mg at 24 1016    [COMPLETED] leucovorin 650 mg in dextrose 5% 250 mL IVPB  400 mg/m2 Intravenous Once Ronan Camarillo MD   650 mg at 24 1016    [COMPLETED] fluorouracil (Adrucil) 650 mg IV push  400 mg/m2 Intravenous Once Ronan Camarillo MD   650 mg at 24 1344    [COMPLETED] ondansetron (Zofran) 16 mg, dexAMETHasone (Decadron) 20 mg in sodium chloride 0.9% 110 mL IVPB   Intravenous Once Ronan Camarillo MD   Given at 24 0941    [COMPLETED] ondansetron (Zofran) 4 MG/2ML injection 4 mg  4 mg Intravenous Once Whitney Guerrero MD   4 mg at 24 1850    [COMPLETED] sodium chloride 0.9 % IV bolus 1,000 mL  1,000 mL Intravenous Once Whitney Guerrero MD   Stopped at 241    [COMPLETED] iopamidol 76%  (ISOVUE-370) injection for power injector  80 mL Intravenous ONCE PRN Whitney Guerrero MD   80 mL at 24 191    [COMPLETED] levoFLOXacin (Levaquin) tab 750 mg  750 mg Oral Once Whitney Guerrero MD   750 mg at 24    [] midazolam (Versed) 2 MG/2ML injection 1 mg  1 mg Intravenous Q5 Min PRN Clayton Bahena MD   0.5 mg at 24 1008    [] fentaNYL (Sublimaze) 50 mcg/mL injection 50 mcg  50 mcg Intravenous Q5 Min PRN Clayton Bahena MD   25 mcg at 24 1017    [COMPLETED] acetaminophen (Tylenol Extra Strength) tab 1,000 mg  1,000 mg Oral Once Umberto Vee MD   1,000 mg at 24 193    [COMPLETED] iopamidol 76% (ISOVUE-370) injection for power injector  100 mL Intravenous ONCE PRN Maciej Baker MD   100 mL at 24 2237     Current Outpatient Medications on File Prior to Encounter   Medication Sig Dispense Refill    OLANZapine 2.5 MG Oral Tab Take 1 tablet (2.5 mg total) by mouth nightly. 30 tablet 0    amoxicillin clavulanate 875-125 MG Oral Tab Take 1 tablet by mouth 2 (two) times daily for 10 days. 20 tablet 0    ondansetron (ZOFRAN) 8 MG tablet Take 1 tablet (8 mg total) by mouth every 8 (eight) hours as needed. 30 tablet 0    morphINE ER 15 MG Oral Tab CR Take 1 tablet (15 mg total) by mouth every 12 (twelve) hours. 60 tablet 0    levothyroxine 50 MCG Oral Tab Take 1 tablet (50 mcg total) by mouth before breakfast. 90 tablet 0       Review of Systems:   A comprehensive 14 point review of systems was completed.    Pertinent positives and negatives noted in the HPI.    Physical Exam:    /72   Pulse 72   Temp 98.7 °F (37.1 °C) (Temporal)   Resp 16   SpO2 98%   General: No acute distress. Alert and oriented x 3.  HEENT: Normocephalic atraumatic. Moist mucous membranes. EOM-I. PERRLA. Anicteric.  Neck: No lymphadenopathy. No JVD. No carotid bruits.  Respiratory: Clear to auscultation bilaterally. No wheezes. No rhonchi.  Cardiovascular: S1, S2. Regular  rate and rhythm. No murmurs, rubs or gallops. Equal pulses.   Chest and Back: No tenderness or deformity.  Abdomen: Soft, nontender, nondistended.  Positive bowel sounds. No rebound, guarding or organomegaly.  Neurologic: No focal neurological deficits. CNII-XII grossly intact.  Musculoskeletal: Moves all extremities.  Extremities: Nonpitting edema bilaterally  Integument: No rashes or lesions.   Psychiatric: Appropriate mood and affect.    Diagnostic Data:      Labs:  Recent Labs   Lab 03/20/24  1020 03/24/24  1858   WBC 11.5* 3.5*   HGB 12.1 11.1*   MCV 84.7 84.7   .0* 142.0*       Recent Labs   Lab 03/20/24  1020 03/24/24  1858   GLU 91 114*   BUN 12 11   CREATSERUM 0.64 0.72   CA 9.0 9.1   ALB 2.3* 2.7*    138   K 4.1 3.6    101   CO2 28.0 30.0   ALKPHO 164* 246*   AST 48* 43*   ALT 24 25   BILT 0.9 1.1   TP 5.8* 6.5       Estimated Creatinine Clearance: 59.2 mL/min (based on SCr of 0.72 mg/dL).    No results for input(s): \"PTP\", \"INR\" in the last 168 hours.    No results for input(s): \"TROP\", \"CK\" in the last 168 hours.    Imaging: Imaging data reviewed in [facility].  US VENOUS DOPPLER LEG BILAT - DIAG IMG (CPT=93970)    Result Date: 3/24/2024  CONCLUSION:  Unremarkable bilateral lower extremity venous ultrasound examination.   LOCATION:  [facility]   Dictated by (CST): Isreal Gutiérrez MD on 3/24/2024 at 9:28 PM     Finalized by (CST): Isreal Gutiérrez MD on 3/24/2024 at 9:29 PM       XR CHEST AP PORTABLE  (CPT=71045)    Result Date: 3/24/2024  CONCLUSION:  1. There is improvement in the appearance of the chest radiograph since previous study with resolution of most of the findings of vascular congestion. 2. There is persistent reticular increased density at lung bases which could represent residual or recurrent edema, interstitial lung disease or dependent atelectasis.    LOCATION:  [facility]      Dictated by (CST): Isreal Gutiérrez MD on 3/24/2024 at 6:06 PM     Finalized by (CST): Isreal Gutiérrez MD on  3/24/2024 at 6:07 PM          ASSESSMENT / PLAN:     # LE edema   - US negative for DVT   - TTE ordered   -If TTE negative can start compression stockings for venous congestion     # Diarrhea   - GI PCR, C diff testing ordered   - Possibly related to recent abx, can consider immodium if infx w/u above negative    # Colon cancer with liver metastasis - oncology on consult  # Hypothyroidism - continue home levothyroxine   # Cancer associated pain - Morphine ER continued, norco PRN for breakthrough   # Pancytopenia - likely 2/2 chemotherapy, oncology on consult      Code Status: Not on file    Plan of care discussed with patient, ED physician    Kwadwo Monsivais MD  3/24/2024                Supplementary Documentation:      MDM : Patient's ER labs, imaging reviewed.  A.m. labs ordered.  ER management discussed with ED physician, decision made for patient to be admitted to the hospital for further medical management.

## 2024-03-25 NOTE — CONSULTS
[facility]  Report of Consultation    Lisa Iqbal Patient Status:  Inpatient    10/11/1950 MRN [id number]   Location [facility] 4NW-A Attending Rc Laguna MD   Hosp Day # 0 PCP Anusha Abraham MD     Reason for Consultation:    The patient was seen for evaluation and management of metastatic colon cancer.    History of Present Illness:    Lisa Iqbal is a a(n) 73 year old female. The patient presents with diarrhea and lower extremity edema.    She is being treated for metastatic colon cancer with diffuse liver metastases. She had a first cycle of FOLFOX chemotherapy two weeks ago (3/14 to 3/16). Last week she had poor appetite and oral intake through the week. She had no nausea or emesis. [facility] the [facility] on [facility]2024. She was given a liter of NS IVF. She says this helped her feel better on 3/23/2024 but yesterday 3/24/2024 she started to have frequent loose bowel movements and she developed bilateral lower extremity edema. She came to the [place] ED. She had a bilateral lower extremity venous doppler that was negative for DVT. She had a CXR that showed resolution of vascular congestion findings seen on 3/16 CXR. There was persistent reticular density at the lung bases.     She has less lower extremity edema this morning. She has no pain at this visit. She still feels very weak but she has some appetite this morning. She had stool sent for c diff that was negative. She had a UA that was negative for infection.    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    Disorder of thyroid     Esophageal reflux     Exposure to medical diagnostic radiation     Last treatment 2018    History of adverse reaction to anesthesia     Has anxiety/nervousness when waking up    Migraines     Nausea and vomiting in adult 3/13/2024    Osteopenia     Osteoporosis     Vertigo     Visual impairment     Glasses       Past Surgical  History:   Procedure Laterality Date          x 3    COLONOSCOPY N/A 2024    Procedure: COLONOSCOPY;  Surgeon: Po Aviles MD;  Location:  ENDOSCOPY    HYSTERECTOMY      Bilateral ovaries remain    LARYNGOSCOPY,DIRCT,OP,BIOPSY  01/15/2018       Family History:  Family History   Problem Relation Age of Onset    Cancer Father         liver cancer    Cancer Brother         liver cancer       Social History:  she reports that she has never smoked. She has never used smokeless tobacco. She reports that she does not drink alcohol and does not use drugs.    Allergies:  Allergies   Allergen Reactions    Septra [Sulfamethoxazole W/Trimethoprim] RASH    Sulfa Antibiotics RASH       Medications:    Current Facility-Administered Medications:     levothyroxine (Synthroid) tab 25 mcg, 25 mcg, Oral, Before breakfast    OLANZapine (ZyPREXA) tab 2.5 mg, 2.5 mg, Oral, Nightly    melatonin tab 3 mg, 3 mg, Oral, Nightly PRN    polyethylene glycol (PEG 3350) (Miralax) 17 g oral packet 17 g, 17 g, Oral, Daily PRN    sennosides (Senokot) tab 17.2 mg, 17.2 mg, Oral, Nightly PRN    bisacodyl (Dulcolax) 10 MG rectal suppository 10 mg, 10 mg, Rectal, Daily PRN    ondansetron (Zofran) 4 MG/2ML injection 4 mg, 4 mg, Intravenous, Q6H PRN    benzonatate (Tessalon) cap 200 mg, 200 mg, Oral, TID PRN    guaiFENesin (Robitussin) 100 MG/5 ML oral liquid 200 mg, 200 mg, Oral, Q4H PRN    glycerin-hypromellose- (Artifical Tears) 0.2-0.2-1 % ophthalmic solution 1 drop, 1 drop, Both Eyes, QID PRN    sodium chloride (Saline Mist) 0.65 % nasal solution 1 spray, 1 spray, Each Nare, Q3H PRN    enoxaparin (Lovenox) 40 MG/0.4ML SUBQ injection 40 mg, 40 mg, Subcutaneous, Daily    acetaminophen (Tylenol) tab 650 mg, 650 mg, Oral, Q4H PRN **OR** HYDROcodone-acetaminophen (Norco) 5-325 MG per tab 1 tablet, 1 tablet, Oral, Q4H PRN **OR** HYDROcodone-acetaminophen (Norco) 5-325 MG per tab 2 tablet, 2 tablet, Oral, Q4H PRN    morphINE ER  (MS Contin) tab 15 mg, 15 mg, Oral, 2 times per day    Review of Systems:  Constitutional: Negative for a fevers, chills, night sweats.  Eyes: Negative for visual disturbance, irritation and redness.  Respiratory: Negative for cough, hemoptysis, chest pain, or dyspnea.  Cardiovascular: Negative for angina, orthopnea or palpitations.  Integument/breast: Negative for rash, skin lesions, and pruritus.  Hematologic/lymphatic: Negative for easy bruising, bleeding, and lymphadenopathy.  Musculoskeletal: Negative for myalgias, arthralgias, Genitourinary: Negative for dysuria or hematuria  Psychiatric: The patient's mood was calm and appropriate for this visit.    The pertinent positives and negatives were described. All other systems were negative.    Physical Exam:   Vital Signs: BP 99/51 (BP Location: Right arm)   Pulse 72   Temp 99.4 °F (37.4 °C) (Oral)   Resp 18   Wt 73.5 kg (162 lb)   SpO2 95%   BMI 27.81 kg/m²   Constitutional: Patient is alert and oriented x 3, not in acute distress.  HEENT:  Oropharynx is clear.   Neck: No palpable lymphadenopathy. Neck is supple.  Respiratory: Clear to auscultation and percussion. No rales.  No wheezes.  Cardiovascular: Regular rate and rhythm.   Gastrointestinal: Soft, non tender with good bowel sounds.  Extremities: No edema. No calf tenderness.  Neurological: Grossly intact without focal motor or sensory deficit.  Lymphatics: There is no palpable lymphadenopathy throughout in the cervical, supraclavicular, or axillary regions.    Performance Status:  ECOG 2: Ambulatory and capable of all selfcare but unable to carry out any work activities. Up and about more than 50% of waking hours     Laboratory Data reviewed at this visit:    Recent Labs   Lab 03/20/24  1020 03/24/24  1858   RBC 4.45 4.12   HGB 12.1 11.1*   HCT 37.7 34.9*   MCV 84.7 84.7   MCH 27.2 26.9   MCHC 32.1 31.8   RDW 15.4 15.1   NEPRELIM 10.48* 2.34   WBC 11.5* 3.5*   .0* 142.0*       Recent Labs   Lab  03/20/24  1020 03/24/24  1858   GLU 91 114*   BUN 12 11   CREATSERUM 0.64 0.72   CA 9.0 9.1   ALB 2.3* 2.7*    138   K 4.1 3.6    101   CO2 28.0 30.0   ALKPHO 164* 246*   AST 48* 43*   ALT 24 25   BILT 0.9 1.1   TP 5.8* 6.5       Radiologic imaging reviewed at this visit:    CXR on 3/24/2024:  FINDINGS: There are reticular areas of increased density in lower lungs bilaterally. Diffuse prominence of interstitial and pulmonary vascular markings noted previously has improved. This could represent resolving residual interstitial edema or  chronic interstitial lung disease or atelectasis. Heart size is within normal limits. Mediastinum and dylan are unremarkable. Chest wall structures are unremarkable.     Impression   CONCLUSION:    1. There is improvement in the appearance of the chest radiograph since previous study with resolution of most of the findings of vascular congestion.  2. There is persistent reticular increased density at lung bases which could represent residual or recurrent edema, interstitial lung disease or dependent atelectasis.         Venous Doppler on 3/24/2024:  FINDINGS:    SAPHENOFEMORAL JUNCTION:  No reflux.  THROMBI:  None visible.  COMPRESSION:  Normal compressibility, phasicity, and augmentation.  OTHER:  Negative.     Impression   CONCLUSION:  Unremarkable bilateral lower extremity venous ultrasound examination.       Impression and Plan:    Patient Active Problem List   Diagnosis    Osteopenia    History of cancer of larynx; glottis    Hypothyroidism    Laryngopharyngeal reflux    Tinnitus    Malignant neoplasm of colon (HCC)    Intractable pain    Nausea and vomiting in adult    Chemotherapy management, encounter for    Neoplasm related pain (acute) (chronic)    Fever    Edema, unspecified type    Diarrhea    Pancytopenia (HCC)       Metastatic sigmoid colon cancer:  Liver metastases  KRAS mutation  MMR/MSI normal     She has had a first cycle of FOLFOX two weeks ago. She was  due for the second cycle this week but we will need to hold chemotherapy at least for a weak. She will need to have improvement in her strength and oral intake. We will likely need to give a dose reduction if we are to continue after a one week delay.     Cancer related abdominal pain:     Continue morphine ER 15 mg BID. Her pain seems to be under better control.     Lower extremity edema:    No DVT on venous doppler. Check Echo today. Less edema this morning.    Anemia complicating neoplastic disease:      Secondary to chemotherapy and malignancy. Overall adequate. Will continue to follow as outpatient.    Mild thrombocytopenia:    This has been stable. Possibly secondary to chemotherapy or possible to liver disease. PLT are greater than 100k so no intervention necessary now.    DVT prophylaxis:    Agree with enoxaparin 40 mg daily.      Ronan Camarillo MD  3/25/2024  8:18 AM

## 2024-03-25 NOTE — TELEPHONE ENCOUNTER
Incoming Fax   We received [facility] Order # [id number] and [id number] placed in DV's inbasket for review

## 2024-03-25 NOTE — PROGRESS NOTES
[facility]   part of [facility]     Hospitalist Progress Note     Lisa Iqbal Patient Status:  Inpatient    10/11/1950 MRN [id number]   Location [facility] 4NW-A Attending Rc Laguna MD   Hosp Day # 0 PCP Anusha Abraham MD     Chief Complaint: LE edema, diarreah    Subjective:     Patient without acute events overnight. No F/C. Edema better today. Still feels better overall. Eating well. No N/V. Diarrhea resolved.     Objective:    Review of Systems:   A comprehensive review of systems was completed; pertinent positive and negatives stated in subjective.    Vital signs:  Temp:  [98.2 °F (36.8 °C)-99.5 °F (37.5 °C)] 99.5 °F (37.5 °C)  Pulse:  [] 72  Resp:  [14-20] 18  BP: ()/() 104/55  SpO2:  [87 %-98 %] 95 %    Physical Exam:    General: No acute distress  Respiratory: No wheezes, no rhonchi  Cardiovascular: S1, S2, regular rate and rhythm  Abdomen: Soft, Non-tender, non-distended, positive bowel sounds  Neuro: No new focal deficits.   Extremities: + edema but improved      Diagnostic Data:    Labs:  Recent Labs   Lab 24  1020 24  1858   WBC 11.5* 3.5*   HGB 12.1 11.1*   MCV 84.7 84.7   .0* 142.0*       Recent Labs   Lab 24  1020 24  1858   GLU 91 114*   BUN 12 11   CREATSERUM 0.64 0.72   CA 9.0 9.1   ALB 2.3* 2.7*    138   K 4.1 3.6    101   CO2 28.0 30.0   ALKPHO 164* 246*   AST 48* 43*   ALT 24 25   BILT 0.9 1.1   TP 5.8* 6.5       Estimated Creatinine Clearance: 59.2 mL/min (based on SCr of 0.72 mg/dL).    Recent Labs   Lab 24  1858   TROPHS 5       No results for input(s): \"PTP\", \"INR\" in the last 168 hours.               Microbiology    No results found for this visit on 24.      Imaging: Reviewed in Epic.    Medications:    levothyroxine  25 mcg Oral Before breakfast    OLANZapine  2.5 mg Oral Nightly    enoxaparin  40 mg Subcutaneous Daily    morphINE ER  15 mg Oral 2 times per day        Assessment & Plan:      # LE edema   - US negative for DVT   - TTE ordered, pending read   -If TTE negative can start compression stockings for venous congestion     # Diarrhea   - GI PCR, C diff testing negative   - Possibly related to recent abx, can consider immodium if infx w/u above negative     # Colon cancer with liver metastasis - oncology on consult  # Hypothyroidism - continue home levothyroxine   # Cancer associated pain - Morphine ER continued, norco PRN for breakthrough   # Pancytopenia - likely 2/2 chemotherapy, oncology on consult         Rc Laguna MD    Supplementary Documentation:     Quality:  DVT Mechanical Prophylaxis:   SCDs, Early ambuation  DVT Pharmacologic Prophylaxis   Medication    enoxaparin (Lovenox) 40 MG/0.4ML SUBQ injection 40 mg                Code Status: Not on file  Cuellar: No urinary catheter in place  Cuellar Duration (in days):   Central line:    ALESHA:     Discharge is dependent on: progress  At this point Ms. Félix Iqbal is expected to be discharge to: home    The 21st Century Cures Act makes medical notes like these available to patients in the interest of transparency. Please be advised this is a medical document. Medical documents are intended to carry relevant information, facts as evident, and the clinical opinion of the practitioner. The medical note is intended as peer to peer communication and may appear blunt or direct. It is written in medical language and may contain abbreviations or verbiage that are unfamiliar.             **Certification      PHYSICIAN Certification of Need for Inpatient Hospitalization - Initial Certification    Patient will require inpatient services that will reasonably be expected to span two midnight's based on the clinical documentation in H+P.   Based on patients current state of illness, I anticipate that, after discharge, patient will require TBD.

## 2024-03-25 NOTE — DISCHARGE SUMMARY
[place] HOSPITALIST  DISCHARGE SUMMARY     Lisa Iqbal Patient Status:  Inpatient    10/11/1950 MRN [id number]   Location [facility] 4NW-A Attending Rc Laguna MD   Hosp Day # 1 PCP Anusha Abraham MD     Date of Admission: 3/24/2024  Date of Discharge:   3/25/2024      Discharge Disposition: home    Discharge Diagnosis:  LE Edema  Diarrhea  Colon Cancer  Hypothyroid  Panctyopenia    History of Present Illness:  Lisa Iqbal is a 73 year old female with past medical history of hypothyroidism, sigmoid colon cancer, GERD, who presents for lower extremity edema and diarrhea.     Patient notes that she had sudden onset increased bilateral lower extremity edema starting today, states that she never had prior history of lower extremity edema, DVT.  Denies any significant associated calf pain, chest pain, fevers, chills, shortness of breath.  No prior history of CHF.  Also notes that she has been having significant diarrhea today, multiple loose watery bowel movements.  Does note that she recently started chemotherapy and is on antibiotic therapy prior to port placement.  Denies any abdominal pain, fevers.  Does endorse mild nausea currently.    Brief Synopsis: Pt was admitted and given supportive care. Her LE improved and she no longer had diarrhea. Her symptoms were likely due to acute gastroenteritis as they resolved quickly. Pt was tolerating diet and ECHO was performed prior to DC. Pt may benefit from compression stockings at home.     Lace+ Score: 78  59-90 High Risk  29-58 Medium Risk  0-28   Low Risk       TCM Follow-Up Recommendation:  LACE > 58: High Risk of readmission after discharge from the hospital.  **Certification    Admission date was 3/24/2024.  Inpatient stay was shorter than expected.  Patient's Edema, unspecified type was initially serious enough to expect a more lengthy hospitalization but patient improved faster than expected.                 Procedures  during hospitalization:   none    Incidental or significant findings and recommendations (brief descriptions):  none    Lab/Test results pending at Discharge:   none    Consultants:  Oncology    Discharge Medication List:     Discharge Medications        CONTINUE taking these medications        Instructions Prescription details   levothyroxine 25 MCG Tabs  Commonly known as: Synthroid      Take 1 tablet (25 mcg total) by mouth before breakfast.   Refills: 0     morphINE ER 15 MG Tbcr  Commonly known as: MS Contin      Take 1 tablet (15 mg total) by mouth every 12 (twelve) hours.   Quantity: 60 tablet  Refills: 0     OLANZapine 2.5 MG Tabs  Commonly known as: ZyPREXA      Take 1 tablet (2.5 mg total) by mouth nightly.   Quantity: 30 tablet  Refills: 0     ondansetron 8 MG tablet  Commonly known as: Zofran      Take 1 tablet (8 mg total) by mouth every 8 (eight) hours as needed.   Quantity: 30 tablet  Refills: 0            STOP taking these medications      amoxicillin clavulanate 875-125 MG Tabs  Commonly known as: Augmentin                 [place]    Follow-up appointment:   No follow-up provider specified.  Appointments for Next 30 Days 3/25/2024 - 4/24/2024        Date Arrival Time Visit Type Length Department Provider     3/27/2024 11:00 AM  EDW IVS IR [1164] 60 min [facility] Interventional Suites EH IVS IR     3/27/2024 11:00 AM  EDW IVS IR [1164] 60 min [facility] X-ray  RADIOLOGIST SPEC IR XR    Patient Instructions:     When you arrive, park in the [facility], then proceed to the ground floor of the [facility] lobby and check in at the [facility] registration desk so we know you have arrived (even if you already completed eCheck-in online).     Your physician or physician's representative will follow-up with you and your loved one by phone after you are discharged.    A nurse from the Interventional Suites Department will be calling you prior to your procedure to perform a  health history screening and give you instructions.  The time the nurse assigns you to arrive will be one hour prior to your procedure.  This will allow plenty of time to register and to get you prepared for your procedure.     If you have any questions, please call [phone].  We are available Monday through Friday, 8:00 AM to 5:00 PM.      Location Instructions:     Your appointment is scheduled in the Interventional Suites Department at [facility].&nbsp; The address is [address] [place] [address].&nbsp; To reach Registration, park in the [place] [facility] and enter the doors located on the ground floor.&nbsp; Proceed to Registration, located across from the [facility], to the left of the Information Desk.  A nurse from Interventional Radiology will be calling you the day prior to your procedure with your arrival time.  Masks are optional for all patients and visitors, unless otherwise indicated.               3/28/2024 10:30 AM  CHEMOTHERAPY [2076] 60 min [facility] in [place] PF [place] RN4    Patient Instructions:         Location Instructions:     Your appointment is scheduled at the [facility] in [place]. The address is [address] [address]. Please park in the GREEN LOT and enter through the CANCER CENTER ENTRANCE of BUILDING A.. Once you arrive, please register at the [facility]  on the 1st floor.  Masks are optional for all patients and visitors, unless otherwise indicated. No care partners/visitors under 18 years of age are allowed in the infusion room.               3/28/2024 10:45 AM  ON TREATMENT VISIT FOLLOW-UP [2641] 15 min [facility] in [place] Ronan Camarillo MD    Patient Instructions:         Location Instructions:     **IF YOU NEED LABWORK OR AN INFUSION ALONG WITH YOUR APPOINTMENT, YOU MUST CALL TO SCHEDULE.**  Your appointment is scheduled at the [facility] in [place]. The address is [address]  [address]. Please park in the GREEN LOT and enter through the CANCER CENTER ENTRANCE of BUILDING A. Once you arrive, please register at the Cancer Center  on the 1st floor.  Masks are optional for all patients and visitors, unless otherwise indicated.                      Vital signs:  Temp:  [98.2 °F (36.8 °C)-99.5 °F (37.5 °C)] 99.5 °F (37.5 °C)  Pulse:  [] 72  Resp:  [14-18] 18  BP: ()/(51-99) 104/55  SpO2:  [87 %-98 %] 95 %          -----------------------------------------------------------------------------------------------  PATIENT DISCHARGE INSTRUCTIONS: See electronic chart    Rc Laguna MD    Total time spent on discharge plannin minutes     The  [place] Cures Act makes medical notes like these available to patients in the interest of transparency. Please be advised this is a medical document. Medical documents are intended to carry relevant information, facts as evident, and the clinical opinion of the practitioner. The medical note is intended as peer to peer communication and may appear blunt or direct. It is written in medical language and may contain abbreviations or verbiage that are unfamiliar.      As certified below, I, or a nurse practitioner or physician assistant working with me, had a face-to-face encounter that meets the physician face-to-face encounter requirements.

## 2024-03-25 NOTE — ED QUICK NOTES
Patients SpO2 dropped to 87% on RA. Patient was placed on 2 L via NC and SpO2 improved to 98%. ED MD made aware.

## 2024-03-25 NOTE — PLAN OF CARE
Pt A/O x4. VSS. Afebrile. Pt c/o 2/10 pain during day, scheduled morphine ER admin per MAR. Pt reports relief w/ pain medication. Medications admin per MAR. Echo completed per order. Pt ambulated safely in room. Pt's daughter at bedside and update on POC. Pt cleared for DC. Fall and safety precautions in place. Call light within reach.

## 2024-03-25 NOTE — PROGRESS NOTES
NURSING ADMISSION NOTE      Patient admitted via cart  Oriented to room.  Safety precautions initiated.  Bed in low position.  Call light in reach.  Zofran po given. Johnny reg diet. Daughter at bedside.

## 2024-03-25 NOTE — ED QUICK NOTES
Orders for admission, patient is aware of plan and ready to go upstairs. Any questions, please call ED RN Aristeo at extension [phone].     Patient Covid vaccination status: Fully vaccinated     COVID Test Ordered in ED: None    COVID Suspicion at Admission: N/A    Running Infusions:  None    Mental Status/LOC at time of transport: AAOx4, Slovenian speaking    Other pertinent information:   CIWA score: N/A   NIH score:  N/A

## 2024-03-25 NOTE — ED QUICK NOTES
Rounding Completed. Received patient from waiting room. AAOx4.    Plan of Care reviewed. Waiting for ED MD to evaluate.  Elimination needs assessed.  Provided gown and family at bedside.    Bed is locked and in lowest position. Call light within reach.

## 2024-03-25 NOTE — PROGRESS NOTES
NURSING DISCHARGE NOTE    Discharged Home via Wheelchair.  Accompanied by Family member and Support staff  Belongings Taken by patient/family.    Pt discharged home w/ HH w/ daughter at approx. 1450. AVS discussed w/ pt. All belongings sent w/ pt.

## 2024-03-25 NOTE — ED QUICK NOTES
Pt assisted to restroom. Stool obtained and sent. Pt placed in new brief. No c/o pain or distress.

## 2024-03-25 NOTE — CM/SW NOTE
SW noted that patient admitted from home. She is current with Residential  services. LACY placed JAI order for [facility] for RN, PT and OT services.     LACY will continue to follow for plan of care changes and remain available for any additional DC needs or concerns.     Harika Haro MSW, LSW  Discharge Planner   [phone]

## 2024-03-26 NOTE — PAYOR COMM NOTE
--------------  ADMISSION REVIEW     Payor: [facility] [place]  Subscriber #:  [id number]  Authorization Number: [id number]    Admit date: 3/25/24  Admit time: 12:40 AM       REVIEW DOCUMENTATION:     ED Provider Notes        ED Provider Notes signed by Bernadette Vick MD at 3/24/2024 10:29 PM       Author: Bernadette Vick MD Service: -- Author Type: Physician    Filed: 3/24/2024 10:29 PM Date of Service: 3/24/2024  6:22 PM Status: Signed    : Bernadette Vick MD (Physician)           Patient Seen in: [facility]      History     Chief Complaint   Patient presents with    Swelling Edema     Stated Complaint: BLE swelling    Subjective:   HPI    The patient is a 73-year-old female with a history of sigmoid colon cancer presented to the ER with reports of bilateral lower extremity swelling that started around noon.  She also notes that she has had several episodes of diarrhea since this afternoon as well.  Patient tells me she recently started chemotherapy for colon cancer and was also placed on amoxicillin.  She denies any shortness of breath or chest pain.  Patient went to immediate care but was recommended to come to the ER for further evaluation.    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    Disorder of thyroid     Esophageal reflux     Exposure to medical diagnostic radiation     Last treatment 2018    History of adverse reaction to anesthesia     Has anxiety/nervousness when waking up    Migraines     Nausea and vomiting in adult 3/13/2024    Osteopenia     Osteoporosis     Vertigo     Visual impairment     Glasses              Past Surgical History:   Procedure Laterality Date          x 3    COLONOSCOPY N/A 2024    Procedure: COLONOSCOPY;  Surgeon: Po Aviles MD;  Location:  ENDOSCOPY    HYSTERECTOMY      Bilateral ovaries remain    LARYNGOSCOPY,DIRCT,OP,BIOPSY  01/15/2018                Social History      Socioeconomic History    Marital status: Single    Number of children: 2   Tobacco Use    Smoking status: Never    Smokeless tobacco: Never   Vaping Use    Vaping Use: Never used   Substance and Sexual Activity    Alcohol use: No    Drug use: No   Other Topics Concern    Caffeine Concern No    Exercise Yes    Seat Belt No    Special Diet Yes     Comment: Balanced    Stress Concern No   Social History Narrative    ** Merged History Encounter **          Social Determinants of Health     Food Insecurity: No Food Insecurity (3/13/2024)    Food Insecurity     Food Insecurity: Never true   Transportation Needs: No Transportation Needs (3/13/2024)    Transportation Needs     Lack of Transportation: No   Housing Stability: Low Risk  (3/13/2024)    Housing Stability     Housing Instability: No              Review of Systems    Positive for stated complaint: BLE swelling  Other systems are as noted in HPI.  Constitutional and vital signs reviewed.      All other systems reviewed and negative except as noted above.    Physical Exam     ED Triage Vitals [03/24/24 1619]   /69   Pulse 83   Resp 14   Temp 98.7 °F (37.1 °C)   Temp src Temporal   SpO2 94 %   O2 Device None (Room air)       Current:/72   Pulse 72   Temp 98.7 °F (37.1 °C) (Temporal)   Resp 16   SpO2 98%         Physical Exam  Vitals and nursing note reviewed.   Constitutional:       General: She is not in acute distress.     Appearance: Normal appearance.   HENT:      Head: Normocephalic.      Nose: Nose normal.      Mouth/Throat:      Mouth: Mucous membranes are moist.   Eyes:      Extraocular Movements: Extraocular movements intact.      Pupils: Pupils are equal, round, and reactive to light.   Cardiovascular:      Rate and Rhythm: Normal rate and regular rhythm.      Pulses: Normal pulses.   Pulmonary:      Effort: Pulmonary effort is normal.   Abdominal:      General: Abdomen is flat. Bowel sounds are normal. There is no distension.       Palpations: Abdomen is soft.      Tenderness: There is no abdominal tenderness. There is no right CVA tenderness, left CVA tenderness, guarding or rebound.      Hernia: No hernia is present.   Musculoskeletal:         General: No swelling or tenderness. Normal range of motion.      Cervical back: Normal range of motion.      Right lower leg: Edema present.      Left lower leg: Edema present.   Skin:     General: Skin is warm and dry.   Neurological:      Mental Status: She is alert and oriented to person, place, and time. Mental status is at baseline.   Psychiatric:         Mood and Affect: Mood normal.               ED Course     Labs Reviewed   COMP METABOLIC PANEL (14) - Abnormal; Notable for the following components:       Result Value    Glucose 114 (*)     AST 43 (*)     Alkaline Phosphatase 246 (*)     Albumin 2.7 (*)     A/G Ratio 0.7 (*)     All other components within normal limits   PRO BETA NATRIURETIC PEPTIDE - Abnormal; Notable for the following components:    Pro-Beta Natriuretic Peptide 534 (*)     All other components within normal limits   CBC W/ DIFFERENTIAL - Abnormal; Notable for the following components:    WBC 3.5 (*)     HGB 11.1 (*)     HCT 34.9 (*)     .0 (*)     Lymphocyte Absolute 0.57 (*)     All other components within normal limits   TROPONIN I HIGH SENSITIVITY - Normal   CBC WITH DIFFERENTIAL WITH PLATELET    Narrative:     The following orders were created for panel order CBC With Differential With Platelet.  Procedure                               Abnormality         Status                     ---------                               -----------         ------                     CBC W/ DIFFERENTIAL[[id number]]          Abnormal            Final result                 Please view results for these tests on the individual orders.   SCAN SLIDE   URINALYSIS, ROUTINE   RAINBOW DRAW BLUE   GI STOOL PANEL BY PCR   C. DIFFICILE(TOXIGENIC)PCR       US VENOUS DOPPLER LEG BILAT - DIAG  IMG (CPT=93970)    Result Date: 3/24/2024  CONCLUSION:  Unremarkable bilateral lower extremity venous ultrasound examination.   LOCATION:  [facility]   Dictated by (CST): Isreal Gutiérrez MD on 3/24/2024 at 9:28 PM     Finalized by (CST): Isreal Gutiérrez MD on 3/24/2024 at 9:29 PM       XR CHEST AP PORTABLE  (CPT=71045)    Result Date: 3/24/2024  CONCLUSION:  1. There is improvement in the appearance of the chest radiograph since previous study with resolution of most of the findings of vascular congestion. 2. There is persistent reticular increased density at lung bases which could represent residual or recurrent edema, interstitial lung disease or dependent atelectasis.    LOCATION:  [facility]      Dictated by (CST): Isreal Gutiérrez MD on 3/24/2024 at 6:06 PM     Finalized by (CST): Isreal Gutiérrez MD on 3/24/2024 at 6:07 PM           MDM      The differential includes the following  Congestive heart failure with exacerbation, DVT which is a life threat,  Diarrhea could be secondary to gastroenteritis, C. difficile given that she is on antibiotics    Pertinent comorbidities include  As listed above    Pertinent social history includes  As listed above    ER course  Arrival to the ER patient normotensive and hemodynamically stable.  She states that she had 1 episode of diarrhea but it was inside of her depends therefore we did not get to obtain a sample.  Her labs are notable for elevated proBNP of 534.    Ultimately patient with episodes of hypoxia down to 86% even when I was talking with her and she was sitting up wide-awake.  Given this and her lower extremity edema I will be admitting for echocardiogram and further evaluation.  I have discussed her case with the [facility] hospitalist.  Of note I did also speak with Dr. Trevizo to discuss cessation of her amoxicillin which is due to be stopped on March 27.  He did not think it was unreasonable.  Stool studies have been ordered to test for C. difficile once patient provides a  sample.      Imaging studies  I reviewed the images and agree with the radiologist report that showed the following no evidence of DVT. CXR with some vascular congestion though improved       Admission disposition: 3/24/2024 10:16 PM           Medical Decision Making      Disposition and Plan     Clinical Impression:  1. Edema, unspecified type         Disposition:  Admit  3/24/2024 10:16 pm    Follow-up:  No follow-up provider specified.        Medications Prescribed:  Current Discharge Medication List                            Hospital Problems       Present on Admission  Date Reviewed: 3/20/2024            ICD-10-CM Noted POA    * (Principal) Edema, unspecified type R60.9 3/24/2024 Unknown                     Signed by Bernadette Vick MD on 3/24/2024 10:29 PM     History and Physical     ASSESSMENT / PLAN:      # LE edema      - TTE ordered   -If TTE negative can start compression stockings for venous congestion     # Diarrhea   - GI PCR, C diff testing ordered   - Possibly related to recent abx, can consider immodium if infx w/u above negative     # Colon cancer with liver metastasis - oncology on consult  # Hypothyroidism - continue home levothyroxine   # Cancer associated pain - Morphine ER continued, norco PRN for breakthrough   # Pancytopenia - likely 2/2 chemotherapy, oncology on consult  3/25  Hematology/Oncology Report of Consultation     [facility]  Report of Consultation           Lisa Iqbal Patient Status:  Inpatient    10/11/1950 MRN [id number]   Location [facility] 4NW-A Attending Rc Laguna MD   Hosp Day # 0 PCP Anusha Abraham MD      Reason for Consultation:     The patient was seen for evaluation and management of metastatic colon cancer.     History of Present Illness:     Lisa Iqbal is a a(n) 73 year old female. The patient presents with diarrhea and lower extremity edema.     She is being treated for metastatic colon cancer with diffuse  liver metastases. She had a first cycle of FOLFOX chemotherapy two weeks ago (3/14 to 3/16). Last week she had poor appetite and oral intake through the week. She had no nausea or emesis. [facility]24. She was given a liter of NS IVF. She says this helped her feel better on 3/23/2024 but yesterday 3/24/2024 she started to have frequent loose bowel movements and she developed bilateral lower extremity edema. She came to the [place] ED. She had a bilateral lower extremity venous doppler that was negative for DVT. She had a CXR that showed resolution of vascular congestion findings seen on 3/16 CXR. There was persistent reticular density at the lung bases.      She has less lower extremity edema this morning. She has no pain at this visit. She still feels very weak but she has some appetite this morning. She had stool sent for c diff that was negative. She had a UA that was negative for infection.     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    Disorder of thyroid      Esophageal reflux      Exposure to medical diagnostic radiation       Last treatment 2018    History of adverse reaction to anesthesia       Has anxiety/nervousness when waking up    Migraines      Nausea and vomiting in adult 3/13/2024    Osteopenia      Osteoporosis      Vertigo      Visual impairment       Glasses               Past Surgical History:   Procedure Laterality Date             x 3    COLONOSCOPY N/A 2024     Procedure: COLONOSCOPY;  Surgeon: Po Aviles MD;  Location:  ENDOSCOPY    HYSTERECTOMY         Bilateral ovaries remain    LARYNGOSCOPY,DIRCT,OP,BIOPSY   01/15/2018         Family History:        Family History   Problem Relation Age of Onset    Cancer Father           liver cancer    Cancer Brother           liver cancer         Social History:  she reports that she has never smoked. She has never used smokeless tobacco. She  reports that she does not drink alcohol and does not use drugs.     Allergies:       Allergies   Allergen Reactions    Septra [Sulfamethoxazole W/Trimethoprim] RASH    Sulfa Antibiotics RASH         Medications:     Current Facility-Administered Medications:     levothyroxine (Synthroid) tab 25 mcg, 25 mcg, Oral, Before breakfast    OLANZapine (ZyPREXA) tab 2.5 mg, 2.5 mg, Oral, Nightly    melatonin tab 3 mg, 3 mg, Oral, Nightly PRN    polyethylene glycol (PEG 3350) (Miralax) 17 g oral packet 17 g, 17 g, Oral, Daily PRN    sennosides (Senokot) tab 17.2 mg, 17.2 mg, Oral, Nightly PRN    bisacodyl (Dulcolax) 10 MG rectal suppository 10 mg, 10 mg, Rectal, Daily PRN    ondansetron (Zofran) 4 MG/2ML injection 4 mg, 4 mg, Intravenous, Q6H PRN    benzonatate (Tessalon) cap 200 mg, 200 mg, Oral, TID PRN    guaiFENesin (Robitussin) 100 MG/5 ML oral liquid 200 mg, 200 mg, Oral, Q4H PRN    glycerin-hypromellose- (Artifical Tears) 0.2-0.2-1 % ophthalmic solution 1 drop, 1 drop, Both Eyes, QID PRN    sodium chloride (Saline Mist) 0.65 % nasal solution 1 spray, 1 spray, Each Nare, Q3H PRN    enoxaparin (Lovenox) 40 MG/0.4ML SUBQ injection 40 mg, 40 mg, Subcutaneous, Daily    acetaminophen (Tylenol) tab 650 mg, 650 mg, Oral, Q4H PRN **OR** HYDROcodone-acetaminophen (Norco) 5-325 MG per tab 1 tablet, 1 tablet, Oral, Q4H PRN **OR** HYDROcodone-acetaminophen (Norco) 5-325 MG per tab 2 tablet, 2 tablet, Oral, Q4H PRN    morphINE ER (MS Contin) tab 15 mg, 15 mg, Oral, 2 times per day     Review of Systems:  Constitutional: Negative for a fevers, chills, night sweats.  Eyes: Negative for visual disturbance, irritation and redness.  Respiratory: Negative for cough, hemoptysis, chest pain, or dyspnea.  Cardiovascular: Negative for angina, orthopnea or palpitations.  Integument/breast: Negative for rash, skin lesions, and pruritus.  Hematologic/lymphatic: Negative for easy bruising, bleeding, and lymphadenopathy.  Musculoskeletal:  Negative for myalgias, arthralgias, Genitourinary: Negative for dysuria or hematuria  Psychiatric: The patient's mood was calm and appropriate for this visit.     The pertinent positives and negatives were described. All other systems were negative.     Physical Exam:   Vital Signs: BP 99/51 (BP Location: Right arm)   Pulse 72   Temp 99.4 °F (37.4 °C) (Oral)   Resp 18   Wt 73.5 kg (162 lb)   SpO2 95%   BMI 27.81 kg/m²   Constitutional: Patient is alert and oriented x 3, not in acute distress.  HEENT:  Oropharynx is clear.   Neck: No palpable lymphadenopathy. Neck is supple.  Respiratory: Clear to auscultation and percussion. No rales.  No wheezes.  Cardiovascular: Regular rate and rhythm.   Gastrointestinal: Soft, non tender with good bowel sounds.  Extremities: No edema. No calf tenderness.  Neurological: Grossly intact without focal motor or sensory deficit.  Lymphatics: There is no palpable lymphadenopathy throughout in the cervical, supraclavicular, or axillary regions.     Performance Status:  ECOG 2: Ambulatory and capable of all selfcare but unable to carry out any work activities. Up and about more than 50% of waking hours      Laboratory Data reviewed at this visit:         Recent Labs   Lab 03/20/24  1020 03/24/24  1858   RBC 4.45 4.12   HGB 12.1 11.1*   HCT 37.7 34.9*   MCV 84.7 84.7   MCH 27.2 26.9   MCHC 32.1 31.8   RDW 15.4 15.1   NEPRELIM 10.48* 2.34   WBC 11.5* 3.5*   .0* 142.0*              Recent Labs   Lab 03/20/24  1020 03/24/24  1858   GLU 91 114*   BUN 12 11   CREATSERUM 0.64 0.72   CA 9.0 9.1   ALB 2.3* 2.7*    138   K 4.1 3.6    101   CO2 28.0 30.0   ALKPHO 164* 246*   AST 48* 43*   ALT 24 25   BILT 0.9 1.1   TP 5.8* 6.5         Radiologic imaging reviewed at this visit:     CXR on 3/24/2024:  FINDINGS: There are reticular areas of increased density in lower lungs bilaterally. Diffuse prominence of interstitial and pulmonary vascular markings noted previously has  improved. This could represent resolving residual interstitial edema or  chronic interstitial lung disease or atelectasis. Heart size is within normal limits. Mediastinum and dylan are unremarkable. Chest wall structures are unremarkable.      Impression   CONCLUSION:    1. There is improvement in the appearance of the chest radiograph since previous study with resolution of most of the findings of vascular congestion.  2. There is persistent reticular increased density at lung bases which could represent residual or recurrent edema, interstitial lung disease or dependent atelectasis.           Venous Doppler on 3/24/2024:  FINDINGS:    SAPHENOFEMORAL JUNCTION:  No reflux.  THROMBI:  None visible.  COMPRESSION:  Normal compressibility, phasicity, and augmentation.  OTHER:  Negative.      Impression   CONCLUSION:  Unremarkable bilateral lower extremity venous ultrasound examination.         Impression and Plan:        Patient Active Problem List   Diagnosis    Osteopenia    History of cancer of larynx; glottis    Hypothyroidism    Laryngopharyngeal reflux    Tinnitus    Malignant neoplasm of colon (HCC)    Intractable pain    Nausea and vomiting in adult    Chemotherapy management, encounter for    Neoplasm related pain (acute) (chronic)    Fever    Edema, unspecified type    Diarrhea    Pancytopenia (HCC)         Metastatic sigmoid colon cancer:  Liver metastases  KRAS mutation  MMR/MSI normal     She has had a first cycle of FOLFOX two weeks ago. She was due for the second cycle this week but we will need to hold chemotherapy at least for a weak. She will need to have improvement in her strength and oral intake. We will likely need to give a dose reduction if we are to continue after a one week delay.     Cancer related abdominal pain:     Continue morphine ER 15 mg BID. Her pain seems to be under better control.     Lower extremity edema:     No DVT on venous doppler. Check Echo today. Less edema this morning.      Anemia complicating neoplastic disease:        Secondary to chemotherapy and malignancy. Overall adequate. Will continue to follow as outpatient.     Mild thrombocytopenia:     This has been stable. Possibly secondary to chemotherapy or possible to liver disease. PLT are greater than 100k so no intervention necessary now.     DVT prophylaxis:     Agree with enoxaparin 40 mg daily.     Hospitalist Progress Note   Assessment & Plan:   # LE edema      - TTE ordered, pending read   -If TTE negative can start compression stockings for venous congestion     # Diarrhea   - GI PCR   - Possibly related to recent abx, can consider immodium if infx w/u above negative     # Colon cancer with liver metastasis - oncology on consult  # Hypothyroidism - continue home levothyroxine   # Cancer associated pain - Morphine ER continued, norco PRN for breakthrough   # Pancytopenia - likely 2/2 chemotherapy, oncology on consult     MEDICATIONS ADMINISTERED IN LAST 1 DAY:  Administration History       None            Vitals (last day) before discharge       Date/Time Temp Pulse Resp BP SpO2 Weight O2 Device O2 Flow Rate (L/min) Who    03/24/24 2345 -- 106 18 136/99 94 % -- None (Room air) -- CK    03/24/24 1922 -- 72 16 -- 98 % -- Nasal cannula 2 L/min CA    03/24/24 1918 -- -- -- -- 87 % -- None (Room air) -- CA          CIWA Scores (last 2 days) before discharge       None          PLEASE FAX DAYS CERTIFIED AND NEXT REVIEW DATE [phone]

## 2024-03-27 ENCOUNTER — HOSPITAL ENCOUNTER (OUTPATIENT)
Dept: INTERVENTIONAL RADIOLOGY/VASCULAR | Facility: HOSPITAL | Age: 74
Discharge: HOME | End: 2024-03-27
Attending: INTERNAL MEDICINE | Admitting: INTERNAL MEDICINE
Payer: MEDICARE

## 2024-03-27 ENCOUNTER — PATIENT OUTREACH (OUTPATIENT)
Dept: INTERNAL MEDICINE CLINIC | Facility: CLINIC | Age: 74
End: 2024-03-27

## 2024-03-27 VITALS
OXYGEN SATURATION: 100 % | DIASTOLIC BLOOD PRESSURE: 54 MMHG | TEMPERATURE: 98 F | HEART RATE: 64 BPM | RESPIRATION RATE: 12 BRPM | SYSTOLIC BLOOD PRESSURE: 93 MMHG

## 2024-03-27 DIAGNOSIS — C18.9 ADENOCARCINOMA OF COLON METASTATIC TO LIVER (HCC): ICD-10-CM

## 2024-03-27 DIAGNOSIS — C78.7 ADENOCARCINOMA OF COLON METASTATIC TO LIVER (HCC): ICD-10-CM

## 2024-03-27 LAB — INR: 1.5 (ref 0.8–1.3)

## 2024-03-27 PROCEDURE — 77001 FLUOROGUIDE FOR VEIN DEVICE: CPT | Performed by: RADIOLOGY

## 2024-03-27 PROCEDURE — 0JH60WZ INSERTION OF TOTALLY IMPLANTABLE VASCULAR ACCESS DEVICE INTO CHEST SUBCUTANEOUS TISSUE AND FASCIA, OPEN APPROACH: ICD-10-PCS | Performed by: RADIOLOGY

## 2024-03-27 PROCEDURE — 36561 INSERT TUNNELED CV CATH: CPT | Performed by: RADIOLOGY

## 2024-03-27 PROCEDURE — 02HV33Z INSERTION OF INFUSION DEVICE INTO SUPERIOR VENA CAVA, PERCUTANEOUS APPROACH: ICD-10-PCS | Performed by: RADIOLOGY

## 2024-03-27 PROCEDURE — 85610 PROTHROMBIN TIME: CPT | Performed by: RADIOLOGY

## 2024-03-27 PROCEDURE — 76937 US GUIDE VASCULAR ACCESS: CPT | Performed by: RADIOLOGY

## 2024-03-27 PROCEDURE — 99152 MOD SED SAME PHYS/QHP 5/>YRS: CPT | Performed by: RADIOLOGY

## 2024-03-27 RX ORDER — CEFAZOLIN SODIUM 1 G/3ML
INJECTION, POWDER, FOR SOLUTION INTRAMUSCULAR; INTRAVENOUS
Status: COMPLETED
Start: 2024-03-27 | End: 2024-03-27

## 2024-03-27 RX ORDER — LIDOCAINE HYDROCHLORIDE 10 MG/ML
INJECTION, SOLUTION INFILTRATION; PERINEURAL
Status: COMPLETED
Start: 2024-03-27 | End: 2024-03-27

## 2024-03-27 RX ORDER — SODIUM CHLORIDE 9 MG/ML
INJECTION, SOLUTION INTRAVENOUS CONTINUOUS
Status: DISCONTINUED | OUTPATIENT
Start: 2024-03-27 | End: 2024-03-27

## 2024-03-27 RX ORDER — VANCOMYCIN HYDROCHLORIDE 1 G/20ML
INJECTION, POWDER, LYOPHILIZED, FOR SOLUTION INTRAVENOUS
Status: COMPLETED
Start: 2024-03-27 | End: 2024-03-27

## 2024-03-27 RX ORDER — LIDOCAINE HYDROCHLORIDE AND EPINEPHRINE BITARTRATE 20; .01 MG/ML; MG/ML
INJECTION, SOLUTION SUBCUTANEOUS
Status: COMPLETED
Start: 2024-03-27 | End: 2024-03-27

## 2024-03-27 RX ORDER — HEPARIN SODIUM 5000 [USP'U]/ML
INJECTION, SOLUTION INTRAVENOUS; SUBCUTANEOUS
Status: COMPLETED
Start: 2024-03-27 | End: 2024-03-27

## 2024-03-27 RX ORDER — MIDAZOLAM HYDROCHLORIDE 1 MG/ML
INJECTION INTRAMUSCULAR; INTRAVENOUS
Status: COMPLETED
Start: 2024-03-27 | End: 2024-03-27

## 2024-03-27 NOTE — DISCHARGE INSTRUCTIONS
DRESSING CHANGES/BASIC CARE - You should discuss the best plan of care for your port and further dressing changes of your port site with your physician.      A. Your port should be flushed with a heparin solution after every use and every four weeks when not in use. This should be done by your health care provider.      B. Your neck dressing should be removed tomorrow. Leave chest dressing in place x 5 days.     C. Keep the dressing clean and dry at all times.     D. After your port incision is healed, you will not need a dressing over the area unless your port is accessed for use.       2. SPONGE BATH- your port insertion site must be kept clean and dry at all times until the site is       Healed.     A. You may sponge bathe or shower, being careful not to get your dressing wet.     B. Cover dressing with plastic wrap and tape edges during washing.     C. Remove plastic wrap and change dressing after you have washed your body if any portion           Of your dressing is wet.    3. ACTIVITIES     A. Avoid strenuous activity with the arm of which the port was placed.     B. Avoid lifting heavy objects such as a purse or back pack from the shoulder of which the port          Was inserted.    4. PAIN CONTROL-      A. It is normal to have some discomfort to your port insertion site for the first 24 to 48 hours.     B. You may take tylenol, as directed by the label for discomfort.     C. You should avoid aspirin products for 3 days post procedure.    5. CALL YOUR PHYSICIAN IF YOU EXPERIENCE:      A. Redness, swelling, warmth, or drainage from the port insertion site.     B. Fever or chills     C. Swelling or worsening pain of your hand, arm, or neck in the side of the port     D. Pain with infusions.     E. Any other concerns or questions call you physicians office.

## 2024-03-27 NOTE — PAYOR COMM NOTE
--------------  DISCHARGE REVIEW    Payor: [employer] [facility] [place]  Subscriber #:  [id number]  Authorization Number: [id number]    Admit date: 3/25/24  Admit time:  12:40 AM  Discharge Date: 3/25/2024  2:50 PM     Admitting Physician: Kwadwo Monsivais MD  Attending Physician:  No att. providers found  Primary Care Physician: Anusha Lucio MD          Discharge Summary Notes        Discharge Summary signed by Rc Laguna MD at 3/25/2024  5:50 PM       Author: Rc Laguna MD Specialty: HOSPITALIST, Internal Medicine Author Type: Physician    Filed: 3/25/2024  5:50 PM Date of Service: 3/25/2024 12:25 PM Status: Signed    : Rc Laguna MD (Physician)           [facility]  DISCHARGE SUMMARY     Lisa Iqbal Patient Status:  Inpatient    10/11/1950 MRN [id number]   [facility] 4N-A Attending Rc Laguna MD   Hosp Day # 1 PCP Anusha Abraham MD     Date of Admission: 3/24/2024  Date of Discharge:   3/25/2024      Discharge Disposition: home    Discharge Diagnosis:  LE Edema  Diarrhea  Colon Cancer  Hypothyroid  Panctyopenia    History of Present Illness:  Lisa Iqbal is a 73 year old female with past medical history of hypothyroidism, sigmoid colon cancer, GERD, who presents for lower extremity edema and diarrhea.     Patient notes that she had sudden onset increased bilateral lower extremity edema starting today, states that she never had prior history of lower extremity edema, DVT.  Denies any significant associated calf pain, chest pain, fevers, chills, shortness of breath.  No prior history of CHF.  Also notes that she has been having significant diarrhea today, multiple loose watery bowel movements.  Does note that she recently started chemotherapy and is on antibiotic therapy prior to port placement.  Denies any abdominal pain, fevers.  Does endorse mild nausea currently.    Brief Synopsis: Pt was admitted and given supportive  care. Her LE improved and she no longer had diarrhea. Her symptoms were likely due to acute gastroenteritis as they resolved quickly. Pt was tolerating diet and ECHO was performed prior to DC. Pt may benefit from compression stockings at home.     Lace+ Score: 78  59-90 High Risk  29-58 Medium Risk  0-28   Low Risk       TCM Follow-Up Recommendation:  LACE > 58: High Risk of readmission after discharge from the hospital.  **Certification    Admission date was 3/24/2024.  Inpatient stay was shorter than expected.  Patient's Edema, unspecified type was initially serious enough to expect a more lengthy hospitalization but patient improved faster than expected.                 Procedures during hospitalization:   none    Incidental or significant findings and recommendations (brief descriptions):  none    Lab/Test results pending at Discharge:   none    Consultants:  Oncology    Discharge Medication List:     Discharge Medications        CONTINUE taking these medications        Instructions Prescription details   levothyroxine 25 MCG Tabs  Commonly known as: Synthroid      Take 1 tablet (25 mcg total) by mouth before breakfast.   Refills: 0     morphINE ER 15 MG Tbcr  Commonly known as: MS Contin      Take 1 tablet (15 mg total) by mouth every 12 (twelve) hours.   Quantity: 60 tablet  Refills: 0     OLANZapine 2.5 MG Tabs  Commonly known as: ZyPREXA      Take 1 tablet (2.5 mg total) by mouth nightly.   Quantity: 30 tablet  Refills: 0     ondansetron 8 MG tablet  Commonly known as: Zofran      Take 1 tablet (8 mg total) by mouth every 8 (eight) hours as needed.   Quantity: 30 tablet  Refills: 0            STOP taking these medications      amoxicillin clavulanate 875-125 MG Tabs  Commonly known as: Augmentin                 [place]    Follow-up appointment:   No follow-up provider specified.  Appointments for Next 30 Days 3/25/2024 - 4/24/2024        Date Arrival Time Visit Type Length Department Provider      3/27/2024 11:00 AM  EDW IVS IR [1164] 60 min [facility] Interventional Suites EH IVS IR     3/27/2024 11:00 AM  EDW IVS IR [1164] 60 min [facility] X-ray  RADIOLOGIST SPEC IR XR    Patient Instructions:     When you arrive, park in the North garage, then proceed to the ground floor of the [facility] lobby and check in at the [facility] registration desk so we know you have arrived (even if you already completed eCheck-in online).     Your physician or physician's representative will follow-up with you and your loved one by phone after you are discharged.    A nurse from the Interventional Suites Department will be calling you prior to your procedure to perform a health history screening and give you instructions.  The time the nurse assigns you to arrive will be one hour prior to your procedure.  This will allow plenty of time to register and to get you prepared for your procedure.     If you have any questions, please call [phone].  We are available Monday through Friday, 8:00 AM to 5:00 PM.      Location Instructions:     Your appointment is scheduled in the Interventional Suites Department at [facility].&nbsp; The address is [address] [place] [address].&nbsp; To reach Registration, park in the North Parking Garage and enter the doors located on the ground floor.&nbsp; Proceed to Registration, located across from the [facility], to the left of the Information Desk.  A nurse from Interventional Radiology will be calling you the day prior to your procedure with your arrival time.  Masks are optional for all patients and visitors, unless otherwise indicated.               3/28/2024 10:30 AM  CHEMOTHERAPY [2076] 60 min [facility] in [place] PF [place] RN4    Patient Instructions:         Location Instructions:     Your appointment is scheduled at the [facility] in [place]. The address is [address]. Please park in the GREEN LOT  and enter through the CANCER CENTER ENTRANCE of BUILDING A.. Once you arrive, please register at the [facility]  on the 1st floor.  Masks are optional for all patients and visitors, unless otherwise indicated. No care partners/visitors under 18 years of age are allowed in the infusion room.               3/28/2024 10:45 AM  ON TREATMENT VISIT FOLLOW-UP [2641] 15 min [facility] in [place] Ronan Camarillo MD    Patient Instructions:         Location Instructions:     **IF YOU NEED LABWORK OR AN INFUSION ALONG WITH YOUR APPOINTMENT, YOU MUST CALL TO SCHEDULE.**  Your appointment is scheduled at the [facility] in [place]. The address is [address] [place] [address]. Please park in the GREEN LOT and enter through the CANCER CENTER ENTRANCE of BUILDING A. Once you arrive, please register at the [facility]  on the 1st floor.  Masks are optional for all patients and visitors, unless otherwise indicated.                      Vital signs:  Temp:  [98.2 °F (36.8 °C)-99.5 °F (37.5 °C)] 99.5 °F (37.5 °C)  Pulse:  [] 72  Resp:  [14-18] 18  BP: ()/(51-99) 104/55  SpO2:  [87 %-98 %] 95 %          -----------------------------------------------------------------------------------------------  PATIENT DISCHARGE INSTRUCTIONS: See electronic chart    Rc Laguna MD    Total time spent on discharge plannin minutes           Electronically signed by Rc Laguna MD on 3/25/2024  5:50 PM

## 2024-03-27 NOTE — PROGRESS NOTES
Patient received from IR lab Right  chest dressing site soft, no hematoma, dressing C/D/I. Rt neck dressing c/d/I soft.  Hemodynamics stable. Post-op orders received and implemented. Patient and family educated on flat bedrest, verbalize understanding. questions answered. Will continue to monitor.    1315 discharge instuctions reviewed with daughter Beryl. Pt tolerating po well. Dressings remain unchanged.    1415 pt discharged to home via wheelchair in stable condition.

## 2024-03-28 ENCOUNTER — OFFICE VISIT (OUTPATIENT)
Dept: HEMATOLOGY/ONCOLOGY | Age: 74
End: 2024-03-28
Attending: INTERNAL MEDICINE
Payer: MEDICARE

## 2024-03-28 VITALS
TEMPERATURE: 99 F | HEART RATE: 81 BPM | BODY MASS INDEX: 27 KG/M2 | SYSTOLIC BLOOD PRESSURE: 109 MMHG | OXYGEN SATURATION: 94 % | WEIGHT: 159.5 LBS | RESPIRATION RATE: 16 BRPM | DIASTOLIC BLOOD PRESSURE: 71 MMHG

## 2024-03-28 DIAGNOSIS — D70.1 NEUTROPENIA DUE TO AND NOT CONCURRENT WITH CHEMOTHERAPY (HCC): ICD-10-CM

## 2024-03-28 DIAGNOSIS — C18.9 COLON CANCER METASTASIZED TO LIVER (HCC): ICD-10-CM

## 2024-03-28 DIAGNOSIS — C78.7 COLON CANCER METASTASIZED TO LIVER (HCC): ICD-10-CM

## 2024-03-28 DIAGNOSIS — D63.0 ANEMIA COMPLICATING NEOPLASTIC DISEASE: ICD-10-CM

## 2024-03-28 DIAGNOSIS — R53.83 CHEMOTHERAPY-INDUCED FATIGUE: ICD-10-CM

## 2024-03-28 DIAGNOSIS — C18.9 COLON CANCER METASTASIZED TO LIVER (HCC): Primary | ICD-10-CM

## 2024-03-28 DIAGNOSIS — T45.1X5A CHEMOTHERAPY-INDUCED FATIGUE: ICD-10-CM

## 2024-03-28 DIAGNOSIS — T45.1X5S NEUTROPENIA DUE TO AND NOT CONCURRENT WITH CHEMOTHERAPY (HCC): ICD-10-CM

## 2024-03-28 DIAGNOSIS — C78.7 COLON CANCER METASTASIZED TO LIVER (HCC): Primary | ICD-10-CM

## 2024-03-28 LAB
ALBUMIN SERPL-MCNC: 2.5 G/DL (ref 3.4–5)
ALBUMIN/GLOB SERPL: 0.7 {RATIO} (ref 1–2)
ALP LIVER SERPL-CCNC: 214 U/L
ALT SERPL-CCNC: 19 U/L
ANION GAP SERPL CALC-SCNC: 7 MMOL/L (ref 0–18)
AST SERPL-CCNC: 50 U/L (ref 15–37)
BASOPHILS # BLD AUTO: 0.01 X10(3) UL (ref 0–0.2)
BASOPHILS NFR BLD AUTO: 0.6 %
BILIRUB SERPL-MCNC: 1 MG/DL (ref 0.1–2)
BUN BLD-MCNC: 8 MG/DL (ref 9–23)
CALCIUM BLD-MCNC: 8.5 MG/DL (ref 8.5–10.1)
CHLORIDE SERPL-SCNC: 97 MMOL/L (ref 98–112)
CO2 SERPL-SCNC: 32 MMOL/L (ref 21–32)
CREAT BLD-MCNC: 0.69 MG/DL
EGFRCR SERPLBLD CKD-EPI 2021: 91 ML/MIN/1.73M2 (ref 60–?)
EOSINOPHIL # BLD AUTO: 0.12 X10(3) UL (ref 0–0.7)
EOSINOPHIL NFR BLD AUTO: 7.5 %
ERYTHROCYTE [DISTWIDTH] IN BLOOD BY AUTOMATED COUNT: 16.7 %
GLOBULIN PLAS-MCNC: 3.7 G/DL (ref 2.8–4.4)
GLUCOSE BLD-MCNC: 115 MG/DL (ref 70–99)
HCT VFR BLD AUTO: 33.9 %
HGB BLD-MCNC: 10.8 G/DL
IMM GRANULOCYTES # BLD AUTO: 0 X10(3) UL (ref 0–1)
IMM GRANULOCYTES NFR BLD: 0 %
LYMPHOCYTES # BLD AUTO: 0.52 X10(3) UL (ref 1–4)
LYMPHOCYTES NFR BLD AUTO: 32.3 %
MCH RBC QN AUTO: 27.2 PG (ref 26–34)
MCHC RBC AUTO-ENTMCNC: 31.9 G/DL (ref 31–37)
MCV RBC AUTO: 85.4 FL
MONOCYTES # BLD AUTO: 0.85 X10(3) UL (ref 0.1–1)
MONOCYTES NFR BLD AUTO: 52.8 %
NEUTROPHILS # BLD AUTO: 0.11 X10 (3) UL (ref 1.5–7.7)
NEUTROPHILS # BLD AUTO: 0.11 X10(3) UL (ref 1.5–7.7)
NEUTROPHILS NFR BLD AUTO: 6.8 %
OSMOLALITY SERPL CALC.SUM OF ELEC: 281 MOSM/KG (ref 275–295)
PLATELET # BLD AUTO: 286 10(3)UL (ref 150–450)
POTASSIUM SERPL-SCNC: 3 MMOL/L (ref 3.5–5.1)
PROT SERPL-MCNC: 6.2 G/DL (ref 6.4–8.2)
RBC # BLD AUTO: 3.97 X10(6)UL
SODIUM SERPL-SCNC: 136 MMOL/L (ref 136–145)
WBC # BLD AUTO: 1.6 X10(3) UL (ref 4–11)

## 2024-03-28 PROCEDURE — 80053 COMPREHEN METABOLIC PANEL: CPT

## 2024-03-28 PROCEDURE — 85025 COMPLETE CBC W/AUTO DIFF WBC: CPT

## 2024-03-28 PROCEDURE — 36591 DRAW BLOOD OFF VENOUS DEVICE: CPT

## 2024-03-28 PROCEDURE — 99215 OFFICE O/P EST HI 40 MIN: CPT | Performed by: INTERNAL MEDICINE

## 2024-03-28 NOTE — PROGRESS NOTES
Cancer Center Progress Note    Problem List:      Patient Active Problem List   Diagnosis    Osteopenia    History of cancer of larynx; glottis    Hypothyroidism    Laryngopharyngeal reflux    Tinnitus    Malignant neoplasm of colon (HCC)    Intractable pain    Nausea and vomiting in adult    Chemotherapy management, encounter for    Neoplasm related pain (acute) (chronic)    Fever    Edema, unspecified type    Diarrhea    Metastatic colon cancer to liver (HCC)    Anemia complicating neoplastic disease    Thrombocytopenia (HCC)       Interim History:    Lisa Iqbal presents today for evaluation and management of a diagnosis of metastatic colon cancer with liver metastases.    The patient had a first cycle of FOLFOX as an inpatient. She was admitted earlier this week with lower extremity edema. She is feeling better now. She has the portacath. She has no fever or sweats. She has no dyspnea. She has less LE edema. She has less RUQ pain. She rarely needs to take pain medication this week. She still has general weakness.     The patient presented with liver metastases. She had a colonoscopy that showed a sigmoid colon mass. The biopsy was positive for invasive adenocarcinoma. She has multiple liver metastases confirmed with biopsy.       Review of Systems:   Constitutional: Negative for anorexia, fevers, chills, night sweats  Respiratory: Negative for cough, hemoptysis, chest pain, or dyspnea.  Cardiovascular: Negative for angina, orthopnea or palpitations.  Gastrointestinal: Negative for nausea, vomiting, change in bowel habits, diarrhea, constipation and abdominal pain.  Integument/breast: Negative for rash, skin lesions, and pruritus.  Musculoskeletal: Negative for myalgias, arthralgias, muscle weakness.  Genitourinary: Negative for dysuria or hematuria  Psychiatric: The patient's mood was calm and appropriate for this visit.  The pertinent positives and negatives were described. All other systems were  negative.    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    Disorder of thyroid     Esophageal reflux     Exposure to medical diagnostic radiation     Last treatment 2018    History of adverse reaction to anesthesia     Has anxiety/nervousness when waking up    Migraines     Nausea and vomiting in adult 3/13/2024    Osteopenia     Osteoporosis     Vertigo     Visual impairment     Glasses       Past Surgical History:   Procedure Laterality Date          x 3    COLONOSCOPY N/A 2024    Procedure: COLONOSCOPY;  Surgeon: Po Aviles MD;  Location:  ENDOSCOPY    HYSTERECTOMY      Bilateral ovaries remain    LARYNGOSCOPY,DIRCT,OP,BIOPSY  01/15/2018       Family History Reviewed:  Family History   Problem Relation Age of Onset    Cancer Father         liver cancer    Cancer Brother         liver cancer       Allergies:     Allergies   Allergen Reactions    Septra [Sulfamethoxazole W/Trimethoprim] RASH    Sulfa Antibiotics RASH       Medications:   levothyroxine 25 MCG Oral Tab Take 1 tablet (25 mcg total) by mouth before breakfast.      OLANZapine 2.5 MG Oral Tab Take 1 tablet (2.5 mg total) by mouth nightly. 30 tablet 0    ondansetron (ZOFRAN) 8 MG tablet Take 1 tablet (8 mg total) by mouth every 8 (eight) hours as needed. 30 tablet 0    morphINE ER 15 MG Oral Tab CR Take 1 tablet (15 mg total) by mouth every 12 (twelve) hours. 60 tablet 0         Vital Signs:      Height: --  Weight: 72.3 kg (159 lb 8 oz) ( 1110)  BSA (Calculated - sq m): --  Pulse: 81 (1110)  BP: 109/71 (1110)  Temp: 98.8 °F (37.1 °C) (1110)  Do Not Use - Resp Rate: --  SpO2: 94 % (1110)      Performance Status:  ECOG 0: Fully active, able to carry on all pre-disease performance without restriction     Physical Examination:    Constitutional: Patient is alert and oriented x 3, not in acute distress.  Respiratory: Clear to auscultation and percussion.  No rales.  No wheezes.  Cardiovascular: Regular rate and rhythm. No murmurs.  Gastrointestinal: Soft, non tender with good bowel sounds.  Musculoskeletal: Bilateral ankle and pretibial edema. No calf tenderness.  Lymphatics: There is no palpable lymphadenopathy throughout in the cervical, supraclavicular, or axillary regions.  Psychiatric: The patient's mood is calm and appropriate for this visit.      Labs reviewed at this visit:     Lab Results   Component Value Date    WBC 1.6 (L) 03/28/2024    RBC 3.97 03/28/2024    HGB 10.8 (L) 03/28/2024    HCT 33.9 (L) 03/28/2024    MCV 85.4 03/28/2024    MCH 27.2 03/28/2024    MCHC 31.9 03/28/2024    RDW 16.7 03/28/2024    .0 03/28/2024    MPV 10.4 12/03/2021     Lab Results   Component Value Date     03/28/2024    K 3.0 (L) 03/28/2024    CL 97 (L) 03/28/2024    CO2 32.0 03/28/2024    BUN 8 (L) 03/28/2024    CREATSERUM 0.69 03/28/2024     (H) 03/28/2024    CA 8.5 03/28/2024    ALKPHO 214 (H) 03/28/2024    ALT 19 03/28/2024    AST 50 (H) 03/28/2024    BILT 1.0 03/28/2024    ALB 2.5 (L) 03/28/2024    TP 6.2 (L) 03/28/2024       Radiologic imaging reviewed at this visit:    PET scan on 1/22/2024:  FINDINGS:    Head and neck:  There is increased uptake identified within a hypoattenuating left lobe thyroid nodule measuring 11 x 10 mm. Maximum SUV is 4.1.  Recommend thyroid ultrasound and FNA.  No other foci of abnormal uptake are identified in the head or neck.  There is no  lymphadenopathy.     Chest:  No abnormal FDG accumulation is identified.  There is no lung mass identified.  No mediastinal or hilar lymphadenopathy is identified.     Abdomen and pelvis:  There are multiple, innumerable hepatic hypoattenuating masses with numerous foci of abnormal FDG accumulation within both right and left hepatic lobes consistent with extensive hepatic metastatic disease.  Representative example includes a 3.2 x 3.3 cm  right hepatic lobe mass with a maximum SUV of  26.2.  There is up attic segment 4A mass measuring 2.1 x 1.9 cm with a maximum SUV of 19.3.  Hepatic segment 2 lesion in the lateral segment of the left lobe measures approximately 3.4 x 2.5 cm and has a  maximum SUV of 28.5 there are multiple other additional foci of metastatic disease subtle which appear less well defined the noncontrast CT images.     There is otherwise physiologic uptake seen within the kidneys, ureters, bowel and bladder.  No lymphadenopathy is identified.  Large exophytic cortical cysts are seen in both kidneys measuring 4.4 x 4.4 cm on the right and 6.6 x 5.8 cm on the left.    There is mild colonic diverticulosis without diverticulitis.     MSK:  There is no abnormal foci of FDG accumulation within the of the visualized osseous structures.     Impression   CONCLUSION:    1. Innumerable hepatic masses with abnormal FDG accumulation consistent with hepatic metastatic disease.  The hepatic lesions are amenable to percutaneous biopsy if indicated clinically.  2. There is focal uptake identified within a hypoattenuating lesion within the left lobe of the thyroid gland.  Thyroid sonogram and fine needle aspiration biopsy recommended.          Assessment/Plan:     Metastatic sigmoid colon cancer:  Liver metastases:  KRAS p.G13D  BRAF/NRAS WT  TMB stable  MMR normal    She had a first cycle of FOLFOX. She had significant fatigue and weakness. She now has neutropenia secondary to the chemotherapy. I will hold her treatment x 1 week. If her ANC is greater than 1300 we will proceed next week with FOLFOX but I will dose reduce the oxaliplatin to 65 mg/m2. We will add joselin.    She will continue with the current pain meds for her abdominal pain. This is getting better. She will continue to try to push her activity.     She has LE edema. This is improving. I will hold off with diuretic but will reassess after the next cycle.    Will need to send pathology for HER2 testing.    Anemia complicating neoplastic  disease:    Repeat CBC next week    Neutropenia secondary to chemotherapy:    Hold chemo today. Dose reduce oxaliplatin. Add growth factor next cycle.      Ronan Camarillo MD

## 2024-03-28 NOTE — H&P
[facility]   part of [facility]   History & Physical    ClarissaRosa Iqbal Patient Status:  Outpatient    10/11/1950 MRN [id number]   Location [facility] INTERVENTIONAL SUITES Attending No att. providers found   Hosp Day # 0 PCP Anusha Abraham MD     Admitting Diagnosis:   Referral for chestport.    History of Present Illness:   Colon cancer with plan for chemotherapy.    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    Disorder of thyroid     Esophageal reflux     Exposure to medical diagnostic radiation     Last treatment 2018    History of adverse reaction to anesthesia     Has anxiety/nervousness when waking up    Migraines     Nausea and vomiting in adult 3/13/2024    Osteopenia     Osteoporosis     Vertigo     Visual impairment     Glasses       Past Surgical History:  Past Surgical History:   Procedure Laterality Date          x 3    COLONOSCOPY N/A 2024    Procedure: COLONOSCOPY;  Surgeon: Po Aviles MD;  Location:  ENDOSCOPY    HYSTERECTOMY      Bilateral ovaries remain    LARYNGOSCOPY,DIRCT,OP,BIOPSY  01/15/2018       Social History:  Social History     Tobacco Use    Smoking status: Never    Smokeless tobacco: Never   Substance Use Topics    Alcohol use: No        Family History:  Family History   Problem Relation Age of Onset    Cancer Father         liver cancer    Cancer Brother         liver cancer       Allergies/Medications:   Allergies:  Allergies   Allergen Reactions    Septra [Sulfamethoxazole W/Trimethoprim] RASH    Sulfa Antibiotics RASH       Medications:    Current Outpatient Medications:     levothyroxine 25 MCG Oral Tab, Take 1 tablet (25 mcg total) by mouth before breakfast., Disp: , Rfl:     OLANZapine 2.5 MG Oral Tab, Take 1 tablet (2.5 mg total) by mouth nightly., Disp: 30 tablet, Rfl: 0    morphINE ER 15 MG Oral Tab CR, Take 1 tablet (15 mg total) by mouth  every 12 (twelve) hours., Disp: 60 tablet, Rfl: 0    ondansetron (ZOFRAN) 8 MG tablet, Take 1 tablet (8 mg total) by mouth every 8 (eight) hours as needed., Disp: 30 tablet, Rfl: 0    Physical Exam & Review of Systems:   Physical Exam:    BP 93/54   Pulse 64   Temp 98 °F (36.7 °C) (Temporal)   Resp 12   SpO2 100%     General: NAD  Neck: No JVD  Lungs: CTA bilat  Heart: RRR, S1, S2  Abdomen: Soft, NT/ND, BS+x4  Extremities: Warm, dry, no LE edema bilat  Pulses: 2+ bilat DP    Results:   Labs:  Recent Labs   Lab 03/24/24  1858   RBC 4.12   HGB 11.1*   HCT 34.9*   MCV 84.7   MCH 26.9   MCHC 31.8   RDW 15.1   NEPRELIM 2.34   WBC 3.5*   .0*     Recent Labs   Lab 03/27/24  1033   INR 1.5*     Recent Labs   Lab 03/24/24  1858   *   BUN 11   CREATSERUM 0.72   CA 9.1      K 3.6      CO2 30.0       Assessment/Plan:   Impression: Referral for chestport.    Recommendations: Outpatient placement.    Reid Renee MD  3/28/2024  10:34 AM

## 2024-03-28 NOTE — PROGRESS NOTES
Patient is here for hospital followup for colon cancer after cycle 1 of FOLFOX and avastin.   Patient had port placed yesterday and this site looks clean and dry.   She reports that her abdominal pain has resolved.  She has been able to eat and has not had nausea.   She had labs drawn today that show low WBC and ANC.  Patient denies fever.    She prefers to have her future follow up and treatments in [place].  PSR will call patient's daughter to schedule once Dr. Camarillo determines when she should resume treatment.     Education Record    Learner:  Patient and Family Member    Disease / Diagnosis: colon cancer    Barriers / Limitations:  None   Comments:    Method:  Discussion   Comments:    General Topics:  Side effects and symptom management   Comments:    Outcome:  Shows understanding   Comments:

## 2024-04-01 ENCOUNTER — TELEPHONE (OUTPATIENT)
Dept: HEMATOLOGY/ONCOLOGY | Facility: HOSPITAL | Age: 74
End: 2024-04-01

## 2024-04-01 NOTE — TELEPHONE ENCOUNTER
ANTONIA baker says she's having burning in her mouth and dry difficult to eat or drink. She is asking for refill on salagan to help with this not on medication list. [email or web address]

## 2024-04-01 NOTE — TELEPHONE ENCOUNTER
Called Lisa via , [place], [place] [phone] to give instructions about the lidocaine swish and spit.  Also recommended Biotene mouth rinse.  The mouth will be evaluated at her visit on Wednesday to see if other medications are needed.

## 2024-04-01 NOTE — TELEPHONE ENCOUNTER
Toxicities: C1 D1 Fluorouracil/Leucovorin/Oxaliplatin on 3/14/2024     : Annika # [id number]    Mucositis/Dry Mouth/Chapped Lips     Mucositis Oral: Grade 2/Dry Mouth/Chapped Lips (Lisa reports that since Saturday her mouth has been red, dry and burning. She is eating a small amount of soft food and making her own shakes. She is sipping fluids. She denies feeling light headed or dizzy when she goes from sitting to standing. She also reports her her lips are chapped. She is applying petroleum jelly, which is helping. Lisa asked if she could get something for her mouth pain sent to her pharmacy? She also asked about seeing the INDIRA and get IV hydration for her appointment on Wednesday?    I offered Lisa an ACV today. She said she does not have a ride. She has an appointment on Wednesday. I added an ACV with INDIRA Horvath at 9 am. I told her she will check her out and can add IV hydration if needed. I also told her I would check with Dr Camarillo and his nurse about a prescription for Magic Mouth wash. She said her daughter will be able to pick it up after work today.

## 2024-04-03 ENCOUNTER — OFFICE VISIT (OUTPATIENT)
Dept: HEMATOLOGY/ONCOLOGY | Facility: HOSPITAL | Age: 74
End: 2024-04-03
Attending: INTERNAL MEDICINE
Payer: MEDICARE

## 2024-04-03 VITALS
RESPIRATION RATE: 18 BRPM | WEIGHT: 153 LBS | BODY MASS INDEX: 26 KG/M2 | HEART RATE: 99 BPM | SYSTOLIC BLOOD PRESSURE: 155 MMHG | DIASTOLIC BLOOD PRESSURE: 77 MMHG | TEMPERATURE: 98 F | OXYGEN SATURATION: 93 %

## 2024-04-03 DIAGNOSIS — R63.0 DECREASED APPETITE: ICD-10-CM

## 2024-04-03 DIAGNOSIS — E87.6 HYPOKALEMIA: ICD-10-CM

## 2024-04-03 DIAGNOSIS — C18.9 MALIGNANT NEOPLASM OF COLON (HCC): Primary | ICD-10-CM

## 2024-04-03 DIAGNOSIS — C18.9 METASTATIC COLON CANCER TO LIVER (HCC): Primary | ICD-10-CM

## 2024-04-03 DIAGNOSIS — C18.7 MALIGNANT NEOPLASM OF SIGMOID COLON (HCC): ICD-10-CM

## 2024-04-03 DIAGNOSIS — C78.7 METASTATIC COLON CANCER TO LIVER (HCC): ICD-10-CM

## 2024-04-03 DIAGNOSIS — C78.7 METASTATIC COLON CANCER TO LIVER (HCC): Primary | ICD-10-CM

## 2024-04-03 DIAGNOSIS — C18.9 METASTATIC COLON CANCER TO LIVER (HCC): ICD-10-CM

## 2024-04-03 LAB
ALBUMIN SERPL-MCNC: 2.6 G/DL (ref 3.4–5)
ALBUMIN/GLOB SERPL: 0.6 {RATIO} (ref 1–2)
ALP LIVER SERPL-CCNC: 245 U/L
ALT SERPL-CCNC: 15 U/L
ANION GAP SERPL CALC-SCNC: 8 MMOL/L (ref 0–18)
AST SERPL-CCNC: 41 U/L (ref 15–37)
BASOPHILS # BLD: 0 X10(3) UL (ref 0–0.2)
BASOPHILS NFR BLD: 0 %
BILIRUB SERPL-MCNC: 1.1 MG/DL (ref 0.1–2)
BUN BLD-MCNC: 8 MG/DL (ref 9–23)
CALCIUM BLD-MCNC: 9.4 MG/DL (ref 8.5–10.1)
CHLORIDE SERPL-SCNC: 100 MMOL/L (ref 98–112)
CO2 SERPL-SCNC: 30 MMOL/L (ref 21–32)
CREAT BLD-MCNC: 0.69 MG/DL
EGFRCR SERPLBLD CKD-EPI 2021: 91 ML/MIN/1.73M2 (ref 60–?)
EOSINOPHIL # BLD: 0 X10(3) UL (ref 0–0.7)
EOSINOPHIL NFR BLD: 0 %
ERYTHROCYTE [DISTWIDTH] IN BLOOD BY AUTOMATED COUNT: 18 %
FASTING STATUS PATIENT QL REPORTED: NO
GLOBULIN PLAS-MCNC: 4.1 G/DL (ref 2.8–4.4)
GLUCOSE BLD-MCNC: 83 MG/DL (ref 70–99)
HCT VFR BLD AUTO: 38.1 %
HGB BLD-MCNC: 12.6 G/DL
LYMPHOCYTES NFR BLD: 1.55 X10(3) UL (ref 1–4)
LYMPHOCYTES NFR BLD: 12 %
MCH RBC QN AUTO: 27.9 PG (ref 26–34)
MCHC RBC AUTO-ENTMCNC: 33.1 G/DL (ref 31–37)
MCV RBC AUTO: 84.5 FL
MONOCYTES # BLD: 1.68 X10(3) UL (ref 0.1–1)
MONOCYTES NFR BLD: 13 %
MORPHOLOGY: NORMAL
NEUTROPHILS # BLD AUTO: 8.32 X10 (3) UL (ref 1.5–7.7)
NEUTROPHILS NFR BLD: 68 %
NEUTS BAND NFR BLD: 7 %
NEUTS HYPERSEG # BLD: 9.68 X10(3) UL (ref 1.5–7.7)
NEUTS VAC BLD QL SMEAR: PRESENT
OSMOLALITY SERPL CALC.SUM OF ELEC: 283 MOSM/KG (ref 275–295)
PLATELET # BLD AUTO: 356 10(3)UL (ref 150–450)
PLATELET MORPHOLOGY: NORMAL
POTASSIUM SERPL-SCNC: 3.2 MMOL/L (ref 3.5–5.1)
PROT SERPL-MCNC: 6.7 G/DL (ref 6.4–8.2)
RBC # BLD AUTO: 4.51 X10(6)UL
SODIUM SERPL-SCNC: 138 MMOL/L (ref 136–145)
TOTAL CELLS COUNTED BLD: 100
TOXIC GRANULES BLD QL SMEAR: PRESENT
WBC # BLD AUTO: 12.9 X10(3) UL (ref 4–11)

## 2024-04-03 PROCEDURE — 85007 BL SMEAR W/DIFF WBC COUNT: CPT

## 2024-04-03 PROCEDURE — 96415 CHEMO IV INFUSION ADDL HR: CPT

## 2024-04-03 PROCEDURE — 80053 COMPREHEN METABOLIC PANEL: CPT

## 2024-04-03 PROCEDURE — 99215 OFFICE O/P EST HI 40 MIN: CPT | Performed by: NURSE PRACTITIONER

## 2024-04-03 PROCEDURE — 85027 COMPLETE CBC AUTOMATED: CPT

## 2024-04-03 PROCEDURE — 85025 COMPLETE CBC W/AUTO DIFF WBC: CPT

## 2024-04-03 PROCEDURE — 96417 CHEMO IV INFUS EACH ADDL SEQ: CPT

## 2024-04-03 PROCEDURE — 96413 CHEMO IV INFUSION 1 HR: CPT

## 2024-04-03 PROCEDURE — 96411 CHEMO IV PUSH ADDL DRUG: CPT

## 2024-04-03 PROCEDURE — 96368 THER/DIAG CONCURRENT INF: CPT

## 2024-04-03 PROCEDURE — 96375 TX/PRO/DX INJ NEW DRUG ADDON: CPT

## 2024-04-03 PROCEDURE — 96549 UNLISTED CHEMOTHERAPY PX: CPT

## 2024-04-03 RX ORDER — FLUOROURACIL 50 MG/ML
400 INJECTION, SOLUTION INTRAVENOUS ONCE
Status: CANCELLED | OUTPATIENT
Start: 2024-04-03

## 2024-04-03 RX ORDER — POTASSIUM CHLORIDE 750 MG/1
40 TABLET, EXTENDED RELEASE ORAL ONCE
Status: COMPLETED | OUTPATIENT
Start: 2024-04-03 | End: 2024-04-03

## 2024-04-03 RX ORDER — FLUOROURACIL 50 MG/ML
2400 INJECTION, SOLUTION INTRAVENOUS CONTINUOUS
Status: DISCONTINUED | OUTPATIENT
Start: 2024-04-03 | End: 2024-04-03

## 2024-04-03 RX ORDER — FLUOROURACIL 50 MG/ML
400 INJECTION, SOLUTION INTRAVENOUS ONCE
Status: COMPLETED | OUTPATIENT
Start: 2024-04-03 | End: 2024-04-03

## 2024-04-03 RX ORDER — POTASSIUM CHLORIDE 750 MG/1
40 TABLET, EXTENDED RELEASE ORAL ONCE
Status: CANCELLED
Start: 2024-04-03 | End: 2024-04-03

## 2024-04-03 RX ORDER — FLUOROURACIL 50 MG/ML
2400 INJECTION, SOLUTION INTRAVENOUS CONTINUOUS
Status: CANCELLED | OUTPATIENT
Start: 2024-04-03

## 2024-04-03 RX ADMIN — FLUOROURACIL 4050 MG: 50 INJECTION, SOLUTION INTRAVENOUS at 14:41:00

## 2024-04-03 RX ADMIN — POTASSIUM CHLORIDE 40 MEQ: 750 TABLET, EXTENDED RELEASE ORAL at 11:27:00

## 2024-04-03 RX ADMIN — FLUOROURACIL 700 MG: 50 INJECTION, SOLUTION INTRAVENOUS at 14:34:00

## 2024-04-03 NOTE — PROGRESS NOTES
Pt here for C2D1 Drug(s)Folfox.  Arrives Ambulating independently, accompanied by Self and Family member     Patient was evaluated today by VALENTINE and Treatment Nurse.    Oral medications included in this regimen:  no    Patient confirms comprehension of cancer treatment schedule:  yes    Pregnancy screening:  Not applicable    Modifications in dose or schedule:  No    Medications appearance and physical integrity checked by RN: yes.    Chemotherapy IV pump settings verified by 2 RNs:  Yes.  Frequency of blood return and site check throughout administration: Prior to administration and Prior to each drug     Infusion/treatment outcome:  patient tolerated treatment without incident    Education Record    Learner:  Patient and Family Member  Barriers / Limitations:  None  Method:  Brief focused, Discussion, and Reinforcement  Education / instructions given:  patient + daughter Beryl since they refused   Outcome:  Shows understanding    Discharged Home, Ambulating independently, accompanied by:Self and Family member    Patient/family verbalized understanding of future appointments: by printed AVS    On arrival her port had a visible blister above scar line . Her port was not painful but definitively red enough, that I took pictures of it and decided to draw patient peripherally and have APN look at her port before I use it.  aware of this issue with port as well . Will wait for her labs to decide if she is getting treated today . Patient speaks Greek and Salvadorean. They refused  so daughter Beryl is the one present and she is the one coordinating all the care    Accessed her port with 3/4 jenkins GA 20 . Excellent blood return. Used skin protectant and CHG disk. Applied hypoallergenic dressing which was reinforced with steri strips .    Labs looks ok , even though her K=3 today and she is slightly dehydrated .We had replaced her K+today orally. She will receive IV fluids on her disconnect  day this coming Friday . Spent ample time in explaining CADD pump and management at home    AVS printed and verbalized     105

## 2024-04-03 NOTE — PROGRESS NOTES
Cancer Center Progress Note    Patient Name: Lisa Iqbal   YOB: 1950   Medical Record Number: [id number]   [facility]: [id number]   Date of visit: 4/3/2024       Chief Complaint/Reason for Visit:  Chief Complaint   Patient presents with    Follow - Up    Chemotherapy    Anorexia      Oncology history:  The patient presented with liver metastases. She had a colonoscopy that showed a sigmoid colon mass. The biopsy was positive for invasive adenocarcinoma. She has multiple liver metastases confirmed with biopsy.   C1 FOLFOX inpatient 3/13/24  C2 deferred x1 week due to neutropenia       History of Present Illness:   Lisa Iqbal is a 73 year old patient of Dr. Murphy with metastatic colon cancer. She is receiving FOLFOX chemotherapy. Her first dose was given inpatient 3/13/24. She was due for her second dose last week but treatment was deferred due to neutropenia. She presents today for follow up and consideration of cycle 2.   She is accompanied by her daughter who assists with translation. She reports feeling okay, energy is fair. She is not at baseline but feeling better then last week. Not in a wheelchair today.   She reports decreased appetite and admits to drinking about 20oz water a day with some juice and fruits. Reports mouth dryness and cracked lips.   She denies any fevers or recent infections, no sob or cp.   She denies any pain currently, trying to decrease morphine use.   No nausea or vomiting, using antiemetics if needed.   Bowels are okay.  No bleeding concerns.   Bilateral lower extremity edema is present but not worsening, elevating her legs when able.       Problem List:  Patient Active Problem List   Diagnosis    Osteopenia    History of cancer of larynx; glottis    Hypothyroidism    Laryngopharyngeal reflux    Tinnitus    Malignant neoplasm of colon (HCC)    Intractable pain    Nausea and vomiting in adult    Chemotherapy management, encounter for    Neoplasm  related pain (acute) (chronic)    Fever    Edema, unspecified type    Diarrhea    Metastatic colon cancer to liver (HCC)    Anemia complicating neoplastic disease    Thrombocytopenia (HCC)    Hypokalemia        Medical History:  Past Medical History:   Diagnosis Date    Cancer (HCC)     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    Disorder of thyroid     Esophageal reflux     Exposure to medical diagnostic radiation     Last treatment 2018    History of adverse reaction to anesthesia     Has anxiety/nervousness when waking up    Migraines     Nausea and vomiting in adult 3/13/2024    Osteopenia     Osteoporosis     Vertigo     Visual impairment     Glasses       Surgical History:  Past Surgical History:   Procedure Laterality Date          x 3    COLONOSCOPY N/A 2024    Procedure: COLONOSCOPY;  Surgeon: Po Aviles MD;  Location:  ENDOSCOPY    HYSTERECTOMY      Bilateral ovaries remain    LARYNGOSCOPY,DIRCT,OP,BIOPSY  01/15/2018       Allergies:  Allergies   Allergen Reactions    Septra [Sulfamethoxazole W/Trimethoprim] RASH    Sulfa Antibiotics RASH       Family History:  Family History   Problem Relation Age of Onset    Cancer Father         liver cancer    Cancer Brother         liver cancer       Social History:  Social History     Socioeconomic History    Marital status: Single     Spouse name: Not on file    Number of children: 2    Years of education: Not on file    Highest education level: Not on file   Occupational History    Not on file   Tobacco Use    Smoking status: Never    Smokeless tobacco: Never   Vaping Use    Vaping Use: Never used   Substance and Sexual Activity    Alcohol use: No    Drug use: No    Sexual activity: Not on file   Other Topics Concern    Caffeine Concern No    Exercise Yes    Seat Belt No    Special Diet Yes     Comment: Balanced    Stress Concern No    Weight Concern Not Asked   Social History Narrative    ** Merged History Encounter **          Social Determinants of  Health     Financial Resource Strain: Not on file   Food Insecurity: No Food Insecurity (3/25/2024)    Food Insecurity     Food Insecurity: Never true   Transportation Needs: No Transportation Needs (3/25/2024)    Transportation Needs     Lack of Transportation: No   Physical Activity: Not on file   Stress: Not on file   Social Connections: Not on file   Housing Stability: Low Risk  (3/25/2024)    Housing Stability     Housing Instability: No     Housing Instability Emergency: Not on file       Medications:    Current Outpatient Medications:     levothyroxine 25 MCG Oral Tab, Take 1 tablet (25 mcg total) by mouth before breakfast., Disp: , Rfl:     OLANZapine 2.5 MG Oral Tab, Take 1 tablet (2.5 mg total) by mouth nightly., Disp: 30 tablet, Rfl: 0    morphINE ER 15 MG Oral Tab CR, Take 1 tablet (15 mg total) by mouth every 12 (twelve) hours., Disp: 60 tablet, Rfl: 0    Lidocaine Viscous HCl 2 % Mouth/Throat Solution, Take 5 mL by mouth every 3 (three) hours as needed for Pain. Swish in the mouth and spit out., Disp: 100 mL, Rfl: 1    ondansetron (ZOFRAN) 8 MG tablet, Take 1 tablet (8 mg total) by mouth every 8 (eight) hours as needed. (Patient not taking: Reported on 4/3/2024), Disp: 30 tablet, Rfl: 0    Review of Systems:  A comprehensive 14 point review of systems was completed.  Pertinent positives and negatives noted in the HPI.    Performance Status: ECOG 2 - Ambulatory/capable of all self-care, unable to perform any work activities. Up and about more than 50% of waking hours.      Physical Examination:  Vital Signs: Height: --  Weight: 69.4 kg (153 lb) (04/03 0931)  BSA (Calculated - sq m): --  Pulse: 99 (04/03 0931)  BP: 155/77 (04/03 0931)  Temp: 97.7 °F (36.5 °C) (04/03 0931)  Do Not Use - Resp Rate: --  SpO2: 93 % (04/03 0931)    General: Patient is alert and oriented x 3, not in acute distress.  HEENT: Anicteric, conjunctivae and sclerae clear, no oropharyngeal lesion/thrush, mucous membranes are moist    Chest: Clear to auscultation. Respirations unlabored.   Heart: Regular rate and rhythm.   Abdomen: Soft, non-distended, non-tender with present bowel sounds.  Extremities: No edema.  Neurological: Grossly intact.   Skin: warm, dry, no erythema or rash   Psych/Depression: mood and affect are appropriate.     Labs:     Recent Results (from the past 72 hour(s))   Comp Metabolic Panel (14)    Collection Time: 04/03/24  9:33 AM   Result Value Ref Range    Glucose 83 70 - 99 mg/dL    Sodium 138 136 - 145 mmol/L    Potassium 3.2 (L) 3.5 - 5.1 mmol/L    Chloride 100 98 - 112 mmol/L    CO2 30.0 21.0 - 32.0 mmol/L    Anion Gap 8 0 - 18 mmol/L    BUN 8 (L) 9 - 23 mg/dL    Creatinine 0.69 0.55 - 1.02 mg/dL    Calcium, Total 9.4 8.5 - 10.1 mg/dL    Calculated Osmolality 283 275 - 295 mOsm/kg    eGFR-Cr 91 >=60 mL/min/1.73m2    AST 41 (H) 15 - 37 U/L    ALT 15 13 - 56 U/L    Alkaline Phosphatase 245 (H) 55 - 142 U/L    Bilirubin, Total 1.1 0.1 - 2.0 mg/dL    Total Protein 6.7 6.4 - 8.2 g/dL    Albumin 2.6 (L) 3.4 - 5.0 g/dL    Globulin  4.1 2.8 - 4.4 g/dL    A/G Ratio 0.6 (L) 1.0 - 2.0    Patient Fasting for CMP? No    CBC W/ DIFFERENTIAL    Collection Time: 04/03/24  9:33 AM   Result Value Ref Range    WBC 12.9 (H) 4.0 - 11.0 x10(3) uL    RBC 4.51 3.80 - 5.30 x10(6)uL    HGB 12.6 12.0 - 16.0 g/dL    HCT 38.1 35.0 - 48.0 %    .0 150.0 - 450.0 10(3)uL    MCV 84.5 80.0 - 100.0 fL    MCH 27.9 26.0 - 34.0 pg    MCHC 33.1 31.0 - 37.0 g/dL    RDW 18.0 %    Neutrophil Absolute Prelim 8.32 (H) 1.50 - 7.70 x10 (3) uL   Manual differential    Collection Time: 04/03/24  9:33 AM   Result Value Ref Range    Neutrophil Absolute Manual 9.68 (H) 1.50 - 7.70 x10(3) uL    Lymphocyte Absolute Manual 1.55 1.00 - 4.00 x10(3) uL    Monocyte Absolute Manual 1.68 (H) 0.10 - 1.00 x10(3) uL    Eosinophil Absolute Manual 0.00 0.00 - 0.70 x10(3) uL    Basophil Absolute Manual 0.00 0.00 - 0.20 x10(3) uL    Neutrophils % Manual 68 %    Band % 7 %     Lymphocyte % Manual 12 %    Monocyte % Manual 13 %    Eosinophil % Manual 0 %    Basophil % Manual 0 %    Total Cells Counted 100     RBC Morphology Normal Normal, Slide reviewed, see previous RBC morphology.    Platelet Morphology Normal Normal    Toxic Granulation Present (A) (none)    Vacuolated Neutrophils Present (A) (none)       Impression/Plan    Metastatic sigmoid colon cancer:  Liver metastases   Began chemotherapy with FOLFOX 3/14/2024 while inpatient.  C2 postponed due to abnormal labs. Bevacizumab added with C2. Oxaliplatin dose reduced due to counts and tolerability.  She is feeling better today, labs reviewed and improved, will proceed with C2 as planned. Reviewed with patient and her daughter.   We discussed home medications and side effects.     Decreased appetite   Decreased fluid intake, admits to drinking about 20-30oz of fluid per day. Complaints of dryness, fatigue and weakness. Discussed hydration and ways to optimize nutrition. Offered dietician, pt wishes to defer for now.   Monitor weight  Will hydrate with IVF as needed. Will give 1L on Wednesday with pump disconnect, if she is feeling better we can cancel the fluids.     Hypokalemia:  Give 40KCL today   Follow cmp  Increase potassium rich foods     Planned Follow Up: 2 weeks, sooner if needed    Risk Level: HIGH, metastatic colon cancer,  receiving chemotherapy requiring close monitoring     The 21st Century Cures Act makes medical notes like these available to patients in the interest of transparency. Please be advised this is a medical document. Medical documents are intended to carry relevant information, facts as evident, and the clinical opinion of the practitioner. The medical note is intended as peer to peer communication and may appear blunt or direct. It is written in medical language and may contain abbreviations or verbiage that are unfamiliar.     Electronically Signed by:    Tania JACOBSON, EMMETTP-BC  Nurse  [facility]

## 2024-04-03 NOTE — PROGRESS NOTES
Chief Complaint   Patient presents with    Follow - Up    Chemotherapy    Anorexia     Pt is here for a sick call and treatment; dehydration/anorexia with C2 D1 Folfox/MVASI is expected. Pt reports that she does not eat very much; 3 small meals a day with limited water intake (about 16.9 oz of water). Nausea but no vomiting; mild constipation, no diarrhea. Pt looked well today otherwise, no wheelchair, walking without assistance.    Education Record    Learner:  Patient and Family Member    Disease / Diagnosis: colon cancer    Barriers / Limitations:  None   Comments:    Method:  Brief focused   Comments:    General Topics:  Diet, Medication, Pain, Side effects and symptom management, and Plan of care reviewed   Comments:    Outcome:  Needs reinforcement   Comments:

## 2024-04-05 ENCOUNTER — OFFICE VISIT (OUTPATIENT)
Dept: HEMATOLOGY/ONCOLOGY | Facility: HOSPITAL | Age: 74
End: 2024-04-05
Attending: INTERNAL MEDICINE
Payer: MEDICARE

## 2024-04-05 VITALS
TEMPERATURE: 97 F | HEART RATE: 73 BPM | DIASTOLIC BLOOD PRESSURE: 84 MMHG | BODY MASS INDEX: 28.89 KG/M2 | SYSTOLIC BLOOD PRESSURE: 137 MMHG | OXYGEN SATURATION: 95 % | WEIGHT: 153 LBS | HEIGHT: 60.98 IN | RESPIRATION RATE: 16 BRPM

## 2024-04-05 DIAGNOSIS — E87.6 HYPOKALEMIA: ICD-10-CM

## 2024-04-05 DIAGNOSIS — C18.7 MALIGNANT NEOPLASM OF SIGMOID COLON (HCC): Primary | ICD-10-CM

## 2024-04-05 DIAGNOSIS — C78.7 METASTATIC COLON CANCER TO LIVER (HCC): ICD-10-CM

## 2024-04-05 DIAGNOSIS — C18.9 METASTATIC COLON CANCER TO LIVER (HCC): ICD-10-CM

## 2024-04-05 PROCEDURE — 96372 THER/PROPH/DIAG INJ SC/IM: CPT

## 2024-04-05 PROCEDURE — 96360 HYDRATION IV INFUSION INIT: CPT

## 2024-04-05 NOTE — PROGRESS NOTES
Pt here for C2D3 Drug(s)Fulphila, IVF.  Arrives Via wheelchair, accompanied by Self     Patient was evaluated today by Treatment Nurse.    Oral medications included in this regimen:  no    Patient confirms comprehension of cancer treatment schedule:  yes    Pregnancy screening:  Denies possibility of pregnancy    Modifications in dose or schedule:  No    Medications appearance and physical integrity checked by RN: yes.    Chemotherapy IV pump settings verified by 2 RNs:  n/a - cancer treatment injection administered.  Frequency of blood return and site check throughout administration: Prior to administration and At completion of therapy     Infusion/treatment outcome:  patient tolerated treatment without incident    Education Record    Learner:  Patient  Barriers / Limitations:  Language  Method:  Brief focused and Discussion  Education / instructions given:  Plan of care and next appointments reviewed.  Outcome:  Shows understanding    Discharged Home, Via wheelchair, accompanied by:Self in stable condition, no new complaints.    Patient/family verbalized understanding of future appointments: by printed AVS

## 2024-04-08 RX ORDER — OLANZAPINE 2.5 MG/1
2.5 TABLET, FILM COATED ORAL NIGHTLY
Qty: 30 TABLET | Refills: 0 | Status: SHIPPED | OUTPATIENT
Start: 2024-04-08 | End: 2024-07-03

## 2024-04-10 ENCOUNTER — OFFICE VISIT (OUTPATIENT)
Dept: HEMATOLOGY/ONCOLOGY | Facility: HOSPITAL | Age: 74
End: 2024-04-10
Attending: INTERNAL MEDICINE
Payer: MEDICARE

## 2024-04-10 ENCOUNTER — HOSPITAL ENCOUNTER (OUTPATIENT)
Dept: GENERAL RADIOLOGY | Facility: HOSPITAL | Age: 74
Discharge: HOME | End: 2024-04-10
Attending: CLINICAL NURSE SPECIALIST
Payer: MEDICARE

## 2024-04-10 VITALS
HEART RATE: 85 BPM | TEMPERATURE: 97 F | OXYGEN SATURATION: 96 % | DIASTOLIC BLOOD PRESSURE: 79 MMHG | RESPIRATION RATE: 16 BRPM | SYSTOLIC BLOOD PRESSURE: 120 MMHG

## 2024-04-10 DIAGNOSIS — C78.7 METASTATIC COLON CANCER TO LIVER (HCC): ICD-10-CM

## 2024-04-10 DIAGNOSIS — K59.00 CONSTIPATION, UNSPECIFIED CONSTIPATION TYPE: Primary | ICD-10-CM

## 2024-04-10 DIAGNOSIS — C18.7 MALIGNANT NEOPLASM OF SIGMOID COLON (HCC): Primary | ICD-10-CM

## 2024-04-10 DIAGNOSIS — C18.7 CANCER OF SIGMOID COLON (HCC): ICD-10-CM

## 2024-04-10 DIAGNOSIS — E86.0 DEHYDRATION: ICD-10-CM

## 2024-04-10 DIAGNOSIS — C18.9 MALIGNANT NEOPLASM OF COLON (HCC): Primary | ICD-10-CM

## 2024-04-10 DIAGNOSIS — C18.9 METASTATIC COLON CANCER TO LIVER (HCC): ICD-10-CM

## 2024-04-10 DIAGNOSIS — K59.00 CONSTIPATION, UNSPECIFIED CONSTIPATION TYPE: ICD-10-CM

## 2024-04-10 PROCEDURE — 74019 RADEX ABDOMEN 2 VIEWS: CPT | Performed by: CLINICAL NURSE SPECIALIST

## 2024-04-10 PROCEDURE — 96360 HYDRATION IV INFUSION INIT: CPT

## 2024-04-10 PROCEDURE — 99215 OFFICE O/P EST HI 40 MIN: CPT | Performed by: CLINICAL NURSE SPECIALIST

## 2024-04-10 RX ORDER — POLYETHYLENE GLYCOL 3350 17 G/17G
17 POWDER, FOR SOLUTION ORAL 2 TIMES DAILY
Qty: 578 G | Refills: 1 | Status: SHIPPED | OUTPATIENT
Start: 2024-04-10

## 2024-04-10 RX ORDER — PROCHLORPERAZINE MALEATE 10 MG
10 TABLET ORAL EVERY 6 HOURS PRN
Qty: 30 TABLET | Refills: 3 | Status: SHIPPED | OUTPATIENT
Start: 2024-04-10

## 2024-04-10 RX ORDER — SENNOSIDES 8.6 MG
8.6 TABLET ORAL NIGHTLY
Qty: 60 TABLET | Refills: 1 | Status: SHIPPED | OUTPATIENT
Start: 2024-04-10

## 2024-04-10 NOTE — PROGRESS NOTES
Received a phone call from home health nurseIsis saying that the patient is requesting a refill of her prochlorperazine.  The patient also reports to the nurse that she has not had a bowel movement since the end of March.  The patient says that she is eating and does not feel bloated or any abdominal pain.  She is scheduled for IV fluids this afternoon and will schedule with APN for evaluation.

## 2024-04-10 NOTE — PROGRESS NOTES
Education Record    Learner:  Patient    Disease / Diagnosis:    Barriers / Limitations:  None   Comments:    Method:  Discussion   Comments:    General Topics:  Plan of care reviewed   Comments:    Outcome:  Shows understanding   Comments:    Patient in treatment center for 1L NS hydration. Patient assessed by APN agusto today as well to follow up. Stat abdominal xray done d/t pt not having a BM for prolonged amount of time. Patient aware of appointment on Friday for additional fluids. Patient left treatment center in stable condition and accompanied by caretaker.

## 2024-04-10 NOTE — PROGRESS NOTES
Cancer Center Progress Note    Patient Name: Lisa Iqbal   YOB: 1950   Medical Record Number: [id number]   CSN: [id number]   Date of visit: 4/10/2024   Provider: INDIRA Gonzalez  Referring Physician: Ronan Camarillo        Chief Complaint:  Chief Complaint   Patient presents with    Constipation        The 21st Century Cures Act makes medical notes like these available to patients in the interest of transparency. Please be advised this is a medical document. Medical documents are intended to carry relevant information, facts as evident, and the clinical opinion of the practitioner. The medical note is intended as peer to peer communication and may appear blunt or direct. It is written in medical language and may contain abbreviations or verbiage that are unfamiliar.     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 She had radiaiton but could not complete the full treatment due to mucosal toxity. She has had POLI with ENT and rad onc follow up within the last year     Colon cancer  1/2-1/3/2024 She presented with acute RUQ sharp pain. She had three episodes of intense sharp pain. She presented to immediate care and had a ddimer that was elevated. She was sent to the ED. She had a CTA that was negative for thrombosis but there were multiple liver lesions. She had some asymmetry of her breast tissue.     1/22/24 PET CONCLUSION:    1. Innumerable hepatic masses with abnormal FDG accumulation consistent with hepatic metastatic disease.  The hepatic lesions are amenable to percutaneous biopsy if indicated clinically.   2. There is focal uptake identified within a hypoattenuating lesion within the left lobe of the thyroid gland.  Thyroid sonogram and fine needle aspiration biopsy recommended.       2/8/24 Liver Bx  Biopsy, liver dome mass lesion:  -Metastatic adenocarcinoma.    She had [facility] NGS with KRAS p.G13D GOF  mutation. BRAF and NRAS are WT. TMB is 4.7 m/MB. Microstatellite stable. MMR normal.     2/27/24 EGD/Colonoscopy    Sigmoid mass, biopsy:  -Positive for invasive colonic adenocarcinoma.    3/12/24 Presented to the ED with abdominal pain and vomiting.  CT findings may represent tumor necrosis as opposed to abscess.  Discharged home.  3/13/24 Returned to the ED with the same abdominal pain and vomiting.  Admitted to the hospital    3/13/24 FOLFOX cycle 1 initiated inpatient.  3/24/24: Admitted to  due to diarrhea and edema.  No DVT.  3/28: Scheduled C2 but delayed due to neutropenia  4/3/24:  FOLFOX cycle 2, DR Oxaliplatin to 65mg/m2, started pegfilgrastim      History of Present Illness:  73 year old  patient of Dr. Murphy  who has the above history.  She presents for scheduled IV hydration fluids.  She also reports that she has not had a BM since 3/28 on the day of her port-a-cath insertion.  She is not taking any laxatives.  She is passing some flatus.  She has no abdominal pain.  No N/V.  Takes Compazine with relief.  She eats small meals about 4x/day.  Eating fruits, light foods such as soups, jello.  She states she feels \"stable\".  She has abdominal pain 2-3/10.  She takes Morphie ER 15mg 2x/day.  She denies lightheadedness or dizziness.  Normal urine output.  She drinks 16-20 oz liquids.  She is eatinig ice chips.  She initially stated that cold bothers her but ice chips don't bother her.  She has no neuropathy.  Denies palpitations, CP, or SOB.    Allergies:  Allergies   Allergen Reactions    Septra [Sulfamethoxazole W/Trimethoprim] RASH    Sulfa Antibiotics RASH       Reviewed social history, PMH, PSH    Vital Signs:  Height: --  Weight: --  BSA (Calculated - sq m): --  Pulse: 85 (04/10 1439)  BP: 120/79 (04/10 1439)  Temp: 97.1 °F (36.2 °C) (04/10 1439)  Do Not Use - Resp Rate: --  SpO2: 96 % (04/10 1439)      Medications:    Current Outpatient Medications:     OLANZapine 2.5 MG Oral Tab, Take 1 tablet  (2.5 mg total) by mouth nightly., Disp: 30 tablet, Rfl: 0    levothyroxine 25 MCG Oral Tab, Take 1 tablet (25 mcg total) by mouth before breakfast., Disp: , Rfl:     morphINE ER 15 MG Oral Tab CR, Take 1 tablet (15 mg total) by mouth every 12 (twelve) hours., Disp: 60 tablet, Rfl: 0    prochlorperazine (COMPAZINE) 10 mg tablet, Take 1 tablet (10 mg total) by mouth every 6 (six) hours as needed for Nausea. (Patient not taking: Reported on 4/10/2024), Disp: 30 tablet, Rfl: 3    Lidocaine Viscous HCl 2 % Mouth/Throat Solution, Take 5 mL by mouth every 3 (three) hours as needed for Pain. Swish in the mouth and spit out. (Patient not taking: Reported on 4/10/2024), Disp: 100 mL, Rfl: 1    ondansetron (ZOFRAN) 8 MG tablet, Take 1 tablet (8 mg total) by mouth every 8 (eight) hours as needed. (Patient not taking: Reported on 4/3/2024), Disp: 30 tablet, Rfl: 0    Review of Systems:   As in HPI    Physical Examination:  General: Awake, alert, oriented x3, no acute distress.    HEENT:  Anicteric, conjunctivae and sclerae clear, no sinus tenderness, no oropharyngeal lesion/thrush, mucous membranes are moist.  Neck:  Supple, no tenderness, no masses or adenopathy  Lungs:  Clear to auscultation bilaterally  CV:  Regular rate and rhythm  Abdomen:  Non-distended, normoactive bowel sounds, soft,nontender, no hepatosplenomegaly.  Extremities:  No edema, no tenderness  Neuro:  CN 2-12 intact    Imaging:  Narrative   PROCEDURE:  XR ABDOMEN OBSTRUCTIVE SERIES ROUTINE(2 VW)(CPT=74019)     TECHNIQUE:  2 view obstructive series of the abdomen and pelvis were obtained.     COMPARISON:  [facility] , XR, XR ABDOMEN (KUB) (1 AP VIEW)  (CPT=74018), 4/02/2018, 1:58 PM.     INDICATIONS:  C18.7 Cancer of sigmoid colon (HCC) K59.00 Constipation, unspecified constipation type     PATIENT STATED HISTORY: (As transcribed by Technologist)  Patient offered no additional history at this time.         FINDINGS:    There is a moderate amount of fecal material  present within the colon.  No dilated loops of small bowel are seen.  No evidence of free intraperitoneal air.  If clinical symptoms persist then recommend follow-up imaging.                   Impression   CONCLUSION:  See above.          Impression/Plan:    Metastatic sigmoid colon cancer with liver involvement:  S/p C2 FOLFOX on 4/3  Oxaliplatin DR to 65mg/m2 due to neutropenia  Pegfilgrastim given on day 3    Dehydration:  Unabld to drink optimal amounts of liquids due to possible cold sensitivity and overall difficulty keeping up.  She is receiving IV fluids that was pre-scheduled to aid in her recovery.  She is able to eat ice chips without difficulty which she states she tries to do throughout the day.      Constipation:  She is on morphine twice daily but not taking laxatives.  She states she has not had a BM since 3/28.  She has no N/V or abd pain.  She is passing some flatus.  She has a normal abdominal exam.  Stat abdominal Xray does not demonstrate obstruction but has moderate amt of stool in the colon.  Senokot BID as she is on opioids  Recommended Miralax BID as well but the patient does not tolerate this as she has difficulty drinking liquids.      Emotional Well Being:  I have assessed the patient's emotional well-being and any concerns about anxiety or depression.  No acute psychosocial intervention required at this time.    Risk level:  High-metastatic colon cancer on chemotherapy, requiring intervention and close monitoring.    INDIRA Gonzalez  [facility]

## 2024-04-10 NOTE — PROGRESS NOTES
Chief Complaint   Patient presents with    Constipation     Pt is here for a sick call - constipation. She was last treated on 04/03/2024 C2 D1 folfox with MVASI. She reports no BM since March; when asked more specifically, patient does admit to having diarrhea inpatient. Pt has been trying to eat more snacks and small portioned items like soup and jello. Denies nausea, vomiting, abdominal pain or rectal pain. Denies flatulence; does report burping.      Education Record    Learner:  Patient    Disease / Diagnosis: colon cancer    Barriers / Limitations:  Language   Comments:    Method:  Brief focused   Comments:    General Topics:  Diet, Medication, Pain, Side effects and symptom management, and Plan of care reviewed   Comments:    Outcome:  Needs reinforcement   Comments:       Scheduled and left message with appt on voicemail.

## 2024-04-11 ENCOUNTER — HOSPITAL ENCOUNTER (EMERGENCY)
Facility: HOSPITAL | Age: 74
Discharge: HOME | End: 2024-04-12
Attending: EMERGENCY MEDICINE
Payer: MEDICARE

## 2024-04-11 DIAGNOSIS — C18.9 METASTASIS FROM COLON CANCER (HCC): ICD-10-CM

## 2024-04-11 DIAGNOSIS — D61.818 PANCYTOPENIA (HCC): ICD-10-CM

## 2024-04-11 DIAGNOSIS — C79.9 METASTASIS FROM COLON CANCER (HCC): ICD-10-CM

## 2024-04-11 DIAGNOSIS — K59.00 CONSTIPATION, UNSPECIFIED CONSTIPATION TYPE: Primary | ICD-10-CM

## 2024-04-11 LAB
ALBUMIN SERPL-MCNC: 2.9 G/DL (ref 3.4–5)
ALBUMIN/GLOB SERPL: 0.7 {RATIO} (ref 1–2)
ALP LIVER SERPL-CCNC: 261 U/L
ALT SERPL-CCNC: 16 U/L
ANION GAP SERPL CALC-SCNC: 10 MMOL/L (ref 0–18)
AST SERPL-CCNC: 48 U/L (ref 15–37)
BILIRUB SERPL-MCNC: 1.6 MG/DL (ref 0.1–2)
BUN BLD-MCNC: 10 MG/DL (ref 9–23)
CALCIUM BLD-MCNC: 9.1 MG/DL (ref 8.5–10.1)
CHLORIDE SERPL-SCNC: 103 MMOL/L (ref 98–112)
CO2 SERPL-SCNC: 22 MMOL/L (ref 21–32)
CREAT BLD-MCNC: 0.5 MG/DL
EGFRCR SERPLBLD CKD-EPI 2021: 98 ML/MIN/1.73M2 (ref 60–?)
GLOBULIN PLAS-MCNC: 4.1 G/DL (ref 2.8–4.4)
GLUCOSE BLD-MCNC: 133 MG/DL (ref 70–99)
LIPASE SERPL-CCNC: 20 U/L (ref 13–75)
OSMOLALITY SERPL CALC.SUM OF ELEC: 281 MOSM/KG (ref 275–295)
POTASSIUM SERPL-SCNC: 4.3 MMOL/L (ref 3.5–5.1)
PROT SERPL-MCNC: 7 G/DL (ref 6.4–8.2)
SODIUM SERPL-SCNC: 135 MMOL/L (ref 136–145)

## 2024-04-11 PROCEDURE — 83690 ASSAY OF LIPASE: CPT | Performed by: EMERGENCY MEDICINE

## 2024-04-11 PROCEDURE — 96374 THER/PROPH/DIAG INJ IV PUSH: CPT

## 2024-04-11 PROCEDURE — 96361 HYDRATE IV INFUSION ADD-ON: CPT

## 2024-04-11 PROCEDURE — 85025 COMPLETE CBC W/AUTO DIFF WBC: CPT | Performed by: EMERGENCY MEDICINE

## 2024-04-11 PROCEDURE — 99285 EMERGENCY DEPT VISIT HI MDM: CPT

## 2024-04-11 PROCEDURE — 80053 COMPREHEN METABOLIC PANEL: CPT | Performed by: EMERGENCY MEDICINE

## 2024-04-11 RX ORDER — SODIUM CHLORIDE 9 MG/ML
INJECTION, SOLUTION INTRAVENOUS CONTINUOUS
Status: DISCONTINUED | OUTPATIENT
Start: 2024-04-12 | End: 2024-04-12

## 2024-04-11 RX ORDER — ONDANSETRON 2 MG/ML
4 INJECTION INTRAMUSCULAR; INTRAVENOUS ONCE
Status: COMPLETED | OUTPATIENT
Start: 2024-04-11 | End: 2024-04-11

## 2024-04-12 ENCOUNTER — OFFICE VISIT (OUTPATIENT)
Dept: HEMATOLOGY/ONCOLOGY | Facility: HOSPITAL | Age: 74
End: 2024-04-12
Attending: INTERNAL MEDICINE
Payer: MEDICARE

## 2024-04-12 ENCOUNTER — APPOINTMENT (OUTPATIENT)
Dept: CT IMAGING | Facility: HOSPITAL | Age: 74
End: 2024-04-12
Attending: EMERGENCY MEDICINE
Payer: MEDICARE

## 2024-04-12 VITALS
HEIGHT: 60.98 IN | RESPIRATION RATE: 16 BRPM | WEIGHT: 145.38 LBS | SYSTOLIC BLOOD PRESSURE: 111 MMHG | BODY MASS INDEX: 27.45 KG/M2 | DIASTOLIC BLOOD PRESSURE: 72 MMHG | HEART RATE: 70 BPM | OXYGEN SATURATION: 98 % | TEMPERATURE: 97 F

## 2024-04-12 VITALS
DIASTOLIC BLOOD PRESSURE: 86 MMHG | HEART RATE: 67 BPM | TEMPERATURE: 98 F | SYSTOLIC BLOOD PRESSURE: 129 MMHG | OXYGEN SATURATION: 96 % | RESPIRATION RATE: 15 BRPM

## 2024-04-12 DIAGNOSIS — C18.9 MALIGNANT NEOPLASM OF COLON (HCC): Primary | ICD-10-CM

## 2024-04-12 LAB
BASOPHILS # BLD AUTO: 0.02 X10(3) UL (ref 0–0.2)
BASOPHILS NFR BLD AUTO: 0.9 %
BILIRUB UR QL STRIP.AUTO: NEGATIVE
CLARITY UR REFRACT.AUTO: CLEAR
EOSINOPHIL # BLD AUTO: 0 X10(3) UL (ref 0–0.7)
EOSINOPHIL NFR BLD AUTO: 0 %
ERYTHROCYTE [DISTWIDTH] IN BLOOD BY AUTOMATED COUNT: 17.6 %
GLUCOSE UR STRIP.AUTO-MCNC: NORMAL MG/DL
HCT VFR BLD AUTO: 33.7 %
HGB BLD-MCNC: 10.7 G/DL
IMM GRANULOCYTES # BLD AUTO: 0.04 X10(3) UL (ref 0–1)
IMM GRANULOCYTES NFR BLD: 1.7 %
KETONES UR STRIP.AUTO-MCNC: 20 MG/DL
LEUKOCYTE ESTERASE UR QL STRIP.AUTO: NEGATIVE
LYMPHOCYTES # BLD AUTO: 0.27 X10(3) UL (ref 1–4)
LYMPHOCYTES NFR BLD AUTO: 11.7 %
MCH RBC QN AUTO: 26.9 PG (ref 26–34)
MCHC RBC AUTO-ENTMCNC: 31.8 G/DL (ref 31–37)
MCV RBC AUTO: 84.7 FL
MONOCYTES # BLD AUTO: 0.12 X10(3) UL (ref 0.1–1)
MONOCYTES NFR BLD AUTO: 5.2 %
NEUTROPHILS # BLD AUTO: 1.86 X10 (3) UL (ref 1.5–7.7)
NEUTROPHILS # BLD AUTO: 1.86 X10(3) UL (ref 1.5–7.7)
NEUTROPHILS NFR BLD AUTO: 80.5 %
NITRITE UR QL STRIP.AUTO: NEGATIVE
PH UR STRIP.AUTO: 5.5 [PH] (ref 5–8)
PLATELET # BLD AUTO: 67 10(3)UL (ref 150–450)
PROT UR STRIP.AUTO-MCNC: NEGATIVE MG/DL
RBC # BLD AUTO: 3.98 X10(6)UL
RBC UR QL AUTO: NEGATIVE
SP GR UR STRIP.AUTO: 1.02 (ref 1–1.03)
UROBILINOGEN UR STRIP.AUTO-MCNC: NORMAL MG/DL
WBC # BLD AUTO: 2.3 X10(3) UL (ref 4–11)

## 2024-04-12 PROCEDURE — 81003 URINALYSIS AUTO W/O SCOPE: CPT | Performed by: EMERGENCY MEDICINE

## 2024-04-12 PROCEDURE — 96360 HYDRATION IV INFUSION INIT: CPT

## 2024-04-12 PROCEDURE — 74177 CT ABD & PELVIS W/CONTRAST: CPT | Performed by: EMERGENCY MEDICINE

## 2024-04-12 NOTE — ED INITIAL ASSESSMENT (HPI)
Patient to the ER with daughter. Patient has lower LQ pain. Patient has liver cancer and colon cancer, currently getting chemo. Patient has bloating pain and vomiting. LBM two weeks ago, recent xray Wednesday and placed on stool softeners. No fever

## 2024-04-12 NOTE — PROGRESS NOTES
Education Record    Learner:  Patient    Disease / Diagnosis: colon ca    Barriers / Limitations:  None   Comments:    Method:  Discussion   Comments:    General Topics:  Medication, Side effects and symptom management, Plan of care reviewed, and Fall risk and prevention   Comments:    Outcome:  Shows understanding   Comments:    1L IVF administered. Pt tolerated well.     Pt was in ER last night for abdominal pain and constipation - she states her pain is much improved today. Pt inquiring about something else to help with constipation - she states she cannot tolerate Miralax due to nausea (she is taking olanzapine) and she said the senna has not helped. Discussed with Robert PARNELL - pt instructed to take 2 senna in the AM and PM and to try OTC milk of mag or mag citrate. Pt is also encouraged to increase PO fluid intake as tolerated (a few small sips more frequently suggested) and fiber-rich diet.    Pt given printout with these instructions, as well as a list of fiber-rich foods.     Discharged in stable condition via wheelchair.

## 2024-04-12 NOTE — PATIENT INSTRUCTIONS
Please increase senna (sennosides) to 2 pills twice daily (morning and night).    You can buy over-the-counter magnesium citrate or milk of magnesia to help induce a bowel movement.    Increase oral fluid intake (EX: take a few sips at every commercial break on TV, no need to drink large amounts in 1 sitting).    Increase fiber-rich foods - see attached handout for list of foods.

## 2024-04-12 NOTE — ED PROVIDER NOTES
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  Patient states that she has had abdominal bloating the last 2 days, states she is having difficulty moving her bowels, recently had outpatient x-ray performed and told she was constipated.  Patient was placed on stool softeners.  States she has more discomfort in the abdomen feels more bloated today.  Denies any fevers or chills.  Denies chest pain or shortness of breath.  Denies urinary symptoms.  Patient also reports that the last few days she has been having difficulty swallowing, states she feels her saliva is \"thick \", denies any change in phonation.  Is able to drink liquids.    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    Disorder of thyroid    Esophageal reflux    Exposure to medical diagnostic radiation    Last treatment 2018    History of adverse reaction to anesthesia    Has anxiety/nervousness when waking up    Migraines    Nausea and vomiting in adult    Osteopenia    Osteoporosis    Vertigo    Visual impairment    Glasses              Past Surgical History:   Procedure Laterality Date          x 3    Colonoscopy N/A 2024    Procedure: COLONOSCOPY;  Surgeon: Po Aviles MD;  Location:  ENDOSCOPY    Hysterectomy      Bilateral ovaries remain    Laryngoscopy,dirct,op,biopsy  01/15/2018                Social History     Socioeconomic History    Marital status: Single    Number of children: 2   Tobacco Use    Smoking status: Never    Smokeless tobacco: Never   Vaping Use    Vaping status: Never Used   Substance and  Sexual Activity    Alcohol use: No    Drug use: No   Other Topics Concern    Caffeine Concern No    Exercise Yes    Seat Belt No    Special Diet Yes     Comment: Balanced    Stress Concern No   Social History Narrative    ** Merged History Encounter **          Social Determinants of Health     Food Insecurity: No Food Insecurity (3/25/2024)    Food Insecurity     Food Insecurity: Never true   Transportation Needs: No Transportation Needs (3/25/2024)    Transportation Needs     Lack of Transportation: No   Housing Stability: Low Risk  (3/25/2024)    Housing Stability     Housing Instability: No              Review of Systems    Positive for stated complaint: Vomiting, just discharged from the hospital  Other systems are as noted in HPI.  Constitutional and vital signs reviewed.      All other systems reviewed and negative except as noted above.    Physical Exam     ED Triage Vitals [04/11/24 2221]   /85   Pulse 72   Resp 16   Temp 97.8 °F (36.6 °C)   Temp src Temporal   SpO2 99 %   O2 Device None (Room air)       Current:/86   Pulse 67   Temp 97.8 °F (36.6 °C) (Temporal)   Resp 15   SpO2 96%         Physical Exam    GENERAL: Patient is awake, alert,  HEENT:  no scleral icterus.  Mucous membranes are slightly dry, oropharynx clear.    NECK: Neck is supple, there is no nuchal rigidity.   HEART: Regular rate and rhythm, no murmurs.  LUNGS: Clear to auscultation bilaterally.  No Rales, no rhonchi, no wheezing, no stridor.  ABDOMEN: Soft, slightly distended, mild lower abdominal tender, bowel sounds are present, no rebound, no rigidity, no guarding.no pulsatile masses. No CVA tenderness  EXTREMITIES: No peripheral edema, no calf tenderness    ED Course     Labs Reviewed   COMP METABOLIC PANEL (14) - Abnormal; Notable for the following components:       Result Value    Glucose 133 (*)     Sodium 135 (*)     Creatinine 0.50 (*)     AST 48 (*)     Alkaline Phosphatase 261 (*)     Albumin 2.9 (*)     A/G  Ratio 0.7 (*)     All other components within normal limits   URINALYSIS, ROUTINE - Abnormal; Notable for the following components:    Ketones Urine 20 (*)     All other components within normal limits   CBC W/ DIFFERENTIAL - Abnormal; Notable for the following components:    WBC 2.3 (*)     HGB 10.7 (*)     HCT 33.7 (*)     PLT 67.0 (*)     Lymphocyte Absolute 0.27 (*)     All other components within normal limits   LIPASE - Normal   CBC WITH DIFFERENTIAL WITH PLATELET    Narrative:     The following orders were created for panel order CBC With Differential With Platelet.  Procedure                               Abnormality         Status                     ---------                               -----------         ------                     CBC W/ DIFFERENTIAL[[id number]]          Abnormal            Final result                 Please view results for these tests on the individual orders.   SCAN SLIDE     CT ABDOMEN AND PELVIS WITH IV CONTRAST    IMPRESSION  Compared to 3/12/244    Extensive hepatic metastasis probably unchanged accounting for differences in phase of contrast.    Possible malignant stricturing in the sigmoid colon with adjacent lymph node peristalsis is a diagnosis of exclusion.  There is mild adjacent edema in the colon proximally please correlate for clinical concomitant cholecystitis.  Occasional diverticula but no definitive inflamed diverticulum to meet criteria for diverticulitis. Mild stool in the colon but no evidence for obstruction.    Gallstones or sludge but no inflammation.  Cyst in the kidneys bilaterally.  Normal appendix.  Trace free fluid in the pelvis.     MDM     Differential diagnosis before testing includes but not limited to bowel obstruction, bowel perforation, electrolyte abnormality, dehydration, urinary tract infection, which is a medical condition that poses a threat to life/function    Past Medical History/comorbidities-metastatic sigmoid colon cancer    Radiographic  images  I personally reviewed the radiographs and my individual interpretation shows CT abdomen no free air I also reviewed the official reports that showed CT with extensive hepatic metastasis, possible malignant stricture in the sigmoid colon, gallstones or sludge no inflammation.  Normal appendix.    History obtained by external source  (EMS, family, law enforcement, )other sources of medical information included history also per daughter as noted in HPI    External chart review included recent oncology and hospital notes as in HPI    Medications Provided: Zofran, IV normal saline    Course of Events during Emergency Room Visit include IV established, blood work obtained.  Patient given IV fluid hydration.  CBC white count 2.3 hemoglobin 10.7 platelet 67.  Urinalysis negative nitrate and leukocyte esterase.  Chemistry sodium 135 potassium 4.3 BUN 10 creatinine 0.5 glucose 133.  Lipase 20.  CT of the abdomen performed, on reevaluation states she is feel much better abdomen soft nonsurgical there is no right upper quadrant tenderness.  Patient is drinking fluids without difficulty states she is feel much better.  Patient recently was prescribed stool softeners by her oncologist for constipation, was instructed to continue as previously prescribed.  Follow-up with her primary care physician and oncologist, return to ER if any change or symptoms.  Patient well-appearing agrees with plan vital signs stable discharge with daughter good condition    Shared decision making was utilized       Discharge  I have discussed with the patient the results of test, differential diagnosis, treatment plan, warning signs and symptoms which should prompt immediate return.  They expressed understanding of these instructions and agrees to the following plan provided.  They were given written discharge instructions and agrees to return for any concerns and voiced understanding and all questions were answered.    Note to patient: The  21st Century Cures Act makes medical notes like these available to patients in the interest of transparency. However, this is a medical document intended as peer to peer communication. It is written in medical language and may contain abbreviations or verbiage that are unfamiliar. It may appear blunt or direct. Medical documents are intended to carry relevant information, facts as evident, and the clinical opinion of the practitioner.                                            Medical Decision Making      Disposition and Plan     Clinical Impression:  1. Constipation, unspecified constipation type    2. Metastasis from colon cancer (HCC)    3. Pancytopenia (HCC)         Disposition:  Discharge  4/12/2024  3:02 am    Follow-up:  Ronan Camarillo MD  [address] [facility]  [facility] [address]  [phone]    Follow up in 2 day(s)            Medications Prescribed:  Discharge Medication List as of 4/12/2024  3:03 AM

## 2024-04-15 ENCOUNTER — TELEPHONE (OUTPATIENT)
Dept: HEMATOLOGY/ONCOLOGY | Facility: HOSPITAL | Age: 74
End: 2024-04-15

## 2024-04-15 ENCOUNTER — APPOINTMENT (OUTPATIENT)
Dept: CT IMAGING | Facility: HOSPITAL | Age: 74
End: 2024-04-15
Attending: EMERGENCY MEDICINE
Payer: MEDICARE

## 2024-04-15 ENCOUNTER — HOSPITAL ENCOUNTER (EMERGENCY)
Facility: HOSPITAL | Age: 74
Discharge: HOME | End: 2024-04-16
Attending: EMERGENCY MEDICINE
Payer: MEDICARE

## 2024-04-15 VITALS
RESPIRATION RATE: 16 BRPM | HEART RATE: 70 BPM | BODY MASS INDEX: 29.27 KG/M2 | OXYGEN SATURATION: 100 % | HEIGHT: 61 IN | TEMPERATURE: 97 F | SYSTOLIC BLOOD PRESSURE: 133 MMHG | DIASTOLIC BLOOD PRESSURE: 75 MMHG | WEIGHT: 155 LBS

## 2024-04-15 DIAGNOSIS — R11.2 NAUSEA AND VOMITING, UNSPECIFIED VOMITING TYPE: Primary | ICD-10-CM

## 2024-04-15 LAB
ALBUMIN SERPL-MCNC: 3.1 G/DL (ref 3.4–5)
ALBUMIN/GLOB SERPL: 0.8 {RATIO} (ref 1–2)
ALP LIVER SERPL-CCNC: 201 U/L
ALT SERPL-CCNC: 15 U/L
ANION GAP SERPL CALC-SCNC: 9 MMOL/L (ref 0–18)
AST SERPL-CCNC: 35 U/L (ref 15–37)
BASOPHILS # BLD AUTO: 0.01 X10(3) UL (ref 0–0.2)
BASOPHILS NFR BLD AUTO: 0.3 %
BILIRUB SERPL-MCNC: 1.1 MG/DL (ref 0.1–2)
BUN BLD-MCNC: 9 MG/DL (ref 9–23)
CALCIUM BLD-MCNC: 9.4 MG/DL (ref 8.5–10.1)
CHLORIDE SERPL-SCNC: 103 MMOL/L (ref 98–112)
CO2 SERPL-SCNC: 25 MMOL/L (ref 21–32)
CREAT BLD-MCNC: 0.71 MG/DL
EGFRCR SERPLBLD CKD-EPI 2021: 89 ML/MIN/1.73M2 (ref 60–?)
EOSINOPHIL # BLD AUTO: 0.16 X10(3) UL (ref 0–0.7)
EOSINOPHIL NFR BLD AUTO: 5.1 %
ERYTHROCYTE [DISTWIDTH] IN BLOOD BY AUTOMATED COUNT: 19 %
GLOBULIN PLAS-MCNC: 3.7 G/DL (ref 2.8–4.4)
GLUCOSE BLD-MCNC: 84 MG/DL (ref 70–99)
HCT VFR BLD AUTO: 32.8 %
HGB BLD-MCNC: 10.7 G/DL
IMM GRANULOCYTES # BLD AUTO: 0.08 X10(3) UL (ref 0–1)
IMM GRANULOCYTES NFR BLD: 2.5 %
LYMPHOCYTES # BLD AUTO: 1.1 X10(3) UL (ref 1–4)
LYMPHOCYTES NFR BLD AUTO: 35 %
MCH RBC QN AUTO: 27.4 PG (ref 26–34)
MCHC RBC AUTO-ENTMCNC: 32.6 G/DL (ref 31–37)
MCV RBC AUTO: 84.1 FL
MONOCYTES # BLD AUTO: 0.76 X10(3) UL (ref 0.1–1)
MONOCYTES NFR BLD AUTO: 24.2 %
NEUTROPHILS # BLD AUTO: 1.03 X10 (3) UL (ref 1.5–7.7)
NEUTROPHILS # BLD AUTO: 1.03 X10(3) UL (ref 1.5–7.7)
NEUTROPHILS NFR BLD AUTO: 32.9 %
OSMOLALITY SERPL CALC.SUM OF ELEC: 282 MOSM/KG (ref 275–295)
PLATELET # BLD AUTO: 88 10(3)UL (ref 150–450)
POTASSIUM SERPL-SCNC: 3.6 MMOL/L (ref 3.5–5.1)
PROT SERPL-MCNC: 6.8 G/DL (ref 6.4–8.2)
RBC # BLD AUTO: 3.9 X10(6)UL
SODIUM SERPL-SCNC: 137 MMOL/L (ref 136–145)
WBC # BLD AUTO: 3.1 X10(3) UL (ref 4–11)

## 2024-04-15 PROCEDURE — 85025 COMPLETE CBC W/AUTO DIFF WBC: CPT | Performed by: EMERGENCY MEDICINE

## 2024-04-15 PROCEDURE — 96374 THER/PROPH/DIAG INJ IV PUSH: CPT

## 2024-04-15 PROCEDURE — 99284 EMERGENCY DEPT VISIT MOD MDM: CPT

## 2024-04-15 PROCEDURE — 96361 HYDRATE IV INFUSION ADD-ON: CPT

## 2024-04-15 PROCEDURE — 74176 CT ABD & PELVIS W/O CONTRAST: CPT | Performed by: EMERGENCY MEDICINE

## 2024-04-15 PROCEDURE — 99285 EMERGENCY DEPT VISIT HI MDM: CPT

## 2024-04-15 PROCEDURE — 96360 HYDRATION IV INFUSION INIT: CPT

## 2024-04-15 PROCEDURE — 80053 COMPREHEN METABOLIC PANEL: CPT | Performed by: EMERGENCY MEDICINE

## 2024-04-15 NOTE — TELEPHONE ENCOUNTER
Chastity calling patient is asking for hydration this week. Please reach out to chastity. Thank you dagoberto

## 2024-04-15 NOTE — TELEPHONE ENCOUNTER
Called Isis.  The patient is scheduled for treatment on Wednesday and would like to make sure that she gets extra fluids and would also like to schedule for fluids on day 3 when her pump comes off and during her off week.  She is not able to drink much fluid due to nausea after her treatment.  Will discuss with JILN.  She has had additional fluids following treatment with previous cycles. Isis will explain to the patient.  No fluids needed prior to treatment day.

## 2024-04-16 ENCOUNTER — TELEPHONE (OUTPATIENT)
Dept: INTERNAL MEDICINE CLINIC | Facility: CLINIC | Age: 74
End: 2024-04-16

## 2024-04-16 NOTE — ED QUICK NOTES
Patient unable to provide urine sample at this time. States she has not urinated all day and will attempt after IV fluids.

## 2024-04-16 NOTE — ED PROVIDER NOTES
Patient Seen in: [facility] Emergency Department      History     Chief Complaint   Patient presents with    Dehydration     Stated Complaint: dehydration    Subjective:   HPI    73-year-old female presents to the emergency department vomiting dehydration.  She says she is having trouble keeping fluids down prompting her visit here she was told she is coming for some IV fluids.  She denies any fevers or chills or new pains or any other complaints.  No other exacerbating relieving factors or associated symptoms.    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    Disorder of thyroid    Esophageal reflux    Exposure to medical diagnostic radiation    Last treatment 2018    History of adverse reaction to anesthesia    Has anxiety/nervousness when waking up    Migraines    Nausea and vomiting in adult    Osteopenia    Osteoporosis    Vertigo    Visual impairment    Glasses              Past Surgical History:   Procedure Laterality Date          x 3    Colonoscopy N/A 2024    Procedure: COLONOSCOPY;  Surgeon: Po Aviles MD;  Location:  ENDOSCOPY    Hysterectomy      Bilateral ovaries remain    Laryngoscopy,dirct,op,biopsy  01/15/2018                Social History     Socioeconomic History    Marital status: Single    Number of children: 2   Tobacco Use    Smoking status: Never    Smokeless tobacco: Never   Vaping Use    Vaping status: Never Used   Substance and Sexual Activity    Alcohol use: No    Drug use: No   Other Topics Concern    Caffeine Concern No    Exercise Yes    Seat Belt No    Special Diet Yes     Comment: Balanced    Stress Concern No   Social History Narrative    ** Merged History Encounter **          Social Determinants of Health     Food Insecurity: No Food Insecurity (3/25/2024)    Food Insecurity     Food Insecurity: Never true   Transportation Needs: No Transportation Needs (3/25/2024)    Transportation Needs     Lack of  Transportation: No   Housing Stability: Low Risk  (3/25/2024)    Housing Stability     Housing Instability: No              Review of Systems    Positive for stated complaint: dehydration  Other systems are as noted in HPI.  Constitutional and vital signs reviewed.      All other systems reviewed and negative except as noted above.    Physical Exam     ED Triage Vitals [04/15/24 1914]   /81   Pulse 73   Resp 16   Temp 97 °F (36.1 °C)   Temp src Temporal   SpO2 99 %   O2 Device None (Room air)       Current:/75   Pulse 66   Temp 97 °F (36.1 °C) (Temporal)   Resp 18   Ht 154.9 cm (5' 1\")   Wt 70.3 kg   SpO2 97%   BMI 29.29 kg/m²         Physical Exam  Awake alert patient appears no distress HEENT exam is normal lungs are clear cardiovascular exam regular rhythm abdomen soft nontender extremities no cyanosis or edema no rash back exam normal skin is nondiaphoretic no focal neurologic deficits       ED Course     Labs Reviewed   COMP METABOLIC PANEL (14) - Abnormal; Notable for the following components:       Result Value    Alkaline Phosphatase 201 (*)     Albumin 3.1 (*)     A/G Ratio 0.8 (*)     All other components within normal limits   CBC W/ DIFFERENTIAL - Abnormal; Notable for the following components:    WBC 3.1 (*)     HGB 10.7 (*)     HCT 32.8 (*)     PLT 88.0 (*)     Neutrophil Absolute Prelim 1.03 (*)     Neutrophil Absolute 1.03 (*)     All other components within normal limits   CBC WITH DIFFERENTIAL WITH PLATELET    Narrative:     The following orders were created for panel order CBC With Differential With Platelet.  Procedure                               Abnormality         Status                     ---------                               -----------         ------                     CBC W/ DIFFERENTIAL[[id number]]          Abnormal            Final result                 Please view results for these tests on the individual orders.   URINALYSIS, ROUTINE   RAINBOW DRAW LAVENDER    RAINBOW DRAW LIGHT GREEN             Differential diagnosis includes obstruction, dehydration         MDM                                         Medical Decision Making  73-year-old female presenting emerged from for dehydration IV established cardiac monitor shows a sinus rhythm pulse ox shows no signs of hypoxia.  CBC shows no signs of significant anemia and the metabolic panel shows a stable renal function and electrolyte panel.  Patient was given IV fluids independent interpretation by ED physician of CT scan shows no obstruction.  Patient was given IV fluids in the emerged part will be discharged home already has scheduled follow-up with her specialist in 2 days and is to return to the emergency department for worsening symptoms other complaints.  The patient was screened and evaluated during this visit.  As a treating physician attending to the patient, I determined, within reasonable clinical confidence and prior to discharge, that an emergency medical condition was not or was no longer present.  There was no indication for further evaluation, treatment or admission on an emergency basis.    The usual and customary discharge instructions were discussed given the patient's ER course.  We discussed signs and symptoms that should prompt the patient's immediate return to the emergency department.  Reasonable over-the-counter and prescription treatment options and physician follow-up plan was discussed.  Patient was discharged home in good condition  This note was prepared using Dragon Medical voice recognition dictation software.  As a result errors may occur.  When identified to these areas have been corrected.  While every attempt is made to correct errors during dictation discrepancies may still exist.  Please contact if there are any errors    Problems Addressed:  Nausea and vomiting, unspecified vomiting type: acute illness or injury    Amount and/or Complexity of Data Reviewed  Labs: ordered.  Decision-making details documented in ED Course.  Radiology: ordered and independent interpretation performed. Decision-making details documented in ED Course.  ECG/medicine tests: ordered and independent interpretation performed. Decision-making details documented in ED Course.        Disposition and Plan     Clinical Impression:  1. Nausea and vomiting, unspecified vomiting type         Disposition:  Discharge  4/15/2024 11:27 pm    Follow-up:  Anusha Lucio MD  [address] [address]  [phone]    Follow up in 2 day(s)            Medications Prescribed:  Current Discharge Medication List

## 2024-04-16 NOTE — TELEPHONE ENCOUNTER
Incoming Fax   We received [facility] Order # [id number] placed in DV's inbasket for review and signature

## 2024-04-16 NOTE — ED INITIAL ASSESSMENT (HPI)
Patient sent by PCP for possible dehydration.  Patient is a chemo patient.  She has not been able to drink a lot today due to nausea and has been vomiting.  Patient reports she has not urinated all day.  MD sent her for IVF.

## 2024-04-17 ENCOUNTER — OFFICE VISIT (OUTPATIENT)
Dept: HEMATOLOGY/ONCOLOGY | Facility: HOSPITAL | Age: 74
End: 2024-04-17
Attending: INTERNAL MEDICINE
Payer: MEDICARE

## 2024-04-17 VITALS
BODY MASS INDEX: 27.98 KG/M2 | SYSTOLIC BLOOD PRESSURE: 103 MMHG | RESPIRATION RATE: 16 BRPM | HEART RATE: 87 BPM | OXYGEN SATURATION: 97 % | HEIGHT: 60.98 IN | TEMPERATURE: 97 F | DIASTOLIC BLOOD PRESSURE: 65 MMHG | WEIGHT: 148.19 LBS

## 2024-04-17 DIAGNOSIS — C18.9 METASTATIC COLON CANCER TO LIVER (HCC): ICD-10-CM

## 2024-04-17 DIAGNOSIS — E87.6 HYPOKALEMIA: ICD-10-CM

## 2024-04-17 DIAGNOSIS — C18.7 MALIGNANT NEOPLASM OF SIGMOID COLON (HCC): Primary | ICD-10-CM

## 2024-04-17 DIAGNOSIS — R63.0 DECREASED APPETITE: ICD-10-CM

## 2024-04-17 DIAGNOSIS — T45.1X5S NEUTROPENIA DUE TO AND NOT CONCURRENT WITH CHEMOTHERAPY (HCC): ICD-10-CM

## 2024-04-17 DIAGNOSIS — C78.7 METASTATIC COLON CANCER TO LIVER (HCC): ICD-10-CM

## 2024-04-17 DIAGNOSIS — D70.1 NEUTROPENIA DUE TO AND NOT CONCURRENT WITH CHEMOTHERAPY (HCC): ICD-10-CM

## 2024-04-17 LAB
ALBUMIN SERPL-MCNC: 2.9 G/DL (ref 3.4–5)
ALBUMIN/GLOB SERPL: 0.8 {RATIO} (ref 1–2)
ALP LIVER SERPL-CCNC: 196 U/L
ALT SERPL-CCNC: 14 U/L
ANION GAP SERPL CALC-SCNC: 9 MMOL/L (ref 0–18)
AST SERPL-CCNC: 33 U/L (ref 15–37)
BASOPHILS # BLD AUTO: 0.04 X10(3) UL (ref 0–0.2)
BASOPHILS NFR BLD AUTO: 0.8 %
BILIRUB SERPL-MCNC: 0.8 MG/DL (ref 0.1–2)
BUN BLD-MCNC: 9 MG/DL (ref 9–23)
CALCIUM BLD-MCNC: 9.1 MG/DL (ref 8.5–10.1)
CHLORIDE SERPL-SCNC: 103 MMOL/L (ref 98–112)
CO2 SERPL-SCNC: 25 MMOL/L (ref 21–32)
CREAT BLD-MCNC: 0.66 MG/DL
EGFRCR SERPLBLD CKD-EPI 2021: 92 ML/MIN/1.73M2 (ref 60–?)
EOSINOPHIL # BLD AUTO: 0.21 X10(3) UL (ref 0–0.7)
EOSINOPHIL NFR BLD AUTO: 4 %
ERYTHROCYTE [DISTWIDTH] IN BLOOD BY AUTOMATED COUNT: 19.8 %
GLOBULIN PLAS-MCNC: 3.5 G/DL (ref 2.8–4.4)
GLUCOSE BLD-MCNC: 91 MG/DL (ref 70–99)
HCT VFR BLD AUTO: 33.1 %
HGB BLD-MCNC: 11 G/DL
IMM GRANULOCYTES # BLD AUTO: 0.24 X10(3) UL (ref 0–1)
IMM GRANULOCYTES NFR BLD: 4.6 %
LYMPHOCYTES # BLD AUTO: 0.91 X10(3) UL (ref 1–4)
LYMPHOCYTES NFR BLD AUTO: 17.5 %
MCH RBC QN AUTO: 27.9 PG (ref 26–34)
MCHC RBC AUTO-ENTMCNC: 33.2 G/DL (ref 31–37)
MCV RBC AUTO: 84 FL
MONOCYTES # BLD AUTO: 0.77 X10(3) UL (ref 0.1–1)
MONOCYTES NFR BLD AUTO: 14.8 %
NEUTROPHILS # BLD AUTO: 3.02 X10 (3) UL (ref 1.5–7.7)
NEUTROPHILS # BLD AUTO: 3.02 X10(3) UL (ref 1.5–7.7)
NEUTROPHILS NFR BLD AUTO: 58.3 %
OSMOLALITY SERPL CALC.SUM OF ELEC: 282 MOSM/KG (ref 275–295)
PLATELET # BLD AUTO: 142 10(3)UL (ref 150–450)
PLATELETS.RETICULATED NFR BLD AUTO: 6 % (ref 0–7)
POTASSIUM SERPL-SCNC: 3.6 MMOL/L (ref 3.5–5.1)
PROT SERPL-MCNC: 6.4 G/DL (ref 6.4–8.2)
RBC # BLD AUTO: 3.94 X10(6)UL
SODIUM SERPL-SCNC: 137 MMOL/L (ref 136–145)
WBC # BLD AUTO: 5.2 X10(3) UL (ref 4–11)

## 2024-04-17 PROCEDURE — 96368 THER/DIAG CONCURRENT INF: CPT

## 2024-04-17 PROCEDURE — 85025 COMPLETE CBC W/AUTO DIFF WBC: CPT

## 2024-04-17 PROCEDURE — 99215 OFFICE O/P EST HI 40 MIN: CPT | Performed by: NURSE PRACTITIONER

## 2024-04-17 PROCEDURE — 96415 CHEMO IV INFUSION ADDL HR: CPT

## 2024-04-17 PROCEDURE — 96375 TX/PRO/DX INJ NEW DRUG ADDON: CPT

## 2024-04-17 PROCEDURE — 80053 COMPREHEN METABOLIC PANEL: CPT

## 2024-04-17 PROCEDURE — 96411 CHEMO IV PUSH ADDL DRUG: CPT

## 2024-04-17 PROCEDURE — 96413 CHEMO IV INFUSION 1 HR: CPT

## 2024-04-17 PROCEDURE — 96417 CHEMO IV INFUS EACH ADDL SEQ: CPT

## 2024-04-17 RX ORDER — FLUOROURACIL 50 MG/ML
2400 INJECTION, SOLUTION INTRAVENOUS CONTINUOUS
Status: CANCELLED | OUTPATIENT
Start: 2024-04-17

## 2024-04-17 RX ORDER — FLUOROURACIL 50 MG/ML
400 INJECTION, SOLUTION INTRAVENOUS ONCE
Status: CANCELLED | OUTPATIENT
Start: 2024-04-17

## 2024-04-17 RX ORDER — FLUOROURACIL 50 MG/ML
2400 INJECTION, SOLUTION INTRAVENOUS CONTINUOUS
Status: DISCONTINUED | OUTPATIENT
Start: 2024-04-17 | End: 2024-04-17

## 2024-04-17 RX ORDER — FLUOROURACIL 50 MG/ML
400 INJECTION, SOLUTION INTRAVENOUS ONCE
Status: COMPLETED | OUTPATIENT
Start: 2024-04-17 | End: 2024-04-17

## 2024-04-17 RX ADMIN — FLUOROURACIL 700 MG: 50 INJECTION, SOLUTION INTRAVENOUS at 14:27:00

## 2024-04-17 RX ADMIN — FLUOROURACIL 4050 MG: 50 INJECTION, SOLUTION INTRAVENOUS at 14:33:00

## 2024-04-17 NOTE — PROGRESS NOTES
Pt here for C3D1 Drug(s)Zirabev, oxaliplatin, Leucovorin, an 5FU.  Arrives Via wheelchair, accompanied by Self     Patient was evaluated today by VALENTINE and Treatment Nurse.    Oral medications included in this regimen:  no    Patient confirms comprehension of cancer treatment schedule:  yes    Pregnancy screening:  Not applicable    Modifications in dose or schedule:  No    Medications appearance and physical integrity checked by RN: yes.    Chemotherapy IV pump settings verified by 2 RNs:  Yes.  Frequency of blood return and site check throughout administration: Prior to administration, Every 2-3 ml IVP, and At completion of therapy     Infusion/treatment outcome:  patient tolerated treatment without incident    Education Record    Learner:  Patient  Barriers / Limitations:  None  Method:  Discussion  Education / instructions given:  plan of care  Outcome:  Shows understanding    Discharged Home, Via wheelchair, accompanied by:Self and Family member    Patient/family verbalized understanding of future appointments: by printed AVS    Patient tolerated treatment well. Future appointments scheduled and patient aware. Patient left treatment center with CADD pump infusing and accompanied by daughter to drive home.

## 2024-04-17 NOTE — PROGRESS NOTES
Cancer Center Progress Note    Patient Name: Lisa Iqbal   YOB: 1950   Medical Record Number: [id number]   [facility]: [id number]   Date of visit: 4/17/2024       Chief Complaint/Reason for Visit:  Chief Complaint   Patient presents with    Chemotherapy     Follow up with Tania PARNELL      Oncology history:  The patient presented with liver metastases. She had a colonoscopy that showed a sigmoid colon mass. The biopsy was positive for invasive adenocarcinoma. She has multiple liver metastases confirmed with biopsy.   C1 FOLFOX inpatient 3/13/24  C2 deferred x1 week due to neutropenia      History of Present Illness:  Lisa Iqbal is a 73 year old patient of Dr. Murphy with metastatic colon cancer. She is receiving FOLFOX chemotherapy. Her first dose was given inpatient 3/13/24. She presents today for consideration of C3. She reports tolerating treatment fairly well.  Her main complaint has been constipation, she has not been taking laxatives regularly up unttil about a week ago. She had BM last two days and is feeling better.   She reports nausea and dry heaves, afraid to drink fluids due to this, therefore has been receiving IVF in clinic. She is taking antiemetics. Today she denies any nausea.  Her appetite is fair, trying to drink more, having ice chips after the cold sensitivity resolves. This usually lasts about 3-5 days after treatment. No neuropathy complaints.   Bilateral lower extremity edema is present.   She reports pain to the RLQ is 2/10 occasionally but using morphine BID as directed.   Denies any fevers or recent infections, no sob or cp, no cough.   No bleeding concerns         Problem List:  Patient Active Problem List   Diagnosis    Osteopenia    History of cancer of larynx; glottis    Hypothyroidism    Laryngopharyngeal reflux    Tinnitus    Malignant neoplasm of colon (HCC)    Intractable pain    Nausea and vomiting in adult    Chemotherapy management,  encounter for    Neoplasm related pain (acute) (chronic)    Fever    Edema, unspecified type    Diarrhea    Metastatic colon cancer to liver (HCC)    Anemia complicating neoplastic disease    Thrombocytopenia (HCC)    Hypokalemia        Medical History:  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    Disorder of thyroid    Esophageal reflux    Exposure to medical diagnostic radiation    Last treatment 2018    History of adverse reaction to anesthesia    Has anxiety/nervousness when waking up    Migraines    Nausea and vomiting in adult    Osteopenia    Osteoporosis    Vertigo    Visual impairment    Glasses       Surgical History:  Past Surgical History:   Procedure Laterality Date          x 3    Colonoscopy N/A 2024    Procedure: COLONOSCOPY;  Surgeon: Po Aviles MD;  Location:  ENDOSCOPY    Hysterectomy      Bilateral ovaries remain    Laryngoscopy,dirct,op,biopsy  01/15/2018       Allergies:  Allergies   Allergen Reactions    Septra [Sulfamethoxazole W/Trimethoprim] RASH    Sulfa Antibiotics RASH       Family History:  Family History   Problem Relation Age of Onset    Cancer Father         liver cancer    Cancer Brother         liver cancer       Social History:  Social History     Socioeconomic History    Marital status: Single     Spouse name: Not on file    Number of children: 2    Years of education: Not on file    Highest education level: Not on file   Occupational History    Not on file   Tobacco Use    Smoking status: Never    Smokeless tobacco: Never   Vaping Use    Vaping status: Never Used   Substance and Sexual Activity    Alcohol use: No    Drug use: No    Sexual activity: Not on file   Other Topics Concern    Caffeine Concern No    Exercise Yes    Seat Belt No    Special Diet Yes     Comment: Balanced    Stress Concern No    Weight Concern Not Asked   Social History Narrative    ** Merged History Encounter **          Social Determinants of Health      Financial Resource Strain: Not on file   Food Insecurity: No Food Insecurity (3/25/2024)    Food Insecurity     Food Insecurity: Never true   Transportation Needs: No Transportation Needs (3/25/2024)    Transportation Needs     Lack of Transportation: No   Physical Activity: Not on file   Stress: Not on file   Social Connections: Not on file   Housing Stability: Low Risk  (3/25/2024)    Housing Stability     Housing Instability: No     Housing Instability Emergency: Not on file       Medications:    Current Outpatient Medications:     prochlorperazine (COMPAZINE) 10 mg tablet, Take 1 tablet (10 mg total) by mouth every 6 (six) hours as needed for Nausea., Disp: 30 tablet, Rfl: 3    sennosides 8.6 MG Oral Tab, Take 1 tablet (8.6 mg total) by mouth nightly., Disp: 60 tablet, Rfl: 1    polyethylene glycol, PEG 3350, (MIRALAX) 17 GM/SCOOP Oral Powder, Take 17 g by mouth 2 (two) times daily., Disp: 578 g, Rfl: 1    OLANZapine 2.5 MG Oral Tab, Take 1 tablet (2.5 mg total) by mouth nightly., Disp: 30 tablet, Rfl: 0    Lidocaine Viscous HCl 2 % Mouth/Throat Solution, Take 5 mL by mouth every 3 (three) hours as needed for Pain. Swish in the mouth and spit out., Disp: 100 mL, Rfl: 1    levothyroxine 25 MCG Oral Tab, Take 1 tablet (25 mcg total) by mouth before breakfast., Disp: , Rfl:     ondansetron (ZOFRAN) 8 MG tablet, Take 1 tablet (8 mg total) by mouth every 8 (eight) hours as needed., Disp: 30 tablet, Rfl: 0    morphINE ER 15 MG Oral Tab CR, Take 1 tablet (15 mg total) by mouth every 12 (twelve) hours., Disp: 60 tablet, Rfl: 0    Review of Systems:  A comprehensive 14 point review of systems was completed.  Pertinent positives and negatives noted in the HPI.    Performance Status: ECOG 2 - Ambulatory/capable of all self-care, unable to perform any work activities. Up and about more than 50% of waking hours.     Physical Examination:  Vital Signs: Height: 154.9 cm (5' 0.98\") (04/17 0939)  Weight: 67.2 kg (148 lb 3.2  oz) (04/17 0939)  BSA (Calculated - sq m): 1.66 sq meters (04/17 0939)  Pulse: 87 (04/17 0939)  BP: 103/65 (04/17 0939)  Temp: 97.4 °F (36.3 °C) (04/17 0939)  Do Not Use - Resp Rate: --  SpO2: 97 % (04/17 0939)    General: Patient is alert and oriented x 3, not in acute distress.  Chest: Clear to auscultation. Respirations unlabored.   Heart: Regular rate and rhythm.   Abdomen: Soft, non-distended, non-tender with present bowel sounds.  Extremities: No edema.  Neurological: Grossly intact.   Skin: warm, dry, no erythema or rash   Psych/Depression: mood and affect are appropriate.     Labs:     Recent Results (from the past 72 hour(s))   RAINBOW DRAW LAVENDER    Collection Time: 04/15/24  9:37 PM   Result Value Ref Range    Hold Lavender Auto Resulted    RAINBOW DRAW LIGHT GREEN    Collection Time: 04/15/24  9:37 PM   Result Value Ref Range    Hold Lt Green Auto Resulted    Comp Metabolic Panel (14)    Collection Time: 04/15/24  9:37 PM   Result Value Ref Range    Glucose 84 70 - 99 mg/dL    Sodium 137 136 - 145 mmol/L    Potassium 3.6 3.5 - 5.1 mmol/L    Chloride 103 98 - 112 mmol/L    CO2 25.0 21.0 - 32.0 mmol/L    Anion Gap 9 0 - 18 mmol/L    BUN 9 9 - 23 mg/dL    Creatinine 0.71 0.55 - 1.02 mg/dL    Calcium, Total 9.4 8.5 - 10.1 mg/dL    Calculated Osmolality 282 275 - 295 mOsm/kg    eGFR-Cr 89 >=60 mL/min/1.73m2    AST 35 15 - 37 U/L    ALT 15 13 - 56 U/L    Alkaline Phosphatase 201 (H) 55 - 142 U/L    Bilirubin, Total 1.1 0.1 - 2.0 mg/dL    Total Protein 6.8 6.4 - 8.2 g/dL    Albumin 3.1 (L) 3.4 - 5.0 g/dL    Globulin  3.7 2.8 - 4.4 g/dL    A/G Ratio 0.8 (L) 1.0 - 2.0   CBC W/ DIFFERENTIAL    Collection Time: 04/15/24  9:37 PM   Result Value Ref Range    WBC 3.1 (L) 4.0 - 11.0 x10(3) uL    RBC 3.90 3.80 - 5.30 x10(6)uL    HGB 10.7 (L) 12.0 - 16.0 g/dL    HCT 32.8 (L) 35.0 - 48.0 %    PLT 88.0 (L) 150.0 - 450.0 10(3)uL    MCV 84.1 80.0 - 100.0 fL    MCH 27.4 26.0 - 34.0 pg    MCHC 32.6 31.0 - 37.0 g/dL    RDW  19.0 %    Neutrophil Absolute Prelim 1.03 (L) 1.50 - 7.70 x10 (3) uL    Neutrophil Absolute 1.03 (L) 1.50 - 7.70 x10(3) uL    Lymphocyte Absolute 1.10 1.00 - 4.00 x10(3) uL    Monocyte Absolute 0.76 0.10 - 1.00 x10(3) uL    Eosinophil Absolute 0.16 0.00 - 0.70 x10(3) uL    Basophil Absolute 0.01 0.00 - 0.20 x10(3) uL    Immature Granulocyte Absolute 0.08 0.00 - 1.00 x10(3) uL    Neutrophil % 32.9 %    Lymphocyte % 35.0 %    Monocyte % 24.2 %    Eosinophil % 5.1 %    Basophil % 0.3 %    Immature Granulocyte % 2.5 %   Comp Metabolic Panel (14)    Collection Time: 04/17/24  9:30 AM   Result Value Ref Range    Glucose 91 70 - 99 mg/dL    Sodium 137 136 - 145 mmol/L    Potassium 3.6 3.5 - 5.1 mmol/L    Chloride 103 98 - 112 mmol/L    CO2 25.0 21.0 - 32.0 mmol/L    Anion Gap 9 0 - 18 mmol/L    BUN 9 9 - 23 mg/dL    Creatinine 0.66 0.55 - 1.02 mg/dL    Calcium, Total 9.1 8.5 - 10.1 mg/dL    Calculated Osmolality 282 275 - 295 mOsm/kg    eGFR-Cr 92 >=60 mL/min/1.73m2    AST 33 15 - 37 U/L    ALT 14 13 - 56 U/L    Alkaline Phosphatase 196 (H) 55 - 142 U/L    Bilirubin, Total 0.8 0.1 - 2.0 mg/dL    Total Protein 6.4 6.4 - 8.2 g/dL    Albumin 2.9 (L) 3.4 - 5.0 g/dL    Globulin  3.5 2.8 - 4.4 g/dL    A/G Ratio 0.8 (L) 1.0 - 2.0    Patient Fasting for CMP? Patient not present    CBC W/ DIFFERENTIAL    Collection Time: 04/17/24  9:30 AM   Result Value Ref Range    WBC 5.2 4.0 - 11.0 x10(3) uL    RBC 3.94 3.80 - 5.30 x10(6)uL    HGB 11.0 (L) 12.0 - 16.0 g/dL    HCT 33.1 (L) 35.0 - 48.0 %    .0 (L) 150.0 - 450.0 10(3)uL    Immature Platelet Fraction 6.0 0.0 - 7.0 %    MCV 84.0 80.0 - 100.0 fL    MCH 27.9 26.0 - 34.0 pg    MCHC 33.2 31.0 - 37.0 g/dL    RDW 19.8 %    Neutrophil Absolute Prelim 3.02 1.50 - 7.70 x10 (3) uL    Neutrophil Absolute 3.02 1.50 - 7.70 x10(3) uL    Lymphocyte Absolute 0.91 (L) 1.00 - 4.00 x10(3) uL    Monocyte Absolute 0.77 0.10 - 1.00 x10(3) uL    Eosinophil Absolute 0.21 0.00 - 0.70 x10(3) uL     Basophil Absolute 0.04 0.00 - 0.20 x10(3) uL    Immature Granulocyte Absolute 0.24 0.00 - 1.00 x10(3) uL    Neutrophil % 58.3 %    Lymphocyte % 17.5 %    Monocyte % 14.8 %    Eosinophil % 4.0 %    Basophil % 0.8 %    Immature Granulocyte % 4.6 %       Impression/Plan    Metastatic sigmoid colon cancer:  Liver metastases   Began chemotherapy with FOLFOX 3/14/2024 while inpatient.  C2 postponed due to abnormal labs. Bevacizumab added with C2. Oxaliplatin dose reduced due to counts and tolerability.  Labs reviewed, will proceed with C3 today as planned. She will return Friday for pegfilgrastim and IVF.     Decreased appetite/dehydration:  Due to decreased PO intake  Discussed ways to optimize nutrtion, she states she is doing better with intake since her last treatment.   Will give IVF with pump disconnect and as needed throughout treatment.   Monitor electrolytes  Dietician referral     Planned Follow Up: 2 weeks for C4    Risk Level: HIGH, colon cancer, receiving chemotherapy requiring close monitoring     The 21st Century Cures Act makes medical notes like these available to patients in the interest of transparency. Please be advised this is a medical document. Medical documents are intended to carry relevant information, facts as evident, and the clinical opinion of the practitioner. The medical note is intended as peer to peer communication and may appear blunt or direct. It is written in medical language and may contain abbreviations or verbiage that are unfamiliar.     Electronically Signed by:    JÚNIOR Jane-BC  Nurse Practitioner  [facility]

## 2024-04-18 ENCOUNTER — PATIENT OUTREACH (OUTPATIENT)
Dept: CASE MANAGEMENT | Age: 74
End: 2024-04-18

## 2024-04-18 DIAGNOSIS — C78.7 COLON CANCER METASTASIZED TO LIVER (HCC): ICD-10-CM

## 2024-04-18 DIAGNOSIS — C18.9 COLON CANCER METASTASIZED TO LIVER (HCC): ICD-10-CM

## 2024-04-18 RX ORDER — MORPHINE SULFATE 15 MG/1
15 TABLET, FILM COATED, EXTENDED RELEASE ORAL EVERY 12 HOURS SCHEDULED
Qty: 60 TABLET | Refills: 0 | Status: SHIPPED | OUTPATIENT
Start: 2024-04-18 | End: 2024-07-03

## 2024-04-18 NOTE — PROGRESS NOTES
1st attempt ER f/up apt request  PCP -decline, pt doesn't want to schedule at this time  Closing encounter

## 2024-04-19 ENCOUNTER — OFFICE VISIT (OUTPATIENT)
Dept: HEMATOLOGY/ONCOLOGY | Facility: HOSPITAL | Age: 74
End: 2024-04-19
Attending: INTERNAL MEDICINE
Payer: MEDICARE

## 2024-04-19 VITALS
TEMPERATURE: 97 F | HEART RATE: 73 BPM | OXYGEN SATURATION: 94 % | DIASTOLIC BLOOD PRESSURE: 73 MMHG | SYSTOLIC BLOOD PRESSURE: 105 MMHG | RESPIRATION RATE: 16 BRPM

## 2024-04-19 DIAGNOSIS — C18.9 MALIGNANT NEOPLASM OF COLON (HCC): ICD-10-CM

## 2024-04-19 DIAGNOSIS — E87.6 HYPOKALEMIA: ICD-10-CM

## 2024-04-19 DIAGNOSIS — C18.7 MALIGNANT NEOPLASM OF SIGMOID COLON (HCC): Primary | ICD-10-CM

## 2024-04-19 DIAGNOSIS — C78.7 METASTATIC COLON CANCER TO LIVER (HCC): ICD-10-CM

## 2024-04-19 DIAGNOSIS — C18.9 METASTATIC COLON CANCER TO LIVER (HCC): ICD-10-CM

## 2024-04-19 PROCEDURE — 96360 HYDRATION IV INFUSION INIT: CPT

## 2024-04-19 PROCEDURE — 96372 THER/PROPH/DIAG INJ SC/IM: CPT

## 2024-04-19 NOTE — PROGRESS NOTES
Education Record    Learner:  Patient    Disease / Diagnosis: colon CA: pump dc, fulphila, IVF    Barriers / Limitations:  None   Comments:    Method:  Brief focused   Comments:    General Topics:  Plan of care reviewed   Comments:    Outcome:  Shows understanding   Comments:    Received IVF - pt with BLE. Denies any pain, redness, warmth or SOB. Encouraged patient to elevate her legs as much as possible, whenever sitting down to help with swelling. Told patient that if she is having increase in swelling, shortness of breath, or pain to call or go to ER.

## 2024-04-22 ENCOUNTER — OFFICE VISIT (OUTPATIENT)
Dept: HEMATOLOGY/ONCOLOGY | Facility: HOSPITAL | Age: 74
End: 2024-04-22
Attending: INTERNAL MEDICINE
Payer: MEDICARE

## 2024-04-22 ENCOUNTER — TELEPHONE (OUTPATIENT)
Dept: HEMATOLOGY/ONCOLOGY | Facility: HOSPITAL | Age: 74
End: 2024-04-22

## 2024-04-22 VITALS
RESPIRATION RATE: 16 BRPM | SYSTOLIC BLOOD PRESSURE: 118 MMHG | OXYGEN SATURATION: 98 % | WEIGHT: 143 LBS | TEMPERATURE: 98 F | DIASTOLIC BLOOD PRESSURE: 73 MMHG | HEIGHT: 60.98 IN | BODY MASS INDEX: 27 KG/M2 | HEART RATE: 74 BPM

## 2024-04-22 DIAGNOSIS — T45.1X5A CHEMOTHERAPY INDUCED NAUSEA AND VOMITING: ICD-10-CM

## 2024-04-22 DIAGNOSIS — R07.0 THROAT PAIN: ICD-10-CM

## 2024-04-22 DIAGNOSIS — C18.9 METASTATIC COLON CANCER TO LIVER (HCC): Primary | ICD-10-CM

## 2024-04-22 DIAGNOSIS — R11.2 CHEMOTHERAPY INDUCED NAUSEA AND VOMITING: ICD-10-CM

## 2024-04-22 DIAGNOSIS — C78.7 METASTATIC COLON CANCER TO LIVER (HCC): Primary | ICD-10-CM

## 2024-04-22 DIAGNOSIS — C18.9 MALIGNANT NEOPLASM OF COLON (HCC): Primary | ICD-10-CM

## 2024-04-22 PROCEDURE — 99215 OFFICE O/P EST HI 40 MIN: CPT | Performed by: NURSE PRACTITIONER

## 2024-04-22 PROCEDURE — 96360 HYDRATION IV INFUSION INIT: CPT

## 2024-04-22 PROCEDURE — 96361 HYDRATE IV INFUSION ADD-ON: CPT

## 2024-04-22 PROCEDURE — G2211 COMPLEX E/M VISIT ADD ON: HCPCS | Performed by: NURSE PRACTITIONER

## 2024-04-22 NOTE — TELEPHONE ENCOUNTER
Returned CAMILLE Haywood's phone call from home health. Per SOHEILA Horvath she should take zofran around the clock, continue olanzapine at bedtime. No compazine. Chastity OBRIEN conveyed understanding.

## 2024-04-22 NOTE — TELEPHONE ENCOUNTER
reports Medications are not working.  She takes  omeprazole 40 mg at bedtime. Zofran 8mg every 8 hours procaloperazine every 6 hours for nausea and vomiting.  She cannot eat or drink.   Still nauseated barely drinking and eating.  Is there something else she can take?   She has an appointment today for hydration.   Please talk to the patient when she comes in.  She wants food she cn tolerate  Please update the  Isis from [facility] hosp[facility]

## 2024-04-22 NOTE — PROGRESS NOTES
Education Record    Learner:  Patient    Disease / Diagnosis: here IVF    Barriers / Limitations:  Language   Comments:    Method:  Brief focused, Discussion, and Printed material   Comments:    General Topics:  Medication, Side effects and symptom management, Plan of care reviewed, and Fall risk and prevention   Comments:    Outcome:  Shows understanding   Comments:    Pt arrives by herself in wheelchair with c/o nausea and throat pain that she says started yesterday. Pt states she ate soup on Saturday and nothing since. Lips dry. Some redness noted to back of throat and white patch to tongue. Pt states she is taking Olanzapine at night and ER morphine. Pt states she isn't taking Compazine or Zofran due to possible side effects and fear of dizziness. Last chemo 4/17. IVF started and SOHEILA Lerma informed of above.  Yuliet saw pt chairside. No nausea medication given during treatment since pt not c/o nausea at time of Yuliet's assessment per Yuliet. Nystatin delivered per [facility]. Pt took dose prior to leaving and tolerated. Printed AVS/pt instructions given and reviewed. Tolerated IVF. Pt states she feels much better than when she arrived. Discharged home in stable condition, no new complaints.

## 2024-04-22 NOTE — PROGRESS NOTES
Cancer Center Progress Note    Patient Name: Lisa Iqbal   YOB: 1950   Medical Record Number: [id number]   Date of visit: 4/22/2024    Chief Complaint/Reason for Visit:  Chief Complaint   Patient presents with    Sore Throat      History of Present Illness: Lisa presents today with complaints of nausea and throat pain. She has had ongoing nausea, she is reluctant to take medicine during the day for nausea due to possible side effects. She confirms she is taking olanzapine 2.5mg at night. She also reports throat pain with food and liquids. This started two days ago, however she has had this in the past with laryngeal cancer (2018)  Appetite is poor. She denies any bowel changes. She has dry chapped lips. She does not report nausea at this time.     Patient has metastatic colon cancer, she was diagnosed in 2/2024. She was hospitalized at [facility] 3/13/2024-3/17/2024 for abdominal pain. She started chemotherapy FOLFOX during hospitalization. She has had 3 cycles of chemotherapy. Her medical oncologist is Dr. Ronan Camarillo.     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    Disorder of thyroid    Esophageal reflux    Exposure to medical diagnostic radiation    Last treatment 2018    History of adverse reaction to anesthesia    Has anxiety/nervousness when waking up    Migraines    Nausea and vomiting in adult    Osteopenia     Osteoporosis    Vertigo    Visual impairment    Glasses       Surgical History:  Past Surgical History:   Procedure Laterality Date          x 3    Colonoscopy N/A 2024    Procedure: COLONOSCOPY;  Surgeon: Po Aviles MD;  Location:  ENDOSCOPY    Hysterectomy      Bilateral ovaries remain    Laryngoscopy,dirct,op,biopsy  01/15/2018       Allergies:  Allergies   Allergen Reactions    Septra [Sulfamethoxazole W/Trimethoprim] RASH    Sulfa Antibiotics RASH       Family History:  Family History   Problem Relation Age of Onset    Cancer Father         liver cancer    Cancer Brother         liver cancer       Social History:  Social History     Socioeconomic History    Marital status: Single     Spouse name: Not on file    Number of children: 2    Years of education: Not on file    Highest education level: Not on file   Occupational History    Not on file   Tobacco Use    Smoking status: Never    Smokeless tobacco: Never   Vaping Use    Vaping status: Never Used   Substance and Sexual Activity    Alcohol use: No    Drug use: No    Sexual activity: Not on file   Other Topics Concern    Caffeine Concern No    Exercise Yes    Seat Belt No    Special Diet Yes     Comment: Balanced    Stress Concern No    Weight Concern Not Asked   Social History Narrative    ** Merged History Encounter **          Social Determinants of Health     Financial Resource Strain: Not on file   Food Insecurity: No Food Insecurity (3/25/2024)    Food Insecurity     Food Insecurity: Never true   Transportation Needs: No Transportation Needs (3/25/2024)    Transportation Needs     Lack of Transportation: No   Physical Activity: Not on file   Stress: Not on file   Social Connections: Not on file   Housing Stability: Low Risk  (3/25/2024)    Housing Stability     Housing Instability: No     Housing Instability Emergency: Not on file       Medications:    Current Outpatient Medications:     nystatin 204697 UNIT/ML Mouth/Throat  Suspension, Take 5 mL (500,000 Units total) by mouth 4 (four) times daily for 7 days., Disp: 200 mL, Rfl: 0    morphINE ER 15 MG Oral Tab CR, Take 1 tablet (15 mg total) by mouth every 12 (twelve) hours., Disp: 60 tablet, Rfl: 0    prochlorperazine (COMPAZINE) 10 mg tablet, Take 1 tablet (10 mg total) by mouth every 6 (six) hours as needed for Nausea., Disp: 30 tablet, Rfl: 3    sennosides 8.6 MG Oral Tab, Take 1 tablet (8.6 mg total) by mouth nightly., Disp: 60 tablet, Rfl: 1    polyethylene glycol, PEG 3350, (MIRALAX) 17 GM/SCOOP Oral Powder, Take 17 g by mouth 2 (two) times daily., Disp: 578 g, Rfl: 1    OLANZapine 2.5 MG Oral Tab, Take 1 tablet (2.5 mg total) by mouth nightly., Disp: 30 tablet, Rfl: 0    Lidocaine Viscous HCl 2 % Mouth/Throat Solution, Take 5 mL by mouth every 3 (three) hours as needed for Pain. Swish in the mouth and spit out., Disp: 100 mL, Rfl: 1    levothyroxine 25 MCG Oral Tab, Take 1 tablet (25 mcg total) by mouth before breakfast., Disp: , Rfl:     ondansetron (ZOFRAN) 8 MG tablet, Take 1 tablet (8 mg total) by mouth every 8 (eight) hours as needed., Disp: 30 tablet, Rfl: 0    Review of Systems:  A comprehensive 14 point review of systems was completed.  Pertinent positives and negatives noted in the HPI.    Performance Status: ECOG 2-3    Physical Examination:  General: Patient is alert and oriented x 3, not in acute distress, in wheelchair  Vital Signs: Height: 154.9 cm (5' 0.98\") (04/22 1332)  Weight: 64.9 kg (143 lb) (04/22 1332)  BSA (Calculated - sq m): 1.64 sq meters (04/22 1332)  Pulse: 74 (04/22 1332)  BP: 118/73 (04/22 1332)  Temp: 97.8 °F (36.6 °C) (04/22 1332)  Do Not Use - Resp Rate: --  SpO2: 98 % (04/22 1332)  HEENT: Anicteric, conjunctivae and sclerae clear, trace thick white/yellow patch on right side of tongue; mucous membranes are dry, chapped lips   Chest: Respirations unlabored.   Abdomen: Soft, non-distended  Extremities: No edema.  Neurological: Grossly intact.    Skin: warm, dry, no apparent rash   Psych/Depression: mood and affect are appropriate.     Impression/Plan    Nausea: related to chemotherapy and malignancy. On olanzapine 2.5mg at bedtime. Continue scheduled Zofran ODT during the day, stop Compazine. Can increase olanzapine if needed once zofran is optimized. Can also consider changing premedications on treatment days.     Throat pain: possibly thrush, however patient has dry oral membrane that is contributing as well. Start Nystatin, instructions provided to patient in Greenlandic    Metastatic colon cancer: metastases to liver. Started on FOLFOX chemotherapy on 3/14/2024.     Planned Follow Up: 5/1/2024 follow up with Dr. Camarillo, labs and chemo    Risk Level: HIGH metastatic colon cancer receiving chemotherapy requiring close monitoring     The 21st Century Cures Act makes medical notes like these available to patients in the interest of transparency. Please be advised this is a medical document. Medical documents are intended to carry relevant information, facts as evident, and the clinical opinion of the practitioner. The medical note is intended as peer to peer communication and may appear blunt or direct. It is written in medical language and may contain abbreviations or verbiage that are unfamiliar.     Electronically Signed by:    INDIRA Lerma, NP-C  Nurse Practitioner  [facility]

## 2024-04-22 NOTE — PATIENT INSTRUCTIONS
-Medicamento para el dolor de garganta (Nystatin): Agite greg el líquido antes de usarlo. El medicamento debe agitarse en la boca y mantenerse en la boca ricky el mayor tiempo posible antes de tragarlo. [place] por 1 semana    -Para las náuseas: tome ondansetrón (Náuseas) cada 8 horas ricky el día. Continúe tomando olanzapina por la noche. Suspenda la proclorperazina (Compazine).

## 2024-04-24 ENCOUNTER — OFFICE VISIT (OUTPATIENT)
Dept: HEMATOLOGY/ONCOLOGY | Facility: HOSPITAL | Age: 74
End: 2024-04-24
Attending: INTERNAL MEDICINE
Payer: MEDICARE

## 2024-04-24 VITALS
BODY MASS INDEX: 26.62 KG/M2 | TEMPERATURE: 98 F | WEIGHT: 141 LBS | HEIGHT: 60.98 IN | OXYGEN SATURATION: 99 % | SYSTOLIC BLOOD PRESSURE: 127 MMHG | DIASTOLIC BLOOD PRESSURE: 79 MMHG | HEART RATE: 75 BPM

## 2024-04-24 DIAGNOSIS — C18.9 MALIGNANT NEOPLASM OF COLON (HCC): Primary | ICD-10-CM

## 2024-04-24 PROCEDURE — 96360 HYDRATION IV INFUSION INIT: CPT

## 2024-04-24 PROCEDURE — 96361 HYDRATE IV INFUSION ADD-ON: CPT

## 2024-04-24 NOTE — PROGRESS NOTES
Education Record    Learner:  Patient    Disease / Diagnosis:IVF     Barriers / Limitations:  None   Comments:    Method:  Brief focused   Comments:    General Topics:  Diet, Infection, Medication, Pain, Precautions, Procedure, Side effects and symptom management, Plan of care reviewed, and Fall risk and prevention   Comments:    Outcome:  Shows understanding   Comments:    Patient c/o pain in her throat and difficulty swallowing anything. Instructions  reiterated with her daughter who brought her that patient should use Nystating 10-15 minutes PRIOR to trying to eat or take her medications so medicine would coat inside of her throat and help numb the pain . Agreed with these instructions .

## 2024-04-25 ENCOUNTER — TELEPHONE (OUTPATIENT)
Dept: HEMATOLOGY/ONCOLOGY | Facility: HOSPITAL | Age: 74
End: 2024-04-25

## 2024-04-25 ENCOUNTER — HOSPITAL ENCOUNTER (EMERGENCY)
Facility: HOSPITAL | Age: 74
Discharge: HOME | End: 2024-04-25
Attending: EMERGENCY MEDICINE
Payer: MEDICARE

## 2024-04-25 VITALS
HEART RATE: 68 BPM | OXYGEN SATURATION: 100 % | HEIGHT: 60 IN | RESPIRATION RATE: 16 BRPM | BODY MASS INDEX: 27.68 KG/M2 | TEMPERATURE: 98 F | SYSTOLIC BLOOD PRESSURE: 159 MMHG | DIASTOLIC BLOOD PRESSURE: 76 MMHG | WEIGHT: 141 LBS

## 2024-04-25 DIAGNOSIS — K59.00 CONSTIPATION, UNSPECIFIED CONSTIPATION TYPE: ICD-10-CM

## 2024-04-25 DIAGNOSIS — K13.79 MOUTH SORES: Primary | ICD-10-CM

## 2024-04-25 LAB
ALBUMIN SERPL-MCNC: 3 G/DL (ref 3.4–5)
ALBUMIN/GLOB SERPL: 0.9 {RATIO} (ref 1–2)
ALP LIVER SERPL-CCNC: 155 U/L
ALT SERPL-CCNC: 12 U/L
ANION GAP SERPL CALC-SCNC: 12 MMOL/L (ref 0–18)
AST SERPL-CCNC: 29 U/L (ref 15–37)
BASOPHILS # BLD AUTO: 0.01 X10(3) UL (ref 0–0.2)
BASOPHILS NFR BLD AUTO: 1.5 %
BILIRUB SERPL-MCNC: 2.2 MG/DL (ref 0.1–2)
BILIRUB UR QL STRIP.AUTO: NEGATIVE
BUN BLD-MCNC: 7 MG/DL (ref 9–23)
CALCIUM BLD-MCNC: 8.5 MG/DL (ref 8.5–10.1)
CHLORIDE SERPL-SCNC: 104 MMOL/L (ref 98–112)
CLARITY UR REFRACT.AUTO: CLEAR
CO2 SERPL-SCNC: 21 MMOL/L (ref 21–32)
CREAT BLD-MCNC: 0.39 MG/DL
EGFRCR SERPLBLD CKD-EPI 2021: 104 ML/MIN/1.73M2 (ref 60–?)
EOSINOPHIL # BLD AUTO: 0.02 X10(3) UL (ref 0–0.7)
EOSINOPHIL NFR BLD AUTO: 3 %
ERYTHROCYTE [DISTWIDTH] IN BLOOD BY AUTOMATED COUNT: 20.4 %
GLOBULIN PLAS-MCNC: 3.4 G/DL (ref 2.8–4.4)
GLUCOSE BLD-MCNC: 90 MG/DL (ref 70–99)
GLUCOSE UR STRIP.AUTO-MCNC: NORMAL MG/DL
HCT VFR BLD AUTO: 32.9 %
HGB BLD-MCNC: 10.3 G/DL
IMM GRANULOCYTES # BLD AUTO: 0.01 X10(3) UL (ref 0–1)
IMM GRANULOCYTES NFR BLD: 1.5 %
KETONES UR STRIP.AUTO-MCNC: 20 MG/DL
LEUKOCYTE ESTERASE UR QL STRIP.AUTO: NEGATIVE
LYMPHOCYTES # BLD AUTO: 0.22 X10(3) UL (ref 1–4)
LYMPHOCYTES NFR BLD AUTO: 33.3 %
MCH RBC QN AUTO: 27.2 PG (ref 26–34)
MCHC RBC AUTO-ENTMCNC: 31.3 G/DL (ref 31–37)
MCV RBC AUTO: 86.8 FL
MONOCYTES # BLD AUTO: 0.06 X10(3) UL (ref 0.1–1)
MONOCYTES NFR BLD AUTO: 9.1 %
NEUTROPHILS # BLD AUTO: 0.34 X10 (3) UL (ref 1.5–7.7)
NEUTROPHILS # BLD AUTO: 0.34 X10(3) UL (ref 1.5–7.7)
NEUTROPHILS NFR BLD AUTO: 51.6 %
NITRITE UR QL STRIP.AUTO: NEGATIVE
OSMOLALITY SERPL CALC.SUM OF ELEC: 282 MOSM/KG (ref 275–295)
PH UR STRIP.AUTO: 7 [PH] (ref 5–8)
PLATELET # BLD AUTO: 94 10(3)UL (ref 150–450)
PLATELETS.RETICULATED NFR BLD AUTO: 5.2 % (ref 0–7)
POTASSIUM SERPL-SCNC: 3 MMOL/L (ref 3.5–5.1)
PROT SERPL-MCNC: 6.4 G/DL (ref 6.4–8.2)
PROT UR STRIP.AUTO-MCNC: NEGATIVE MG/DL
RBC # BLD AUTO: 3.79 X10(6)UL
RBC UR QL AUTO: NEGATIVE
SODIUM SERPL-SCNC: 137 MMOL/L (ref 136–145)
SP GR UR STRIP.AUTO: 1.01 (ref 1–1.03)
UROBILINOGEN UR STRIP.AUTO-MCNC: NORMAL MG/DL
WBC # BLD AUTO: 0.7 X10(3) UL (ref 4–11)

## 2024-04-25 PROCEDURE — 96375 TX/PRO/DX INJ NEW DRUG ADDON: CPT

## 2024-04-25 PROCEDURE — 85025 COMPLETE CBC W/AUTO DIFF WBC: CPT | Performed by: EMERGENCY MEDICINE

## 2024-04-25 PROCEDURE — 81003 URINALYSIS AUTO W/O SCOPE: CPT | Performed by: EMERGENCY MEDICINE

## 2024-04-25 PROCEDURE — 99284 EMERGENCY DEPT VISIT MOD MDM: CPT

## 2024-04-25 PROCEDURE — 80053 COMPREHEN METABOLIC PANEL: CPT | Performed by: EMERGENCY MEDICINE

## 2024-04-25 PROCEDURE — 96361 HYDRATE IV INFUSION ADD-ON: CPT

## 2024-04-25 PROCEDURE — 96365 THER/PROPH/DIAG IV INF INIT: CPT

## 2024-04-25 PROCEDURE — 99285 EMERGENCY DEPT VISIT HI MDM: CPT

## 2024-04-25 PROCEDURE — 96366 THER/PROPH/DIAG IV INF ADDON: CPT

## 2024-04-25 RX ORDER — HYDROMORPHONE HYDROCHLORIDE 1 MG/ML
0.5 INJECTION, SOLUTION INTRAMUSCULAR; INTRAVENOUS; SUBCUTANEOUS EVERY 30 MIN PRN
Status: DISCONTINUED | OUTPATIENT
Start: 2024-04-25 | End: 2024-04-25

## 2024-04-25 RX ORDER — HYDROCODONE BITARTRATE AND ACETAMINOPHEN 5; 325 MG/1; MG/1
1-2 TABLET ORAL EVERY 6 HOURS PRN
Qty: 20 TABLET | Refills: 0 | Status: SHIPPED | OUTPATIENT
Start: 2024-04-25 | End: 2024-04-25

## 2024-04-25 RX ORDER — ONDANSETRON 2 MG/ML
4 INJECTION INTRAMUSCULAR; INTRAVENOUS ONCE
Status: COMPLETED | OUTPATIENT
Start: 2024-04-25 | End: 2024-04-25

## 2024-04-25 RX ORDER — POTASSIUM CHLORIDE 14.9 MG/ML
20 INJECTION INTRAVENOUS ONCE
Status: COMPLETED | OUTPATIENT
Start: 2024-04-25 | End: 2024-04-25

## 2024-04-25 RX ORDER — DIPHENHYDRAMINE HYDROCHLORIDE AND LIDOCAINE HYDROCHLORIDE AND ALUMINUM HYDROXIDE AND MAGNESIUM HYDRO
10 KIT EVERY 6 HOURS PRN
Status: DISCONTINUED | OUTPATIENT
Start: 2024-04-25 | End: 2024-04-25

## 2024-04-25 RX ORDER — POLYETHYLENE GLYCOL 3350 17 G/17G
17 POWDER, FOR SOLUTION ORAL DAILY PRN
Qty: 12 EACH | Refills: 0 | Status: ON HOLD | OUTPATIENT
Start: 2024-04-25 | End: 2024-05-25

## 2024-04-25 RX ORDER — SODIUM CHLORIDE 9 MG/ML
INJECTION, SOLUTION INTRAVENOUS ONCE
Status: COMPLETED | OUTPATIENT
Start: 2024-04-25 | End: 2024-04-25

## 2024-04-25 NOTE — ED QUICK NOTES
used to communicate neutropenic status to patient along with low potassium count. Pt expressed understanding on plan of care:  -potassium replacement  -zofran for nausea  -oral solution for mouth pain  -discharge once potassium 2hr infusion is complete    Pt refused pain medications at this time.

## 2024-04-25 NOTE — ED QUICK NOTES
Pt c/o some discomfort to mouth but states she is feeling better. Pt awake and alert, skin w/d,resps reg/unlabored.     Pt's friend at bedside will drive pt home.

## 2024-04-25 NOTE — DISCHARGE INSTRUCTIONS
Small frequent fluids to maintain hydration.  Make sure that you are urinating at least 3 times in a 24-hour time.    Return to the emergency department for any new or worsening symptoms.    Your oncologist's office will contact you tomorrow to see how you are doing and if you need to come into the infusion center for additional intravenous hydration.

## 2024-04-25 NOTE — ED PROVIDER NOTES
Patient Seen in: [facility] Emergency Department      History     Chief Complaint   Patient presents with    Cancer    Swallowing Problem     Stated Complaint: Cancer patinet - unable to eat, inflamation in her throat.    Subjective:   HPI    73-year-old female with a history of colon carcinoma presents to the emergency department complaining of several day history of mouth sores with pain and difficulty taking food and fluids.  Last chemotherapy was for 3 days beginning 8 days ago.  She has not had a bowel movement in 2 weeks.  She complains of nausea but no vomiting.  No abdominal pain.  She resides alone.  No fevers.    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    Disorder of thyroid    Esophageal reflux    Exposure to medical diagnostic radiation    Last treatment 2018    History of adverse reaction to anesthesia    Has anxiety/nervousness when waking up    Migraines    Nausea and vomiting in adult    Osteopenia    Osteoporosis    Vertigo    Visual impairment    Glasses              Past Surgical History:   Procedure Laterality Date          x 3    Colonoscopy N/A 2024    Procedure: COLONOSCOPY;  Surgeon: Po Aviles MD;  Location:  ENDOSCOPY    Hysterectomy      Bilateral ovaries remain    Laryngoscopy,dirct,op,biopsy  01/15/2018                Social History     Socioeconomic History    Marital status: Single    Number of children: 2   Tobacco Use    Smoking status: Never    Smokeless tobacco: Never   Vaping Use    Vaping status: Never Used   Substance and Sexual Activity    Alcohol use: No    Drug use: No   Other Topics Concern    Caffeine Concern No    Exercise Yes    Seat Belt No    Special Diet Yes     Comment: Balanced    Stress Concern No   Social History Narrative    ** Merged History Encounter **          Social Determinants of Health     Food Insecurity: No Food Insecurity (3/25/2024)    Food Insecurity     Food Insecurity: Never  true   Transportation Needs: No Transportation Needs (3/25/2024)    Transportation Needs     Lack of Transportation: No   Housing Stability: Low Risk  (3/25/2024)    Housing Stability     Housing Instability: No              Review of Systems    Positive for stated complaint: Cancer patinet - unable to eat, inflamation in her throat.  Other systems are as noted in HPI.  Constitutional and vital signs reviewed.      All other systems reviewed and negative except as noted above.    Physical Exam     ED Triage Vitals [04/25/24 1035]   BP (!) 112/97   Pulse 70   Resp 18   Temp 97.9 °F (36.6 °C)   Temp src Temporal   SpO2 100 %   O2 Device        Current:BP (!) 112/97   Pulse 70   Temp 97.9 °F (36.6 °C) (Temporal)   Resp 18   Ht 152.4 cm (5')   Wt 64 kg   SpO2 100%   BMI 27.54 kg/m²         Physical Exam    General appearance: This is an older female sitting on a gurney.  Vital signs were reviewed per nurses notes.  HEENT: Normocephalic.  Anicteric sclera.  Oral mucosa is pasty.  There are ulcerations on the buccal mucosa, tongue and lips.  Neck: No adenopathy or thyromegaly.  Lungs are clear to auscultation.  Heart exam: Normal S1-S2 without extra sounds or murmurs.  Regular rate and rhythm.  Abdomen is soft and nontender without masses or rebound.  Extremities are atraumatic.  Skin is dry without rashes or lesions.  Neuroexam: Awake, conversive and moving all 4 extremities well.    ED Course     Labs Reviewed   COMP METABOLIC PANEL (14) - Abnormal; Notable for the following components:       Result Value    Potassium 3.0 (*)     BUN 7 (*)     Creatinine 0.39 (*)     ALT 12 (*)     Alkaline Phosphatase 155 (*)     Bilirubin, Total 2.2 (*)     Albumin 3.0 (*)     A/G Ratio 0.9 (*)     All other components within normal limits   CBC W/ DIFFERENTIAL - Abnormal; Notable for the following components:    WBC 0.7 (*)     RBC 3.79 (*)     HGB 10.3 (*)     HCT 32.9 (*)     PLT 94.0 (*)     Neutrophil Absolute Prelim 0.34  (*)     Neutrophil Absolute 0.34 (*)     Lymphocyte Absolute 0.22 (*)     Monocyte Absolute 0.06 (*)     All other components within normal limits   CBC WITH DIFFERENTIAL WITH PLATELET    Narrative:     The following orders were created for panel order CBC With Differential With Platelet.  Procedure                               Abnormality         Status                     ---------                               -----------         ------                     CBC W/ DIFFERENTIAL[[id number]]          Abnormal            Final result                 Please view results for these tests on the individual orders.   SCAN SLIDE   URINALYSIS, ROUTINE             Intravenous access was obtained.  The patient was given a liter of normal saline.  Potassium was 3.0.  A K rider of 20 mill equivalents over 2 hours was given.    Patient desired admission.  I spoke with oncologist Dr. Trevizo who is covering for Dr. Conley.  Because the patient lab test do not reflect any degree of dehydration, he deferred discharge at this time.  He advised that the oncology office will be in touch with the patient tomorrow to arrange for rehydration in the infusion center if necessary.    Test results and treatment plan were discussed with the patient, now using the language line which the patient stated she now wanted to use.  She had initially told me that she was not on any analgesics but subsequently acknowledged that she is taking oral morphine.  She was advised to continue this medication and was placed on Magic mouthwash.          MDM      #1.  Mouth sores.  Stomatitis likely secondary to chemotherapy however there is no clinical evidence of dehydration.  Mucous membranes are moist and renal function is normal.  IV fluids were administered as well as analgesics.  Patient has morphine at home to take.  2.  Constipation.  Given milk of magnesia in the ED and discharged home on MiraLAX.                                   Medical Decision  Making      Disposition and Plan     Clinical Impression:  1. Mouth sores    2. Constipation, unspecified constipation type         Disposition:  Discharge  4/25/2024 12:24 pm    Follow-up:  Gage Trevizo MD  [address] [place] [address]  [phone]    Call in 1 day(s)            Medications Prescribed:  Current Discharge Medication List        START taking these medications    Details   maalox/diphenhydramine/sucralfate Oral Suspension Take 10 mL by mouth TID & HS.  Qty: 240 mL, Refills: 0      polyethylene glycol, PEG 3350, 17 g Oral Powd Pack Take 17 g by mouth daily as needed.  Qty: 12 each, Refills: 0

## 2024-04-25 NOTE — TELEPHONE ENCOUNTER
Isis is calling to inform that patient just called her to inform that she is feeling sick she is feeling very week and she wants to go to the hospital  she can be reach at  ph:[phone]. Please call to advice.

## 2024-04-25 NOTE — TELEPHONE ENCOUNTER
Beryl said they are not interested in a palliative care consult. [facility] called to say Lisa called her and said she was now at the [facility] ER getting triaged.

## 2024-04-25 NOTE — ED QUICK NOTES
Pt awake and alert, skin w/d,resps appear unlabored.     Pt c/o dryness to lips with some cracking and bleeding. Pt's friend at bedside reports that pt has been using vaseline at home. Pt told to consider aquaphor and given lip moisturizer available here.     Pt made comfortable on cart. NS and K rider infusing.

## 2024-04-25 NOTE — TELEPHONE ENCOUNTER
Toxicities: C3 A1Qzkamtuehmgn/Leucovorin/Bevacizumab-bvzr/Oxaliplatin on 4/17/2024  C3 D3 Pegfilgrastim-jmdb on 4/19/2024    I spoke with [facility] RN. She updated me on a call she had with Lisa. She said she is weak. She is not able to eat or drink due to pain and nausea. [facility] also asked if she may have verbal orders to have Palliative Care see the patient.    : Landry # [id number]    I attempted to reach Lisa to do a telephone assessment. The  left a voice mail message asking Lisa to please return my call.     I then called Lisa Cordoba's daughter. She did a three way call.    Anorexia/Nausea/Mucositis/Constipation/Dehydration    Anorexia/Nausea/Mucositis: (Lisa reports that she can't eat or drink because her throat hurts and she is nauseated. She used the viscous lidocaine around 4 am. She has not used any since. She has not take her anti nausea medications. She has not started taking the nystatin that was ordered on 4/22/2024. She has not had anything to eat or drink today. She denies vomiting.)  Constipation: Grade 1 (Lisa reports her last bowel movement was last Friday. She has not taken her senna or miralax in a week. She is passing gas. Her abdomen is soft. She denies chills, shakes, fever or severe abdominal pain.)  Dehydration: Grade 1 (Lisa reports she is not able to take more than sips of water. She feels weak.)    I asked Lisa to use the viscous lidocaine now. Wait 10 minutes and then take her compazine, senokot and miralax. She should also try to eat and drink. Beryl reports she would not agreed to it. She then hung up on Beryl. Beryl said she can't leave work to bring her to the office for an ACV. I explained I am concerned. She said she will see if her sister can take her to ER. Beryl also said she spoke with [facility]. She told her she wants to have the home health nurse disconnect her Mom's pump and be able to provide IV hydration at  home. Beryl said her  works for a home health agency and they can provide that service. Beryl also said she wants Social Work to provide a home health aide that can help her Mom to get meals during the day. I told her I would update Dr Camarillo and his nurse. Dr Camarillo is not in the office today.

## 2024-04-25 NOTE — ED QUICK NOTES
Pt concerned about access port so peripheral IV started to acquire blood work and start fluid bolus.

## 2024-04-25 NOTE — ED QUICK NOTES
Pt ambulated to bathroom with assistance from this RN. Pt had steady gait and used limited assistance.

## 2024-04-25 NOTE — ED INITIAL ASSESSMENT (HPI)
Pt presents to ER with complaint of nausea, feeling dehydrated, and feels as if throat is swollen. No complaint of difficulty breathing. Blood noted on lower lip. Pt states that the  told her there is a fungal infection.Has not been able to eat or drink much recently. Pt has a hx of colon cancer and currently on 2nd cycle of chemo.

## 2024-04-26 ENCOUNTER — PATIENT OUTREACH (OUTPATIENT)
Dept: CASE MANAGEMENT | Age: 74
End: 2024-04-26

## 2024-04-26 ENCOUNTER — OFFICE VISIT (OUTPATIENT)
Dept: HEMATOLOGY/ONCOLOGY | Facility: HOSPITAL | Age: 74
End: 2024-04-26
Attending: INTERNAL MEDICINE
Payer: MEDICARE

## 2024-04-26 VITALS
OXYGEN SATURATION: 98 % | TEMPERATURE: 98 F | WEIGHT: 135.19 LBS | DIASTOLIC BLOOD PRESSURE: 78 MMHG | BODY MASS INDEX: 25.52 KG/M2 | HEIGHT: 60.98 IN | SYSTOLIC BLOOD PRESSURE: 121 MMHG | HEART RATE: 81 BPM

## 2024-04-26 DIAGNOSIS — E87.6 HYPOKALEMIA: ICD-10-CM

## 2024-04-26 DIAGNOSIS — C18.9 MALIGNANT NEOPLASM OF COLON (HCC): Primary | ICD-10-CM

## 2024-04-26 LAB
ALBUMIN SERPL-MCNC: 2.9 G/DL (ref 3.4–5)
ALBUMIN/GLOB SERPL: 0.9 {RATIO} (ref 1–2)
ALP LIVER SERPL-CCNC: 142 U/L
ALT SERPL-CCNC: 13 U/L
ANION GAP SERPL CALC-SCNC: 8 MMOL/L (ref 0–18)
AST SERPL-CCNC: 21 U/L (ref 15–37)
BASOPHILS # BLD AUTO: 0 X10(3) UL (ref 0–0.2)
BASOPHILS NFR BLD AUTO: 0 %
BILIRUB SERPL-MCNC: 1.6 MG/DL (ref 0.1–2)
BUN BLD-MCNC: 7 MG/DL (ref 9–23)
CALCIUM BLD-MCNC: 8.4 MG/DL (ref 8.5–10.1)
CHLORIDE SERPL-SCNC: 104 MMOL/L (ref 98–112)
CO2 SERPL-SCNC: 25 MMOL/L (ref 21–32)
CREAT BLD-MCNC: 0.51 MG/DL
EGFRCR SERPLBLD CKD-EPI 2021: 98 ML/MIN/1.73M2 (ref 60–?)
EOSINOPHIL # BLD AUTO: 0.01 X10(3) UL (ref 0–0.7)
EOSINOPHIL NFR BLD AUTO: 2.8 %
ERYTHROCYTE [DISTWIDTH] IN BLOOD BY AUTOMATED COUNT: 20.3 %
GLOBULIN PLAS-MCNC: 3.3 G/DL (ref 2.8–4.4)
GLUCOSE BLD-MCNC: 106 MG/DL (ref 70–99)
HCT VFR BLD AUTO: 31.6 %
HGB BLD-MCNC: 10.1 G/DL
IMM GRANULOCYTES # BLD AUTO: 0 X10(3) UL (ref 0–1)
IMM GRANULOCYTES NFR BLD: 0 %
LYMPHOCYTES # BLD AUTO: 0.22 X10(3) UL (ref 1–4)
LYMPHOCYTES NFR BLD AUTO: 61.1 %
MAGNESIUM SERPL-MCNC: 2 MG/DL (ref 1.6–2.6)
MCH RBC QN AUTO: 27.2 PG (ref 26–34)
MCHC RBC AUTO-ENTMCNC: 32 G/DL (ref 31–37)
MCV RBC AUTO: 85.2 FL
MONOCYTES # BLD AUTO: 0.04 X10(3) UL (ref 0.1–1)
MONOCYTES NFR BLD AUTO: 11.1 %
NEUTROPHILS # BLD AUTO: 0.09 X10 (3) UL (ref 1.5–7.7)
NEUTROPHILS # BLD AUTO: 0.09 X10(3) UL (ref 1.5–7.7)
NEUTROPHILS NFR BLD AUTO: 25 %
OSMOLALITY SERPL CALC.SUM OF ELEC: 282 MOSM/KG (ref 275–295)
PLATELET # BLD AUTO: 64 10(3)UL (ref 150–450)
PLATELETS.RETICULATED NFR BLD AUTO: 4.7 % (ref 0–7)
POTASSIUM SERPL-SCNC: 3.3 MMOL/L (ref 3.5–5.1)
PROT SERPL-MCNC: 6.2 G/DL (ref 6.4–8.2)
RBC # BLD AUTO: 3.71 X10(6)UL
SODIUM SERPL-SCNC: 137 MMOL/L (ref 136–145)
WBC # BLD AUTO: 0.4 X10(3) UL (ref 4–11)

## 2024-04-26 PROCEDURE — 85025 COMPLETE CBC W/AUTO DIFF WBC: CPT

## 2024-04-26 PROCEDURE — 83735 ASSAY OF MAGNESIUM: CPT

## 2024-04-26 PROCEDURE — 96366 THER/PROPH/DIAG IV INF ADDON: CPT

## 2024-04-26 PROCEDURE — 96361 HYDRATE IV INFUSION ADD-ON: CPT

## 2024-04-26 PROCEDURE — 80053 COMPREHEN METABOLIC PANEL: CPT

## 2024-04-26 PROCEDURE — 96365 THER/PROPH/DIAG IV INF INIT: CPT

## 2024-04-26 NOTE — PROGRESS NOTES
1st attempt ER f/up apt request  PCP -decline, pt doesn't want to schedule apt this time  HEM/ONC -existing apt (5/1)  Closing encounter

## 2024-04-26 NOTE — PATIENT INSTRUCTIONS
Take nystatin mouth wash four times a day. Swish around in your mouth and swallow.    You can use the new mouthwash you were prescribed in the Emergency Room as needed for the mouth pain. This mouthwash you swish and spit. You can use this every three to four hours as needed. This mouthwash is called maalox/diphenhydramine/sucralfate or \"magic mouthwash\".       [place] enjuague bucal con nistatina cuatro veces al día. Violet buches en la boca y trague.    Puede utilizar el nuevo enjuague bucal que le recetaron en la rashaun de emergencias según sea necesario para el dolor de boca. [place] enjuague bucal se hace buches y se escupe. Puede usarlo cada johana o cuatro horas según sea necesario. Gemma enjuague bucal se llama maalox/difenhidramina/sucralfato o \"enjuague bucal mágico\".

## 2024-04-26 NOTE — PROGRESS NOTES
Education Record    Learner:  Patient     Disease / Diagnosis: colon ca    Barriers / Limitations:  None    Method:  Brief focused, printed material and  reinforcement    General Topics:  Plan of care reviewed    Outcome:  Shows understanding      Here for IVF. Had ER visit for mouth pain yesterday. Pt has a lot of confusion with mouthwashes and med management in general. Needs frequent reminders of how to take meds that are prescribed. Pt was only doing nystatin twice a day instead of four times daily. Pt also did not  magic mouthwash. Instructions given to her in Danish regarding when to take mouth washes. Neutropenic--APN notified. K 3.3 (3.0 yesterday), pt not eating/drinking with mouth pain. Unable to take pills d/t size and pain in mouth. 20meq IV with IVF ordered and given. Caregiver with her. Explained all instructions to her as well.

## 2024-04-27 ENCOUNTER — HOSPITAL ENCOUNTER (INPATIENT)
Facility: HOSPITAL | Age: 74
LOS: 21 days | Discharge: NURSING, REHAB OR LONG-TERM CARE | DRG: 157 | End: 2024-05-18
Attending: EMERGENCY MEDICINE | Admitting: EMERGENCY MEDICINE
Payer: MEDICARE

## 2024-04-27 DIAGNOSIS — D70.1 CHEMOTHERAPY-INDUCED NEUTROPENIA (HCC): ICD-10-CM

## 2024-04-27 DIAGNOSIS — E87.6 HYPOKALEMIA: ICD-10-CM

## 2024-04-27 DIAGNOSIS — R13.11 ORAL PHASE DYSPHAGIA: ICD-10-CM

## 2024-04-27 DIAGNOSIS — K12.31 MUCOSITIS DUE TO CHEMOTHERAPY: Primary | ICD-10-CM

## 2024-04-27 DIAGNOSIS — T45.1X5A CHEMOTHERAPY-INDUCED NEUTROPENIA (HCC): ICD-10-CM

## 2024-04-27 DIAGNOSIS — R50.81 FEVER AND NEUTROPENIA (HCC): ICD-10-CM

## 2024-04-27 DIAGNOSIS — D70.9 FEVER AND NEUTROPENIA (HCC): ICD-10-CM

## 2024-04-27 LAB
ALBUMIN SERPL-MCNC: 3.3 G/DL (ref 3.4–5)
ALBUMIN/GLOB SERPL: 0.8 {RATIO} (ref 1–2)
ALP LIVER SERPL-CCNC: 148 U/L
ALT SERPL-CCNC: 15 U/L
ANION GAP SERPL CALC-SCNC: 10 MMOL/L (ref 0–18)
AST SERPL-CCNC: 21 U/L (ref 15–37)
BASOPHILS # BLD AUTO: 0 X10(3) UL (ref 0–0.2)
BASOPHILS NFR BLD AUTO: 0 %
BILIRUB SERPL-MCNC: 1.5 MG/DL (ref 0.1–2)
BUN BLD-MCNC: 8 MG/DL (ref 9–23)
CALCIUM BLD-MCNC: 9.1 MG/DL (ref 8.5–10.1)
CHLORIDE SERPL-SCNC: 103 MMOL/L (ref 98–112)
CO2 SERPL-SCNC: 26 MMOL/L (ref 21–32)
CREAT BLD-MCNC: 0.64 MG/DL
EGFRCR SERPLBLD CKD-EPI 2021: 93 ML/MIN/1.73M2 (ref 60–?)
EOSINOPHIL # BLD AUTO: 0 X10(3) UL (ref 0–0.7)
EOSINOPHIL NFR BLD AUTO: 0 %
ERYTHROCYTE [DISTWIDTH] IN BLOOD BY AUTOMATED COUNT: 20.6 %
FLUAV + FLUBV RNA SPEC NAA+PROBE: NEGATIVE
FLUAV + FLUBV RNA SPEC NAA+PROBE: NEGATIVE
GLOBULIN PLAS-MCNC: 3.9 G/DL (ref 2.8–4.4)
GLUCOSE BLD-MCNC: 114 MG/DL (ref 70–99)
HCT VFR BLD AUTO: 35.5 %
HGB BLD-MCNC: 11.2 G/DL
IMM GRANULOCYTES # BLD AUTO: 0 X10(3) UL (ref 0–1)
IMM GRANULOCYTES NFR BLD: 0 %
LYMPHOCYTES # BLD AUTO: 0.27 X10(3) UL (ref 1–4)
LYMPHOCYTES NFR BLD AUTO: 79.4 %
MCH RBC QN AUTO: 27.1 PG (ref 26–34)
MCHC RBC AUTO-ENTMCNC: 31.5 G/DL (ref 31–37)
MCV RBC AUTO: 86 FL
MONOCYTES # BLD AUTO: 0.04 X10(3) UL (ref 0.1–1)
MONOCYTES NFR BLD AUTO: 11.8 %
NEUTROPHILS # BLD AUTO: 0.03 X10 (3) UL (ref 1.5–7.7)
NEUTROPHILS # BLD AUTO: 0.03 X10(3) UL (ref 1.5–7.7)
NEUTROPHILS NFR BLD AUTO: 8.8 %
OSMOLALITY SERPL CALC.SUM OF ELEC: 287 MOSM/KG (ref 275–295)
PLATELET # BLD AUTO: 50 10(3)UL (ref 150–450)
PLATELETS.RETICULATED NFR BLD AUTO: 7.8 % (ref 0–7)
POTASSIUM SERPL-SCNC: 3.2 MMOL/L (ref 3.5–5.1)
PROT SERPL-MCNC: 7.2 G/DL (ref 6.4–8.2)
RBC # BLD AUTO: 4.13 X10(6)UL
RSV RNA SPEC NAA+PROBE: NEGATIVE
SARS-COV-2 RNA RESP QL NAA+PROBE: NOT DETECTED
SODIUM SERPL-SCNC: 139 MMOL/L (ref 136–145)
WBC # BLD AUTO: 0.3 X10(3) UL (ref 4–11)

## 2024-04-27 PROCEDURE — 99223 1ST HOSP IP/OBS HIGH 75: CPT | Performed by: INTERNAL MEDICINE

## 2024-04-27 RX ORDER — LIDOCAINE HYDROCHLORIDE 20 MG/ML
5 SOLUTION OROPHARYNGEAL
Status: DISCONTINUED | OUTPATIENT
Start: 2024-04-27 | End: 2024-05-18

## 2024-04-27 RX ORDER — MORPHINE SULFATE 2 MG/ML
1 INJECTION, SOLUTION INTRAMUSCULAR; INTRAVENOUS EVERY 2 HOUR PRN
Status: DISCONTINUED | OUTPATIENT
Start: 2024-04-27 | End: 2024-05-18

## 2024-04-27 RX ORDER — FLUCONAZOLE 2 MG/ML
200 INJECTION, SOLUTION INTRAVENOUS EVERY 24 HOURS
Status: DISCONTINUED | OUTPATIENT
Start: 2024-04-28 | End: 2024-05-18

## 2024-04-27 RX ORDER — OLANZAPINE 2.5 MG/1
2.5 TABLET, FILM COATED ORAL NIGHTLY
Status: DISCONTINUED | OUTPATIENT
Start: 2024-04-27 | End: 2024-05-02

## 2024-04-27 RX ORDER — ONDANSETRON 2 MG/ML
4 INJECTION INTRAMUSCULAR; INTRAVENOUS ONCE
Status: COMPLETED | OUTPATIENT
Start: 2024-04-27 | End: 2024-04-27

## 2024-04-27 RX ORDER — ONDANSETRON 2 MG/ML
4 INJECTION INTRAMUSCULAR; INTRAVENOUS EVERY 6 HOURS PRN
Status: DISCONTINUED | OUTPATIENT
Start: 2024-04-27 | End: 2024-05-18

## 2024-04-27 RX ORDER — POTASSIUM CHLORIDE 14.9 MG/ML
20 INJECTION INTRAVENOUS ONCE
Status: COMPLETED | OUTPATIENT
Start: 2024-04-27 | End: 2024-04-27

## 2024-04-27 RX ORDER — MORPHINE SULFATE 2 MG/ML
2 INJECTION, SOLUTION INTRAMUSCULAR; INTRAVENOUS EVERY 2 HOUR PRN
Status: DISCONTINUED | OUTPATIENT
Start: 2024-04-27 | End: 2024-05-18

## 2024-04-27 RX ORDER — DIPHENHYDRAMINE HYDROCHLORIDE AND LIDOCAINE HYDROCHLORIDE AND ALUMINUM HYDROXIDE AND MAGNESIUM HYDRO
10 KIT
Status: DISCONTINUED | OUTPATIENT
Start: 2024-04-27 | End: 2024-05-05

## 2024-04-27 RX ORDER — POTASSIUM CHLORIDE 1.5 G/1.58G
20 POWDER, FOR SOLUTION ORAL ONCE
Status: DISCONTINUED | OUTPATIENT
Start: 2024-04-27 | End: 2024-05-18

## 2024-04-27 RX ORDER — SENNOSIDES 8.6 MG
17.2 TABLET ORAL NIGHTLY PRN
Status: DISCONTINUED | OUTPATIENT
Start: 2024-04-27 | End: 2024-05-18

## 2024-04-27 RX ORDER — MORPHINE SULFATE 4 MG/ML
4 INJECTION, SOLUTION INTRAMUSCULAR; INTRAVENOUS EVERY 2 HOUR PRN
Status: DISCONTINUED | OUTPATIENT
Start: 2024-04-27 | End: 2024-05-18

## 2024-04-27 RX ORDER — METOCLOPRAMIDE HYDROCHLORIDE 5 MG/ML
10 INJECTION INTRAMUSCULAR; INTRAVENOUS EVERY 8 HOURS PRN
Status: DISCONTINUED | OUTPATIENT
Start: 2024-04-27 | End: 2024-05-18

## 2024-04-27 RX ORDER — MORPHINE SULFATE 4 MG/ML
4 INJECTION, SOLUTION INTRAMUSCULAR; INTRAVENOUS EVERY 30 MIN PRN
Status: DISCONTINUED | OUTPATIENT
Start: 2024-04-27 | End: 2024-05-18

## 2024-04-27 RX ORDER — POLYETHYLENE GLYCOL 3350 17 G/17G
17 POWDER, FOR SOLUTION ORAL DAILY PRN
Status: DISCONTINUED | OUTPATIENT
Start: 2024-04-27 | End: 2024-05-18

## 2024-04-27 RX ORDER — MELATONIN
3 NIGHTLY PRN
Status: DISCONTINUED | OUTPATIENT
Start: 2024-04-27 | End: 2024-05-18

## 2024-04-27 RX ORDER — SODIUM CHLORIDE 9 MG/ML
INJECTION, SOLUTION INTRAVENOUS CONTINUOUS
Status: DISCONTINUED | OUTPATIENT
Start: 2024-04-27 | End: 2024-05-03

## 2024-04-27 NOTE — ED QUICK NOTES
Dr. Giron requested we contact  to come and speak with patient. I called and spoke with Wilsony she will come and speak with patient as soon as she can.

## 2024-04-27 NOTE — ED PROVIDER NOTES
Patient Seen in: [facility] Emergency Department      History     Chief Complaint   Patient presents with    Mouth/Lip Problem     Stated Complaint: unable to eat    Subjective:   HPI    73-year-old female presents for evaluation of difficulty eating.  Patient is unable to drink liquids or solids due to pain in her throat.  Patient has stomatitis related to recent chemotherapy for metastatic colon cancer.  Last chemo was about 1 week ago.  She was seen in the ER for same symptoms 2 days ago and received Magic mouthwash cocktail for home.  States this is not improving her pain.  She lives at home alone.    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    Colon cancer (HCC)    Disorder of thyroid    Esophageal reflux    Exposure to medical diagnostic radiation    Last treatment 2018    History of adverse reaction to anesthesia    Has anxiety/nervousness when waking up    Migraines    Nausea and vomiting in adult    Osteopenia    Osteoporosis    Vertigo    Visual impairment    Glasses              Past Surgical History:   Procedure Laterality Date          x 3    Colonoscopy N/A 2024    Procedure: COLONOSCOPY;  Surgeon: Po Aviles MD;  Location:  ENDOSCOPY    Hysterectomy      Bilateral ovaries remain    Laryngoscopy,dirct,op,biopsy  01/15/2018                Social History     Socioeconomic History    Marital status: Single    Number of children: 2   Tobacco Use    Smoking status: Never    Smokeless tobacco: Never   Vaping Use    Vaping status: Never Used   Substance and Sexual Activity    Alcohol use: No    Drug use: No   Other Topics Concern    Caffeine Concern No    Exercise Yes    Seat Belt No    Special Diet Yes     Comment: Balanced    Stress Concern No   Social History Narrative    ** Merged History Encounter **          Social Determinants of Health     Food Insecurity: No Food Insecurity (3/25/2024)    Food Insecurity     Food Insecurity: Never  true   Transportation Needs: No Transportation Needs (3/25/2024)    Transportation Needs     Lack of Transportation: No   Housing Stability: Low Risk  (3/25/2024)    Housing Stability     Housing Instability: No              Review of Systems    Positive for stated complaint: unable to eat  Other systems are as noted in HPI.  Constitutional and vital signs reviewed.      All other systems reviewed and negative except as noted above.    Physical Exam     ED Triage Vitals [04/27/24 1740]   /84   Pulse 88   Resp 16   Temp 99.4 °F (37.4 °C)   Temp src Temporal   SpO2 99 %   O2 Device None (Room air)       Current:BP (!) 169/83   Pulse 82   Temp 99.4 °F (37.4 °C) (Temporal)   Resp 14   Ht 154.9 cm (5' 1\")   Wt 61.3 kg   SpO2 100%   BMI 25.53 kg/m²         Physical Exam    General: Alert, oriented, no apparent distress  HEENT: Atraumatic, normocephalic.  Pupils equal reactive.  Extraocular motions intact.  Shallow ulcerations to the buccal mucosa and lateral aspect of the tongue  Neck: Supple  Lungs: Clear to auscultation bilaterally.  Heart: Regular rate and rhythm.  Abdomen: Soft, nontender.   Skin: No rash.  No edema.  Neurologic: No focal neurologic deficits.  Normal speech pattern  Musculoskeletal: No tenderness or deformity noted.  Full range of motion throughout.        ED Course     Labs Reviewed   COMP METABOLIC PANEL (14) - Abnormal; Notable for the following components:       Result Value    Glucose 114 (*)     Potassium 3.2 (*)     BUN 8 (*)     Alkaline Phosphatase 148 (*)     Albumin 3.3 (*)     A/G Ratio 0.8 (*)     All other components within normal limits   CBC W/ DIFFERENTIAL - Abnormal; Notable for the following components:    WBC 0.3 (*)     HGB 11.2 (*)     PLT 50.0 (*)     Immature Platelet Fraction 7.8 (*)     Neutrophil Absolute Prelim 0.03 (*)     Neutrophil Absolute 0.03 (*)     Lymphocyte Absolute 0.27 (*)     Monocyte Absolute 0.04 (*)     All other components within normal limits    RAPID STREP A SCREEN (LC) - Normal    Narrative:     A confirmatory culture is recommended if clinically indicated.   SARS-COV-2/FLU A AND B/RSV BY PCR (GENEXPERT) - Normal    Narrative:     This test is intended for the qualitative detection and differentiation of SARS-CoV-2, influenza A, influenza B, and respiratory syncytial virus (RSV) viral RNA in nasopharyngeal or nares swabs from individuals suspected of respiratory viral infection consistent with COVID-19 by their healthcare provider. Signs and symptoms of respiratory viral infection due to SARS-CoV-2, influenza, and RSV can be similar.    Test performed using the Xpert Xpress SARS-CoV-2/FLU/RSV (real time RT-PCR)  assay on the [employer] instrument, [employer], [place] [address].   This test is being used under the Food and Drug Administration's Emergency Use Authorization.    The authorized Fact Sheet for Healthcare Providers for this assay is available upon request from the laboratory.   CBC WITH DIFFERENTIAL WITH PLATELET    Narrative:     The following orders were created for panel order CBC With Differential With Platelet.  Procedure                               Abnormality         Status                     ---------                               -----------         ------                     CBC W/ DIFFERENTIAL[[id number]]          Abnormal            Final result                 Please view results for these tests on the individual orders.   SCAN SLIDE   RAINBOW DRAW BLUE   RAINBOW DRAW GOLD                      MDM      Differential diagnosis includes metabolic disturbance, dehydration, stomatitis versus strep pharyngitis  Admission disposition: 4/27/2024  7:38 PM           CBC shows neutropenia.  Similar to 2 days ago likely related to recent chemotherapy.  Patient is afebrile.  When I reviewed her chart she is supposed to be taking nystatin to treat mouth ulcers which she admits she has not been doing.      Chemistry with slightly low potassium  3.2 that was repleted.  Normal BUN and creatinine    Medications   potassium chloride (Klor-Con) 20 MEQ oral powder 20 mEq (20 mEq Oral Not Given 4/27/24 1923)   potassium chloride 20 mEq/100mL IVPB premix 20 mEq (has no administration in time range)   sodium chloride 0.9 % IV bolus 1,000 mL (0 mL Intravenous Stopped 4/27/24 1909)     Discussed with Dr. Novoa oncology.  He would like to patient to stay overnight in the hospital due to her intractable symptoms and risk for infection due to neutropenia and mucositis           Discussed with Dr Lui for admission              Medical Decision Making  Amount and/or Complexity of Data Reviewed  External Data Reviewed: notes.     Details: Recent ER note and oncology visit notes        Disposition and Plan     Clinical Impression:  1. Mucositis due to chemotherapy    2. Chemotherapy-induced neutropenia (HCC)    3. Oral phase dysphagia    4. Hypokalemia         Disposition:  Admit  4/27/2024  7:38 pm    Follow-up:  No follow-up provider specified.        Medications Prescribed:  Current Discharge Medication List                            Hospital Problems       Present on Admission  Date Reviewed: 4/22/2024            ICD-10-CM Noted POA    * (Principal) Mucositis due to chemotherapy K12.31 4/27/2024 Unknown

## 2024-04-27 NOTE — ED INITIAL ASSESSMENT (HPI)
Pt presents to the ED with c/o mouth sores. Pt has hx of colon cancer and has had sores addressed with cancer center. Pt seen here 2 days ago with similar complaint.pt was neutropenic at that time. Pt was seen at cancer center yesterday for IVF's. Pt reports she continues to have great difficulty eating and drinking. Pt lives at home by self. Pt awake and alert, appears pale, skin w/d,resps appear unlabored.

## 2024-04-27 NOTE — CM/SW NOTE
Received a call from the Rn requesting to speak to the pt to see if any assistance we could offer her at home. Pt speaks Venezuelan. Daughter/Beryl on the phone as the . Daughter told this writer that pt is non-compliant w/ taking the nystatin prescribed for pt's mouth sores. Edcm explained to the daughter that pt need to take that in order for the mouth sores to heal and that there is no iv medication that can fix the mouth sores instantly. Pt's daughter understood and requested for another Home Health Agency since the current home health agency they have unable to start iv's(???). Per simone pt's gets ivf for hydration in between chemo days and is being ordered by the pt's oncologist. Daughter can't recall what name of the home health agency pt has, this writer asked her to give edcm a call if she finds out the name of the Home health agency.  Explained to the pt's daughter that Edcm will initiate the referral for home health Rn and whoever the accepting agency will give the pt a call. Dr. Orona made aware of the above.

## 2024-04-28 LAB
BASOPHILS # BLD AUTO: 0 X10(3) UL (ref 0–0.2)
BASOPHILS NFR BLD AUTO: 0 %
EOSINOPHIL # BLD AUTO: 0.01 X10(3) UL (ref 0–0.7)
EOSINOPHIL NFR BLD AUTO: 3.1 %
ERYTHROCYTE [DISTWIDTH] IN BLOOD BY AUTOMATED COUNT: 19.9 %
HCT VFR BLD AUTO: 29.1 %
HGB BLD-MCNC: 9.2 G/DL
IMM GRANULOCYTES # BLD AUTO: 0 X10(3) UL (ref 0–1)
IMM GRANULOCYTES NFR BLD: 0 %
LYMPHOCYTES # BLD AUTO: 0.26 X10(3) UL (ref 1–4)
LYMPHOCYTES NFR BLD AUTO: 81.3 %
MCH RBC QN AUTO: 27.5 PG (ref 26–34)
MCHC RBC AUTO-ENTMCNC: 31.6 G/DL (ref 31–37)
MCV RBC AUTO: 87.1 FL
MONOCYTES # BLD AUTO: 0.04 X10(3) UL (ref 0.1–1)
MONOCYTES NFR BLD AUTO: 12.5 %
NEUTROPHILS # BLD AUTO: 0.01 X10 (3) UL (ref 1.5–7.7)
NEUTROPHILS # BLD AUTO: 0.01 X10(3) UL (ref 1.5–7.7)
NEUTROPHILS NFR BLD AUTO: 3.1 %
PLATELET # BLD AUTO: 31 10(3)UL (ref 150–450)
PLATELETS.RETICULATED NFR BLD AUTO: 7.4 % (ref 0–7)
RBC # BLD AUTO: 3.34 X10(6)UL
WBC # BLD AUTO: 0.3 X10(3) UL (ref 4–11)

## 2024-04-28 PROCEDURE — 99232 SBSQ HOSP IP/OBS MODERATE 35: CPT | Performed by: HOSPITALIST

## 2024-04-28 PROCEDURE — 99223 1ST HOSP IP/OBS HIGH 75: CPT | Performed by: INTERNAL MEDICINE

## 2024-04-28 NOTE — PROGRESS NOTES
[facility]   part of [facility]     Hospitalist Progress Note     Lisa Iqbal Patient Status:  Inpatient    10/11/1950 MRN [id number]   [facility] 4-A Attending Enedelia Lui MD   Hosp Day # 1 PCP Anusha Abraham MD     Chief Complaint: difficulty eating    Subjective:     Patient still with painful mouth, difficulty eating.    Objective:    Review of Systems:   A comprehensive review of systems was completed; pertinent positive and negatives stated in subjective.    Vital signs:  Temp:  [98.8 °F (37.1 °C)-99.4 °F (37.4 °C)] 98.8 °F (37.1 °C)  Pulse:  [75-88] 78  Resp:  [14-21] 17  BP: (123-169)/(66-87) 139/66  SpO2:  [97 %-100 %] 97 %    Physical Exam:    General: No acute distress  Respiratory: No wheezes, no rhonchi  Cardiovascular: S1, S2, regular rate and rhythm  Abdomen: Soft, Non-tender, non-distended, positive bowel sounds  Neuro: No new focal deficits.   Extremities: No edema      Diagnostic Data:    Labs:  Recent Labs   Lab 24  1110 24  1306 24  1813 24  0734   WBC 0.7* 0.4* 0.3* 0.3*   HGB 10.3* 10.1* 11.2* 9.2*   MCV 86.8 85.2 86.0 87.1   PLT 94.0* 64.0* 50.0* 31.0*       Recent Labs   Lab 24  1110 24  1306 24  1813   GLU 90 106* 114*   BUN 7* 7* 8*   CREATSERUM 0.39* 0.51* 0.64   CA 8.5 8.4* 9.1   ALB 3.0* 2.9* 3.3*    137 139   K 3.0* 3.3* 3.2*    104 103   CO2 21.0 25.0 26.0   ALKPHO 155* 142 148*   AST 29 21 21   ALT 12* 13 15   BILT 2.2* 1.6 1.5   TP 6.4 6.2* 7.2       Estimated Creatinine Clearance: 58.2 mL/min (based on SCr of 0.64 mg/dL).    No results for input(s): \"TROP\", \"TROPHS\", \"CK\" in the last 168 hours.    No results for input(s): \"PTP\", \"INR\" in the last 168 hours.               Microbiology    No results found for this visit on 24.      Imaging: Reviewed in Epic.    Medications:    potassium chloride  20 mEq Oral Once    nystatin  500,000 Units Oral QID    OLANZapine  2.5 mg Oral  Nightly    maalox/diphenhydramine/lidocaine  10 mL Oral TID & HS    fluconazole  200 mg Intravenous Q24H       Assessment & Plan:      # mucositis, oral candidiasis due to chemo  - cont magic mouth wash, nystatin solution   - iv Fluconazole  - gentle IVF   - pain control      # pancytopenia   - due to chemo   - monitor counts, transfuse if Hgb , 7, plt < 10  - oncology following     # oral candidiasis   - will place on fluconazole IV, as above     # hypokalemia , replace      # metastatic sigmoid colon cancer          Win Iyer MD    Supplementary Documentation:     Quality:  DVT Mechanical Prophylaxis:   SCDs,    DVT Pharmacologic Prophylaxis   Medication   None                Code Status: Not on file  Cuellar: No urinary catheter in place  Cuellar Duration (in days):   Central line:    ALESHA:     Discharge is dependent on: progress  At this point Ms. Félix Iqbal is expected to be discharge to: home    The 21st Century Cures Act makes medical notes like these available to patients in the interest of transparency. Please be advised this is a medical document. Medical documents are intended to carry relevant information, facts as evident, and the clinical opinion of the practitioner. The medical note is intended as peer to peer communication and may appear blunt or direct. It is written in medical language and may contain abbreviations or verbiage that are unfamiliar.

## 2024-04-28 NOTE — ED QUICK NOTES
Orders for admission, patient is aware of plan and ready to go upstairs. Any questions, please call ED RN Juan C RN at extension [phone].     Patient Covid vaccination status: Fully vaccinated     COVID Test Ordered in ED: SARS-CoV-2/Flu A and B/RSV by PCR (GeneXpert)    COVID Suspicion at Admission: N/A    Running Infusions:  Potassium running currently with IV fluids    Mental Status/LOC at time of transport: A/o X4    Other pertinent information:   CIWA score: N/A   NIH score:  N/A

## 2024-04-28 NOTE — PLAN OF CARE
Patient admitted with mucositis patient noted to have ulceration to the tongue, lips and the roof of the mouth. Oral care performed gently with miracle mouth wash and Nystatin pt tolerated it well. Patient had been reporting pain 10/10 via  language line. Pain medication regimen reviewed via  and patient verbalized understanding. Patient medicated with 4 mg of IV Morphine after medication patient reported relief and appeared more comfortable. Initial admission questions and assessment questions as well as reviewing the current plan of care done via language line with  Tami # [id number]. Pt was able to ask questions via  and was agreeable with the plan care per . Call light within reach. Neutropenic precautions in place

## 2024-04-28 NOTE — H&P
[facility]     Lisa Iqbal Patient Status:  Inpatient    10/11/1950 MRN [id number]   Location [facility] 4NW-A Attending Enedelia Lui MD   Hosp Day # 0 PCP Anusha Abraham MD     Chief Complaint: Difficulty eating    Subjective:    History of Present Illness:     Lisa Iqbal is a 73 year old female with 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  Patient has visited the emergency room multiple times over the last week.  She had been on Magic mouthwash and nystatin solution however on clear whether she has been compliant with this.  Denies any fever, chills, cough, chest pain or shortness of breath, diarrhea or nausea.    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    Colon cancer (HCC)    Disorder of thyroid    Esophageal reflux    Exposure to medical diagnostic radiation    Last treatment 2018    History of adverse reaction to anesthesia    Has anxiety/nervousness when waking up    Migraines    Nausea and vomiting in adult    Osteopenia    Osteoporosis    Vertigo    Visual impairment    Glasses     Past Surgical History:   Past Surgical History:   Procedure Laterality Date          x 3    Colonoscopy N/A 2024    Procedure: COLONOSCOPY;  Surgeon: Po Aviles MD;  Location:  ENDOSCOPY    Hysterectomy      Bilateral ovaries remain    Laryngoscopy,dirct,op,biopsy  01/15/2018      Family History:   Family History   Problem Relation Age of Onset    Cancer Father         liver cancer    Cancer Brother         liver cancer     Social History:    reports that she has never smoked. She has never used smokeless tobacco. She reports that she does not drink alcohol and does not use drugs.     Allergies:    Allergies   Allergen Reactions    Septra [Sulfamethoxazole W/Trimethoprim] RASH    Sulfa Antibiotics RASH       Medications:    Current Facility-Administered Medications on File Prior to Encounter   Medication Dose Route Frequency Provider Last Rate Last Admin    [COMPLETED] sodium chloride 0.9 % IV bolus 1,000 mL  1,000 mL Intravenous Once Yuliet Martinez APRN   Stopped at 04/26/24 1355    [COMPLETED] potassium chloride 20 mEq in sodium chloride 0.9% 1,010 mL IVPB (Onc)   Intravenous Once Sonja Escobar APRN   Stopped at 04/26/24 1605    [COMPLETED] sodium chloride 0.9 % IV bolus 1,000 mL  1,000 mL Intravenous Once Gabby Henry MD   Stopped at 04/25/24 1246    [COMPLETED] ondansetron (Zofran) 4 MG/2ML injection 4 mg  4 mg Intravenous Once Gabby Henry MD   4 mg at 04/25/24 1245    [COMPLETED] potassium chloride 20 mEq/100mL IVPB premix 20 mEq  20 mEq Intravenous Once Gabby Henry MD   Stopped at 04/25/24 1530    [COMPLETED] sodium chloride 0.9% infusion   Intravenous Once Gabby Henry MD   Stopped at 04/25/24 1530    [COMPLETED] sodium chloride 0.9 % IV bolus 1,000 mL  1,000 mL Intravenous Once Yuliet Martinez APRN   Stopped at 04/24/24 1158    [COMPLETED] sodium chloride 0.9 % IV bolus 1,000 mL  1,000 mL Intravenous Once Yuliet Martinez APRN   Stopped at 04/22/24 1544    [COMPLETED] pegfilgrastim-jmdb (Fulphila) 6 MG/0.6ML SUBQ injection 6 mg  6 mg Subcutaneous Once Tania Whaley APRN   6 mg at 04/19/24 1228    [COMPLETED] sodium chloride 0.9 % IV bolus 1,000 mL  1,000 mL Intravenous Once Yuliet Martinez APRN   Stopped at 04/19/24 1315    [COMPLETED] ondansetron (Zofran) 16 mg, dexAMETHasone (Decadron) 20 mg in sodium chloride 0.9% 110 mL IVPB   Intravenous Once Tania Whaley APRN   Stopped at 04/17/24 1131    [COMPLETED] bevacizumab-bvzr (Zirabev) 350 mg in sodium chloride 0.9% 114 mL infusion  5 mg/kg (Treatment Plan Recorded) Intravenous Once Tania Whaley APRN    Stopped at 04/17/24 1205    [COMPLETED] oxaliplatin (Eloxatin) 110 mg in dextrose 5% 272 mL infusion  65 mg/m2 (Treatment Plan Recorded) Intravenous Once Tania Whaley APRN   Stopped at 04/17/24 1421    [COMPLETED] leucovorin 700 mg in dextrose 5% 250 mL infusion  400 mg/m2 (Treatment Plan Recorded) Intravenous Once Tania Whaley APRN   Stopped at 04/17/24 1421    [COMPLETED] fluorouracil (Adrucil) 50 mg/mL IV push 700 mg 14 mL  400 mg/m2 (Treatment Plan Recorded) Intravenous Once Tania Whaley APRN   700 mg at 04/17/24 1427    [COMPLETED] sodium chloride 0.9 % IV bolus 1,000 mL  1,000 mL Intravenous Once Abhinav Dominguez MD   Stopped at 04/15/24 2230    [COMPLETED] heparin (Porcine) 100 Units/mL lock flush 500 Units  5 mL Intravenous Once Abhinav Dominguez MD   500 Units at 04/15/24 2352    [COMPLETED] sodium chloride 0.9 % IV bolus 1,000 mL  1,000 mL Intravenous Once Yuliet Martinez APRN   Stopped at 04/12/24 1532    [COMPLETED] iopamidol 76% (ISOVUE-370) injection for power injector  80 mL Intravenous ONCE PRN Madi Trujillo DO   80 mL at 04/12/24 0142    [COMPLETED] heparin (Porcine) 100 Units/mL lock flush 500 Units  5 mL Intravenous Once Madi Trujillo DO   500 Units at 04/12/24 0314    [COMPLETED] ondansetron (Zofran) 4 MG/2ML injection 4 mg  4 mg Intravenous Once Madi Trujillo DO   4 mg at 04/11/24 2339    [COMPLETED] sodium chloride 0.9 % IV bolus 500 mL  500 mL Intravenous Once Madi Trujillo DO   Stopped at 04/12/24 0101    [COMPLETED] sodium chloride 0.9 % IV bolus 1,000 mL  1,000 mL Intravenous Once Yuliet Martinez APRN   Stopped at 04/10/24 1537    [COMPLETED] sodium chloride 0.9 % IV bolus 1,000 mL  1,000 mL Intravenous Once Tania Whaley APRN   Stopped at 04/05/24 1316    [COMPLETED] pegfilgrastim-jmdb (Fulphila) 6 MG/0.6ML SUBQ injection 6 mg  6 mg Subcutaneous Once Tania Whaley APRN   6 mg at 04/05/24 1213    [COMPLETED] potassium chloride (K-Dur) tab 40 mEq  40 mEq Oral Once Jump,  INDIRA Marin   40 mEq at 04/03/24 1127    [COMPLETED] ondansetron (Zofran) 16 mg, dexAMETHasone (Decadron) 20 mg in sodium chloride 0.9% 110 mL IVPB   Intravenous Once Tania Whaley APRN   Stopped at 04/03/24 1204    [COMPLETED] bevacizumab-bvzr (Zirabev) 350 mg in sodium chloride 0.9% 114 mL infusion  5 mg/kg (Treatment Plan Recorded) Intravenous Once Tania Whaley APRN   Stopped at 04/03/24 1144    [COMPLETED] oxaliplatin (Eloxatin) 110 mg in dextrose 5% 272 mL infusion  65 mg/m2 (Treatment Plan Recorded) Intravenous Once Tania Whaley APRN   Stopped at 04/03/24 1417    [COMPLETED] leucovorin 700 mg in dextrose 5% 250 mL infusion  400 mg/m2 (Treatment Plan Recorded) Intravenous Once Tania Whaley APRN   Stopped at 04/03/24 1417    [COMPLETED] fluorouracil (Adrucil) 50 mg/mL IV push 700 mg 14 mL  400 mg/m2 (Treatment Plan Recorded) Intravenous Once Tania Whaley APRN   700 mg at 04/03/24 1434    [COMPLETED] heparin (Porcine) 100 Units/mL lock flush             [COMPLETED] lidocaine-EPINEPHrine (Xylocaine-Epinephrine) 2 %-1:290372 injection             [COMPLETED] lidocaine (Xylocaine) 1 % injection             [COMPLETED] fentaNYL (Sublimaze) 50 mcg/mL injection             [COMPLETED] vancomycin (Vancocin) 1 g injection             [COMPLETED] midazolam (Versed) 2 MG/2ML injection             [COMPLETED] ceFAZolin (Ancef) 1 g injection             [COMPLETED] heparin (Porcine) 5000 UNIT/ML injection             [COMPLETED] ondansetron (Zofran) tab 4 mg  4 mg Oral Once Kwadwo Monsivais MD   4 mg at 03/25/24 0114    [COMPLETED] furosemide (Lasix) 10 mg/mL injection 20 mg  20 mg Intravenous Once Bernadette Vick MD   20 mg at 03/24/24 2242    [COMPLETED] sodium chloride 0.9 % IV bolus 1,000 mL  1,000 mL Intravenous Once Yuliet Martinez APRN   Stopped at 03/22/24 1503    [COMPLETED] ondansetron (Zofran) 4 MG/2ML injection 4 mg  4 mg Intravenous Once Bernadette Vick MD   4 mg at 03/13/24 1016     [COMPLETED] levoFLOXacin in dextrose 5% (Levaquin) 750 mg/150mL IVPB premix 750 mg  750 mg Intravenous Once Bernadette Vick  mL/hr at 24 1103 750 mg at 24 1103    [] ketorolac (Toradol) 15 MG/ML injection 15 mg  15 mg Intravenous Q6H PRN Jojo Barba MD   15 mg at 24 1950    [COMPLETED] oxaliplatin (Eloxatin) 140 mg in dextrose 5% 278 mL infusion  85 mg/m2 Intravenous Once Ronan Camarillo  mL/hr at 24 1016 140 mg at 24 1016    [COMPLETED] leucovorin 650 mg in dextrose 5% 250 mL IVPB  400 mg/m2 Intravenous Once Ronan Camarillo MD   650 mg at 24 1016    [COMPLETED] fluorouracil (Adrucil) 650 mg IV push  400 mg/m2 Intravenous Once Ronan Camarillo MD   650 mg at 24 1344    [COMPLETED] ondansetron (Zofran) 16 mg, dexAMETHasone (Decadron) 20 mg in sodium chloride 0.9% 110 mL IVPB   Intravenous Once Ronan Camarillo MD   Given at 24 0941    [COMPLETED] ondansetron (Zofran) 4 MG/2ML injection 4 mg  4 mg Intravenous Once Whitney Guerrero MD   4 mg at 24 1850    [COMPLETED] sodium chloride 0.9 % IV bolus 1,000 mL  1,000 mL Intravenous Once Whitney Guerrero MD   Stopped at 24    [COMPLETED] iopamidol 76% (ISOVUE-370) injection for power injector  80 mL Intravenous ONCE PRN Whitney Guerrero MD   80 mL at 24 1918    [COMPLETED] levoFLOXacin (Levaquin) tab 750 mg  750 mg Oral Once Whitney Guerrero MD   750 mg at 24    [] midazolam (Versed) 2 MG/2ML injection 1 mg  1 mg Intravenous Q5 Min PRN Clayton Bahena MD   0.5 mg at 24 1008    [] fentaNYL (Sublimaze) 50 mcg/mL injection 50 mcg  50 mcg Intravenous Q5 Min PRN Clayton Bahena MD   25 mcg at 24 1017     Current Outpatient Medications on File Prior to Encounter   Medication Sig Dispense Refill    maalox/diphenhydramine/sucralfate Oral Suspension Take 10 mL by mouth TID & HS. 240 mL 0    polyethylene glycol, PEG 3350, 17 g Oral Powd Pack Take  17 g by mouth daily as needed. 12 each 0    nystatin 178265 UNIT/ML Mouth/Throat Suspension Take 5 mL (500,000 Units total) by mouth 4 (four) times daily for 7 days. 200 mL 0    morphINE ER 15 MG Oral Tab CR Take 1 tablet (15 mg total) by mouth every 12 (twelve) hours. 60 tablet 0    prochlorperazine (COMPAZINE) 10 mg tablet Take 1 tablet (10 mg total) by mouth every 6 (six) hours as needed for Nausea. 30 tablet 3    sennosides 8.6 MG Oral Tab Take 1 tablet (8.6 mg total) by mouth nightly. 60 tablet 1    polyethylene glycol, PEG 3350, (MIRALAX) 17 GM/SCOOP Oral Powder Take 17 g by mouth 2 (two) times daily. 578 g 1    OLANZapine 2.5 MG Oral Tab Take 1 tablet (2.5 mg total) by mouth nightly. 30 tablet 0    Lidocaine Viscous HCl 2 % Mouth/Throat Solution Take 5 mL by mouth every 3 (three) hours as needed for Pain. Swish in the mouth and spit out. 100 mL 1    levothyroxine 25 MCG Oral Tab Take 1 tablet (25 mcg total) by mouth before breakfast.      ondansetron (ZOFRAN) 8 MG tablet Take 1 tablet (8 mg total) by mouth every 8 (eight) hours as needed. 30 tablet 0       Review of Systems:   A comprehensive review of systems was completed.    Pertinent positives and negatives noted in the HPI.    Objective:   Physical Exam:    /72 (BP Location: Right arm)   Pulse 75   Temp 99 °F (37.2 °C) (Oral)   Resp 17   Ht 5' 1\" (1.549 m)   Wt 135 lb 2.3 oz (61.3 kg)   SpO2 99%   BMI 25.53 kg/m²   General: No acute distress, Alert  Respiratory: No rhonchi, no wheezes  Cardiovascular: S1, S2. Regular rate and rhythm  Abdomen: Soft, Non-tender, non-distended, positive bowel sounds  Neuro: No new focal deficits  Extremities: No edema      Results:    Labs:      Labs Last 24 Hours:    Recent Labs   Lab 04/25/24  1110 04/26/24  1306 04/27/24  1813   RBC 3.79* 3.71* 4.13   HGB 10.3* 10.1* 11.2*   HCT 32.9* 31.6* 35.5   MCV 86.8 85.2 86.0   MCH 27.2 27.2 27.1   MCHC 31.3 32.0 31.5   RDW 20.4 20.3 20.6   NEPRELIM 0.34* 0.09* 0.03*    WBC 0.7* 0.4* 0.3*   PLT 94.0* 64.0* 50.0*       Recent Labs   Lab 04/25/24  1110 04/26/24  1306 04/27/24  1813   GLU 90 106* 114*   BUN 7* 7* 8*   CREATSERUM 0.39* 0.51* 0.64   EGFRCR 104 98 93   CA 8.5 8.4* 9.1   ALB 3.0* 2.9* 3.3*    137 139   K 3.0* 3.3* 3.2*    104 103   CO2 21.0 25.0 26.0   ALKPHO 155* 142 148*   AST 29 21 21   ALT 12* 13 15   BILT 2.2* 1.6 1.5   TP 6.4 6.2* 7.2       Lab Results   Component Value Date    INR 1.5 (A) 03/27/2024    INR 1.39 (H) 03/14/2024    INR 1.07 02/08/2024       No results for input(s): \"TROP\", \"TROPHS\", \"CK\" in the last 168 hours.    No results for input(s): \"TROP\", \"PBNP\" in the last 168 hours.    No results for input(s): \"PCT\" in the last 168 hours.    Imaging: Imaging data reviewed in Epic.    Assessment & Plan:      # mucositis   - cont magic mouth wash, nystatin solution   - gentle IVF   - pain control     # pancytopenia   - due to chemo   - monitor   - oncology consulted     # oral candidiasis   - will place on fluconazole IV    # hypokalemia , replace     # metastatic sigmoid colon cancer           Plan of care discussed with patient and ER.     Enedelia Lui MD    Supplementary Documentation:     The 21st Century Cures Act makes medical notes like these available to patients in the interest of transparency. Please be advised this is a medical document. Medical documents are intended to carry relevant information, facts as evident, and the clinical opinion of the practitioner. The medical note is intended as peer to peer communication and may appear blunt or direct. It is written in medical language and may contain abbreviations or verbiage that are unfamiliar.               **Certification      PHYSICIAN Certification of Need for Inpatient Hospitalization - Initial Certification    Patient will require inpatient services that will reasonably be expected to span two midnight's based on the clinical documentation in H+P.   Based on patients current  state of illness, I anticipate that, after discharge, patient will require TBD.

## 2024-04-28 NOTE — CONSULTS
Hematology/Oncology Initial Consultation Note    Patient Name: Lisa Iqbal  Medical Record Number: [id number]    YOB: 1950   Date of Consultation: 2024   Physician requesting consultation: Dr Lui    Reason for Consultation:  Lisa Iqbal was seen today for the diagnosis of mucositis, chemotherapy induced neutropenia    History of Present Illness:      72 y/o F PMH metastatic colon cancer to liver on FOLFOX/joselin     - C3 was given ; she got peg-GCSF   - having mouth ulcers, was rx'ed magic mouthwash, no relief from magic mouthwash, biotin mouthwash as well as nystatin, but she has not been using it as prescribed. Poor PO intake  - daughter instructed to bring her to ED; subsequently admitted for mucositis. 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    Colon cancer (HCC)    Disorder of thyroid    Esophageal reflux    Exposure to medical diagnostic radiation    Last treatment 2018    History of adverse reaction to anesthesia    Has anxiety/nervousness when waking up    Migraines    Nausea and vomiting in adult    Osteopenia    Osteoporosis    Vertigo    Visual impairment    Glasses       Past Surgical History:   Procedure Laterality Date          x 3    Colonoscopy N/A 2024    Procedure: COLONOSCOPY;  Surgeon: Po Aviles MD;  Location:  ENDOSCOPY    Hysterectomy      Bilateral ovaries remain    Laryngoscopy,dirct,op,biopsy  01/15/2018       Home Medications:  Current Facility-Administered Medications on File Prior to Encounter   Medication Dose Route Frequency Provider Last Rate Last Admin    [COMPLETED] sodium chloride 0.9 % IV bolus 1,000 mL  1,000 mL Intravenous Once Yuliet Martinez APRN   Stopped at 24 3470    [COMPLETED] potassium chloride 20 mEq in sodium  chloride 0.9% 1,010 mL IVPB (Onc)   Intravenous Once Sonja Escobar APRN   Stopped at 04/26/24 1605    [COMPLETED] sodium chloride 0.9 % IV bolus 1,000 mL  1,000 mL Intravenous Once Gabby Henry MD   Stopped at 04/25/24 1246    [COMPLETED] ondansetron (Zofran) 4 MG/2ML injection 4 mg  4 mg Intravenous Once Gabby Henry MD   4 mg at 04/25/24 1245    [COMPLETED] potassium chloride 20 mEq/100mL IVPB premix 20 mEq  20 mEq Intravenous Once Gabby Henry MD   Stopped at 04/25/24 1530    [COMPLETED] sodium chloride 0.9% infusion   Intravenous Once Gabby Henry MD   Stopped at 04/25/24 1530    [COMPLETED] sodium chloride 0.9 % IV bolus 1,000 mL  1,000 mL Intravenous Once Yuliet Martinez APRN   Stopped at 04/24/24 1158    [COMPLETED] sodium chloride 0.9 % IV bolus 1,000 mL  1,000 mL Intravenous Once Yuliet Martinez APRN   Stopped at 04/22/24 1544    [COMPLETED] pegfilgrastim-jmdb (Fulphila) 6 MG/0.6ML SUBQ injection 6 mg  6 mg Subcutaneous Once Tania Whaley APRN   6 mg at 04/19/24 1228    [COMPLETED] sodium chloride 0.9 % IV bolus 1,000 mL  1,000 mL Intravenous Once Yuliet Martinez APRN   Stopped at 04/19/24 1315    [COMPLETED] ondansetron (Zofran) 16 mg, dexAMETHasone (Decadron) 20 mg in sodium chloride 0.9% 110 mL IVPB   Intravenous Once Tania Whaley APRN   Stopped at 04/17/24 1131    [COMPLETED] bevacizumab-bvzr (Zirabev) 350 mg in sodium chloride 0.9% 114 mL infusion  5 mg/kg (Treatment Plan Recorded) Intravenous Once Tania Whaley APRN   Stopped at 04/17/24 1205    [COMPLETED] oxaliplatin (Eloxatin) 110 mg in dextrose 5% 272 mL infusion  65 mg/m2 (Treatment Plan Recorded) Intravenous Once Tania Whaley APRN   Stopped at 04/17/24 1421    [COMPLETED] leucovorin 700 mg in dextrose 5% 250 mL infusion  400 mg/m2 (Treatment Plan Recorded) Intravenous Once Tania Whaley APRN   Stopped at 04/17/24 1421    [COMPLETED] fluorouracil (Adrucil) 50 mg/mL IV push 700 mg 14 mL  400  mg/m2 (Treatment Plan Recorded) Intravenous Once Tania Whaley APRN   700 mg at 04/17/24 1427    [COMPLETED] sodium chloride 0.9 % IV bolus 1,000 mL  1,000 mL Intravenous Once Abhinav Dominguez MD   Stopped at 04/15/24 2230    [COMPLETED] heparin (Porcine) 100 Units/mL lock flush 500 Units  5 mL Intravenous Once Abhinav Dominguez MD   500 Units at 04/15/24 2352    [COMPLETED] sodium chloride 0.9 % IV bolus 1,000 mL  1,000 mL Intravenous Once Yuliet Martinez APRN   Stopped at 04/12/24 1532    [COMPLETED] iopamidol 76% (ISOVUE-370) injection for power injector  80 mL Intravenous ONCE PRN Madi Trujillo DO   80 mL at 04/12/24 0142    [COMPLETED] heparin (Porcine) 100 Units/mL lock flush 500 Units  5 mL Intravenous Once Madi Trujillo DO   500 Units at 04/12/24 0314    [COMPLETED] ondansetron (Zofran) 4 MG/2ML injection 4 mg  4 mg Intravenous Once Madi Trujillo DO   4 mg at 04/11/24 2339    [COMPLETED] sodium chloride 0.9 % IV bolus 500 mL  500 mL Intravenous Once Madi Trujillo DO   Stopped at 04/12/24 0101    [COMPLETED] sodium chloride 0.9 % IV bolus 1,000 mL  1,000 mL Intravenous Once Yuliet Martinez APRN   Stopped at 04/10/24 1537    [COMPLETED] sodium chloride 0.9 % IV bolus 1,000 mL  1,000 mL Intravenous Once Tania Whaley APRN   Stopped at 04/05/24 1316    [COMPLETED] pegfilgrastim-jmdb (Fulphila) 6 MG/0.6ML SUBQ injection 6 mg  6 mg Subcutaneous Once Tania Whaley APRN   6 mg at 04/05/24 1213    [COMPLETED] potassium chloride (K-Dur) tab 40 mEq  40 mEq Oral Once Tania Whaley APRN   40 mEq at 04/03/24 1127    [COMPLETED] ondansetron (Zofran) 16 mg, dexAMETHasone (Decadron) 20 mg in sodium chloride 0.9% 110 mL IVPB   Intravenous Once Tania Whaley APRN   Stopped at 04/03/24 1204    [COMPLETED] bevacizumab-bvzr (Zirabev) 350 mg in sodium chloride 0.9% 114 mL infusion  5 mg/kg (Treatment Plan Recorded) Intravenous Once Tania Whaley APRN   Stopped at 04/03/24 1144    [COMPLETED] oxaliplatin (Eloxatin) 110  mg in dextrose 5% 272 mL infusion  65 mg/m2 (Treatment Plan Recorded) Intravenous Once Tania WhaleyINDIRA   Stopped at 04/03/24 1417    [COMPLETED] leucovorin 700 mg in dextrose 5% 250 mL infusion  400 mg/m2 (Treatment Plan Recorded) Intravenous Once Tania WhaleyINDIRA   Stopped at 04/03/24 1417    [COMPLETED] fluorouracil (Adrucil) 50 mg/mL IV push 700 mg 14 mL  400 mg/m2 (Treatment Plan Recorded) Intravenous Once Ofelia WhaleyferINDIRA   700 mg at 04/03/24 1434     Current Outpatient Medications on File Prior to Encounter   Medication Sig Dispense Refill    maalox/diphenhydramine/sucralfate Oral Suspension Take 10 mL by mouth TID & HS. 240 mL 0    polyethylene glycol, PEG 3350, 17 g Oral Powd Pack Take 17 g by mouth daily as needed. 12 each 0    nystatin 315615 UNIT/ML Mouth/Throat Suspension Take 5 mL (500,000 Units total) by mouth 4 (four) times daily for 7 days. 200 mL 0    morphINE ER 15 MG Oral Tab CR Take 1 tablet (15 mg total) by mouth every 12 (twelve) hours. 60 tablet 0    prochlorperazine (COMPAZINE) 10 mg tablet Take 1 tablet (10 mg total) by mouth every 6 (six) hours as needed for Nausea. 30 tablet 3    sennosides 8.6 MG Oral Tab Take 1 tablet (8.6 mg total) by mouth nightly. 60 tablet 1    polyethylene glycol, PEG 3350, (MIRALAX) 17 GM/SCOOP Oral Powder Take 17 g by mouth 2 (two) times daily. 578 g 1    OLANZapine 2.5 MG Oral Tab Take 1 tablet (2.5 mg total) by mouth nightly. 30 tablet 0    levothyroxine 25 MCG Oral Tab Take 1 tablet (25 mcg total) by mouth before breakfast.      ondansetron (ZOFRAN) 8 MG tablet Take 1 tablet (8 mg total) by mouth every 8 (eight) hours as needed. 30 tablet 0    Lidocaine Viscous HCl 2 % Mouth/Throat Solution Take 5 mL by mouth every 3 (three) hours as needed for Pain. Swish in the mouth and spit out. 100 mL 1       Current Inpatient Medications:  Inpatient Meds:   potassium chloride  20 mEq Oral Once    nystatin  500,000 Units Oral QID    OLANZapine  2.5 mg Oral  Nightly    maalox/diphenhydramine/lidocaine  10 mL Oral TID & HS    fluconazole  200 mg Intravenous Q24H      sodium chloride 100 mL/hr at 04/27/24 2256       PRN Meds:    morphINE    Lidocaine Viscous HCl    morphINE **OR** morphINE **OR** morphINE    melatonin    ondansetron    metoclopramide    polyethylene glycol (PEG 3350)    sennosides    Allergies:   Allergies   Allergen Reactions    Septra [Sulfamethoxazole W/Trimethoprim] RASH    Sulfa Antibiotics RASH       Psychosocial History:  Social History     Social History Narrative    ** Merged History Encounter **          Social History     Socioeconomic History    Marital status: Single    Number of children: 2   Tobacco Use    Smoking status: Never    Smokeless tobacco: Never   Vaping Use    Vaping status: Never Used   Substance and Sexual Activity    Alcohol use: No    Drug use: No   Other Topics Concern    Caffeine Concern No    Exercise Yes    Seat Belt No    Special Diet Yes     Comment: Balanced    Stress Concern No   Social History Narrative    ** Merged History Encounter **          Social Determinants of Health     Food Insecurity: No Food Insecurity (4/27/2024)    Food Insecurity     Food Insecurity: Never true   Transportation Needs: Unmet Transportation Needs (4/27/2024)    Transportation Needs     Lack of Transportation: Yes   Housing Stability: Low Risk  (4/27/2024)    Housing Stability     Housing Instability: No       Family Medical History:  Family History   Problem Relation Age of Onset    Cancer Father         liver cancer    Cancer Brother         liver cancer       Review of Systems:  A 10-point ROS was done with pertinent positives and negative per the HPI    Vital Signs:  Height: 154.9 cm (5' 1\") (04/27 1740)  Weight: 64.9 kg (143 lb) (04/27 2339)  BSA (Calculated - sq m): 1.6 sq meters (04/27 1740)  Pulse: 78 (04/28 0450)  BP: 139/66 (04/28 0450)  Temp: 98.8 °F (37.1 °C) (04/28 0814)  Do Not Use - Resp Rate: --  SpO2: 97 % (04/28  6092)      Wt Readings from Last 6 Encounters:   04/27/24 64.9 kg (143 lb)   04/26/24 61.3 kg (135 lb 3.2 oz)   04/25/24 64 kg (141 lb)   04/24/24 64 kg (141 lb)   04/22/24 64.9 kg (143 lb)   04/17/24 67.2 kg (148 lb 3.2 oz)         Physical Examination:  General: Patient is alert and oriented, not in acute distress. Ill appearing  Psych: Mood and affect are appropriate  Eyes: EOMI, PERRL  ENT: Mucositis; white patches in mouth  CV: no LE edema  Respiratory: Non-labored respirations  GI/Abd: Soft, non-tender   Neurological: Grossly intact   Skin: no rashes or petechiae    Laboratory:  Recent Labs   Lab 04/26/24  1306 04/27/24  1813 04/28/24  0734   WBC 0.4* 0.3* 0.3*   HGB 10.1* 11.2* 9.2*   HCT 31.6* 35.5 29.1*   PLT 64.0* 50.0* 31.0*   MCV 85.2 86.0 87.1   RDW 20.3 20.6 19.9   NEPRELIM 0.09* 0.03* 0.01*       Recent Labs   Lab 04/25/24  1110 04/26/24  1306 04/27/24  1813    137 139   K 3.0* 3.3* 3.2*    104 103   CO2 21.0 25.0 26.0   BUN 7* 7* 8*   CREATSERUM 0.39* 0.51* 0.64   GLU 90 106* 114*   CA 8.5 8.4* 9.1   TP 6.4 6.2* 7.2   ALB 3.0* 2.9* 3.3*   ALKPHO 155* 142 148*   AST 29 21 21   ALT 12* 13 15   BILT 2.2* 1.6 1.5       No results for input(s): \"PT\", \"INR\", \"PTT\", \"FIB\" in the last 168 hours.    Impression & Plan:     Mucositis  Chemotherapy induced neutropenia  Thrombocytopenia  Oral candidiasis  - secondary to chemo; has already gotten peg -GCSF  - given neutropenia, low threshold to start broad spectrum IV Abx if patient has a fever  - agree with IV fluconazole  - continue magic mouthwash and nystatin q6h  - maintenance IVF, encouraged PO if gets better  - transfuse for hgb <7, plt <10k    Metastatic colon cancer   - s/p FOLFOX C3 on 4/17    Dr Camarillo will follow from tomorrow      Ronan Novoa MD  Hematology/Medical Oncology  [facility]

## 2024-04-28 NOTE — PHYSICAL THERAPY NOTE
PHYSICAL THERAPY EVALUATION - INPATIENT     Room Number: 404/404-A  Evaluation Date: 4/28/2024  Type of Evaluation: Initial  Physician Order: PT Eval and Treat    Presenting Problem: diff eating  Co-Morbidities : Colon Ca with mets with resulting stomatitis after recent chemo, Liver mets  Reason for Therapy: Mobility Dysfunction and Discharge Planning    Recent admission:  3/24-3/25 due to LE edema, diarrhea; dc home  3/13-3/17 due to abd pain, dc home    PHYSICAL THERAPY ASSESSMENT   Patient is currently functioning below baseline with bed mobility, transfers, gait, and stair negotiation.  Prior to admission, patient's baseline is independent gait without device.  Patient is requiring contact guard assist as a result of the following impairments: decreased functional strength, decreased endurance/aerobic capacity, impaired standing balance, decreased muscular endurance, and medical status.  Physical Therapy will continue to follow for duration of hospitalization.    Patient will benefit from continued skilled PT Services to promote return to prior level of function and safety with continuous assistance and gradual rehabilitative therapy .    PLAN  PT Treatment Plan: Bed mobility;Patient education;Gait training;Range of motion;Strengthening;Stair training;Transfer training  Rehab Potential : Fair  Frequency (Obs): 3-5x/week         CURRENT GOALS    Goal #1 Patient is able to demonstrate supine - sit EOB @ level: modified independent     Goal #2 Patient is able to demonstrate transfers Sit to/from Stand at assistance level: independent     Goal #3 Patient is able to ambulate 150 feet with assist device: none at assistance level: independent     Goal #4 Patient independent with stair negotiation 18 steps step-to pattern with use of at least one railing   Goal #5    Goal #6    Goal Comments: Goals established on 4/28/2024      PHYSICAL THERAPY MEDICAL/SOCIAL HISTORY  History related to current admission: Patient  is a 73 year old female admitted on 2024 from home for diff eating.  Pt diagnosed with mucositis due to chemotherapy.      HOME SITUATION  Type of Home: Apartment   Home Layout: One level  Stairs to Enter : 18  Railing: Yes          Lives With: Alone     Patient Owned Equipment: None       Prior Level of Mono: Patient is independent gait without device, was able to drive, independent with ADL/self-care.     SUBJECTIVE  \"Yes\" when asked to work with PT.  Patient is able to understand and speak some English      OBJECTIVE  Precautions: Bed/chair alarm  Fall Risk: Standard fall risk    WEIGHT BEARING RESTRICTION  Weight Bearing Restriction: None                PAIN ASSESSMENT  Ratin  Location: mouth       COGNITION  Overall Cognitive Status:  WFL - within functional limits    RANGE OF MOTION AND STRENGTH ASSESSMENT  Upper extremity ROM and strength are within functional limits     Lower extremity ROM is within functional limits     Lower extremity strength is within functional limits       BALANCE  Static Sitting: Fair +  Dynamic Sitting: Fair +  Static Standing: Fair (with RW)  Dynamic Standing: Fair (with RW)    ADDITIONAL TESTS                                    ACTIVITY TOLERANCE                         O2 WALK       NEUROLOGICAL FINDINGS                        AM-PAC '6-Clicks' INPATIENT SHORT FORM - BASIC MOBILITY  How much difficulty does the patient currently have...  Patient Difficulty: Turning over in bed (including adjusting bedclothes, sheets and blankets)?: A Little   Patient Difficulty: Sitting down on and standing up from a chair with arms (e.g., wheelchair, bedside commode, etc.): A Little   Patient Difficulty: Moving from lying on back to sitting on the side of the bed?: A Little   How much help from another person does the patient currently need...   Help from Another: Moving to and from a bed to a chair (including a wheelchair)?: A Little   Help from Another: Need to walk in hospital  room?: A Little   Help from Another: Climbing 3-5 steps with a railing?: A Little       AM-PAC Score:  Raw Score: 18   Approx Degree of Impairment: 46.58%   Standardized Score (AM-PAC Scale): 43.63   CMS Modifier (G-Code): CK    FUNCTIONAL ABILITY STATUS  Gait Assessment   Functional Mobility/Gait Assessment  Gait Assistance: Contact guard assist  Distance (ft): 70  Assistive Device: Rolling walker  Pattern:  (Decr gina/step length/heel-toe pattern)    Skilled Therapy Provided     Bed Mobility:  Rolling: Supervision with HOB elevated  Supine to sit: Supervision with HOB elevated towards R   Sit to supine: Supervision     Transfer Mobility:  Sit to stand: cga with the RW   Stand to sit: cga with the RW  Gait = cga     Therapist's Comments: RN approved session, patient was received in bed with HOB elevated, was sleeping but was able to wake up with minimal cues and patient is agreeable to the session.  Patient performed bed mobility, transfers and gait with the RW needing increase in time, cues provided for hand placement/set-up during transfers with the RW, ambulated slow with the RW but (-)LOB.  Patient requested to return to bed after gait.    Exercise/Education Provided:  Patient was educated on activity recommendation; patient was encouraged to be OOB as able and ambulate with nursing staff as tolerated; patient verbalized understanding.     Patient End of Session: In bed;Needs met;Call light within reach;RN aware of session/findings;All patient questions and concerns addressed;Alarm set      Patient Evaluation Complexity Level:  History Moderate - 1 or 2 personal factors and/or co-morbidities   Examination of body systems Moderate - addressing a total of 3 or more elements   Clinical Presentation Moderate - Evolving   Clinical Decision Making Moderate - Evolving       PT Session Time: 30 minutes  Gait Training: 10 minutes  Therapeutic Activity: 10 minutes  Neuromuscular Re-education:  minutes  Therapeutic  Exercise:  minutes

## 2024-04-28 NOTE — PROGRESS NOTES
Has ivf at reg rates,Here for mucusitis,Sched meds given,Has   Poor appetite d/t pain in her mouth from chemotherapy,Up to bathroom w/ stand by assist, On neutropenic precautions.

## 2024-04-28 NOTE — CM/SW NOTE
04/28/24 1210   SDOH Outreach Status   SDOH Outreach Status Complete   SDOH Referred for Transportation   SDOH Resource Details   SDOH Resource (Transportation) www.findhelp.org        04/28/24 1200   CM/SW Referral Data   Referral Source Physician   Reason for Referral Discharge planning   Informant Daughter;EMR;Other   Medical Hx   Does patient have an established PCP? Yes  (Dr. Anusha Abraham)   Patient Info   Patient Communication Issues Language barrier   Patient's Home Environment House   Number of Levels in Home 2   Number of Stair in Home 18   Patient lives with Alone   Patient Status Prior to Admission   Independent with ADLs and Mobility Yes   Discharge Needs   Anticipated D/C needs Home health care   Choice of Post-Acute Provider   Informed patient of right to choose their preferred provider Yes   List of appropriate post-acute services provided to patient/family with quality data   (Pending)       1314:  Clarification, [facility] NOT able to accommodated home IVF. Still pending acceptance from other [facility] agencies if this is an option.     If pt switches [facility] agency, will need to notify [facility] to discharge services.     ----------------  1236:  Kristina from [facility] confirmed they're current w/ pt for RN/PT/OT. [facility] orders entered. Kristina stated they can assist w/ IVF teach/train for home.     SW/CM to follow up with accepting infusion company for IVF arrangements for home.    *Will need IVF order*    ----------------  1218:  MDO received for [facility]; daughter requesting another [facility] agency. SDOH order for transportation resources. Resources emailed to daughter at [email or web address]    SW spoke w/ pt's daughter, Beryl via phone to discuss eventual discharge planning needs. Dtr stated she's uncertain of pt's current HHC agency, but stated that ED arranged it last month. Per chart review hx, pt was dc'd with [facility]. Pt's daughter stated pt currently goes to [facility] for IVF for hydration and goes to  outpatient chemo on alternate days. Dtr requesting another [facility] agency that can assist w/ IVF at home 3x/week.     LACY messaged Kristina from [facility] to confirm pt is still current and inquired if they're able to accommodate IVF 3x/week if arranged through an infusion company.     Tentative C referrals sent previous - but none accepting thus far. LACY extended deadline and sent referral to additional agencies for review. Infusion referral also sent to inquire about IVF at home. LACY explained to dtr, that uncertain if IVF is accommodated through [facility] but will find out more.     PLAN: Home w/ HHC, pending agency confirmation    Herminia Costa, ARTEMIO - [id number]

## 2024-04-29 ENCOUNTER — APPOINTMENT (OUTPATIENT)
Dept: GENERAL RADIOLOGY | Facility: HOSPITAL | Age: 74
DRG: 157 | End: 2024-04-29
Attending: INTERNAL MEDICINE
Payer: MEDICARE

## 2024-04-29 ENCOUNTER — TELEPHONE (OUTPATIENT)
Dept: HEMATOLOGY/ONCOLOGY | Facility: HOSPITAL | Age: 74
End: 2024-04-29

## 2024-04-29 PROBLEM — D69.6 THROMBOCYTOPENIA (HCC): Status: ACTIVE | Noted: 2024-04-29

## 2024-04-29 LAB
BASOPHILS # BLD: 0 X10(3) UL (ref 0–0.2)
BASOPHILS NFR BLD: 0 %
CEA SERPL-MCNC: 73.7 NG/ML (ref ?–5)
EOSINOPHIL # BLD: 0 X10(3) UL (ref 0–0.7)
EOSINOPHIL NFR BLD: 0 %
ERYTHROCYTE [DISTWIDTH] IN BLOOD BY AUTOMATED COUNT: 20.1 %
HCT VFR BLD AUTO: 28.5 %
HGB BLD-MCNC: 9.5 G/DL
LYMPHOCYTES NFR BLD: 0.2 X10(3) UL (ref 1–4)
LYMPHOCYTES NFR BLD: 50 %
MCH RBC QN AUTO: 28.4 PG (ref 26–34)
MCHC RBC AUTO-ENTMCNC: 33.3 G/DL (ref 31–37)
MCV RBC AUTO: 85.3 FL
MONOCYTES # BLD: 0.03 X10(3) UL (ref 0.1–1)
MONOCYTES NFR BLD: 8 %
MORPHOLOGY: NORMAL
NEUTROPHILS # BLD AUTO: 0.06 X10 (3) UL (ref 1.5–7.7)
NEUTROPHILS NFR BLD: 37 %
NEUTS BAND NFR BLD: 5 %
NEUTS HYPERSEG # BLD: 0.17 X10(3) UL (ref 1.5–7.7)
PLATELET # BLD AUTO: 17 10(3)UL (ref 150–450)
PLATELET MORPHOLOGY: NORMAL
RBC # BLD AUTO: 3.34 X10(6)UL
TOTAL CELLS COUNTED BLD: 38
WBC # BLD AUTO: 0.4 X10(3) UL (ref 4–11)

## 2024-04-29 PROCEDURE — 71046 X-RAY EXAM CHEST 2 VIEWS: CPT | Performed by: INTERNAL MEDICINE

## 2024-04-29 PROCEDURE — 99233 SBSQ HOSP IP/OBS HIGH 50: CPT | Performed by: INTERNAL MEDICINE

## 2024-04-29 PROCEDURE — 99233 SBSQ HOSP IP/OBS HIGH 50: CPT | Performed by: HOSPITALIST

## 2024-04-29 RX ORDER — ACETAMINOPHEN 10 MG/ML
1000 INJECTION, SOLUTION INTRAVENOUS EVERY 6 HOURS PRN
Status: DISCONTINUED | OUTPATIENT
Start: 2024-04-29 | End: 2024-05-18

## 2024-04-29 RX ORDER — ACETAMINOPHEN 10 MG/ML
1000 INJECTION, SOLUTION INTRAVENOUS ONCE
Status: COMPLETED | OUTPATIENT
Start: 2024-04-29 | End: 2024-04-29

## 2024-04-29 NOTE — PROGRESS NOTES
[facility]   part of [facility]     Hospitalist Progress Note     Lisa Iqbal Patient Status:  Inpatient    10/11/1950 MRN [id number]   [facility] 4N-A Attending Enedelia Lui MD   Hosp Day # 2 PCP Anusha Abraham MD     Chief Complaint: difficulty eating    Subjective:     Patient still with painful mouth, difficulty eating, dec PO intake, poor appetite.     Objective:    Review of Systems:   A comprehensive review of systems was completed; pertinent positive and negatives stated in subjective.    Vital signs:  Temp:  [98.2 °F (36.8 °C)-99.5 °F (37.5 °C)] 99.5 °F (37.5 °C)  Pulse:  [] 84  Resp:  [18] 18  BP: ()/() 133/81  SpO2:  [94 %-100 %] 97 %    Physical Exam:    General: No acute distress  Respiratory: No wheezes, no rhonchi  Cardiovascular: S1, S2, regular rate and rhythm  Abdomen: Soft, Non-tender, non-distended, positive bowel sounds  Neuro: No new focal deficits.   Extremities: No edema      Diagnostic Data:    Labs:  Recent Labs   Lab 24  1110 24  1306 24  1813 24  0734 24  0615   WBC 0.7* 0.4* 0.3* 0.3* 0.4*   HGB 10.3* 10.1* 11.2* 9.2* 9.5*   MCV 86.8 85.2 86.0 87.1 85.3   PLT 94.0* 64.0* 50.0* 31.0* 17.0*   BAND  --   --   --   --  5       Recent Labs   Lab 24  1110 24  1306 24  1813   GLU 90 106* 114*   BUN 7* 7* 8*   CREATSERUM 0.39* 0.51* 0.64   CA 8.5 8.4* 9.1   ALB 3.0* 2.9* 3.3*    137 139   K 3.0* 3.3* 3.2*    104 103   CO2 21.0 25.0 26.0   ALKPHO 155* 142 148*   AST 29 21 21   ALT 12* 13 15   BILT 2.2* 1.6 1.5   TP 6.4 6.2* 7.2       Estimated Creatinine Clearance: 58.2 mL/min (based on SCr of 0.64 mg/dL).    No results for input(s): \"TROP\", \"TROPHS\", \"CK\" in the last 168 hours.    No results for input(s): \"PTP\", \"INR\" in the last 168 hours.               Microbiology    No results found for this visit on 24.      Imaging: Reviewed in Epic.    Medications:     potassium chloride  20 mEq Oral Once    nystatin  500,000 Units Oral QID    OLANZapine  2.5 mg Oral Nightly    maalox/diphenhydramine/lidocaine  10 mL Oral TID & HS    fluconazole  200 mg Intravenous Q24H       Assessment & Plan:      # mucositis, oral candidiasis due to chemo  - cont magic mouth wash, nystatin solution   - iv Fluconazole  - gentle IVF   - pain control      # pancytopenia with neutropenia  - due to chemo   - monitor counts, transfuse if Hgb < 7, plt < 10  - oncology following     # oral candidiasis   - IV Fluconazole     # hypokalemia , replace PRN     # metastatic sigmoid colon cancer          Win Iyer MD    Supplementary Documentation:     Quality:  DVT Mechanical Prophylaxis:   SCDs,    DVT Pharmacologic Prophylaxis   Medication   None                Code Status: Not on file  Cuellar: No urinary catheter in place  Cuellar Duration (in days):   Central line:    ALESHA:     Discharge is dependent on: progress  At this point Ms. Félix Iqbal is expected to be discharge to: home    The 21st Century Cures Act makes medical notes like these available to patients in the interest of transparency. Please be advised this is a medical document. Medical documents are intended to carry relevant information, facts as evident, and the clinical opinion of the practitioner. The medical note is intended as peer to peer communication and may appear blunt or direct. It is written in medical language and may contain abbreviations or verbiage that are unfamiliar.

## 2024-04-29 NOTE — PLAN OF CARE
Patient drowsy, arousable to verbal stimuli. PCT reports patient refused morning vital signs. Language line  utilized for assessments. Declines pain. Mouth sores present - patient refused medications, education provided on purpose of medications and side effects of not taking them, patient verbalized understanding. IVF per MAR. Patient reports she does not feel good. Patient reports no appetite, refused meals. Temperature 101.3, Sepsis BPA firing - MD Jaylon notified, no new orders. Temperature 102.6 - MD Marquise notified, orders received. PIV accidentally removed by patient, replaced. Call light within reach.

## 2024-04-29 NOTE — TELEPHONE ENCOUNTER
Returned patient's daughter Beryl's call.  Beryl states that patient is inpatient. Patient not eating or taking in fluids. Beryl would like to talk to Dr Camarillo to go over POC as far as chemo. I advised Beryl that I would notify Dr Camarillo. Beryl conveyed understanding.

## 2024-04-29 NOTE — TELEPHONE ENCOUNTER
----- Message from Lisa Iqbal sent at 4/28/2024  7:51 PM CDT -----  Regarding: Call back  Contact: [phone]  Please have Dr. Camarillo call [facility] [phone]

## 2024-04-29 NOTE — PROGRESS NOTES
Heme/Onc Progress Note - [facility]      Chief Complaint:    Follow up for evaluation and management of metastatic colon cancer.    Interim History:      Patient complains of oral pain and nausea. She has no appetite. She has no diarrhea. She has intermittent abdominal pain. She has no bleeding. She has not had fever. She is not eating.    Physical Examination:    Vital Signs: /78 (BP Location: Right arm)   Pulse 74   Temp 99.5 °F (37.5 °C) (Oral)   Resp 18   Ht 1.549 m (5' 1\")   Wt 64.9 kg (143 lb)   SpO2 94%   BMI 27.02 kg/m²     General: Patient is alert and oriented x 3, She is very weak and uncomfortable.  HEENT: Oral mucositis.   Chest: Clear to auscultation. No rales and no wheezes.  Heart: Regular rate and rhythm.   Abdomen: Soft with some diffuse tenderness to palpation. good bowel sounds.  Extremities: No edema. Non-tender.  Skin:  Warm, dry.      Labs reviewed at this visit:     Recent Labs   Lab 04/27/24  1813 04/28/24  0734 04/29/24  0615   RBC 4.13 3.34* 3.34*   HGB 11.2* 9.2* 9.5*   HCT 35.5 29.1* 28.5*   MCV 86.0 87.1 85.3   MCH 27.1 27.5 28.4   MCHC 31.5 31.6 33.3   RDW 20.6 19.9 20.1   NEPRELIM 0.03* 0.01* 0.06*   WBC 0.3* 0.3* 0.4*   PLT 50.0* 31.0* 17.0*       Recent Labs   Lab 04/25/24  1110 04/26/24  1306 04/27/24  1813   GLU 90 106* 114*   BUN 7* 7* 8*   CREATSERUM 0.39* 0.51* 0.64   CA 8.5 8.4* 9.1   ALB 3.0* 2.9* 3.3*    137 139   K 3.0* 3.3* 3.2*    104 103   CO2 21.0 25.0 26.0   ALKPHO 155* 142 148*   AST 29 21 21   ALT 12* 13 15   BILT 2.2* 1.6 1.5   TP 6.4 6.2* 7.2       Radiologic imaging reviewed at this visit:    CT abd/pelvis on 4/15/2024:  FINDINGS:    LIVER:  Extensive hepatic metastatic deposits throughout the liver.  BILIARY:  High density in the gallbladder is nonspecific.  PANCREAS:  No lesion, fluid collection, ductal dilatation, or atrophy.    SPLEEN:  No enlargement or focal lesion.    KIDNEYS:  Large cyst in the mid left kidney measures 6 x 7 x 6.0 cm.   Cyst in the upper pole the right kidney measures 5.3 x 5 1 cm. No hydronephrosis.  No calculi in the kidneys.  ADRENALS:  No mass or enlargement.    AORTA/VASCULAR:    Unremarkable as seen on non-contrast imaging.  RETROPERITONEUM:  No mass or adenopathy.    BOWEL/MESENTERY:  Visualized part of the appendix is unremarkable.  Mild thickening of the sigmoid colon.  ABDOMINAL WALL:  Tiny fat containing umbilical hernia.  URINARY BLADDER:  No visible focal wall thickening, lesion, or calculus.    PELVIC NODES:  No adenopathy.    PELVIC ORGANS:  Sequelae of hysterectomy.  BONES:  No bony lesion or fracture.    LUNG BASES:  No visible pulmonary or pleural disease.    OTHER:  Negative.       Impression   CONCLUSION:       1. No calculi in the kidneys.  No hydronephrosis.     2. Extensive metastatic deposits throughout the liver.     3. There is mild thickening of the sigmoid colon.  This may be sequelae of a localized colitis.  This may be sequelae of infectious or inflammatory etiology.  Ischemia is considered unlikely.         Impression/Plan:     Metastatic Colon Cancer:  Liver metastases:    S/P FOLFOX cycle 3 on 4/17/2024. Having complications from treatment even with dose reduction.     Mucositis secondary to chemotherapy:  Nausea secondary to chemotherapy  Chemo induced neutropenia without fever s/p peg-filgrastim:  Oral candidiasis    Continue IV fluids. Still with neutropenia. Follow for fever and start broad spectrum antibiotics if temp greater than 100.5.  Continue IV fluconizole for oral candidiasis.   Continue oral care, magic mouthwash and nystatin.    Anemia complicating neoplastic disease:  Thrombocytopenia secondary to chemotherapy:    Platelets have decreased to 17k without bleeding. Will transfuse if PLT < 10k or if any bleeding. Repeat cbc in am.  HGB is stable. Follow and transfuse if HGB less than 7.0.    I spoke with Beryl, patient's daughter. We discussed plan of care. The patient will require  inpatient care at least for the next 48 to 72 hours or greater given her worsen ing blood counts, weakness and mucositis.      Ronan Camarillo MD  [facility]

## 2024-04-29 NOTE — DIETARY NOTE
[facility]   part of [facility]    NUTRITION ASSESSMENT    Pt meets severe malnutrition criteria at this time.    CRITERIA FOR MALNUTRITION DIAGNOSIS:  Criteria for severe malnutrition diagnosis: acute illness/injury related to wt loss greater than 5% in 1 month, energy intake less than 50% for greater than 5 days, and body fat moderate depletion      NUTRITION INTERVENTION:    RD nutrition Care Plan- Initiated ONS (oral nutritional supplements)  Meal and Snacks - Monitor and encourage adequate PO intake.   Medical Food Supplements - Ensure Plus High Protein TID, Magic Cup TID, and Magic Shake (Ensure/Magic Cup) 2pm daily snack. Rationale/use for oral supplements discussed.      PATIENT STATUS: 04/29/24 at risk consult  73 year old female with mucositis d/t chemo for metastatic colon cancer.  Pt with 10 pound wt loss over past month which is significant wt loss per standards of 6.5% in 1 month. Pt with mild to moderate muscle and fat depletion.  Pt with poor po intake for past week or so due to pain from mucositis.        ANTHROPOMETRICS:  Ht: 154.9 cm (5' 1\")  Wt: 64.9 kg (143 lb).   BMI: Body mass index is 27.02 kg/m².  IBW: 47.7 kg      WEIGHT HISTORY:   Weight loss: Yes, Severe Wt loss of 10 lbs, 6.5%, over 1 months     Wt Readings from Last 10 Encounters:   04/27/24 64.9 kg (143 lb)   04/26/24 61.3 kg (135 lb 3.2 oz)   04/25/24 64 kg (141 lb)   04/24/24 64 kg (141 lb)   04/22/24 64.9 kg (143 lb)   04/17/24 67.2 kg (148 lb 3.2 oz)   04/15/24 70.3 kg (155 lb)   04/12/24 66 kg (145 lb 6.4 oz)   04/05/24 69.4 kg (153 lb)   04/03/24 69.4 kg (153 lb)        NUTRITION:  Diet:       Procedures    Low Fiber/Soft diet Low Fiber/Soft; Is Patient on Accuchecks? No      Food Allergies: No  Cultural/Ethnic/Orthodox Preferences Addressed: Yes    Percent Meals Eaten (last 3 days)       Date/Time Percent Meals Eaten (%)    04/29/24 1000 0 %    04/29/24 1240 0 %            GI system review:  trouble eating due to  mucositits  Last BM: 4/29  Skin and wounds: none    NUTRITION RELATED PHYSICAL FINDINGS:     1. Body Fat/Muscle Mass: moderate depletion body fat Orbital fat pad and Buccal fat pad and mild muscle depletion Temple region and Clavicle region     2. Fluid Accumulation: none     NUTRITION PRESCRIPTION: 64.9kg  Calories: 9570-9762 calories/day (25-30 kcal/kg)  Protein: 78-97 grams protein/day (1.2-1.5 grams protein per kg)  Fluid: ~1 ml/kcal or per MD discretion    NUTRITION DIAGNOSIS/PROBLEM:  Inadequate oral intake related to inability to take or tolerate and increased nutritional demands for healing as evidenced by documented/reported insufficient oral intake, documented/reported unintentional weight loss, loss of fat mass, and loss of muscle mass      MONITOR AND EVALUATE/NUTRITION GOALS:  PO intake of 75% of meals TID - New  PO intake of 75% of oral nutrition supplement/s - New  Weight stable within 1 to 2 lbs during admission - New      MEDICATIONS:  Reviewed    LABS:  Reviewed    Pt is at High nutrition risk    Karine Clark RD, LDN  Clinical Dietitian  [id number]

## 2024-04-29 NOTE — PROGRESS NOTES
Patient alert x3, VSS. No complaint of pain. Primarily Turkish speaking. All meds given by moth. Mouth sores. IV fluids infusing. Ambulates with assistance. Bed alarm on. Call light within place.       0312- pt had episode of confusion and being lethargic. BP was taken. 79/50. MD paged and fluid bolus ordered. Pt better with bolus. Continued to monitor.

## 2024-04-30 PROBLEM — R50.81 FEBRILE NEUTROPENIA (HCC): Status: ACTIVE | Noted: 2024-04-30

## 2024-04-30 PROBLEM — D70.9 FEBRILE NEUTROPENIA (HCC): Status: ACTIVE | Noted: 2024-04-30

## 2024-04-30 LAB
ALBUMIN SERPL-MCNC: 1.9 G/DL (ref 3.4–5)
ALBUMIN/GLOB SERPL: 0.6 {RATIO} (ref 1–2)
ALP LIVER SERPL-CCNC: 95 U/L
ALT SERPL-CCNC: 9 U/L
ANION GAP SERPL CALC-SCNC: 10 MMOL/L (ref 0–18)
AST SERPL-CCNC: 20 U/L (ref 15–37)
BASOPHILS # BLD: 0 X10(3) UL (ref 0–0.2)
BASOPHILS NFR BLD: 0 %
BILIRUB SERPL-MCNC: 1.4 MG/DL (ref 0.1–2)
BILIRUB UR QL STRIP.AUTO: NEGATIVE
BUN BLD-MCNC: 8 MG/DL (ref 9–23)
CALCIUM BLD-MCNC: 8.2 MG/DL (ref 8.5–10.1)
CHLORIDE SERPL-SCNC: 111 MMOL/L (ref 98–112)
CLARITY UR REFRACT.AUTO: CLEAR
CO2 SERPL-SCNC: 21 MMOL/L (ref 21–32)
COLOR UR AUTO: YELLOW
CREAT BLD-MCNC: 0.53 MG/DL
EGFRCR SERPLBLD CKD-EPI 2021: 97 ML/MIN/1.73M2 (ref 60–?)
EOSINOPHIL # BLD: 0 X10(3) UL (ref 0–0.7)
EOSINOPHIL NFR BLD: 0 %
ERYTHROCYTE [DISTWIDTH] IN BLOOD BY AUTOMATED COUNT: 19.8 %
GLOBULIN PLAS-MCNC: 3.3 G/DL (ref 2.8–4.4)
GLUCOSE BLD-MCNC: 94 MG/DL (ref 70–99)
GLUCOSE UR STRIP.AUTO-MCNC: NORMAL MG/DL
HCT VFR BLD AUTO: 25.2 %
HGB BLD-MCNC: 8.2 G/DL
KETONES UR STRIP.AUTO-MCNC: 20 MG/DL
LDH SERPL L TO P-CCNC: 201 U/L
LEUKOCYTE ESTERASE UR QL STRIP.AUTO: NEGATIVE
LYMPHOCYTES NFR BLD: 0.19 X10(3) UL (ref 1–4)
LYMPHOCYTES NFR BLD: 27 %
MCH RBC QN AUTO: 27.2 PG (ref 26–34)
MCHC RBC AUTO-ENTMCNC: 32.5 G/DL (ref 31–37)
MCV RBC AUTO: 83.4 FL
METAMYELOCYTES # BLD: 0.01 X10(3) UL
METAMYELOCYTES NFR BLD: 1 %
MONOCYTES # BLD: 0.11 X10(3) UL (ref 0.1–1)
MONOCYTES NFR BLD: 16 %
MORPHOLOGY: NORMAL
NEUTROPHILS # BLD AUTO: 0.16 X10 (3) UL (ref 1.5–7.7)
NEUTROPHILS NFR BLD: 24 %
NEUTS BAND NFR BLD: 32 %
NEUTS HYPERSEG # BLD: 0.39 X10(3) UL (ref 1.5–7.7)
NITRITE UR QL STRIP.AUTO: NEGATIVE
NRBC BLD MANUAL-RTO: 1 %
OSMOLALITY SERPL CALC.SUM OF ELEC: 292 MOSM/KG (ref 275–295)
PH UR STRIP.AUTO: 6 [PH] (ref 5–8)
PLATELET # BLD AUTO: 18 10(3)UL (ref 150–450)
PLATELET MORPHOLOGY: NORMAL
PLATELETS.RETICULATED NFR BLD AUTO: 9.6 % (ref 0–7)
POTASSIUM SERPL-SCNC: 2.3 MMOL/L (ref 3.5–5.1)
POTASSIUM SERPL-SCNC: 3.1 MMOL/L (ref 3.5–5.1)
PROT SERPL-MCNC: 5.2 G/DL (ref 6.4–8.2)
PROT UR STRIP.AUTO-MCNC: 30 MG/DL
RBC # BLD AUTO: 3.02 X10(6)UL
RBC UR QL AUTO: NEGATIVE
SODIUM SERPL-SCNC: 142 MMOL/L (ref 136–145)
SP GR UR STRIP.AUTO: 1.02 (ref 1–1.03)
TOTAL CELLS COUNTED BLD: 100
UROBILINOGEN UR STRIP.AUTO-MCNC: NORMAL MG/DL
WBC # BLD AUTO: 0.7 X10(3) UL (ref 4–11)

## 2024-04-30 PROCEDURE — 99233 SBSQ HOSP IP/OBS HIGH 50: CPT | Performed by: INTERNAL MEDICINE

## 2024-04-30 PROCEDURE — 99232 SBSQ HOSP IP/OBS MODERATE 35: CPT | Performed by: HOSPITALIST

## 2024-04-30 RX ORDER — POTASSIUM CHLORIDE 14.9 MG/ML
20 INJECTION INTRAVENOUS ONCE
Qty: 100 ML | Refills: 0 | Status: COMPLETED | OUTPATIENT
Start: 2024-04-30 | End: 2024-04-30

## 2024-04-30 NOTE — PROGRESS NOTES
Heme/Onc Progress Note - [facility]      Chief Complaint:    Follow up for evaluation and management of metastatic colon cancer.    Interim History:      She had fever yesterday with Tmax 102.6 at 1416. She was started on Meropenem. She has been afebrile since that time. She has continued oral pain but this is slightly better. She has no other new pain. She still has very poor appetite.    Physical Examination:    Vital Signs: /62 (BP Location: Right arm)   Pulse 75   Temp 98.1 °F (36.7 °C) (Oral)   Resp 18   Ht 1.549 m (5' 1\")   Wt 64.9 kg (143 lb)   SpO2 96%   BMI 27.02 kg/m²     General: Patient is alert and oriented x 3, She is very weak and uncomfortable.  HEENT: Oral mucositis.   Chest: Clear to auscultation. No rales and no wheezes.  Heart: Regular rate and rhythm.   Abdomen: Soft and nontender. good bowel sounds.  Extremities: No edema. Non-tender.  Skin:  Warm, dry.      Labs reviewed at this visit:     Recent Labs   Lab 04/28/24  0734 04/29/24  0615 04/30/24  0750   RBC 3.34* 3.34* 3.02*   HGB 9.2* 9.5* 8.2*   HCT 29.1* 28.5* 25.2*   MCV 87.1 85.3 83.4   MCH 27.5 28.4 27.2   MCHC 31.6 33.3 32.5   RDW 19.9 20.1 19.8   NEPRELIM 0.01* 0.06* 0.16*   WBC 0.3* 0.4* 0.7*   PLT 31.0* 17.0* 18.0*       Recent Labs   Lab 04/26/24  1306 04/27/24  1813 04/30/24  0750   * 114* 94   BUN 7* 8* 8*   CREATSERUM 0.51* 0.64 0.53*   CA 8.4* 9.1 8.2*   ALB 2.9* 3.3* 1.9*    139 142   K 3.3* 3.2* 2.3*    103 111   CO2 25.0 26.0 21.0   ALKPHO 142 148* 95   AST 21 21 20   ALT 13 15 9*   BILT 1.6 1.5 1.4   TP 6.2* 7.2 5.2*       Radiologic imaging reviewed at this visit:    CT abd/pelvis on 4/15/2024:  FINDINGS:    LIVER:  Extensive hepatic metastatic deposits throughout the liver.  BILIARY:  High density in the gallbladder is nonspecific.  PANCREAS:  No lesion, fluid collection, ductal dilatation, or atrophy.    SPLEEN:  No enlargement or focal lesion.    KIDNEYS:  Large cyst in the mid left kidney  measures 6 x 7 x 6.0 cm.  Cyst in the upper pole the right kidney measures 5.3 x 5 1 cm. No hydronephrosis.  No calculi in the kidneys.  ADRENALS:  No mass or enlargement.    AORTA/VASCULAR:    Unremarkable as seen on non-contrast imaging.  RETROPERITONEUM:  No mass or adenopathy.    BOWEL/MESENTERY:  Visualized part of the appendix is unremarkable.  Mild thickening of the sigmoid colon.  ABDOMINAL WALL:  Tiny fat containing umbilical hernia.  URINARY BLADDER:  No visible focal wall thickening, lesion, or calculus.    PELVIC NODES:  No adenopathy.    PELVIC ORGANS:  Sequelae of hysterectomy.  BONES:  No bony lesion or fracture.    LUNG BASES:  No visible pulmonary or pleural disease.    OTHER:  Negative.       Impression   CONCLUSION:       1. No calculi in the kidneys.  No hydronephrosis.     2. Extensive metastatic deposits throughout the liver.     3. There is mild thickening of the sigmoid colon.  This may be sequelae of a localized colitis.  This may be sequelae of infectious or inflammatory etiology.  Ischemia is considered unlikely.         Impression/Plan:     Metastatic Colon Cancer:  Liver metastases:    S/P FOLFOX cycle 3 on 4/17/2024. Having complications from treatment even with dose reduction.  Will need to hold or stop chemotherapy indefinitely.    Mucositis secondary to chemotherapy:  Nausea secondary to chemotherapy  Chemo induced neutropenia with fever s/p peg-filgrastim:  Oral candidiasis    Continue IV fluids. Had fever but doing better no Meropenem. Will continue antibiotics until recovery of ANC.     Continue IV fluconizole for oral candidiasis.   Continue oral care, magic mouthwash and nystatin.    Anemia complicating neoplastic disease:  Thrombocytopenia secondary to chemotherapy:    Platelets are stable at 18k without bleeding. Will transfuse if PLT < 10k or if any bleeding. Repeat cbc in am.  HGB is stable. Follow and transfuse if HGB less than 7.0.    Overall signs of bone marrow recovery.  Continue current support and repeat labs in am.      Ronan Camarillo MD  [facility]

## 2024-04-30 NOTE — PLAN OF CARE
Patient is alert and oriented, still complains of pain in her mouth. Morphine given for pain, patient refuses PRN mouth wash. IVF infusing, all meds given per MAR. Patient still very weak. Safety precautions in place, call light within reach, plan of care ongoing.     Problem: RISK FOR INFECTION - ADULT  Goal: Absence of fever/infection during anticipated neutropenic period  Description: INTERVENTIONS  - Monitor WBC  - Administer growth factors as ordered  - Implement neutropenic guidelines  Outcome: Progressing     Problem: SAFETY ADULT - FALL  Goal: Free from fall injury  Description: INTERVENTIONS:  - Assess pt frequently for physical needs  - Identify cognitive and physical deficits and behaviors that affect risk of falls.  - [place] fall precautions as indicated by assessment.  - Educate pt/family on patient safety including physical limitations  - Instruct pt to call for assistance with activity based on assessment  - Modify environment to reduce risk of injury  - Provide assistive devices as appropriate  - Consider OT/PT consult to assist with strengthening/mobility  - Encourage toileting schedule  Outcome: Progressing     Problem: PAIN - ADULT  Goal: Verbalizes/displays adequate comfort level or patient's stated pain goal  Description: INTERVENTIONS:  - Encourage pt to monitor pain and request assistance  - Assess pain using appropriate pain scale  - Administer analgesics based on type and severity of pain and evaluate response  - Implement non-pharmacological measures as appropriate and evaluate response  - Consider cultural and social influences on pain and pain management  - Manage/alleviate anxiety  - Utilize distraction and/or relaxation techniques  - Monitor for opioid side effects  - Notify MD/LIP if interventions unsuccessful or patient reports new pain  - Anticipate increased pain with activity and pre-medicate as appropriate  Outcome: Not Progressing

## 2024-04-30 NOTE — CONSULTS
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 Pt was dx with oral candidiasis and was started on magic mouthwash, biotin, and nystatin prior to admission however was not using routinely. Since admission, pt started on IV fluconazole and reports improved pain however no appetite. No heartburn, + odynophagia. No chronic acid suppression. Pt with ongoing wt loss. 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    Colon cancer (HCC)    Disorder of thyroid    Esophageal reflux    Exposure to medical diagnostic radiation    Last treatment 2018    History of adverse reaction to anesthesia    Has anxiety/nervousness when waking up    Migraines    Nausea and vomiting in adult    Osteopenia    Osteoporosis    Vertigo    Visual impairment    Glasses                PSHx:   Past Surgical History:   Procedure Laterality Date          x 3    Colonoscopy N/A 2024    Procedure: COLONOSCOPY;  Surgeon: Po Aviles MD;  Location:  ENDOSCOPY    Hysterectomy      Bilateral ovaries remain    Laryngoscopy,dirct,op,biopsy  01/15/2018     Medications:    potassium chloride  40 mEq in 250mL sodium chloride 0.9% IVPB premix  40 mEq Intravenous Once    Followed by    potassium chloride 20 mEq/100mL IVPB premix 20 mEq  20 mEq Intravenous Once    [COMPLETED] sodium chloride 0.9 % IV bolus 1,000 mL  1,000 mL Intravenous Once    [COMPLETED] acetaminophen (Ofirmev) 10 mg/mL infusion premix 1,000 mg  1,000 mg Intravenous Once    meropenem (Merrem) 1,000 mg in sodium chloride 0.9% 100 mL IVPB-MBP  1,000 mg Intravenous Q8H    acetaminophen (Ofirmev) 10 mg/mL infusion premix 1,000 mg  1,000 mg Intravenous Q6H PRN    [COMPLETED] sodium chloride 0.9 % IV bolus 1,000 mL  1,000 mL Intravenous Once    potassium chloride (Klor-Con) 20 MEQ oral powder 20 mEq  20 mEq Oral Once    [COMPLETED] potassium chloride 20 mEq/100mL IVPB premix 20 mEq  20 mEq Intravenous Once    morphINE PF 4 MG/ML injection 4 mg  4 mg Intravenous Q30 Min PRN    [COMPLETED] ondansetron (Zofran) 4 MG/2ML injection 4 mg  4 mg Intravenous Once    nystatin (Mycostatin) 863372 UNIT/ML oral suspension 500,000 Units  500,000 Units Oral QID    OLANZapine (ZyPREXA) tab 2.5 mg  2.5 mg Oral Nightly    maalox/diphenhydramine/lidocaine (First Mouthwash BLM) oral suspension 10 mL  10 mL Oral TID & HS    Lidocaine Viscous HCl (XYLOCAINE) 2 % mouth solution 5 mL  5 mL Mouth/Throat Q3H PRN    sodium chloride 0.9% infusion   Intravenous Continuous    morphINE PF 2 MG/ML injection 1 mg  1 mg Intravenous Q2H PRN    Or    morphINE PF 2 MG/ML injection 2 mg  2 mg Intravenous Q2H PRN    Or    morphINE PF 4 MG/ML injection 4 mg  4 mg Intravenous Q2H PRN    melatonin tab 3 mg  3 mg Oral Nightly PRN    ondansetron (Zofran) 4 MG/2ML injection 4 mg  4 mg Intravenous Q6H PRN    metoclopramide (Reglan) 5 mg/mL injection 10 mg  10 mg Intravenous Q8H PRN    polyethylene glycol (PEG 3350) (Miralax) 17 g oral packet 17 g  17 g Oral Daily PRN    sennosides (Senokot) tab 17.2 mg  17.2 mg Oral Nightly PRN    fluconazole in sodium chloride 0.9% (Diflucan) 200 mg/100mL  IVPB premix 200 mg  200 mg Intravenous Q24H     Allergies:   Allergies   Allergen Reactions    Septra [Sulfamethoxazole W/Trimethoprim] RASH    Sulfa Antibiotics RASH     Social HX:   Social History     Socioeconomic History    Marital status: Single    Number of children: 2   Tobacco Use    Smoking status: Never    Smokeless tobacco: Never   Vaping Use    Vaping status: Never Used   Substance and Sexual Activity    Alcohol use: No    Drug use: No   Other Topics Concern    Caffeine Concern No    Exercise Yes    Seat Belt No    Special Diet Yes     Comment: Balanced    Stress Concern No   Social History Narrative    ** Merged History Encounter **          Social Determinants of Health     Food Insecurity: No Food Insecurity (4/27/2024)    Food Insecurity     Food Insecurity: Never true   Transportation Needs: Unmet Transportation Needs (4/27/2024)    Transportation Needs     Lack of Transportation: Yes   Housing Stability: Low Risk  (4/27/2024)    Housing Stability     Housing Instability: No      FamHx: The patient has no family history of colon cancer or other gastrointestinal malignancies;  No family history of ulcer disease, or inflammatory bowel disease  ROS:  In addition to the pertinent positives described above:            Infectious Disease:  No chronic infections or recent fevers prior to the acute illness            Cardiovascular: No history of CAD, prior MI, chest pain, or palpitations            Respiratory: No shortness of breath, asthma, copd, recurrent pneumonia            Hematologic: The patient reports no easy bruising, frequent gum bleeding or nose bleeding;  The patient has no history of known chronic anemia            Dermatologic: The patient reports no recent rashes or chronic skin disorders            Rheumatologic: The patient reports no history of chronic arthritis, myalgias, arthralgias            Genitourinary:  The patient reports no history of recurrent urinary tract infections,  hematuria, dysuria, or nephrolithiasis           Psychiatric: The patient reports no history of depression, anxiety, suicidal ideation, or homicidal ideation           Oncologic: + metastatic colon cancer            ENT: The patient reports no hoarseness of voice, hearing loss, sinus congestion, tinnitus           Neurologic: + migraines   PE: /61 (BP Location: Right arm)   Pulse 72   Temp 97.8 °F (36.6 °C) (Oral)   Resp 18   Ht 5' 1\" (1.549 m)   Wt 143 lb (64.9 kg)   SpO2 96%   BMI 27.02 kg/m²   Gen: Awake, alert; weak and ill appearing; talking with low voice   HENT: EOMI, PERRL; + mucositis   Eyes: Sclerae are anicteric  Neck:  Supple without nuchal rigidity  CV: Regular rate and rhythm, with normal S1 and S2  Resp: Clear to auscultation bilaterally without wheezes; rubs, rhonchi, or rales  Abdomen: Soft, non-tender, non-distended with the presence of bowel sounds; No hepatosplenomegaly; no rebound or guarding; No ascites is clinically apparent; no tympany to percussion  Ext: No peripheral edema or cyanosis  Skin: Warm and dry  Psychiatric: Appropriate mood and congruent affect without obvious depression or anxiety  Labs:   Lab Results   Component Value Date    WBC 0.7 04/30/2024    HGB 8.2 04/30/2024    HCT 25.2 04/30/2024    PLT 18.0 04/30/2024    CREATSERUM 0.53 04/30/2024    BUN 8 04/30/2024     04/30/2024    K 2.3 04/30/2024     04/30/2024    CO2 21.0 04/30/2024    GLU 94 04/30/2024    CA 8.2 04/30/2024    ALB 1.9 04/30/2024    ALKPHO 95 04/30/2024    BILT 1.4 04/30/2024    AST 20 04/30/2024    ALT 9 04/30/2024     Recent Labs   Lab 04/26/24  1306 04/27/24  1813 04/30/24  0750   * 114* 94   BUN 7* 8* 8*   CREATSERUM 0.51* 0.64 0.53*   CA 8.4* 9.1 8.2*    139 142   K 3.3* 3.2* 2.3*    103 111   CO2 25.0 26.0 21.0     Recent Labs   Lab 04/30/24  0750   RBC 3.02*   HGB 8.2*   HCT 25.2*   MCV 83.4   MCH 27.2   MCHC 32.5   RDW 19.8   NEPRELIM 0.16*   WBC 0.7*   PLT  18.0*       Recent Labs   Lab 04/26/24  1306 04/27/24  1813 04/30/24  0750   ALT 13 15 9*   AST 21 21 20       Imaging:   PROCEDURE:  CT ABDOMEN+PELVIS (CPT=74176)     COMPARISON:  [facility] , CT, CT ABDOMEN+PELVIS(CONTRAST ONLY)(CPT=74177), 4/12/2024, 1:12 AM.     INDICATIONS:  dehydration     TECHNIQUE:  Unenhanced multislice CT scanning was performed from the dome of the diaphragm to the pubic symphysis.  Dose reduction techniques were used. Dose information is transmitted to the ACR (American College of Radiology) NRDR (National Radiology  Data Registry) which includes the Dose Index Registry.     PATIENT STATED HISTORY: (As transcribed by Technologist)  dehydrated         FINDINGS:    LIVER:  Extensive hepatic metastatic deposits throughout the liver.  BILIARY:  High density in the gallbladder is nonspecific.  PANCREAS:  No lesion, fluid collection, ductal dilatation, or atrophy.    SPLEEN:  No enlargement or focal lesion.    KIDNEYS:  Large cyst in the mid left kidney measures 6 x 7 x 6.0 cm.  Cyst in the upper pole the right kidney measures 5.3 x 5 1 cm. No hydronephrosis.  No calculi in the kidneys.  ADRENALS:  No mass or enlargement.    AORTA/VASCULAR:    Unremarkable as seen on non-contrast imaging.  RETROPERITONEUM:  No mass or adenopathy.    BOWEL/MESENTERY:  Visualized part of the appendix is unremarkable.  Mild thickening of the sigmoid colon.  ABDOMINAL WALL:  Tiny fat containing umbilical hernia.  URINARY BLADDER:  No visible focal wall thickening, lesion, or calculus.    PELVIC NODES:  No adenopathy.    PELVIC ORGANS:  Sequelae of hysterectomy.  BONES:  No bony lesion or fracture.    LUNG BASES:  No visible pulmonary or pleural disease.    OTHER:  Negative.                     Impression   CONCLUSION:       1. No calculi in the kidneys.  No hydronephrosis.     2. Extensive metastatic deposits throughout the liver.     3. There is mild thickening of the sigmoid colon.  This may be sequelae of a  localized colitis.  This may be sequelae of infectious or inflammatory etiology.  Ischemia is considered unlikely.          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 Pt was dx with oral candidiasis. GI consulted for possible esophageal candidiasis--unable to complete an EGD at this time given neutropenia (WBC 0.7; neutrophils absolute 0.01) and marked thrombocytopenia (plt 18). Can empirically treat with 21 day course of fluconazole and continue supportive care measures      Recommendations:     Unable to complete EGD at this time given neutropenia + thrombocytopenia   Recommend treating for esophageal candidiasis with 21 day course of fluconazole   Continue to encourage PO intake as able--did not restrict diet in hopes of encouraging pt's PO intake  Start Protonix 40 mg IV daily  Continue supportive measures with magic mouth wash, nystatin     Thank you for the consultation, we will follow the patient with you.  Attending addendum (Dr Reyes) to follow later today and provide formal, final recommendations at that time   INDIRA Egan  12:54 PM  4/30/2024  [facility]  [phone]    GI Attending Addendum:  I have personally seen and examined this patient and agree with above nurse practitioner's history, physical exam, assessment and recommendations with the following modifications and/or additions:     Patient is a 73 year old with head/neck cancer, s/p RTX and metastatic colon cancer, admitted with poor PO intake. Dx with oral candidiasis. Started on IV fluconazole. Has mouth/oral pain, but denies any pain esophageal pain with swallowing or  dysphagia symptoms. No abd pain.   Reviewed imaging, and hx of moderate hiatal hernia.     Given above, and neutropenia and plts - no plan for non-urgent endoscopy.   Given history and diagnosis, reasonable to pursue 21 day course of fluconazole therapy   Can consider EGD as OP - once neutropenia and thrombocytopenia resolved, and safe for endoscopy   PPI BID  IVF, analgesia, anti-emetics per primary team  F/u in SGI OV in 2-3 weeks    GI will signoff, please page with questions.       Kb Reyes MD  4/30/2024  [facility]

## 2024-04-30 NOTE — SPIRITUAL CARE NOTE
Spiritual Care Visit Note    Patient Name: Lisa Iqbal Date of Spiritual Care Visit: 24   : 10/11/1950 Primary Dx: Mucositis due to chemotherapy       Referred By: Referral From: Other (Comment)    Spiritual Care Taxonomy:    Intended Effects: Demonstrate caring and concern    Methods: Offer support    Interventions: Acknowledge current situation;Explain  role;Share words of hope and inspiration;Silent prayer    Visit Type/Summary:     - Spiritual Care:  remains available as needed for follow up.    Spiritual Care support can be requested via an Epic consult. For urgent/immediate needs, please contact the On Call  at: [facility]: ext [phone]

## 2024-04-30 NOTE — PLAN OF CARE
Patient is alert, c/o mouth pain - PRN per MAR. IVF per MAR. Electrolytes replaced per protocol. Patient continues to refuse oral suspensions - RN educated on purpose of medications and side effects of not taking, patient verbalized understanding. RN encouraged PO intake, patient eating jello and popsicles. Call light within reach.

## 2024-04-30 NOTE — OCCUPATIONAL THERAPY NOTE
OT orders received and pt chart reviewed. Per pt report, she feels too tired to get out of bed to day and wishes to rest, deferring therapy until tomorrow. Will re-attempt at later date.

## 2024-04-30 NOTE — PAYOR COMM NOTE
--------------  ADMISSION REVIEW     Payor: [facility] [place]  Subscriber #:  [id number]  Authorization Number: [id number]    Admit date: 4/27/24  Admit time:  9:12 PM       REVIEW DOCUMENTATION:     ED Provider Notes        Patient Seen in: [facility]      History     Chief Complaint   Patient presents with    Mouth/Lip Problem     Stated Complaint: unable to eat    Subjective:   HPI    73-year-old female presents for evaluation of difficulty eating.  Patient is unable to drink liquids or solids due to pain in her throat.  Patient has stomatitis related to recent chemotherapy for metastatic colon cancer.  Last chemo was about 1 week ago.  She was seen in the ER for same symptoms 2 days ago and received Magic mouthwash cocktail for home.  States this is not improving her pain.  She lives at home alone.    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  Constitutional and vital signs reviewed.      All other systems reviewed and negative except as noted above.    Physical Exam     ED Triage Vitals [04/27/24 1740]   /84   Pulse 88   Resp 16   Temp 99.4 °F (37.4 °C)   Temp src Temporal   SpO2 99 %   O2 Device None (Room air)       Current:BP (!) 169/83   Pulse 82   Temp 99.4 °F (37.4 °C) (Temporal)   Resp 14   Ht 154.9 cm (5' 1\")   Wt 61.3 kg   SpO2 100%   BMI 25.53 kg/m²         Physical Exam    General: Alert, oriented, no apparent distress  HEENT: Atraumatic, normocephalic.  Pupils equal reactive.  Extraocular  motions intact.  Shallow ulcerations to the buccal mucosa and lateral aspect of the tongue  Neck: Supple  Lungs: Clear to auscultation bilaterally.  Heart: Regular rate and rhythm.  Abdomen: Soft, nontender.   Skin: No rash.  No edema.  Neurologic: No focal neurologic deficits.  Normal speech pattern  Musculoskeletal: No tenderness or deformity noted.  Full range of motion throughout.        ED Course     Labs Reviewed   COMP METABOLIC PANEL (14) - Abnormal; Notable for the following components:       Result Value    Glucose 114 (*)     Potassium 3.2 (*)     BUN 8 (*)     Alkaline Phosphatase 148 (*)     Albumin 3.3 (*)     A/G Ratio 0.8 (*)     All other components within normal limits   CBC W/ DIFFERENTIAL - Abnormal; Notable for the following components:    WBC 0.3 (*)     HGB 11.2 (*)     PLT 50.0 (*)     Immature Platelet Fraction 7.8 (*)     Neutrophil Absolute Prelim 0.03 (*)     Neutrophil Absolute 0.03 (*)     Lymphocyte Absolute 0.27 (*)     Monocyte Absolute 0.04 (*)     All other components within normal limits   RAPID STREP A SCREEN (LC) - Normal    Narrative:     A confirmatory culture is recommended if clinically indicated.   SARS-COV-2/FLU A AND B/RSV BY PCR (GENEXPERT) - Normal    Narrative:     This test is intended for the qualitative detection and differentiation of SARS-CoV-2, influenza A, influenza B, and respiratory syncytial virus (RSV) viral RNA in nasopharyngeal or nares swabs from individuals suspected of respiratory viral infection consistent with COVID-19 by their healthcare provider. Signs and symptoms of respiratory viral infection due to SARS-CoV-2, influenza, and RSV can be similar.    Test performed using the Xpert Xpress SARS-CoV-2/FLU/RSV (real time RT-PCR)  assay on the GeneXpert instrument, [employer], [place] [address].   This test is being used under the Food and Drug Administration's Emergency Use Authorization.    The authorized Fact Sheet for Healthcare Providers for this  assay is available upon request from the laboratory.   CBC WITH DIFFERENTIAL WITH PLATELET    Narrative:     The following orders were created for panel order CBC With Differential With Platelet.  Procedure                               Abnormality         Status                     ---------                               -----------         ------                     CBC W/ DIFFERENTIAL[[id number]]          Abnormal            Final result                 Please view results for these tests on the individual orders.   SCAN SLIDE   RAINBOW DRAW BLUE   RAINBOW DRAW GOLD       MDM      Differential diagnosis includes metabolic disturbance, dehydration, stomatitis versus strep pharyngitis  Admission disposition: 4/27/2024  7:38 PM           CBC shows neutropenia.  Similar to 2 days ago likely related to recent chemotherapy.  Patient is afebrile.  When I reviewed her chart she is supposed to be taking nystatin to treat mouth ulcers which she admits she has not been doing.      Chemistry with slightly low potassium 3.2 that was repleted.  Normal BUN and creatinine    Medications   potassium chloride (Klor-Con) 20 MEQ oral powder 20 mEq (20 mEq Oral Not Given 4/27/24 1923)   potassium chloride 20 mEq/100mL IVPB premix 20 mEq (has no administration in time range)   sodium chloride 0.9 % IV bolus 1,000 mL (0 mL Intravenous Stopped 4/27/24 1909)     Discussed with Dr. Novoa oncology.  He would like to patient to stay overnight in the hospital due to her intractable symptoms and risk for infection due to neutropenia and mucositis           Discussed with Dr Lui for admission              Medical Decision Making  Amount and/or Complexity of Data Reviewed  External Data Reviewed: notes.     Details: Recent ER note and oncology visit notes        Disposition and Plan     Clinical Impression:  1. Mucositis due to chemotherapy    2. Chemotherapy-induced neutropenia (HCC)    3. Oral phase dysphagia    4. Hypokalemia          Disposition:  Admit  4/27/2024  7:38 pm    Follow-up:  No follow-up provider specified.        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  Patient has visited the emergency room multiple times over the last week.  She had been on Magic mouthwash and nystatin solution however on clear whether she has been compliant with this.  Denies any fever, chills, cough, chest pain or shortness of breath, diarrhea or nausea.    Assessment & Plan:       # mucositis   - cont magic mouth wash, nystatin solution   - gentle IVF   - pain control      # pancytopenia   - due to chemo   - monitor   - oncology consulted      # oral candidiasis   - will place on fluconazole IV     # hypokalemia , replace      # metastatic sigmoid colon cancer               Plan of care discussed with patient and ER.      Enedelia Lui MD    4/28  Chief Complaint: difficulty eating     Subjective:      Patient still with painful mouth, difficulty eating.     Objective:    Review of Systems:   A comprehensive review of systems was completed; pertinent positive and negatives stated in subjective.     Vital signs:  Temp:  [98.8 °F (37.1 °C)-99.4 °F (37.4 °C)] 98.8 °F (37.1 °C)  Pulse:  [75-88] 78  Resp:  [14-21] 17  BP: (123-169)/(66-87) 139/66  SpO2:  [97 %-100 %] 97 %     Physical  Exam:    General: No acute distress  Respiratory: No wheezes, no rhonchi  Cardiovascular: S1, S2, regular rate and rhythm  Abdomen: Soft, Non-tender, non-distended, positive bowel sounds  Neuro: No new focal deficits.   Extremities: No edema        Diagnostic Data:    Labs:         Recent Labs   Lab 04/25/24  1110 04/26/24  1306 04/27/24  1813 04/28/24  0734   WBC 0.7* 0.4* 0.3* 0.3*   HGB 10.3* 10.1* 11.2* 9.2*   MCV 86.8 85.2 86.0 87.1   PLT 94.0* 64.0* 50.0* 31.0*               Recent Labs   Lab 04/25/24  1110 04/26/24  1306 04/27/24  1813   GLU 90 106* 114*   BUN 7* 7* 8*   CREATSERUM 0.39* 0.51* 0.64   CA 8.5 8.4* 9.1   ALB 3.0* 2.9* 3.3*    137 139   K 3.0* 3.3* 3.2*    104 103   CO2 21.0 25.0 26.0   ALKPHO 155* 142 148*   AST 29 21 21   ALT 12* 13 15   BILT 2.2* 1.6 1.5   TP 6.4 6.2* 7.2       Microbiology     No results found for this visit on 04/27/24.        Imaging: Reviewed in Epic.     Medications:    potassium chloride  20 mEq Oral Once    nystatin  500,000 Units Oral QID    OLANZapine  2.5 mg Oral Nightly    maalox/diphenhydramine/lidocaine  10 mL Oral TID & HS    fluconazole  200 mg Intravenous Q24H         Assessment & Plan:       # mucositis, oral candidiasis due to chemo  - cont magic mouth wash, nystatin solution   - iv Fluconazole  - gentle IVF   - pain control      # pancytopenia   - due to chemo   - monitor counts, transfuse if Hgb , 7, plt < 10  - oncology following     # oral candidiasis   - will place on fluconazole IV, as above     # hypokalemia , replace      # metastatic sigmoid colon cancer            Win Iyer MD    4/29  Chief Complaint: difficulty eating     Subjective:      Patient still with painful mouth, difficulty eating, dec PO intake, poor appetite.      Objective:    Review of Systems:   A comprehensive review of systems was completed; pertinent positive and negatives stated in subjective.     Vital signs:  Temp:  [98.2 °F (36.8 °C)-99.5 °F (37.5 °C)] 99.5 °F  (37.5 °C)  Pulse:  [] 84  Resp:  [18] 18  BP: ()/() 133/81  SpO2:  [94 %-100 %] 97 %     Physical Exam:    General: No acute distress  Respiratory: No wheezes, no rhonchi  Cardiovascular: S1, S2, regular rate and rhythm  Abdomen: Soft, Non-tender, non-distended, positive bowel sounds  Neuro: No new focal deficits.   Extremities: No edema        Diagnostic Data:    Labs:          Recent Labs   Lab 04/25/24  1110 04/26/24  1306 04/27/24  1813 04/28/24  0734 04/29/24  0615   WBC 0.7* 0.4* 0.3* 0.3* 0.4*   HGB 10.3* 10.1* 11.2* 9.2* 9.5*   MCV 86.8 85.2 86.0 87.1 85.3   PLT 94.0* 64.0* 50.0* 31.0* 17.0*   BAND  --   --   --   --  5               Recent Labs   Lab 04/25/24  1110 04/26/24  1306 04/27/24  1813   GLU 90 106* 114*   BUN 7* 7* 8*   CREATSERUM 0.39* 0.51* 0.64   CA 8.5 8.4* 9.1   ALB 3.0* 2.9* 3.3*    137 139   K 3.0* 3.3* 3.2*    104 103   CO2 21.0 25.0 26.0   ALKPHO 155* 142 148*   AST 29 21 21   ALT 12* 13 15   BILT 2.2* 1.6 1.5   TP 6.4 6.2* 7.2                Microbiology     No results found for this visit on 04/27/24.        Imaging: Reviewed in Epic.     Medications:    potassium chloride  20 mEq Oral Once    nystatin  500,000 Units Oral QID    OLANZapine  2.5 mg Oral Nightly    maalox/diphenhydramine/lidocaine  10 mL Oral TID & HS    fluconazole  200 mg Intravenous Q24H         Assessment & Plan:       # mucositis, oral candidiasis due to chemo  - cont magic mouth wash, nystatin solution   - iv Fluconazole  - gentle IVF   - pain control      # pancytopenia with neutropenia  - due to chemo   - monitor counts, transfuse if Hgb < 7, plt < 10  - oncology following     # oral candidiasis   - IV Fluconazole     # hypokalemia , replace PRN     # metastatic sigmoid colon cancer            Win Iyer MD      MEDICATIONS ADMINISTERED IN LAST 1 DAY:  acetaminophen (Ofirmev) 10 mg/mL infusion premix 1,000 mg       Date Action Dose Route User    4/29/2024 1119 New Bag 1,000 mg  Intravenous Silke Martinez RN          maalox/diphenhydramine/lidocaine (First Mouthwash BLM) oral suspension 10 mL       Date Action Dose Route User    4/29/2024 2026 Given 10 mL Oral Nina Kaur RN          fluconazole in sodium chloride 0.9% (Diflucan) 200 mg/100mL IVPB premix 200 mg       Date Action Dose Route User    4/29/2024 2339 New Bag 200 mg Intravenous Nina Kaur RN          meropenem (Merrem) 1,000 mg in sodium chloride 0.9% 100 mL IVPB-MBP       Date Action Dose Route User    4/30/2024 0834 New Bag 1,000 mg Intravenous Silke Martinez RN    4/30/2024 0046 New Bag 1,000 mg Intravenous Nina Kaur RN    4/29/2024 1612 New Bag 1,000 mg Intravenous Silke Martinez RN          morphINE PF 2 MG/ML injection 2 mg       Date Action Dose Route User    4/29/2024 2345 Given 2 mg Intravenous Nina Kaur RN          morphINE PF 4 MG/ML injection 4 mg       Date Action Dose Route User    4/30/2024 0848 Given 4 mg Intravenous Silke Martinez RN    4/29/2024 1816 Given 4 mg Intravenous Silke Martinez RN          nystatin (Mycostatin) [id number] UNIT/ML oral suspension 500,000 Units       Date Action Dose Route User    4/29/2024 2025 Given 500,000 Units Oral Nina Kaur RN          OLANZapine (ZyPREXA) tab 2.5 mg       Date Action Dose Route User    4/29/2024 2026 Given 2.5 mg Oral Nina Kaur RN          sodium chloride 0.9% infusion       Date Action Dose Route User    4/30/2024 0215 New Bag (none) Intravenous Nina Kaur RN    4/29/2024 1336 New Bag (none) Intravenous Silke Martinez RN            Vitals (last day)       Date/Time Temp Pulse Resp BP SpO2 Weight O2 Device O2 Flow Rate (L/min) [facility]    04/30/24 0749 97.8 °F (36.6 °C) 72 18 131/61 96 % -- None (Room air) -- WW    04/30/24 0500 97.6 °F (36.4 °C) 71 20 132/60 97 % -- None (Room air) -- ES    04/29/24 2327 98.4 °F (36.9 °C) 74 19 113/45 99 % -- None (Room air) -- ES    04/29/24 2114 99 °F (37.2 °C) 83 20 106/48 99 % -- None  (Room air) -- ES    04/29/24 1810 99 °F (37.2 °C) -- -- -- -- -- -- --     04/29/24 1614 101.1 °F (38.4 °C) 82 18 106/54 98 % -- None (Room air) --     04/29/24 1416 102.6 °F (39.2 °C) 97 -- -- 96 % -- -- --     04/29/24 1231 101.3 °F (38.5 °C) 88 18 149/71 97 % -- None (Room air) --     04/29/24 1007 -- 84 18 133/81 97 % -- None (Room air) --     04/29/24 0502 -- 74 -- -- 94 % -- -- -- AK    04/29/24 0357 -- 101 -- 140/78 98 % -- -- --     04/29/24 0333 99.5 °F (37.5 °C) 86 18 153/69 100 % -- None (Room air) -- AK    04/29/24 0311 -- 102 -- 79/56 -- -- -- -- MM    04/29/24 0308 -- 100 -- 85/69 -- -- -- -- MM    04/29/24 0247 -- 106 -- -- 97 % -- -- -- AK

## 2024-04-30 NOTE — PROGRESS NOTES
[facility]   part of [facility]     Hospitalist Progress Note     Lisa Iqbal Patient Status:  Inpatient    10/11/1950 MRN [id number]   [facility] 4NW-A Attending Enedelia Lui MD   Hosp Day # 3 PCP Anusha Abraham MD     Chief Complaint: difficulty eating    Subjective:     Patient still with painful mouth, difficulty eating, fatigue, speaks very slowly, denies cp, abdominal pain, still with poor appetite and po intake.    Objective:    Review of Systems:   A comprehensive review of systems was completed; pertinent positive and negatives stated in subjective.    Vital signs:  Temp:  [97.6 °F (36.4 °C)-102.6 °F (39.2 °C)] 97.8 °F (36.6 °C)  Pulse:  [71-97] 72  Resp:  [18-20] 18  BP: (106-149)/(45-71) 131/61  SpO2:  [96 %-99 %] 96 %    Physical Exam:    General: No acute distress  Respiratory: No wheezes, no rhonchi  Cardiovascular: S1, S2, regular rate and rhythm  Abdomen: Soft, Non-tender, non-distended, positive bowel sounds  Neuro: No new focal deficits.   Extremities: No edema      Diagnostic Data:    Labs:  Recent Labs   Lab 24  1306 24  1813 24  0734 24  0615 24  0750   WBC 0.4* 0.3* 0.3* 0.4* 0.7*   HGB 10.1* 11.2* 9.2* 9.5* 8.2*   MCV 85.2 86.0 87.1 85.3 83.4   PLT 64.0* 50.0* 31.0* 17.0* 18.0*   BAND  --   --   --  5 32       Recent Labs   Lab 24  1306 24  1813 24  0750   * 114* 94   BUN 7* 8* 8*   CREATSERUM 0.51* 0.64 0.53*   CA 8.4* 9.1 8.2*   ALB 2.9* 3.3* 1.9*    139 142   K 3.3* 3.2* 2.3*    103 111   CO2 25.0 26.0 21.0   ALKPHO 142 148* 95   AST 21 21 20   ALT 13 15 9*   BILT 1.6 1.5 1.4   TP 6.2* 7.2 5.2*       Estimated Creatinine Clearance: 70.3 mL/min (A) (based on SCr of 0.53 mg/dL (L)).    No results for input(s): \"TROP\", \"TROPHS\", \"CK\" in the last 168 hours.    No results for input(s): \"PTP\", \"INR\" in the last 168 hours.               Microbiology    No results found for this visit  on 04/27/24.      Imaging: Reviewed in Epic.    Medications:    potassium chloride  40 mEq Intravenous Once    Followed by    potassium chloride  20 mEq Intravenous Once    meropenem  1,000 mg Intravenous Q8H    potassium chloride  20 mEq Oral Once    nystatin  500,000 Units Oral QID    OLANZapine  2.5 mg Oral Nightly    maalox/diphenhydramine/lidocaine  10 mL Oral TID & HS    fluconazole  200 mg Intravenous Q24H       Assessment & Plan:      # mucositis, oral candidiasis due to chemo  - cont magic mouth wash, nystatin solution   - iv Fluconazole  - gentle IVF   - pain control      # pancytopenia with neutropenia  - due to chemo, wbc improving  - monitor counts, transfuse if Hgb < 7, plt < 10  - oncology following     # oral candidiasis   - IV Fluconazole     # hypokalemia , replace PRN     # metastatic sigmoid colon cancer          Win Iyer MD    Supplementary Documentation:     Quality:  DVT Mechanical Prophylaxis:   SCDs,    DVT Pharmacologic Prophylaxis   Medication   None                Code Status: Not on file  Cuellar: No urinary catheter in place  Cuellar Duration (in days):   Central line:    ALESHA:     Discharge is dependent on: progress  At this point Ms. Félix Iqbal is expected to be discharge to: home    The 21st Century Cures Act makes medical notes like these available to patients in the interest of transparency. Please be advised this is a medical document. Medical documents are intended to carry relevant information, facts as evident, and the clinical opinion of the practitioner. The medical note is intended as peer to peer communication and may appear blunt or direct. It is written in medical language and may contain abbreviations or verbiage that are unfamiliar.

## 2024-05-01 ENCOUNTER — APPOINTMENT (OUTPATIENT)
Dept: HEMATOLOGY/ONCOLOGY | Facility: HOSPITAL | Age: 74
End: 2024-05-01
Attending: INTERNAL MEDICINE
Payer: MEDICARE

## 2024-05-01 LAB
ALBUMIN SERPL-MCNC: 1.7 G/DL (ref 3.4–5)
ALBUMIN/GLOB SERPL: 0.6 {RATIO} (ref 1–2)
ALP LIVER SERPL-CCNC: 84 U/L
ALT SERPL-CCNC: 7 U/L
ANION GAP SERPL CALC-SCNC: 9 MMOL/L (ref 0–18)
AST SERPL-CCNC: 8 U/L (ref 15–37)
BASOPHILS # BLD AUTO: 0.02 X10(3) UL (ref 0–0.2)
BASOPHILS NFR BLD AUTO: 1.5 %
BILIRUB SERPL-MCNC: 0.9 MG/DL (ref 0.1–2)
BUN BLD-MCNC: 8 MG/DL (ref 9–23)
CALCIUM BLD-MCNC: 8.3 MG/DL (ref 8.5–10.1)
CHLORIDE SERPL-SCNC: 115 MMOL/L (ref 98–112)
CO2 SERPL-SCNC: 18 MMOL/L (ref 21–32)
CREAT BLD-MCNC: 0.27 MG/DL
EGFRCR SERPLBLD CKD-EPI 2021: 114 ML/MIN/1.73M2 (ref 60–?)
EOSINOPHIL # BLD AUTO: 0.01 X10(3) UL (ref 0–0.7)
EOSINOPHIL NFR BLD AUTO: 0.7 %
ERYTHROCYTE [DISTWIDTH] IN BLOOD BY AUTOMATED COUNT: 20.9 %
GLOBULIN PLAS-MCNC: 3 G/DL (ref 2.8–4.4)
GLUCOSE BLD-MCNC: 83 MG/DL (ref 70–99)
HCT VFR BLD AUTO: 24.1 %
HGB BLD-MCNC: 8.1 G/DL
IMM GRANULOCYTES # BLD AUTO: 0 X10(3) UL (ref 0–1)
IMM GRANULOCYTES NFR BLD: 0 %
LYMPHOCYTES # BLD AUTO: 0.35 X10(3) UL (ref 1–4)
LYMPHOCYTES NFR BLD AUTO: 25.7 %
MCH RBC QN AUTO: 27.9 PG (ref 26–34)
MCHC RBC AUTO-ENTMCNC: 33.6 G/DL (ref 31–37)
MCV RBC AUTO: 83.1 FL
MONOCYTES # BLD AUTO: 0.23 X10(3) UL (ref 0.1–1)
MONOCYTES NFR BLD AUTO: 16.9 %
NEUTROPHILS # BLD AUTO: 0.75 X10 (3) UL (ref 1.5–7.7)
NEUTROPHILS # BLD AUTO: 0.75 X10(3) UL (ref 1.5–7.7)
NEUTROPHILS NFR BLD AUTO: 55.2 %
OSMOLALITY SERPL CALC.SUM OF ELEC: 291 MOSM/KG (ref 275–295)
PLATELET # BLD AUTO: 25 10(3)UL (ref 150–450)
PLATELETS.RETICULATED NFR BLD AUTO: 6.2 % (ref 0–7)
POTASSIUM SERPL-SCNC: 3.2 MMOL/L (ref 3.5–5.1)
POTASSIUM SERPL-SCNC: 3.2 MMOL/L (ref 3.5–5.1)
PROT SERPL-MCNC: 4.7 G/DL (ref 6.4–8.2)
RBC # BLD AUTO: 2.9 X10(6)UL
SODIUM SERPL-SCNC: 142 MMOL/L (ref 136–145)
WBC # BLD AUTO: 1.4 X10(3) UL (ref 4–11)

## 2024-05-01 PROCEDURE — 99232 SBSQ HOSP IP/OBS MODERATE 35: CPT | Performed by: INTERNAL MEDICINE

## 2024-05-01 NOTE — PROGRESS NOTES
Patient alert x3, VSS. Afebrile. IV fluids infusing. Potassium replaced over night. Pt declined oral medications. IV abx given. Tolerated well. Bed alarm on. Call light within reach.

## 2024-05-01 NOTE — PHYSICAL THERAPY NOTE
Attempted to see for PT this date.  Pt declining, stating too tired.  Attempted to encourage, pt continues to decline.

## 2024-05-01 NOTE — OCCUPATIONAL THERAPY NOTE
Att'd to see pt this AM for OT eval. Pt declined due to reported fatigue and deferred until tomorrow. OT will continue to follow and re-attempt at later time.

## 2024-05-01 NOTE — PLAN OF CARE
Pt Aox3. VSS. RA.  No complain on N/V/D.  Reported pain of the lips and inside the mouth. Refusing Nystatin and medicated Mouthwash.  Morphine 4mg IV administered PRN.   States that ice chips relieve her pain.   Petechiae noted on the back of the throat. Sores on the lower lip and inside the mouth.  Potassium replaced per protocol.   Pt did not eat or drink.   Will continue plan of care.      Problem: PAIN - ADULT  Goal: Verbalizes/displays adequate comfort level or patient's stated pain goal  Description: INTERVENTIONS:  - Encourage pt to monitor pain and request assistance  - Assess pain using appropriate pain scale  - Administer analgesics based on type and severity of pain and evaluate response  - Implement non-pharmacological measures as appropriate and evaluate response  - Consider cultural and social influences on pain and pain management  - Manage/alleviate anxiety  - Utilize distraction and/or relaxation techniques  - Monitor for opioid side effects  - Notify MD/LIP if interventions unsuccessful or patient reports new pain  - Anticipate increased pain with activity and pre-medicate as appropriate  Outcome: Progressing

## 2024-05-01 NOTE — PROGRESS NOTES
Heme/Onc Progress Note - [facility]      Chief Complaint:    Follow up for evaluation and management of metastatic colon cancer.    Interim History:      She has been afebrile on antibiotics. She has increasing ANC and platelets. She is on Meropenem. She has been afebrile since that time. She has continued oral pain but this is slightly better. She has no other new pain. She still has very poor appetite.    Physical Examination:    Vital Signs: /73 (BP Location: Right arm)   Pulse 80   Temp 97.8 °F (36.6 °C) (Oral)   Resp 18   Ht 1.549 m (5' 1\")   Wt 65 kg (143 lb 3.2 oz)   SpO2 97%   BMI 27.06 kg/m²     General: Patient is alert and oriented x 3, She is very weak and uncomfortable.  HEENT: Oral mucositis.   Chest: Clear to auscultation. No rales and no wheezes.  Heart: Regular rate and rhythm.   Abdomen: Soft and nontender. good bowel sounds.  Extremities: No edema. Non-tender.  Skin:  Warm, dry.      Labs reviewed at this visit:     Recent Labs   Lab 04/29/24  0615 04/30/24  0750 05/01/24  0631   RBC 3.34* 3.02* 2.90*   HGB 9.5* 8.2* 8.1*   HCT 28.5* 25.2* 24.1*   MCV 85.3 83.4 83.1   MCH 28.4 27.2 27.9   MCHC 33.3 32.5 33.6   RDW 20.1 19.8 20.9   NEPRELIM 0.06* 0.16* 0.75*   WBC 0.4* 0.7* 1.4*   PLT 17.0* 18.0* 25.0*       Recent Labs   Lab 04/27/24  1813 04/30/24  0750 04/30/24  2121 05/01/24  0631   * 94  --  83   BUN 8* 8*  --  8*   CREATSERUM 0.64 0.53*  --  0.27*   CA 9.1 8.2*  --  8.3*   ALB 3.3* 1.9*  --  1.7*    142  --  142   K 3.2* 2.3* 3.1* 3.2*  3.2*    111  --  115*   CO2 26.0 21.0  --  18.0*   ALKPHO 148* 95  --  84   AST 21 20  --  8*   ALT 15 9*  --  7*   BILT 1.5 1.4  --  0.9   TP 7.2 5.2*  --  4.7*       Radiologic imaging reviewed at this visit:    CT abd/pelvis on 4/15/2024:  FINDINGS:    LIVER:  Extensive hepatic metastatic deposits throughout the liver.  BILIARY:  High density in the gallbladder is nonspecific.  PANCREAS:  No lesion, fluid collection, ductal  dilatation, or atrophy.    SPLEEN:  No enlargement or focal lesion.    KIDNEYS:  Large cyst in the mid left kidney measures 6 x 7 x 6.0 cm.  Cyst in the upper pole the right kidney measures 5.3 x 5 1 cm. No hydronephrosis.  No calculi in the kidneys.  ADRENALS:  No mass or enlargement.    AORTA/VASCULAR:    Unremarkable as seen on non-contrast imaging.  RETROPERITONEUM:  No mass or adenopathy.    BOWEL/MESENTERY:  Visualized part of the appendix is unremarkable.  Mild thickening of the sigmoid colon.  ABDOMINAL WALL:  Tiny fat containing umbilical hernia.  URINARY BLADDER:  No visible focal wall thickening, lesion, or calculus.    PELVIC NODES:  No adenopathy.    PELVIC ORGANS:  Sequelae of hysterectomy.  BONES:  No bony lesion or fracture.    LUNG BASES:  No visible pulmonary or pleural disease.    OTHER:  Negative.       Impression   CONCLUSION:       1. No calculi in the kidneys.  No hydronephrosis.     2. Extensive metastatic deposits throughout the liver.     3. There is mild thickening of the sigmoid colon.  This may be sequelae of a localized colitis.  This may be sequelae of infectious or inflammatory etiology.  Ischemia is considered unlikely.         Impression/Plan:     Metastatic Colon Cancer:  Liver metastases:    S/P FOLFOX cycle 3 on 4/17/2024. Having complications from treatment even with dose reduction.  Will need to hold or stop chemotherapy indefinitely.    Mucositis secondary to chemotherapy:  Nausea secondary to chemotherapy  Chemo induced neutropenia with fever s/p peg-filgrastim:  Oral candidiasis    Continue IV fluids. Had fever but doing better no Meropenem. Will continue antibiotics until recovery of ANC.     Continue IV fluconizole for oral candidiasis.   Continue oral care, magic mouthwash and nystatin.    Anemia complicating neoplastic disease:  Thrombocytopenia secondary to chemotherapy:    Platelets are stable at 18k without bleeding. Will transfuse if PLT < 10k or if any bleeding.  Repeat cbc in am.  HGB is stable. Follow and transfuse if HGB less than 7.0.    Overall signs of bone marrow recovery. Continue current support and repeat labs in am.      Ronan Camarillo MD  [facility]

## 2024-05-01 NOTE — PROGRESS NOTES
[facility]   part of [facility]     Hospitalist Progress Note     Lisa Iqbal Patient Status:  Inpatient    10/11/1950 MRN [id number]   Formerly [facility] 4NW-A Attending Enedelia Lui MD   Hosp Day # 4 PCP Anusah Abraham MD     Chief Complaint:sore in her mouth     Subjective:     Patient still with painful mouth,. Feels cold today.     Objective:    Review of Systems:   A comprehensive review of systems was completed; pertinent positive and negatives stated in subjective.    Vital signs:  Temp:  [97.4 °F (36.3 °C)-98.3 °F (36.8 °C)] 97.8 °F (36.6 °C)  Pulse:  [75-82] 80  Resp:  [17-19] 18  BP: (127-141)/(60-73) 141/73  SpO2:  [96 %-100 %] 97 %    Physical Exam:    General: No acute distress  Respiratory: No wheezes, no rhonchi  Cardiovascular: S1, S2, regular rate and rhythm  Abdomen: Soft, Non-tender, non-distended, positive bowel sounds  Neuro: No new focal deficits.   Extremities: No edema      Diagnostic Data:    Labs:  Recent Labs   Lab 24  1813 24  0734 24  0615 24  0750 24  0631   WBC 0.3* 0.3* 0.4* 0.7* 1.4*   HGB 11.2* 9.2* 9.5* 8.2* 8.1*   MCV 86.0 87.1 85.3 83.4 83.1   PLT 50.0* 31.0* 17.0* 18.0* 25.0*   BAND  --   --  5 32  --        Recent Labs   Lab 24  1813 24  0750 24  2121 24  0631   * 94  --  83   BUN 8* 8*  --  8*   CREATSERUM 0.64 0.53*  --  0.27*   CA 9.1 8.2*  --  8.3*   ALB 3.3* 1.9*  --  1.7*    142  --  142   K 3.2* 2.3* 3.1* 3.2*  3.2*    111  --  115*   CO2 26.0 21.0  --  18.0*   ALKPHO 148* 95  --  84   AST 21 20  --  8*   ALT 15 9*  --  7*   BILT 1.5 1.4  --  0.9   TP 7.2 5.2*  --  4.7*       Estimated Creatinine Clearance: 137.9 mL/min (A) (based on SCr of 0.27 mg/dL (L)).    No results for input(s): \"TROP\", \"TROPHS\", \"CK\" in the last 168 hours.    No results for input(s): \"PTP\", \"INR\" in the last 168 hours.               Microbiology    Hospital Encounter on 24   1.  Blood Culture     Status: None (Preliminary result)    Collection Time: 04/29/24  3:21 PM    Specimen: Blood,peripheral   Result Value Ref Range    Blood Culture Result No Growth 1 Day N/A         Imaging: Reviewed in Epic.    Medications:    pantoprazole  40 mg Intravenous Q24H    meropenem  1,000 mg Intravenous Q8H    potassium chloride  20 mEq Oral Once    nystatin  500,000 Units Oral QID    OLANZapine  2.5 mg Oral Nightly    maalox/diphenhydramine/lidocaine  10 mL Oral TID & HS    fluconazole  200 mg Intravenous Q24H       Assessment & Plan:      # mucositis, oral candidiasis due to chemo  - cont magic mouth wash, nystatin solution   - iv Fluconazole  - gentle IVF   - pain control     # neutropenic fever  - on merrem  - Blood cx NGTD      # pancytopenia with neutropenia  - due to chemo,  improving  - monitor counts, transfuse if Hgb < 7, plt < 10  - oncology following     # oral candidiasis   - IV Fluconazole, plan for 2 day course of fluconazole      # hypokalemia , replace PRN     # metastatic sigmoid colon cancer        Enedelia Lui MD      Supplementary Documentation:     Quality:  DVT Mechanical Prophylaxis:   SCDs,    DVT Pharmacologic Prophylaxis   Medication   None                Code Status: Not on file  Cuellar: No urinary catheter in place  Cuellar Duration (in days):   Central line:    ALESHA:     Discharge is dependent on: progress  At this point Ms. Félix Iqbal is expected to be discharge to: home    The 21st Century Cures Act makes medical notes like these available to patients in the interest of transparency. Please be advised this is a medical document. Medical documents are intended to carry relevant information, facts as evident, and the clinical opinion of the practitioner. The medical note is intended as peer to peer communication and may appear blunt or direct. It is written in medical language and may contain abbreviations or verbiage that are unfamiliar.

## 2024-05-02 LAB
BASOPHILS # BLD: 0.03 X10(3) UL (ref 0–0.2)
BASOPHILS NFR BLD: 1 %
EOSINOPHIL # BLD: 0.03 X10(3) UL (ref 0–0.7)
EOSINOPHIL NFR BLD: 1 %
ERYTHROCYTE [DISTWIDTH] IN BLOOD BY AUTOMATED COUNT: 21.3 %
HCT VFR BLD AUTO: 25.2 %
HGB BLD-MCNC: 8.5 G/DL
LYMPHOCYTES NFR BLD: 0.2 X10(3) UL (ref 1–4)
LYMPHOCYTES NFR BLD: 8 %
MCH RBC QN AUTO: 27.7 PG (ref 26–34)
MCHC RBC AUTO-ENTMCNC: 33.7 G/DL (ref 31–37)
MCV RBC AUTO: 82.1 FL
MONOCYTES # BLD: 0.2 X10(3) UL (ref 0.1–1)
MONOCYTES NFR BLD: 8 %
MORPHOLOGY: NORMAL
NEUTROPHILS # BLD AUTO: 1.04 X10 (3) UL (ref 1.5–7.7)
NEUTROPHILS NFR BLD: 76 %
NEUTS BAND NFR BLD: 6 %
NEUTS HYPERSEG # BLD: 2.05 X10(3) UL (ref 1.5–7.7)
PLATELET # BLD AUTO: 29 10(3)UL (ref 150–450)
PLATELET MORPHOLOGY: NORMAL
PLATELETS.RETICULATED NFR BLD AUTO: 6.7 % (ref 0–7)
POTASSIUM SERPL-SCNC: 2.8 MMOL/L (ref 3.5–5.1)
POTASSIUM SERPL-SCNC: 3.3 MMOL/L (ref 3.5–5.1)
RBC # BLD AUTO: 3.07 X10(6)UL
TOTAL CELLS COUNTED BLD: 100
WBC # BLD AUTO: 2.5 X10(3) UL (ref 4–11)

## 2024-05-02 PROCEDURE — 99232 SBSQ HOSP IP/OBS MODERATE 35: CPT | Performed by: INTERNAL MEDICINE

## 2024-05-02 RX ORDER — MIRTAZAPINE 7.5 MG/1
15 TABLET, FILM COATED ORAL NIGHTLY
Status: DISCONTINUED | OUTPATIENT
Start: 2024-05-02 | End: 2024-05-18

## 2024-05-02 RX ORDER — POTASSIUM CHLORIDE 20 MEQ/1
40 TABLET, EXTENDED RELEASE ORAL EVERY 4 HOURS
Status: DISCONTINUED | OUTPATIENT
Start: 2024-05-02 | End: 2024-05-02

## 2024-05-02 RX ORDER — POTASSIUM CHLORIDE 14.9 MG/ML
20 INJECTION INTRAVENOUS ONCE
Status: COMPLETED | OUTPATIENT
Start: 2024-05-02 | End: 2024-05-02

## 2024-05-02 NOTE — PLAN OF CARE
Problem: PAIN - ADULT  Goal: Verbalizes/displays adequate comfort level or patient's stated pain goal  Description: INTERVENTIONS:  - Encourage pt to monitor pain and request assistance  - Assess pain using appropriate pain scale  - Administer analgesics based on type and severity of pain and evaluate response  - Implement non-pharmacological measures as appropriate and evaluate response  - Consider cultural and social influences on pain and pain management  - Manage/alleviate anxiety  - Utilize distraction and/or relaxation techniques  - Monitor for opioid side effects  - Notify MD/LIP if interventions unsuccessful or patient reports new pain  - Anticipate increased pain with activity and pre-medicate as appropriate  Outcome: Progressing     Problem: RISK FOR INFECTION - ADULT  Goal: Absence of fever/infection during anticipated neutropenic period  Description: INTERVENTIONS  - Monitor WBC  - Administer growth factors as ordered  - Implement neutropenic guidelines  Outcome: Progressing     Problem: SAFETY ADULT - FALL  Goal: Free from fall injury  Description: INTERVENTIONS:  - Assess pt frequently for physical needs  - Identify cognitive and physical deficits and behaviors that affect risk of falls.  - [place] fall precautions as indicated by assessment.  - Educate pt/family on patient safety including physical limitations  - Instruct pt to call for assistance with activity based on assessment  - Modify environment to reduce risk of injury  - Provide assistive devices as appropriate  - Consider OT/PT consult to assist with strengthening/mobility  - Encourage toileting schedule  Outcome: Progressing     Problem: DISCHARGE PLANNING  Goal: Discharge to home or other facility with appropriate resources  Description: INTERVENTIONS:  - Identify barriers to discharge w/pt and caregiver  - Include patient/family/discharge partner in discharge planning  - Arrange for needed discharge resources and transportation as  appropriate  - Identify discharge learning needs (meds, wound care, etc)  - Arrange for interpreters to assist at discharge as needed  - Consider post-discharge preferences of patient/family/discharge partner  - Complete POLST form as appropriate  - Assess patient's ability to be responsible for managing their own health  - Refer to Case Management Department for coordinating discharge planning if the patient needs post-hospital services based on physician/LIP order or complex needs related to functional status, cognitive ability or social support system  Outcome: Progressing     Problem: Altered Communication/Language Barrier  Goal: Patient/Family is able to understand and participate in their care  Description: Interventions:  - Assess communication ability and preferred communication style  - Implement communication aides and strategies  - Use visual cues when possible  - Listen attentively, be patient, do not interrupt  - Minimize distractions  - Allow time for understanding and response  - Establish method for patient to ask for assistance (call light)  - Provide an  as needed  - Communicate barriers and strategies to overcome with those who interact with patient  Outcome: Progressing     Problem: Diabetes/Glucose Control  Goal: Glucose maintained within prescribed range  Description: INTERVENTIONS:  - Monitor Blood Glucose as ordered  - Assess for signs and symptoms of hyperglycemia and hypoglycemia  - Administer ordered medications to maintain glucose within target range  - Assess barriers to adequate nutritional intake and initiate nutrition consult as needed  - Instruct patient on self management of diabetes  Outcome: Progressing  Pt is aaox4, denies pain, refused MMW and nystatin, on iv abtx.

## 2024-05-02 NOTE — OCCUPATIONAL THERAPY NOTE
OCCUPATIONAL THERAPY EVALUATION - INPATIENT     Room Number: 404/404-A  Evaluation Date: 5/2/2024  Type of Evaluation: Initial  Presenting Problem: mucositis due to chemotherapy    Physician Order: IP Consult to Occupational Therapy  Reason for Therapy: ADL/IADL Dysfunction and Discharge Planning    OCCUPATIONAL THERAPY ASSESSMENT   Patient is currently functioning below baseline with ADLs and functional mobility. Prior to admission, patient's baseline is IND with ADL and functional mobility  Patient is requiring moderate assist as a result of the following impairments: decreased endurance and impaired standing balance. Occupational Therapy will continue to follow for duration of hospitalization.    Patient will benefit from continued skilled OT Services to promote return to prior level of function and safety with continuous assistance and gradual rehabilitative therapy       History Related to Current Admission: Patient is a 73 year old female admitted on 4/27/2024 with Presenting Problem: mucositis due to chemotherapy. Co-Morbidities : Colon Ca with mets with resulting stomatitis after recent chemo, Liver mets    WEIGHT BEARING RESTRICTION  Weight Bearing Restriction: None                Recommendations for nursing staff:   Transfers: one person with RW  Toileting location: toilet    EVALUATION SESSION:  Patient Start of Session: semi-supine in bed ready to work with writer  FUNCTIONAL TRANSFER ASSESSMENT  Sit to Stand: Edge of Bed  Edge of Bed: Minimal Assist    BED MOBILITY  Supine to Sit : Minimal Assist  Sit to Supine (OT): Not Tested  Scooting: CGA    BALANCE ASSESSMENT  Static Sitting: Supervision  Sitting Unilateral: Supervision  Static Standing: Contact Guard Assist  Standing Unilateral: Contact Guard Assist    FUNCTIONAL ADL ASSESSMENT  Grooming Seated: Not Tested  LB Dressing Seated: Supervision (donning/doffing socks)      ACTIVITY TOLERANCE: functional mobility approx 4 minutes utilizing RW                          O2 SATURATIONS       COGNITION  Arousal/Alertness:  delayed responses to stimuli and lethargic  Following Commands:  follows one step commands with repetition and follows one step commands consistently    Upper Extremity   ROM: within functional limits   Strength: within functional limits, by observation alone  Coordination  Gross motor: WFL   Fine motor: WFL  Sensation: Light touch:  intact    EDUCATION PROVIDED  Patient: Role of Occupational Therapy; Plan of Care; Functional Transfer Techniques; Posture/Positioning; Energy Conservation; Proper Body Mechanics  Patient's Response to Education: Verbalized Understanding    Equipment used: RW  Demonstrates functional use, Would benefit from additional trial      Therapist comments: Pt is pleasant and cooperative throughout session. Pt required extensive time to adjust to position change after bed mobility and in preparation for functional mobility    Patient End of Session: Up in chair;Needs met;Call light within reach;RN aware of session/findings;All patient questions and concerns addressed;Alarm set    OCCUPATIONAL PROFILE    HOME SITUATION  Type of Home: Apartment  Home Layout: One level  Lives With: Alone                        Patient Regularly Uses: Glasses    Prior Level of Function: Pt is IND with BADLs, IADLs, driving.    SUBJECTIVE   \"I am ready.\"    PAIN ASSESSMENT  Ratin  Location: denies       OBJECTIVE  Precautions: Bed/chair alarm  Fall Risk: Standard fall risk      ASSESSMENTS    AM-PAC ‘6-Clicks’ Inpatient Daily Activity Short Form  -   Putting on and taking off regular lower body clothing?: A Little  -   Bathing (including washing, rinsing, drying)?: A Little  -   Toileting, which includes using toilet, bedpan or urinal? : A Little  -   Putting on and taking off regular upper body clothing?: A Little  -   Taking care of personal grooming such as brushing teeth?: A Little  -   Eating meals?: A Little    AM-PAC Score:  Score: 18  Approx  Degree of Impairment: 46.65%  Standardized Score (AM-PAC Scale): 38.66    ADDITIONAL TESTS     NEUROLOGICAL FINDINGS      COGNITION ASSESSMENTS       PLAN  OT Treatment Plan: Balance activities;Energy conservation/work simplification techniques;ADL training;IADL training;Functional transfer training;UE strengthening/ROM;Endurance training;Patient/Family training;Patient/Family education;Equipment eval/education;Compensatory technique education;Fine motor coordination activities;Continued evaluation  Rehab Potential : Good  Frequency: 3x/week  Number of Visits to Meet Established Goals: 4    ADL Goals   Patient will perform grooming: with supervision  Patient will perform upper body dressing:  with supervision  Patient will perform lower body dressing:  with supervision  Patient will perform toileting: with supervision    Functional Transfer Goals  Patient will transfer from supine to sit:  with supervision  Patient will transfer from sit to stand:  with supervision  Patient will transfer to bedside commode:  with supervision  Patient will transfer to toilet:  with supervision    UE Exercise Program Goal  Patient will be supervision with bilateral AROM HEP (home exercise program).    Additional Goals  Pt will tolerate standing for 7 minutes utilizing AD as needed in preparation to perform grooming ADLs at sink level.      Patient Evaluation Complexity Level:   Occupational Profile/Medical History LOW - Brief history including review of medical or therapy records    Specific performance deficits impacting engagement in ADL/IADL LOW  1 - 3 performance deficits    Client Assessment/Performance Deficits MODERATE - Comorbidities and min to mod modifications of tasks    Clinical Decision Making LOW - Analysis of occupational profile, problem-focused assessments, limited treatment options    Overall Complexity LOW     OT Session Time: 16 minutes  Therapeutic Activity: 12 minutes

## 2024-05-02 NOTE — PROGRESS NOTES
[facility]   part of [facility]     Hospitalist Progress Note     Lisa Iqbal Patient Status:  Inpatient    10/11/1950 MRN [id number]   Location [facility] 4NW-A Attending Enedelia Lui MD   Hosp Day # 5 PCP Anusha Abraham MD     Chief Complaint:sore in her mouth , poor oral intake     Subjective:     Patient still with painful mouth,not eating much. Looks frail, fatigue, deconditioned. Did not work with PT OT. Per RN not taking her oral nystatin. Per patient it makes her nauseated.     Objective:    Review of Systems:   A comprehensive review of systems was completed; pertinent positive and negatives stated in subjective.    Vital signs:  Temp:  [97.4 °F (36.3 °C)-98.2 °F (36.8 °C)] 97.4 °F (36.3 °C)  Pulse:  [68-80] 74  Resp:  [16-20] 18  BP: (127-139)/(68-78) 138/72  SpO2:  [93 %-99 %] 95 %    Physical Exam:    General: No acute distress  Respiratory: No wheezes, no rhonchi  Cardiovascular: S1, S2, regular rate and rhythm  Abdomen: Soft, Non-tender, non-distended, positive bowel sounds  Neuro: No new focal deficits.   Extremities: No edema      Diagnostic Data:    Labs:  Recent Labs   Lab 24  0734 24  0615 24  0750 24  0631 24  0752   WBC 0.3* 0.4* 0.7* 1.4* 2.5*   HGB 9.2* 9.5* 8.2* 8.1* 8.5*   MCV 87.1 85.3 83.4 83.1 82.1   PLT 31.0* 17.0* 18.0* 25.0* 29.0*   BAND  --  5 32  --   --        Recent Labs   Lab 24  1813 24  0750 24  2121 24  0631 24  0636   * 94  --  83  --    BUN 8* 8*  --  8*  --    CREATSERUM 0.64 0.53*  --  0.27*  --    CA 9.1 8.2*  --  8.3*  --    ALB 3.3* 1.9*  --  1.7*  --     142  --  142  --    K 3.2* 2.3* 3.1* 3.2*  3.2* 2.8*    111  --  115*  --    CO2 26.0 21.0  --  18.0*  --    ALKPHO 148* 95  --  84  --    AST 21 20  --  8*  --    ALT 15 9*  --  7*  --    BILT 1.5 1.4  --  0.9  --    TP 7.2 5.2*  --  4.7*  --        Estimated Creatinine Clearance: 137.9 mL/min (A)  (based on SCr of 0.27 mg/dL (L)).    No results for input(s): \"TROP\", \"TROPHS\", \"CK\" in the last 168 hours.    No results for input(s): \"PTP\", \"INR\" in the last 168 hours.               Microbiology    Hospital Encounter on 04/27/24   1. Urine Culture, Routine     Status: None    Collection Time: 04/30/24  3:35 PM    Specimen: Urine, clean catch   Result Value Ref Range    Urine Culture No Growth at 18-24 hrs. N/A   2. Blood Culture     Status: None (Preliminary result)    Collection Time: 04/29/24  3:21 PM    Specimen: Blood,peripheral   Result Value Ref Range    Blood Culture Result No Growth 2 Days N/A         Imaging: Reviewed in Epic.    Medications:    pantoprazole  40 mg Intravenous Q24H    meropenem  1,000 mg Intravenous Q8H    potassium chloride  20 mEq Oral Once    nystatin  500,000 Units Oral QID    OLANZapine  2.5 mg Oral Nightly    maalox/diphenhydramine/lidocaine  10 mL Oral TID & HS    fluconazole  200 mg Intravenous Q24H       Assessment & Plan:      # mucositis, oral candidiasis due to chemo  - cont magic mouth wash, nystatin solution   - iv Fluconazole  - pain control     # neutropenic fever  - on merrem  - Blood cx NGTD-> if afebrile and cx neg tomorrow will transition to oral abx for 7 total days      # pancytopenia with neutropenia  - due to chemo,  improving  - monitor counts, transfuse if Hgb < 7, plt < 10  - oncology following     # oral candidiasis   - IV Fluconazole, plan for 21 day course of fluconazole      # hypokalemia , replace PRN     # metastatic sigmoid colon cancer     # failure to thrive.   - cont to encourage oral intake   - will add Remeron  - PT OT     D/w pt in depth with assistance of . I emphasized importance of PT OT and nutrition in order to do chemo otherwise will not be a candidate  for treatment anytime soon.     D/w daughter at length over the phone as well. Seems like this has been on going over the last few weeks.        Enedelia Lui MD      Supplementary  Documentation:     Quality:  DVT Mechanical Prophylaxis:   SCDs,    DVT Pharmacologic Prophylaxis   Medication   None                Code Status: Not on file  Cuellar: No urinary catheter in place  Cuellar Duration (in days):   Central line:    ALESHA:     Discharge is dependent on: progress  At this point Ms. Félix Iqbal is expected to be discharge to: home    The 21st Century Cures Act makes medical notes like these available to patients in the interest of transparency. Please be advised this is a medical document. Medical documents are intended to carry relevant information, facts as evident, and the clinical opinion of the practitioner. The medical note is intended as peer to peer communication and may appear blunt or direct. It is written in medical language and may contain abbreviations or verbiage that are unfamiliar.

## 2024-05-02 NOTE — PLAN OF CARE
Problem: PAIN - ADULT  Goal: Verbalizes/displays adequate comfort level or patient's stated pain goal  Description: INTERVENTIONS:  - Encourage pt to monitor pain and request assistance  - Assess pain using appropriate pain scale  - Administer analgesics based on type and severity of pain and evaluate response  - Implement non-pharmacological measures as appropriate and evaluate response  - Consider cultural and social influences on pain and pain management  - Manage/alleviate anxiety  - Utilize distraction and/or relaxation techniques  - Monitor for opioid side effects  - Notify MD/LIP if interventions unsuccessful or patient reports new pain  - Anticipate increased pain with activity and pre-medicate as appropriate  5/2/2024 1701 by Magaly Taylor, RN  Outcome: Progressing  5/2/2024 0944 by Magaly Tayolr, RN  Outcome: Progressing     Problem: RISK FOR INFECTION - ADULT  Goal: Absence of fever/infection during anticipated neutropenic period  Description: INTERVENTIONS  - Monitor WBC  - Administer growth factors as ordered  - Implement neutropenic guidelines  5/2/2024 1701 by Magaly Taylor, RN  Outcome: Progressing  5/2/2024 0944 by Magaly Taylor RN  Outcome: Progressing     Problem: SAFETY ADULT - FALL  Goal: Free from fall injury  Description: INTERVENTIONS:  - Assess pt frequently for physical needs  - Identify cognitive and physical deficits and behaviors that affect risk of falls.  - [place] fall precautions as indicated by assessment.  - Educate pt/family on patient safety including physical limitations  - Instruct pt to call for assistance with activity based on assessment  - Modify environment to reduce risk of injury  - Provide assistive devices as appropriate  - Consider OT/PT consult to assist with strengthening/mobility  - Encourage toileting schedule  5/2/2024 1701 by Magaly Taylor, RN  Outcome: Progressing  5/2/2024 0944 by Magaly Taylor, RN  Outcome: Progressing      Problem: DISCHARGE PLANNING  Goal: Discharge to home or other facility with appropriate resources  Description: INTERVENTIONS:  - Identify barriers to discharge w/pt and caregiver  - Include patient/family/discharge partner in discharge planning  - Arrange for needed discharge resources and transportation as appropriate  - Identify discharge learning needs (meds, wound care, etc)  - Arrange for interpreters to assist at discharge as needed  - Consider post-discharge preferences of patient/family/discharge partner  - Complete POLST form as appropriate  - Assess patient's ability to be responsible for managing their own health  - Refer to Case Management Department for coordinating discharge planning if the patient needs post-hospital services based on physician/LIP order or complex needs related to functional status, cognitive ability or social support system  5/2/2024 1701 by Magaly Taylor, RN  Outcome: Progressing  5/2/2024 0944 by Magaly Taylor, RN  Outcome: Progressing     Problem: DISCHARGE PLANNING  Goal: Discharge to home or other facility with appropriate resources  Description: INTERVENTIONS:  - Identify barriers to discharge w/pt and caregiver  - Include patient/family/discharge partner in discharge planning  - Arrange for needed discharge resources and transportation as appropriate  - Identify discharge learning needs (meds, wound care, etc)  - Arrange for interpreters to assist at discharge as needed  - Consider post-discharge preferences of patient/family/discharge partner  - Complete POLST form as appropriate  - Assess patient's ability to be responsible for managing their own health  - Refer to Case Management Department for coordinating discharge planning if the patient needs post-hospital services based on physician/LIP order or complex needs related to functional status, cognitive ability or social support system  5/2/2024 1701 by Magaly Taylor, RN  Outcome: Progressing  5/2/2024 0944  by Magaly Taylor, RN  Outcome: Progressing     Problem: Altered Communication/Language Barrier  Goal: Patient/Family is able to understand and participate in their care  Description: Interventions:  - Assess communication ability and preferred communication style  - Implement communication aides and strategies  - Use visual cues when possible  - Listen attentively, be patient, do not interrupt  - Minimize distractions  - Allow time for understanding and response  - Establish method for patient to ask for assistance (call light)  - Provide an  as needed  - Communicate barriers and strategies to overcome with those who interact with patient  5/2/2024 1701 by Magaly Taylor, RN  Outcome: Progressing  5/2/2024 0944 by Magaly Taylor, RN  Outcome: Progressing     Problem: Diabetes/Glucose Control  Goal: Glucose maintained within prescribed range  Description: INTERVENTIONS:  - Monitor Blood Glucose as ordered  - Assess for signs and symptoms of hyperglycemia and hypoglycemia  - Administer ordered medications to maintain glucose within target range  - Assess barriers to adequate nutritional intake and initiate nutrition consult as needed  - Instruct patient on self management of diabetes  5/2/2024 1701 by Magaly Taylor, RN  Outcome: Progressing  5/2/2024 0944 by Magaly Taylor, RN  Outcome: Progressing   Encouraged pt to use MMW and nystatin but strongly refusing it.

## 2024-05-02 NOTE — PROGRESS NOTES
Heme/Onc Progress Note - [facility]      Chief Complaint:    Follow up for evaluation and management of metastatic colon cancer.    Interim History:      She continues to be afebrile on antibiotics. Blood culture x 2 are negative. Urine culture is negative. She still is eating very little. She feels a little better. She is on Meropenem. She has no abdominal pain.     Physical Examination:    Vital Signs: /72 (BP Location: Right arm)   Pulse 74   Temp 97.4 °F (36.3 °C) (Oral)   Resp 18   Ht 1.549 m (5' 1\")   Wt 65 kg (143 lb 3.2 oz)   SpO2 95%   BMI 27.06 kg/m²     General: Patient is alert and oriented x 3, She is very weak and uncomfortable.  HEENT: Oral mucositis.   Chest: Clear to auscultation. No rales and no wheezes.  Heart: Regular rate and rhythm.   Abdomen: Soft and nontender. good bowel sounds.  Extremities: No edema. Non-tender.  Skin:  Warm, dry.      Labs reviewed at this visit:     Recent Labs   Lab 04/29/24  0615 04/30/24  0750 05/01/24  0631   RBC 3.34* 3.02* 2.90*   HGB 9.5* 8.2* 8.1*   HCT 28.5* 25.2* 24.1*   MCV 85.3 83.4 83.1   MCH 28.4 27.2 27.9   MCHC 33.3 32.5 33.6   RDW 20.1 19.8 20.9   NEPRELIM 0.06* 0.16* 0.75*   WBC 0.4* 0.7* 1.4*   PLT 17.0* 18.0* 25.0*       Recent Labs   Lab 04/27/24  1813 04/30/24  0750 04/30/24  2121 05/01/24  0631   * 94  --  83   BUN 8* 8*  --  8*   CREATSERUM 0.64 0.53*  --  0.27*   CA 9.1 8.2*  --  8.3*   ALB 3.3* 1.9*  --  1.7*    142  --  142   K 3.2* 2.3* 3.1* 3.2*  3.2*    111  --  115*   CO2 26.0 21.0  --  18.0*   ALKPHO 148* 95  --  84   AST 21 20  --  8*   ALT 15 9*  --  7*   BILT 1.5 1.4  --  0.9   TP 7.2 5.2*  --  4.7*       Radiologic imaging reviewed at this visit:    CT abd/pelvis on 4/15/2024:  FINDINGS:    LIVER:  Extensive hepatic metastatic deposits throughout the liver.  BILIARY:  High density in the gallbladder is nonspecific.  PANCREAS:  No lesion, fluid collection, ductal dilatation, or atrophy.    SPLEEN:  No  enlargement or focal lesion.    KIDNEYS:  Large cyst in the mid left kidney measures 6 x 7 x 6.0 cm.  Cyst in the upper pole the right kidney measures 5.3 x 5 1 cm. No hydronephrosis.  No calculi in the kidneys.  ADRENALS:  No mass or enlargement.    AORTA/VASCULAR:    Unremarkable as seen on non-contrast imaging.  RETROPERITONEUM:  No mass or adenopathy.    BOWEL/MESENTERY:  Visualized part of the appendix is unremarkable.  Mild thickening of the sigmoid colon.  ABDOMINAL WALL:  Tiny fat containing umbilical hernia.  URINARY BLADDER:  No visible focal wall thickening, lesion, or calculus.    PELVIC NODES:  No adenopathy.    PELVIC ORGANS:  Sequelae of hysterectomy.  BONES:  No bony lesion or fracture.    LUNG BASES:  No visible pulmonary or pleural disease.    OTHER:  Negative.       Impression   CONCLUSION:       1. No calculi in the kidneys.  No hydronephrosis.     2. Extensive metastatic deposits throughout the liver.     3. There is mild thickening of the sigmoid colon.  This may be sequelae of a localized colitis.  This may be sequelae of infectious or inflammatory etiology.  Ischemia is considered unlikely.         Impression/Plan:     Metastatic Colon Cancer:  Liver metastases:    S/P FOLFOX cycle 3 on 4/17/2024. Having complications from treatment even with dose reduction.  Will need to hold or stop chemotherapy indefinitely. We do not plan to consider any chemotherapy or other anticancer treatment until after completing rehab.    Mucositis secondary to chemotherapy:  Nausea secondary to chemotherapy  Chemo induced neutropenia with fever s/p peg-filgrastim:  Oral candidiasis    If ANC is greater than 1000 then will discontinue IV antibiotics.     Continue IV fluconizole for oral candidiasis.   Continue oral care, magic mouthwash and nystatin. Need to push PO diet. I think she will need [facility]. OK for discharge planning from oncology standpoint.    Anemia complicating neoplastic disease:  Thrombocytopenia  secondary to chemotherapy:    Platelets are increasing without bleeding. HGB has been adequate. Will follow as outpatient.        Ronan Camarillo MD  [facility]

## 2024-05-02 NOTE — PHYSICAL THERAPY NOTE
PHYSICAL THERAPY TREATMENT NOTE - INPATIENT    Room Number: 404/404-A     Session: 1     Number of Visits to Meet Established Goals: 5    Presenting Problem: diff eating  Co-Morbidities : Colon Ca with mets with resulting stomatitis after recent chemo, Liver mets    ASSESSMENT   Patient demonstrates good  progress this session, goals  remain in progress.    Patient continues to function below baseline with bed mobility, transfers, gait, and stair negotiation.  Contributing factors to remaining limitations include decreased functional strength, impaired standing balance, and decreased muscular endurance.  Next session anticipate patient to progress bed mobility, transfers, and gait.  Physical Therapy will continue to follow patient for duration of hospitalization.    Patient continues to benefit from continued skilled PT services: to promote return to prior level of function and safety with continuous assistance and gradual rehabilitative therapy .    PLAN  PT Treatment Plan: Bed mobility;Patient education;Gait training;Range of motion;Strengthening;Stair training;Transfer training  Rehab Potential : Fair  Frequency (Obs): 3-5x/week    CURRENT GOALS     Goal #1 Patient is able to demonstrate supine - sit EOB @ level: modified independent      Goal #2 Patient is able to demonstrate transfers Sit to/from Stand at assistance level: independent      Goal #3 Patient is able to ambulate 150 feet with assist device: none at assistance level: independent      Goal #4 Patient independent with stair negotiation 18 steps step-to pattern with use of at least one railing   Goal #5     Goal #6     Goal Comments: Goals established on 4/28/2024 5/2/2024 all goals ongoing    History related to current admission: Patient is a 73 year old female admitted on 4/27/2024 from home for diff eating.  Pt diagnosed with mucositis due to chemotherapy.        HOME SITUATION  Type of Home: Apartment   Home Layout: One level  Stairs to Enter :  18  Railing: Yes     Lives With: Alone  Patient Owned Equipment: None     Prior Level of [place]: Patient is independent gait without device, was able to drive, independent with ADL/self-care.        SUBJECTIVE  \"I was waiting for you to come.  I want to walk, but I'm very weak.\"    OBJECTIVE  Precautions: Bed/chair alarm    WEIGHT BEARING RESTRICTION  Weight Bearing Restriction: None                PAIN ASSESSMENT   Ratin  Location: Pt denied any pain this session       BALANCE                                                                                                                       Static Sitting: Fair +  Dynamic Sitting: Fair +           Static Standing: Fair -  Dynamic Standing: Poor +    ACTIVITY TOLERANCE                         O2 WALK         AM-PAC '6-Clicks' INPATIENT SHORT FORM - BASIC MOBILITY  How much difficulty does the patient currently have...  Patient Difficulty: Turning over in bed (including adjusting bedclothes, sheets and blankets)?: None   Patient Difficulty: Sitting down on and standing up from a chair with arms (e.g., wheelchair, bedside commode, etc.): A Little   Patient Difficulty: Moving from lying on back to sitting on the side of the bed?: A Little   How much help from another person does the patient currently need...   Help from Another: Moving to and from a bed to a chair (including a wheelchair)?: A Little   Help from Another: Need to walk in hospital room?: A Little   Help from Another: Climbing 3-5 steps with a railing?: A Little       AM-PAC Score:  Raw Score: 19   Approx Degree of Impairment: 41.77%   Standardized Score (AM-PAC Scale): 45.44   CMS Modifier (G-Code): CK    FUNCTIONAL ABILITY STATUS  Gait Assessment   Functional Mobility/Gait Assessment  Gait Assistance: Minimum assistance  Distance (ft): 90  Assistive Device: Rolling walker  Pattern: R Foot flat;L Foot flat (Slow gina, lob on turn)    Skilled Therapy Provided    Bed Mobility:  Rolling:  Right, supervision assist.   Supine<>Sit: Min a of 1 to assist trunk into upright posture.  Pt denied dizziness.  Pt is dependent for donning shoes.   Sit<>Supine: NT     Transfer Mobility:  Sit<>Stand: Cues for proper hand placement, but pt cont to reach for walker for assist to stand - min a of 1.   Stand<>Sit: CGA and cues for safety technique.   Gait: Pt completed gait 90'x1 w/ rw and min a of 1.  Pt required assist particularly on a turn when she was concurrently turning and looking up - min a of 1 to prevent lob.    Therapist's Comments: Pt agreeable to sit in bedside chair at end of session, but seemed more concerned about having her bed cleaned.  Concerned that this means pt just wants to return to bed.  PCT informed.        Patient End of Session: Up in chair;Needs met;Call light within reach;RN aware of session/findings;All patient questions and concerns addressed (Rn Magaly aware of treatment session, pct aware that pt's bed needed changing at end of session)    PT Session Time: 25 minutes  Gait Trainin minutes  Therapeutic Activity: 13 minutes  Therapeutic Exercise: 0 minutes   Neuromuscular Re-education: 0 minutes

## 2024-05-02 NOTE — PLAN OF CARE
Pt a/o x4. VSS on RA. Denies need for pain meds. Pt encouraged to take meds for thrush, pt declining. IVF per order.      Fall risk protocol followed, call light within reach.

## 2024-05-02 NOTE — CM/SW NOTE
SW was notified by MD's that they are wanting rehab placement for patient due to increased weakness. Patient has been refusing rehab services but did work with PT today. Note pending.     SW met with patient at bedside to discuss and she is not thrilled with the idea of rehab but seemed open to discussing it further. She asked to get back in to bed. Tech was there for assistance.     LACY sent referral for [facility] placement via [facility]. [facility] submitted for review. PASRR done.     Patient will need insurance authorization in order to go to rehab. LACY will request auth once PT note is entered.     Per Hem Onc patient will not be receiving chemotherapy while in rehab. MD charted. Note attached to referral.     SW will continue to follow for plan of care changes and remain available for any additional DC needs or concerns.     Harika Haro MSW, LSW  Discharge Planner   [id number]

## 2024-05-03 PROBLEM — R62.7 FAILURE TO THRIVE IN ADULT: Status: ACTIVE | Noted: 2024-05-03

## 2024-05-03 LAB
BASOPHILS # BLD: 0 X10(3) UL (ref 0–0.2)
BASOPHILS NFR BLD: 0 %
EOSINOPHIL # BLD: 0.12 X10(3) UL (ref 0–0.7)
EOSINOPHIL NFR BLD: 5 %
ERYTHROCYTE [DISTWIDTH] IN BLOOD BY AUTOMATED COUNT: 21.5 %
HCT VFR BLD AUTO: 26.4 %
HGB BLD-MCNC: 8.7 G/DL
LYMPHOCYTES NFR BLD: 0.34 X10(3) UL (ref 1–4)
LYMPHOCYTES NFR BLD: 14 %
MCH RBC QN AUTO: 27.5 PG (ref 26–34)
MCHC RBC AUTO-ENTMCNC: 33 G/DL (ref 31–37)
MCV RBC AUTO: 83.5 FL
MONOCYTES # BLD: 0.22 X10(3) UL (ref 0.1–1)
MONOCYTES NFR BLD: 9 %
MORPHOLOGY: NORMAL
NEUTROPHILS # BLD AUTO: 1.21 X10 (3) UL (ref 1.5–7.7)
NEUTROPHILS NFR BLD: 70 %
NEUTS BAND NFR BLD: 2 %
NEUTS HYPERSEG # BLD: 1.73 X10(3) UL (ref 1.5–7.7)
PLATELET # BLD AUTO: 30 10(3)UL (ref 150–450)
PLATELET MORPHOLOGY: NORMAL
PLATELETS.RETICULATED NFR BLD AUTO: 9.9 % (ref 0–7)
POTASSIUM SERPL-SCNC: 3.1 MMOL/L (ref 3.5–5.1)
RBC # BLD AUTO: 3.16 X10(6)UL
TOTAL CELLS COUNTED BLD: 100
WBC # BLD AUTO: 2.4 X10(3) UL (ref 4–11)

## 2024-05-03 PROCEDURE — 99232 SBSQ HOSP IP/OBS MODERATE 35: CPT | Performed by: INTERNAL MEDICINE

## 2024-05-03 RX ORDER — DEXTROSE MONOHYDRATE, SODIUM CHLORIDE, AND POTASSIUM CHLORIDE 50; 1.49; 9 G/1000ML; G/1000ML; G/1000ML
INJECTION, SOLUTION INTRAVENOUS CONTINUOUS
Status: DISCONTINUED | OUTPATIENT
Start: 2024-05-03 | End: 2024-05-18

## 2024-05-03 RX ORDER — POTASSIUM CHLORIDE 14.9 MG/ML
20 INJECTION INTRAVENOUS ONCE
Status: COMPLETED | OUTPATIENT
Start: 2024-05-03 | End: 2024-05-03

## 2024-05-03 NOTE — CONGREGATE LIVING REVIEW
Formerly [facility] Living Authorization    The [facility] Review Committee has reviewed this case and the patient IS APPROVED for discharge to a facility for Short Term Skilled once the following procedure is followed:     - The physician discharge instructions (contained within the GILES note for SNF) must inlcude the below appropriate and approved COVID instructions to the facility    For questions regarding CLRC approval process, please contact the CM assigned to the case.  For questions regarding RN discharge workflow, please contact the unit Clinical Leader.

## 2024-05-03 NOTE — PROGRESS NOTES
[facility]   part of [facility]     Hospitalist Progress Note     Lisa Iqbal Patient Status:  Inpatient    10/11/1950 MRN [id number]   [facility] 4NW-A Attending Enedelia Lui MD   Hosp Day # 6 PCP Aunsha Abraham MD     Chief Complaint:  poor oral intake     Subjective:     Patient still not eating, fatigue, frail.     Objective:    Review of Systems:   A comprehensive review of systems was completed; pertinent positive and negatives stated in subjective.    Vital signs:  Temp:  [97.3 °F (36.3 °C)-99.3 °F (37.4 °C)] 99.1 °F (37.3 °C)  Pulse:  [54-87] 75  Resp:  [15-18] 16  BP: (130-146)/(65-77) 146/75  SpO2:  [91 %-100 %] 91 %    Physical Exam:    General: No acute distress  Respiratory: No wheezes, no rhonchi  Cardiovascular: S1, S2, regular rate and rhythm  Abdomen: Soft, Non-tender, non-distended, positive bowel sounds  Neuro: No new focal deficits.   Extremities: No edema      Diagnostic Data:    Labs:  Recent Labs   Lab 24  0615 24  0750 24  0631 24  0752 24  0804   WBC 0.4* 0.7* 1.4* 2.5* 2.4*   HGB 9.5* 8.2* 8.1* 8.5* 8.7*   MCV 85.3 83.4 83.1 82.1 83.5   PLT 17.0* 18.0* 25.0* 29.0* 30.0*   BAND 5 32  --  6  --        Recent Labs   Lab 24  1813 24  0750 24  2121 24  0631 24  0636 24  2259   * 94  --  83  --   --    BUN 8* 8*  --  8*  --   --    CREATSERUM 0.64 0.53*  --  0.27*  --   --    CA 9.1 8.2*  --  8.3*  --   --    ALB 3.3* 1.9*  --  1.7*  --   --     142  --  142  --   --    K 3.2* 2.3*   < > 3.2*  3.2* 2.8* 3.3*    111  --  115*  --   --    CO2 26.0 21.0  --  18.0*  --   --    ALKPHO 148* 95  --  84  --   --    AST 21 20  --  8*  --   --    ALT 15 9*  --  7*  --   --    BILT 1.5 1.4  --  0.9  --   --    TP 7.2 5.2*  --  4.7*  --   --     < > = values in this interval not displayed.       Estimated Creatinine Clearance: 137.9 mL/min (A) (based on SCr of 0.27 mg/dL  (L)).    No results for input(s): \"TROP\", \"TROPHS\", \"CK\" in the last 168 hours.    No results for input(s): \"PTP\", \"INR\" in the last 168 hours.               Microbiology    Hospital Encounter on 04/27/24   1. Urine Culture, Routine     Status: None    Collection Time: 04/30/24  3:35 PM    Specimen: Urine, clean catch   Result Value Ref Range    Urine Culture No Growth at 18-24 hrs. N/A   2. Blood Culture     Status: None (Preliminary result)    Collection Time: 04/29/24  3:21 PM    Specimen: Blood,peripheral   Result Value Ref Range    Blood Culture Result No Growth 3 Days N/A         Imaging: Reviewed in Epic.    Medications:    mirtazapine  15 mg Oral Nightly    pantoprazole  40 mg Intravenous Q24H    potassium chloride  20 mEq Oral Once    nystatin  500,000 Units Oral QID    maalox/diphenhydramine/lidocaine  10 mL Oral TID & HS    fluconazole  200 mg Intravenous Q24H       Assessment & Plan:      # mucositis, oral candidiasis due to chemo  - cont magic mouth wash, nystatin solution   - iv Fluconazole  - pain control     # neutropenic fever, resolved   - on merrem, stop today   - Blood cx NGTD    # pancytopenia with neutropenia  - due to chemo,  improving  - monitor counts, transfuse if Hgb < 7, plt < 10  - oncology following     # oral candidiasis   - IV Fluconazole, plan for 21 day course of fluconazole      # hypokalemia , replace PRN   - change IVF today     # metastatic sigmoid colon cancer     # failure to thrive.   - cont to encourage oral intake   -  Remeron  - PT OT   - d.w family   - may need to get psych involved    Enedelia Lui MD      Supplementary Documentation:     Quality:  DVT Mechanical Prophylaxis:   SCDs,    DVT Pharmacologic Prophylaxis   Medication   None                Code Status: Not on file  Cuellar: No urinary catheter in place  Cuellar Duration (in days):   Central line:    ALESHA:     Discharge is dependent on: progress  At this point Ms. Félix Iqbal is expected to be discharge to:  home    The 21st Century Cures Act makes medical notes like these available to patients in the interest of transparency. Please be advised this is a medical document. Medical documents are intended to carry relevant information, facts as evident, and the clinical opinion of the practitioner. The medical note is intended as peer to peer communication and may appear blunt or direct. It is written in medical language and may contain abbreviations or verbiage that are unfamiliar.

## 2024-05-03 NOTE — PROGRESS NOTES
Heme/Onc Progress Note - [facility]      Chief Complaint:    Follow up for evaluation and management of metastatic colon cancer.    Interim History:      She continues to be afebrile on antibiotics. Her ANC is greater than 1000. Blood culture x 2 are negative. Urine culture is negative. She continues to eat very little. She has oral pain. She has no other pain. She has no motivation to eat. She is very weak.    Physical Examination:    Vital Signs: /75   Pulse 75   Temp 99.1 °F (37.3 °C) (Axillary)   Resp 16   Ht 1.549 m (5' 1\")   Wt 65 kg (143 lb 3.2 oz)   SpO2 91%   BMI 27.06 kg/m²     General: Patient is alert and oriented x 3, She is very weak and uncomfortable.  HEENT: Oral mucositis.   Chest: Clear to auscultation. No rales and no wheezes.  Heart: Regular rate and rhythm.   Abdomen: Soft and nontender. good bowel sounds.  Extremities: No edema. Non-tender.  Skin:  Warm, dry.      Labs reviewed at this visit:     Recent Labs   Lab 05/01/24  0631 05/02/24  0752 05/03/24  0804   RBC 2.90* 3.07* 3.16*   HGB 8.1* 8.5* 8.7*   HCT 24.1* 25.2* 26.4*   MCV 83.1 82.1 83.5   MCH 27.9 27.7 27.5   MCHC 33.6 33.7 33.0   RDW 20.9 21.3 21.5   NEPRELIM 0.75* 1.04* 1.21*   WBC 1.4* 2.5* 2.4*   PLT 25.0* 29.0* 30.0*       Recent Labs   Lab 04/27/24  1813 04/30/24  0750 04/30/24  2121 05/01/24  0631 05/02/24  0636 05/02/24  2259   * 94  --  83  --   --    BUN 8* 8*  --  8*  --   --    CREATSERUM 0.64 0.53*  --  0.27*  --   --    CA 9.1 8.2*  --  8.3*  --   --    ALB 3.3* 1.9*  --  1.7*  --   --     142  --  142  --   --    K 3.2* 2.3*   < > 3.2*  3.2* 2.8* 3.3*    111  --  115*  --   --    CO2 26.0 21.0  --  18.0*  --   --    ALKPHO 148* 95  --  84  --   --    AST 21 20  --  8*  --   --    ALT 15 9*  --  7*  --   --    BILT 1.5 1.4  --  0.9  --   --    TP 7.2 5.2*  --  4.7*  --   --     < > = values in this interval not displayed.       Radiologic imaging reviewed at this visit:    CT abd/pelvis on  4/15/2024:  FINDINGS:    LIVER:  Extensive hepatic metastatic deposits throughout the liver.  BILIARY:  High density in the gallbladder is nonspecific.  PANCREAS:  No lesion, fluid collection, ductal dilatation, or atrophy.    SPLEEN:  No enlargement or focal lesion.    KIDNEYS:  Large cyst in the mid left kidney measures 6 x 7 x 6.0 cm.  Cyst in the upper pole the right kidney measures 5.3 x 5 1 cm. No hydronephrosis.  No calculi in the kidneys.  ADRENALS:  No mass or enlargement.    AORTA/VASCULAR:    Unremarkable as seen on non-contrast imaging.  RETROPERITONEUM:  No mass or adenopathy.    BOWEL/MESENTERY:  Visualized part of the appendix is unremarkable.  Mild thickening of the sigmoid colon.  ABDOMINAL WALL:  Tiny fat containing umbilical hernia.  URINARY BLADDER:  No visible focal wall thickening, lesion, or calculus.    PELVIC NODES:  No adenopathy.    PELVIC ORGANS:  Sequelae of hysterectomy.  BONES:  No bony lesion or fracture.    LUNG BASES:  No visible pulmonary or pleural disease.    OTHER:  Negative.       Impression   CONCLUSION:       1. No calculi in the kidneys.  No hydronephrosis.     2. Extensive metastatic deposits throughout the liver.     3. There is mild thickening of the sigmoid colon.  This may be sequelae of a localized colitis.  This may be sequelae of infectious or inflammatory etiology.  Ischemia is considered unlikely.         Impression/Plan:     Metastatic Colon Cancer:  Liver metastases:    S/P FOLFOX cycle 3 on 4/17/2024. Having complications from treatment even with dose reduction.  I would recommend stopping chemotherapy due to toxicity and her very poor performance status. I would recommend subacute rehab to see if she can get stronger. We would then reconsider after discharge from rehab.     Mucositis secondary to chemotherapy:  Nausea secondary to chemotherapy  Chemo induced neutropenia with fever s/p peg-filgrastim:  Oral candidiasis    ANC is greater than 1000. Repeat CBC  today. I would recommend stopping meropenem and then following for fever.     Continue IV fluconizole for oral candidiasis.   Continue oral care, magic mouthwash and nystatin.     She has very little calorie intake. She still has oral mucositis but she has little motivation to eat. Continue to encourage PO diet.     Anemia complicating neoplastic disease:  Thrombocytopenia secondary to chemotherapy:    Platelets are increasing without bleeding. HGB has been adequate. Repeat cbc today.        Ronan Camarillo MD  [facility]

## 2024-05-03 NOTE — CM/SW NOTE
Met w/ pt to provide [facility] choice list. Pt voiced issues with getting returned to bed when she was sitting up in the chair previously and reports \"No, I'm not going to go there.\" Pt asked if CM could call her family to provide list to assist w/ decision. Pt stated, \"no.\" [facility] list left at bedside. CM will follow up w/ pt. Pt's RN notified of pt's issue with previous episode while sitting up in the chair.     Addendum:  1630: met w/ pt and pt's RN to discuss issues with assistance previously and discuss DC planning.  IPAD used. Pt agrees to rehab at DC. She will review list. Pt is also agreeable to have her dtr Beryl assist w/ deciding on facility. Pt's dtr Beryl contacted to discuss. List emailed to pt's dtr at [email or web address]      CM/SW will remain available for DC planning and/or support.     MARANDA MusaN, CMSRN    [id number]

## 2024-05-03 NOTE — PLAN OF CARE
Pt still refusing her nystatin and magic mouthwash, offered applesauce and/or ice cream but refused.   Problem: PAIN - ADULT  Goal: Verbalizes/displays adequate comfort level or patient's stated pain goal  Description: INTERVENTIONS:  - Encourage pt to monitor pain and request assistance  - Assess pain using appropriate pain scale  - Administer analgesics based on type and severity of pain and evaluate response  - Implement non-pharmacological measures as appropriate and evaluate response  - Consider cultural and social influences on pain and pain management  - Manage/alleviate anxiety  - Utilize distraction and/or relaxation techniques  - Monitor for opioid side effects  - Notify MD/LIP if interventions unsuccessful or patient reports new pain  - Anticipate increased pain with activity and pre-medicate as appropriate  Outcome: Progressing     Problem: RISK FOR INFECTION - ADULT  Goal: Absence of fever/infection during anticipated neutropenic period  Description: INTERVENTIONS  - Monitor WBC  - Administer growth factors as ordered  - Implement neutropenic guidelines  Outcome: Progressing     Problem: SAFETY ADULT - FALL  Goal: Free from fall injury  Description: INTERVENTIONS:  - Assess pt frequently for physical needs  - Identify cognitive and physical deficits and behaviors that affect risk of falls.  - Wesley fall precautions as indicated by assessment.  - Educate pt/family on patient safety including physical limitations  - Instruct pt to call for assistance with activity based on assessment  - Modify environment to reduce risk of injury  - Provide assistive devices as appropriate  - Consider OT/PT consult to assist with strengthening/mobility  - Encourage toileting schedule  Outcome: Progressing     Problem: DISCHARGE PLANNING  Goal: Discharge to home or other facility with appropriate resources  Description: INTERVENTIONS:  - Identify barriers to discharge w/pt and caregiver  - Include  patient/family/discharge partner in discharge planning  - Arrange for needed discharge resources and transportation as appropriate  - Identify discharge learning needs (meds, wound care, etc)  - Arrange for interpreters to assist at discharge as needed  - Consider post-discharge preferences of patient/family/discharge partner  - Complete POLST form as appropriate  - Assess patient's ability to be responsible for managing their own health  - Refer to Case Management Department for coordinating discharge planning if the patient needs post-hospital services based on physician/LIP order or complex needs related to functional status, cognitive ability or social support system  Outcome: Progressing     Problem: Altered Communication/Language Barrier  Goal: Patient/Family is able to understand and participate in their care  Description: Interventions:  - Assess communication ability and preferred communication style  - Implement communication aides and strategies  - Use visual cues when possible  - Listen attentively, be patient, do not interrupt  - Minimize distractions  - Allow time for understanding and response  - Establish method for patient to ask for assistance (call light)  - Provide an  as needed  - Communicate barriers and strategies to overcome with those who interact with patient  Outcome: Progressing     Problem: Diabetes/Glucose Control  Goal: Glucose maintained within prescribed range  Description: INTERVENTIONS:  - Monitor Blood Glucose as ordered  - Assess for signs and symptoms of hyperglycemia and hypoglycemia  - Administer ordered medications to maintain glucose within target range  - Assess barriers to adequate nutritional intake and initiate nutrition consult as needed  - Instruct patient on self management of diabetes  Outcome: Progressing

## 2024-05-03 NOTE — CM/SW NOTE
Submitted clinical via [employer] portal.  nav[facility] Case/Auth ID is [id number].  Final insurance authorization is pending at this time.      SW/CM assigned to the case will continue to follow auth status.      Felicia Hermosillo, DSC

## 2024-05-03 NOTE — DIETARY NOTE
[facility]   part of [facility]    NUTRITION ASSESSMENT    Pt meets severe malnutrition criteria at this time.    CRITERIA FOR MALNUTRITION DIAGNOSIS:  Criteria for severe malnutrition diagnosis: acute illness/injury related to wt loss greater than 5% in 1 month, energy intake less than 50% for greater than 5 days, and body fat moderate depletion      NUTRITION INTERVENTION:    RD nutrition Care Plan- Initiated ONS (oral nutritional supplements)  Meal and Snacks - Monitor and encourage adequate PO intake.   Medical Food Supplements - Ensure Plus High Protein TID, Magic Cup TID, and Magic Shake (Ensure/Magic Cup) 2pm daily snack. Rationale/use for oral supplements discussed.      PATIENT STATUS:   5/3- pt remains with poor po intake related to mouth pain. Minimal intake.  Encouraged po intake of soft foods and ONS.    04/29/24 at risk consult  73 year old female with mucositis d/t chemo for metastatic colon cancer.  Pt with 10 pound wt loss over past month which is significant wt loss per standards of 6.5% in 1 month. Pt with mild to moderate muscle and fat depletion.  Pt with poor po intake for past week or so due to pain from mucositis.        ANTHROPOMETRICS:  Ht: 154.9 cm (5' 1\")  Wt: 65 kg (143 lb 3.2 oz).   BMI: Body mass index is 27.06 kg/m².  IBW: 47.7 kg      WEIGHT HISTORY:   Weight loss: Yes, Severe Wt loss of 10 lbs, 6.5%, over 1 months     Wt Readings from Last 10 Encounters:   05/01/24 65 kg (143 lb 3.2 oz)   04/26/24 61.3 kg (135 lb 3.2 oz)   04/25/24 64 kg (141 lb)   04/24/24 64 kg (141 lb)   04/22/24 64.9 kg (143 lb)   04/17/24 67.2 kg (148 lb 3.2 oz)   04/15/24 70.3 kg (155 lb)   04/12/24 66 kg (145 lb 6.4 oz)   04/05/24 69.4 kg (153 lb)   04/03/24 69.4 kg (153 lb)        NUTRITION:  Diet:       Procedures    Low Fiber/Soft diet Low Fiber/Soft; Is Patient on Accuchecks? No      Food Allergies: No  Cultural/Ethnic/Mandaeism Preferences Addressed: Yes    Percent Meals Eaten (last 3 days)        Date/Time Percent Meals Eaten (%)    05/01/24 1002 0 %     Percent Meals Eaten (%): PATIENT WAS ASSISTED WITH FEEDING, PATIENT DID NOT REALLY EAT WHAT WAS PROVIDED, COMPLAINED TONGUE AND LOWER MOUTH PAIN, ATTENDING RN WAS NOTIFIED at 05/01/24 1002            GI system review:  trouble eating due to mucositits  Last BM: 4/29  Skin and wounds: none    NUTRITION RELATED PHYSICAL FINDINGS:     1. Body Fat/Muscle Mass: moderate depletion body fat Orbital fat pad and Buccal fat pad and mild muscle depletion Temple region and Clavicle region     2. Fluid Accumulation: none     NUTRITION PRESCRIPTION: 64.9kg  Calories: 0656-7776 calories/day (25-30 kcal/kg)  Protein: 78-97 grams protein/day (1.2-1.5 grams protein per kg)  Fluid: ~1 ml/kcal or per MD discretion    NUTRITION DIAGNOSIS/PROBLEM:  Inadequate oral intake related to inability to take or tolerate and increased nutritional demands for healing as evidenced by documented/reported insufficient oral intake, documented/reported unintentional weight loss, loss of fat mass, and loss of muscle mass      MONITOR AND EVALUATE/NUTRITION GOALS:  PO intake of 75% of meals TID - Not met, Continues  PO intake of 75% of oral nutrition supplement/s - Not met, Continues  Weight stable within 1 to 2 lbs during admission - Ongoing      MEDICATIONS:  Reviewed    LABS:  Reviewed    Pt is at High nutrition risk    Karine Clark RD, LDN  Clinical Dietitian  [id number]

## 2024-05-03 NOTE — PLAN OF CARE
Patient very weak, still refusing oral medication and magic mouth wash. IV scheduled meds given per mAR. No new complaints or acute events overnight. Safety precautions in place, call light within reach, plan of care ongoing.     Problem: PAIN - ADULT  Goal: Verbalizes/displays adequate comfort level or patient's stated pain goal  Description: INTERVENTIONS:  - Encourage pt to monitor pain and request assistance  - Assess pain using appropriate pain scale  - Administer analgesics based on type and severity of pain and evaluate response  - Implement non-pharmacological measures as appropriate and evaluate response  - Consider cultural and social influences on pain and pain management  - Manage/alleviate anxiety  - Utilize distraction and/or relaxation techniques  - Monitor for opioid side effects  - Notify MD/LIP if interventions unsuccessful or patient reports new pain  - Anticipate increased pain with activity and pre-medicate as appropriate  Outcome: Progressing     Problem: RISK FOR INFECTION - ADULT  Goal: Absence of fever/infection during anticipated neutropenic period  Description: INTERVENTIONS  - Monitor WBC  - Administer growth factors as ordered  - Implement neutropenic guidelines  Outcome: Progressing     Problem: Altered Communication/Language Barrier  Goal: Patient/Family is able to understand and participate in their care  Description: Interventions:  - Assess communication ability and preferred communication style  - Implement communication aides and strategies  - Use visual cues when possible  - Listen attentively, be patient, do not interrupt  - Minimize distractions  - Allow time for understanding and response  - Establish method for patient to ask for assistance (call light)  - Provide an  as needed  - Communicate barriers and strategies to overcome with those who interact with patient  Outcome: Progressing

## 2024-05-04 PROBLEM — E83.39 HYPOPHOSPHATEMIA: Status: ACTIVE | Noted: 2024-05-04

## 2024-05-04 LAB
ANION GAP SERPL CALC-SCNC: 5 MMOL/L (ref 0–18)
BUN BLD-MCNC: 4 MG/DL (ref 9–23)
CALCIUM BLD-MCNC: 8.1 MG/DL (ref 8.5–10.1)
CHLORIDE SERPL-SCNC: 107 MMOL/L (ref 98–112)
CO2 SERPL-SCNC: 26 MMOL/L (ref 21–32)
CREAT BLD-MCNC: 0.34 MG/DL
EGFRCR SERPLBLD CKD-EPI 2021: 108 ML/MIN/1.73M2 (ref 60–?)
ERYTHROCYTE [DISTWIDTH] IN BLOOD BY AUTOMATED COUNT: 21 %
GLUCOSE BLD-MCNC: 109 MG/DL (ref 70–99)
HCT VFR BLD AUTO: 26.4 %
HGB BLD-MCNC: 8.5 G/DL
MAGNESIUM SERPL-MCNC: 2 MG/DL (ref 1.6–2.6)
MCH RBC QN AUTO: 27.4 PG (ref 26–34)
MCHC RBC AUTO-ENTMCNC: 32.2 G/DL (ref 31–37)
MCV RBC AUTO: 85.2 FL
OSMOLALITY SERPL CALC.SUM OF ELEC: 283 MOSM/KG (ref 275–295)
PHOSPHATE SERPL-MCNC: 0.7 MG/DL (ref 2.5–4.9)
PLATELET # BLD AUTO: 37 10(3)UL (ref 150–450)
PLATELETS.RETICULATED NFR BLD AUTO: 12.5 % (ref 0–7)
POTASSIUM SERPL-SCNC: 3.6 MMOL/L (ref 3.5–5.1)
POTASSIUM SERPL-SCNC: 4.1 MMOL/L (ref 3.5–5.1)
RBC # BLD AUTO: 3.1 X10(6)UL
SODIUM SERPL-SCNC: 138 MMOL/L (ref 136–145)
WBC # BLD AUTO: 2.7 X10(3) UL (ref 4–11)

## 2024-05-04 PROCEDURE — 99232 SBSQ HOSP IP/OBS MODERATE 35: CPT | Performed by: INTERNAL MEDICINE

## 2024-05-04 RX ORDER — POTASSIUM CHLORIDE 14.9 MG/ML
20 INJECTION INTRAVENOUS ONCE
Status: COMPLETED | OUTPATIENT
Start: 2024-05-04 | End: 2024-05-04

## 2024-05-04 NOTE — CM/SW NOTE
Department  notified of request for christopher [facility] referrals started. Assigned CM/SW to follow up with pt/family on further discharge planning.     Felicia Hermosillo, DSC

## 2024-05-04 NOTE — PLAN OF CARE
Patient alert and oriented. Room air. No SOB. Afebrile. Still noted with generalized weakness. Refused all PO medications. Able to give IV Diflucan as ordered. Electrolytes replaced.  Noted with mouth sores d/t Mucositis, declined pain medication when offered.  Fall precautions in place. Call light in reach. Will continue Plan of care.     Problem: PAIN - ADULT  Goal: Verbalizes/displays adequate comfort level or patient's stated pain goal  Description: INTERVENTIONS:  - Encourage pt to monitor pain and request assistance  - Assess pain using appropriate pain scale  - Administer analgesics based on type and severity of pain and evaluate response  - Implement non-pharmacological measures as appropriate and evaluate response  - Consider cultural and social influences on pain and pain management  - Manage/alleviate anxiety  - Utilize distraction and/or relaxation techniques  - Monitor for opioid side effects  - Notify MD/LIP if interventions unsuccessful or patient reports new pain  - Anticipate increased pain with activity and pre-medicate as appropriate  Outcome: Progressing     Problem: RISK FOR INFECTION - ADULT  Goal: Absence of fever/infection during anticipated neutropenic period  Description: INTERVENTIONS  - Monitor WBC  - Administer growth factors as ordered  - Implement neutropenic guidelines  Outcome: Progressing     Problem: METABOLIC/FLUID AND ELECTROLYTES - ADULT  Goal: Electrolytes maintained within normal limits  Description: INTERVENTIONS:  - Monitor labs and rhythm and assess patient for signs and symptoms of electrolyte imbalances  - Administer electrolyte replacement as ordered  - Monitor response to electrolyte replacements, including rhythm and repeat lab results as appropriate  - Fluid restriction as ordered  - Instruct patient on fluid and nutrition restrictions as appropriate  Outcome: Progressing

## 2024-05-04 NOTE — PROGRESS NOTES
[facility]   part of [facility]     Hospitalist Progress Note     Lisa Iqbal Patient Status:  Inpatient    10/11/1950 MRN [id number]   Location [facility] 4NW-A Attending Enedelia Lui MD   Hosp Day # 7 PCP Anusha Abraham MD     Chief Complaint:  poor oral intake     Subjective:     Patient still not eating, fatigue, frail, does not want to get out of bed. SW d/w daughter in regards to placement     Objective:    Review of Systems:   A comprehensive review of systems was completed; pertinent positive and negatives stated in subjective.    Vital signs:  Temp:  [97.5 °F (36.4 °C)-98.9 °F (37.2 °C)] 97.5 °F (36.4 °C)  Pulse:  [79-90] 82  Resp:  [16-18] 18  BP: (131-151)/(48-97) 151/97  SpO2:  [92 %-96 %] 95 %    Physical Exam:    General: No acute distress  Respiratory: No wheezes, no rhonchi  Cardiovascular: S1, S2, regular rate and rhythm  Abdomen: Soft, Non-tender, non-distended, positive bowel sounds  Neuro: No new focal deficits.   Extremities: No edema      Diagnostic Data:    Labs:  Recent Labs   Lab 24  0615 24  0750 24  0631 24  0752 24  0804 24  0608   WBC 0.4* 0.7* 1.4* 2.5* 2.4* 2.7*   HGB 9.5* 8.2* 8.1* 8.5* 8.7* 8.5*   MCV 85.3 83.4 83.1 82.1 83.5 85.2   PLT 17.0* 18.0* 25.0* 29.0* 30.0* 37.0*   BAND 5 32  --  6 2  --        Recent Labs   Lab 24  1813 24  0750 24  2121 24  0631 24  0636 24  2259 24  1532 24  0608   * 94  --  83  --   --   --  109*   BUN 8* 8*  --  8*  --   --   --  4*   CREATSERUM 0.64 0.53*  --  0.27*  --   --   --  0.34*   CA 9.1 8.2*  --  8.3*  --   --   --  8.1*   ALB 3.3* 1.9*  --  1.7*  --   --   --   --     142  --  142  --   --   --  138   K 3.2* 2.3*   < > 3.2*  3.2*   < > 3.3* 3.1* 3.6    111  --  115*  --   --   --  107   CO2 26.0 21.0  --  18.0*  --   --   --  26.0   ALKPHO 148* 95  --  84  --   --   --   --    AST 21 20  --  8*  --    --   --   --    ALT 15 9*  --  7*  --   --   --   --    BILT 1.5 1.4  --  0.9  --   --   --   --    TP 7.2 5.2*  --  4.7*  --   --   --   --     < > = values in this interval not displayed.       Estimated Creatinine Clearance: 109.5 mL/min (A) (based on SCr of 0.34 mg/dL (L)).    No results for input(s): \"TROP\", \"TROPHS\", \"CK\" in the last 168 hours.    No results for input(s): \"PTP\", \"INR\" in the last 168 hours.               Microbiology    Hospital Encounter on 04/27/24   1. Urine Culture, Routine     Status: None    Collection Time: 04/30/24  3:35 PM    Specimen: Urine, clean catch   Result Value Ref Range    Urine Culture No Growth at 18-24 hrs. N/A   2. Blood Culture     Status: None (Preliminary result)    Collection Time: 04/29/24  3:21 PM    Specimen: Blood,peripheral   Result Value Ref Range    Blood Culture Result No Growth 4 Days N/A         Imaging: Reviewed in Epic.    Medications:    potassium chloride  20 mEq Intravenous Once    sodium phosphate  30 mmol Intravenous Once    sodium phosphate  15 mmol Intravenous Once    mirtazapine  15 mg Oral Nightly    pantoprazole  40 mg Intravenous Q24H    potassium chloride  20 mEq Oral Once    nystatin  500,000 Units Oral QID    maalox/diphenhydramine/lidocaine  10 mL Oral TID & HS    fluconazole  200 mg Intravenous Q24H       Assessment & Plan:      # mucositis, oral candidiasis due to chemo  - cont magic mouth wash, nystatin solution   - iv Fluconazole  - pain control     # hypophosphatemia  - replace     # neutropenic fever, resolved   - on merrem, stop today   - Blood cx NGTD    # pancytopenia with neutropenia  - due to chemo,  improving  - monitor counts, transfuse if Hgb < 7, plt < 10  - oncology following     # oral candidiasis   - IV Fluconazole, plan for 21 day course of fluconazole      # hypokalemia , replace PRN   - change IVF today     # metastatic sigmoid colon cancer     # failure to thrive.   - cont to encourage oral intake   -  Remeron  - PT OT    - samantha family   - may need to get psych involved  - ELIANE Lui MD      Supplementary Documentation:     Quality:  DVT Mechanical Prophylaxis:   SCDs,    DVT Pharmacologic Prophylaxis   Medication   None                Code Status: Not on file  Cuellar: No urinary catheter in place  Cuellar Duration (in days):   Central line:    ALESHA:     Discharge is dependent on: progress  At this point Ms. Félix Iqbal is expected to be discharge to: home    The 21st Century Cures Act makes medical notes like these available to patients in the interest of transparency. Please be advised this is a medical document. Medical documents are intended to carry relevant information, facts as evident, and the clinical opinion of the practitioner. The medical note is intended as peer to peer communication and may appear blunt or direct. It is written in medical language and may contain abbreviations or verbiage that are unfamiliar.

## 2024-05-04 NOTE — CM/SW NOTE
[facility]  [id number].    Clin updts, via portal     Assigned SW/CM to follow up with patient/family on discharge plan.     Felicia Hermosillo, DSC

## 2024-05-04 NOTE — PROGRESS NOTES
Hematology/Oncology Progress Note    Patient Name: Lisa Iqbal  Medical Record Number: [id number]    YOB: 1950     Reason for Consultation:  Lisa Iqbal was seen today for the diagnosis of metastatic colon cancer    Interval events: Patient reports ongoing mouth pain.  Continues to have poor oral intake.  States her mouth is dry.    Inpatient Meds:   potassium chloride  20 mEq Intravenous Once    sodium phosphate  30 mmol Intravenous Once    sodium phosphate  15 mmol Intravenous Once    mirtazapine  15 mg Oral Nightly    pantoprazole  40 mg Intravenous Q24H    potassium chloride  20 mEq Oral Once    nystatin  500,000 Units Oral QID    maalox/diphenhydramine/lidocaine  10 mL Oral TID & HS    fluconazole  200 mg Intravenous Q24H      potassium chloride in dextrose 5%-sodium chloride 0.9% 100 mL/hr at 05/04/24 0115     PRN Meds:    acetaminophen    morphINE    Lidocaine Viscous HCl    morphINE **OR** morphINE **OR** morphINE    melatonin    ondansetron    metoclopramide    polyethylene glycol (PEG 3350)    sennosides  Allergies:   Allergies   Allergen Reactions    Septra [Sulfamethoxazole W/Trimethoprim] RASH    Sulfa Antibiotics RASH       Vital Signs:  Height: --  Weight: --  BSA (Calculated - sq m): --  Pulse: 82 (05/04 0819)  BP: 151/97 (05/04 0819)  Temp: 97.5 °F (36.4 °C) (05/04 0819)  Do Not Use - Resp Rate: --  SpO2: 95 % (05/04 0819)  Wt Readings from Last 6 Encounters:   05/01/24 65 kg (143 lb 3.2 oz)   04/26/24 61.3 kg (135 lb 3.2 oz)   04/25/24 64 kg (141 lb)   04/24/24 64 kg (141 lb)   04/22/24 64.9 kg (143 lb)   04/17/24 67.2 kg (148 lb 3.2 oz)     Physical Examination:  General: Patient is alert and oriented  ENT: Appears improved.  No significant thrush or mucositis noted today  CV: Regular rate and rhythm,, no murmurs, no LE edema  Respiratory: Lungs clear to auscultation bilaterally  GI/Abd: Soft, non-tender   Skin: no rashes or  [facility]    Laboratory:  Recent Labs   Lab 05/01/24 0631 05/02/24 0752 05/03/24  0804 05/04/24  0608   WBC 1.4* 2.5* 2.4* 2.7*   HGB 8.1* 8.5* 8.7* 8.5*   HCT 24.1* 25.2* 26.4* 26.4*   PLT 25.0* 29.0* 30.0* 37.0*   MCV 83.1 82.1 83.5 85.2   RDW 20.9 21.3 21.5 21.0   NEPRELIM 0.75* 1.04* 1.21*  --      Recent Labs   Lab 04/27/24  1813 04/30/24  0750 04/30/24 2121 05/01/24 0631 05/02/24  0636 05/02/24  2259 05/03/24  1532 05/04/24  0608    142  --  142  --   --   --  138   K 3.2* 2.3*   < > 3.2*  3.2*   < > 3.3* 3.1* 3.6    111  --  115*  --   --   --  107   CO2 26.0 21.0  --  18.0*  --   --   --  26.0   BUN 8* 8*  --  8*  --   --   --  4*   CREATSERUM 0.64 0.53*  --  0.27*  --   --   --  0.34*   * 94  --  83  --   --   --  109*   CA 9.1 8.2*  --  8.3*  --   --   --  8.1*   PHOS  --   --   --   --   --   --   --  0.7*   TP 7.2 5.2*  --  4.7*  --   --   --   --    ALB 3.3* 1.9*  --  1.7*  --   --   --   --    ALKPHO 148* 95  --  84  --   --   --   --    AST 21 20  --  8*  --   --   --   --    ALT 15 9*  --  7*  --   --   --   --    BILT 1.5 1.4  --  0.9  --   --   --   --     < > = values in this interval not displayed.     No results for input(s): \"PT\", \"INR\", \"PTT\" in the last 168 hours.    Impression & Plan:     *Metastatic colon cancer to liver  -Follows with Dr. Camarillo. S/p cycle 3 FOLFOX on 4/17/24.  Poorly tolerated even with dose reduction.  Plan is to hold off on further chemotherapy due to poor performance status.  Recommend subacute rehab at this time.  May reconsider additional chemotherapy after discharge from [facility].    *Mucositis due to chemotherapy, oral candidiasis  -Continue oral care, Magic mouthwash, nystatin.  -Pain meds prn    *Neutropenic fever  -Neutropenia due to chemotherapy.  Fevers have resolved.  ANC has improved.  Is now off meropenem.    *Anemia and thrombocytopenia due to chemotherapy  -Gradually improving.  Remains adequate today.  Follow CBC.    *Poor appetite and  oral intake  -Encourage patient to try to eat and drink better today.  -Mirtazapine    *Physical deconditioning  -Plan to discharge to ELIANE Trevizo MD  Hematology/Medical Oncology  [facility]

## 2024-05-05 LAB
BASOPHILS # BLD: 0 X10(3) UL (ref 0–0.2)
BASOPHILS NFR BLD: 0 %
EOSINOPHIL # BLD: 0.19 X10(3) UL (ref 0–0.7)
EOSINOPHIL NFR BLD: 7 %
ERYTHROCYTE [DISTWIDTH] IN BLOOD BY AUTOMATED COUNT: 20.9 %
HCT VFR BLD AUTO: 29.1 %
HGB BLD-MCNC: 9.7 G/DL
LYMPHOCYTES NFR BLD: 0.76 X10(3) UL (ref 1–4)
LYMPHOCYTES NFR BLD: 28 %
MCH RBC QN AUTO: 27.2 PG (ref 26–34)
MCHC RBC AUTO-ENTMCNC: 33.3 G/DL (ref 31–37)
MCV RBC AUTO: 81.5 FL
MONOCYTES # BLD: 0.08 X10(3) UL (ref 0.1–1)
MONOCYTES NFR BLD: 3 %
MORPHOLOGY: NORMAL
NEUTROPHILS # BLD AUTO: 1.45 X10 (3) UL (ref 1.5–7.7)
NEUTROPHILS NFR BLD: 62 %
NEUTS HYPERSEG # BLD: 1.67 X10(3) UL (ref 1.5–7.7)
NRBC BLD MANUAL-RTO: 1 %
PHOSPHATE SERPL-MCNC: 1.8 MG/DL (ref 2.5–4.9)
PLATELET # BLD AUTO: 57 10(3)UL (ref 150–450)
PLATELET MORPHOLOGY: NORMAL
PLATELETS.RETICULATED NFR BLD AUTO: 14.5 % (ref 0–7)
RBC # BLD AUTO: 3.57 X10(6)UL
TOTAL CELLS COUNTED BLD: 100
WBC # BLD AUTO: 2.7 X10(3) UL (ref 4–11)

## 2024-05-05 PROCEDURE — 99232 SBSQ HOSP IP/OBS MODERATE 35: CPT | Performed by: INTERNAL MEDICINE

## 2024-05-05 RX ORDER — ENOXAPARIN SODIUM 100 MG/ML
40 INJECTION SUBCUTANEOUS DAILY
Status: DISCONTINUED | OUTPATIENT
Start: 2024-05-06 | End: 2024-05-18

## 2024-05-05 RX ORDER — DIPHENHYDRAMINE HYDROCHLORIDE AND LIDOCAINE HYDROCHLORIDE AND ALUMINUM HYDROXIDE AND MAGNESIUM HYDRO
10 KIT 4 TIMES DAILY PRN
Status: DISCONTINUED | OUTPATIENT
Start: 2024-05-05 | End: 2024-05-18

## 2024-05-05 NOTE — PROGRESS NOTES
[facility]   part of [facility]     Hospitalist Progress Note     Lisa Iqbal Patient Status:  Inpatient    10/11/1950 MRN [id number]   [facility] 4N-A Attending Enedelia Lui MD   Hosp Day # 8 PCP Anusha Abraham MD     Chief Complaint:  drowsy     Subjective:     Patient has not been eating or participating with staff. Not motivated seems withdrawn.     Objective:    Review of Systems:   A comprehensive review of systems was completed; pertinent positive and negatives stated in subjective.    Vital signs:  Temp:  [97.7 °F (36.5 °C)-99.1 °F (37.3 °C)] 97.7 °F (36.5 °C)  Pulse:  [76-88] 78  Resp:  [17-22] 22  BP: (125-138)/(71-90) 129/79  SpO2:  [95 %-99 %] 95 %    Physical Exam:    General: No acute distress  Respiratory: No wheezes, no rhonchi  Cardiovascular: S1, S2, regular rate and rhythm  Abdomen: Soft, Non-tender, non-distended, positive bowel sounds  Neuro: No new focal deficits.   Extremities: No edema      Diagnostic Data:    Labs:  Recent Labs   Lab 24  0615 24  0750 24  0631 24  0752 24  0804 24  0608   WBC 0.4* 0.7* 1.4* 2.5* 2.4* 2.7*   HGB 9.5* 8.2* 8.1* 8.5* 8.7* 8.5*   MCV 85.3 83.4 83.1 82.1 83.5 85.2   PLT 17.0* 18.0* 25.0* 29.0* 30.0* 37.0*   BAND 5 32  --  6 2  --        Recent Labs   Lab 24  0750 24  0631 24  0636 24  1532 24  0608 24  1144   GLU 94  --  83  --   --  109*  --    BUN 8*  --  8*  --   --  4*  --    CREATSERUM 0.53*  --  0.27*  --   --  0.34*  --    CA 8.2*  --  8.3*  --   --  8.1*  --    ALB 1.9*  --  1.7*  --   --   --   --      --  142  --   --  138  --    K 2.3*   < > 3.2*  3.2*   < > 3.1* 3.6 4.1     --  115*  --   --  107  --    CO2 21.0  --  18.0*  --   --  26.0  --    ALKPHO 95  --  84  --   --   --   --    AST 20  --  8*  --   --   --   --    ALT 9*  --  7*  --   --   --   --    BILT 1.4  --  0.9  --   --   --   --    TP 5.2*   --  4.7*  --   --   --   --     < > = values in this interval not displayed.       Estimated Creatinine Clearance: 109.5 mL/min (A) (based on SCr of 0.34 mg/dL (L)).    No results for input(s): \"TROP\", \"TROPHS\", \"CK\" in the last 168 hours.    No results for input(s): \"PTP\", \"INR\" in the last 168 hours.               Microbiology    Hospital Encounter on 04/27/24   1. Urine Culture, Routine     Status: None    Collection Time: 04/30/24  3:35 PM    Specimen: Urine, clean catch   Result Value Ref Range    Urine Culture No Growth at 18-24 hrs. N/A   2. Blood Culture     Status: None    Collection Time: 04/29/24  3:21 PM    Specimen: Blood,peripheral   Result Value Ref Range    Blood Culture Result No Growth 5 Days N/A         Imaging: Reviewed in Epic.    Medications:    mirtazapine  15 mg Oral Nightly    pantoprazole  40 mg Intravenous Q24H    potassium chloride  20 mEq Oral Once    nystatin  500,000 Units Oral QID    fluconazole  200 mg Intravenous Q24H       Assessment & Plan:      # mucositis, oral candidiasis due to chemo- improved clinically   - cont magic mouth wash, nystatin solution   - iv Fluconazole  - pain control     # hypophosphatemia  - replaced     # neutropenic fever, resolved   - off merrem   - Blood cx NGTD    # pancytopenia with neutropenia  - due to chemo,  improving  - monitor counts, transfuse if Hgb < 7, plt < 10  - oncology following     # oral candidiasis   - IV Fluconazole, plan for 21 day course of fluconazole      # hypokalemia , replace PRN    # metastatic sigmoid colon cancer   - will need to f/u with oncology tomorrow to discuss GOC in light of her poor functional status     # failure to thrive.   - cont to encourage oral intake   -  Remeron  - PT OT   - d.w family   - consider NGT placement.  Patient is declining   - may need to get psych involved  - ELIANE PLACEMENT     Enedelia Lui MD      Supplementary Documentation:     Quality:  DVT Mechanical Prophylaxis:   SCDs,    DVT Pharmacologic  Prophylaxis   Medication   None                Code Status: Not on file  Cuellar: No urinary catheter in place  Cuellar Duration (in days):   Central line:    ALESHA:     Discharge is dependent on: progress  At this point Ms. Félix Iqbal is expected to be discharge to: home    The 21st Century Cures Act makes medical notes like these available to patients in the interest of transparency. Please be advised this is a medical document. Medical documents are intended to carry relevant information, facts as evident, and the clinical opinion of the practitioner. The medical note is intended as peer to peer communication and may appear blunt or direct. It is written in medical language and may contain abbreviations or verbiage that are unfamiliar.

## 2024-05-05 NOTE — CM/SW NOTE
SW spoke with dtr to follow up on ELIANE choice. Reserved [employer] [employer] [place]. Shlomo pending, id: [id number]. Notified [employer].     [email or web address] to remain available.    ARTEMIO Camarena

## 2024-05-05 NOTE — PROGRESS NOTES
Hematology/Oncology Progress Note    Patient Name: Lisa Iqbal  Medical Record Number: [id number]    YOB: 1950     Reason for Consultation:  Lisa Iqbal was seen today for the diagnosis of metastatic colon cancer    Interval events: Patient remains lethargic but is arousable.  Still with poor p.o. intake.  Despite encouragement she says she does not want to eat or drink anything.  Has not had any nausea or vomiting.  Mucositis has improved.      Inpatient Meds:   mirtazapine  15 mg Oral Nightly    pantoprazole  40 mg Intravenous Q24H    potassium chloride  20 mEq Oral Once    nystatin  500,000 Units Oral QID    fluconazole  200 mg Intravenous Q24H      potassium chloride in dextrose 5%-sodium chloride 0.9% 100 mL/hr at 05/05/24 0342     PRN Meds:    maalox/diphenhydramine/lidocaine    acetaminophen    morphINE    Lidocaine Viscous HCl    morphINE **OR** morphINE **OR** morphINE    melatonin    ondansetron    metoclopramide    polyethylene glycol (PEG 3350)    sennosides  Allergies:   Allergies   Allergen Reactions    Septra [Sulfamethoxazole W/Trimethoprim] RASH    Sulfa Antibiotics RASH       Vital Signs:  Height: --  Weight: --  BSA (Calculated - sq m): --  Pulse: 78 (05/05 0755)  BP: 129/79 (05/05 0755)  Temp: 97.7 °F (36.5 °C) (05/05 0755)  Do Not Use - Resp Rate: --  SpO2: 95 % (05/05 0440)  Wt Readings from Last 6 Encounters:   05/01/24 65 kg (143 lb 3.2 oz)   04/26/24 61.3 kg (135 lb 3.2 oz)   04/25/24 64 kg (141 lb)   04/24/24 64 kg (141 lb)   04/22/24 64.9 kg (143 lb)   04/17/24 67.2 kg (148 lb 3.2 oz)     Physical Examination:  General: Patient is oriented, interacts but is withdrawn, depressed affect  ENT: No significant mucositis noted today  CV: Regular rate and rhythm,, no murmurs, no LE edema  Respiratory: Lungs clear to auscultation bilaterally  GI/Abd: Soft, non-tender   Skin: no rashes or petechiae    Laboratory:  Recent Labs   Lab 05/01/24  0631  05/02/24 0752 05/03/24  0804 05/04/24  0608   WBC 1.4* 2.5* 2.4* 2.7*   HGB 8.1* 8.5* 8.7* 8.5*   HCT 24.1* 25.2* 26.4* 26.4*   PLT 25.0* 29.0* 30.0* 37.0*   MCV 83.1 82.1 83.5 85.2   RDW 20.9 21.3 21.5 21.0   NEPRELIM 0.75* 1.04* 1.21*  --      Recent Labs   Lab 04/30/24  0750 04/30/24  2121 05/01/24  0631 05/02/24  0636 05/03/24  1532 05/04/24  0608 05/04/24  1144     --  142  --   --  138  --    K 2.3*   < > 3.2*  3.2*   < > 3.1* 3.6 4.1     --  115*  --   --  107  --    CO2 21.0  --  18.0*  --   --  26.0  --    BUN 8*  --  8*  --   --  4*  --    CREATSERUM 0.53*  --  0.27*  --   --  0.34*  --    GLU 94  --  83  --   --  109*  --    CA 8.2*  --  8.3*  --   --  8.1*  --    PHOS  --   --   --   --   --  0.7*  --    TP 5.2*  --  4.7*  --   --   --   --    ALB 1.9*  --  1.7*  --   --   --   --    ALKPHO 95  --  84  --   --   --   --    AST 20  --  8*  --   --   --   --    ALT 9*  --  7*  --   --   --   --    BILT 1.4  --  0.9  --   --   --   --     < > = values in this interval not displayed.     No results for input(s): \"PT\", \"INR\", \"PTT\" in the last 168 hours.    Impression & Plan:     *Metastatic colon cancer to liver  -Follows with Dr. Camarillo. S/p cycle 3 FOLFOX on 4/17/24.  Poorly tolerated even with dose reduction.  Plan is to hold off on further chemotherapy due to poor performance status.  Recommend subacute rehab at this time.  May reconsider additional chemotherapy after discharge from [facility].    *Mucositis due to chemotherapy, oral candidiasis  -Continue oral care, Magic mouthwash, nystatin.  -Pain meds prn    *Neutropenic fever  -Neutropenia due to chemotherapy.  Fevers have resolved.  ANC has improved, awaiting repeat CBC today.  Is now off meropenem.    *Anemia and thrombocytopenia due to chemotherapy  -Gradually improving, awaiting repeat CBC today.  Follow CBC.    *Poor appetite and oral intake  -Encourage patient to try to eat and drink better today.  -Mirtazapine 15mg at bedtime.  May  consider increasing mirtazapine if appetite not improving.    *Physical deconditioning  -Plan to discharge to ELIANE Trevizo MD  Hematology/Medical Oncology  [facility]

## 2024-05-05 NOTE — PLAN OF CARE
Pt Aox3. VSS. RA.  Complain of pain of lips/inside mouth. Using ice chips as a relief.   Refusing food/drink and PO medications.   Up x 2 assist. Pt placed in the chair.  She requested to be place back to bed after a short time period.  Pt wants to be left alone and sleep.   Psychiatry consulted per order.     Problem: METABOLIC/FLUID AND ELECTROLYTES - ADULT  Goal: Electrolytes maintained within normal limits  Description: INTERVENTIONS:  - Monitor labs and rhythm and assess patient for signs and symptoms of electrolyte imbalances  - Administer electrolyte replacement as ordered  - Monitor response to electrolyte replacements, including rhythm and repeat lab results as appropriate  - Fluid restriction as ordered  - Instruct patient on fluid and nutrition restrictions as appropriate  Outcome: Progressing     Problem: DISCHARGE PLANNING  Goal: Discharge to home or other facility with appropriate resources  Description: INTERVENTIONS:  - Identify barriers to discharge w/pt and caregiver  - Include patient/family/discharge partner in discharge planning  - Arrange for needed discharge resources and transportation as appropriate  - Identify discharge learning needs (meds, wound care, etc)  - Arrange for interpreters to assist at discharge as needed  - Consider post-discharge preferences of patient/family/discharge partner  - Complete POLST form as appropriate  - Assess patient's ability to be responsible for managing their own health  - Refer to Case Management Department for coordinating discharge planning if the patient needs post-hospital services based on physician/LIP order or complex needs related to functional status, cognitive ability or social support system  Outcome: Progressing

## 2024-05-05 NOTE — PLAN OF CARE
Patient alert, drowsy, asleep most of the time. Still with poor PO intake. Encouraged to eat or drink more but patient declined when offered. Generalized weakness noted. IV fluid continuous. Able to take scheduled IV medications for oral thrush.  No complaints of pain. No nausea and vomiting. Fall precautions in place. Call light in reach. Needs attended.     POC: Discharge to [facility].     Problem: RISK FOR INFECTION - ADULT  Goal: Absence of fever/infection during anticipated neutropenic period  Description: INTERVENTIONS  - Monitor WBC  - Administer growth factors as ordered  - Implement neutropenic guidelines  Outcome: Progressing     Problem: Diabetes/Glucose Control  Goal: Glucose maintained within prescribed range  Description: INTERVENTIONS:  - Monitor Blood Glucose as ordered  - Assess for signs and symptoms of hyperglycemia and hypoglycemia  - Administer ordered medications to maintain glucose within target range  - Assess barriers to adequate nutritional intake and initiate nutrition consult as needed  - Instruct patient on self management of diabetes  Outcome: Progressing     Problem: METABOLIC/FLUID AND ELECTROLYTES - ADULT  Goal: Electrolytes maintained within normal limits  Description: INTERVENTIONS:  - Monitor labs and rhythm and assess patient for signs and symptoms of electrolyte imbalances  - Administer electrolyte replacement as ordered  - Monitor response to electrolyte replacements, including rhythm and repeat lab results as appropriate  - Fluid restriction as ordered  - Instruct patient on fluid and nutrition restrictions as appropriate  Outcome: Progressing

## 2024-05-06 ENCOUNTER — APPOINTMENT (OUTPATIENT)
Dept: GENERAL RADIOLOGY | Facility: HOSPITAL | Age: 74
DRG: 157 | End: 2024-05-06
Attending: INTERNAL MEDICINE
Payer: MEDICARE

## 2024-05-06 PROBLEM — F09 COGNITIVE DISORDER: Status: ACTIVE | Noted: 2024-05-06

## 2024-05-06 PROBLEM — G93.40 ACUTE ENCEPHALOPATHY: Status: ACTIVE | Noted: 2024-05-06

## 2024-05-06 LAB
ANION GAP SERPL CALC-SCNC: 8 MMOL/L (ref 0–18)
BASOPHILS # BLD AUTO: 0.02 X10(3) UL (ref 0–0.2)
BASOPHILS NFR BLD AUTO: 0.8 %
BUN BLD-MCNC: 2 MG/DL (ref 9–23)
CALCIUM BLD-MCNC: 8.5 MG/DL (ref 8.5–10.1)
CHLORIDE SERPL-SCNC: 103 MMOL/L (ref 98–112)
CO2 SERPL-SCNC: 23 MMOL/L (ref 21–32)
CREAT BLD-MCNC: 0.6 MG/DL
EGFRCR SERPLBLD CKD-EPI 2021: 94 ML/MIN/1.73M2 (ref 60–?)
EOSINOPHIL # BLD AUTO: 0.14 X10(3) UL (ref 0–0.7)
EOSINOPHIL NFR BLD AUTO: 5.6 %
ERYTHROCYTE [DISTWIDTH] IN BLOOD BY AUTOMATED COUNT: 21 %
GLUCOSE BLD-MCNC: 124 MG/DL (ref 70–99)
HCT VFR BLD AUTO: 28.1 %
HGB BLD-MCNC: 9.1 G/DL
IMM GRANULOCYTES # BLD AUTO: 0.03 X10(3) UL (ref 0–1)
IMM GRANULOCYTES NFR BLD: 1.2 %
LYMPHOCYTES # BLD AUTO: 0.73 X10(3) UL (ref 1–4)
LYMPHOCYTES NFR BLD AUTO: 29.1 %
MAGNESIUM SERPL-MCNC: 1.9 MG/DL (ref 1.6–2.6)
MCH RBC QN AUTO: 26.9 PG (ref 26–34)
MCHC RBC AUTO-ENTMCNC: 32.4 G/DL (ref 31–37)
MCV RBC AUTO: 83.1 FL
MONOCYTES # BLD AUTO: 0.41 X10(3) UL (ref 0.1–1)
MONOCYTES NFR BLD AUTO: 16.3 %
NEUTROPHILS # BLD AUTO: 1.18 X10 (3) UL (ref 1.5–7.7)
NEUTROPHILS # BLD AUTO: 1.18 X10(3) UL (ref 1.5–7.7)
NEUTROPHILS NFR BLD AUTO: 47 %
OSMOLALITY SERPL CALC.SUM OF ELEC: 276 MOSM/KG (ref 275–295)
PHOSPHATE SERPL-MCNC: 1.7 MG/DL (ref 2.5–4.9)
PLATELET # BLD AUTO: 76 10(3)UL (ref 150–450)
PLATELETS.RETICULATED NFR BLD AUTO: 15 % (ref 0–7)
POTASSIUM SERPL-SCNC: 3.5 MMOL/L (ref 3.5–5.1)
RBC # BLD AUTO: 3.38 X10(6)UL
SODIUM SERPL-SCNC: 134 MMOL/L (ref 136–145)
WBC # BLD AUTO: 2.5 X10(3) UL (ref 4–11)

## 2024-05-06 PROCEDURE — 71045 X-RAY EXAM CHEST 1 VIEW: CPT | Performed by: INTERNAL MEDICINE

## 2024-05-06 PROCEDURE — 90792 PSYCH DIAG EVAL W/MED SRVCS: CPT | Performed by: OTHER

## 2024-05-06 PROCEDURE — 99232 SBSQ HOSP IP/OBS MODERATE 35: CPT | Performed by: INTERNAL MEDICINE

## 2024-05-06 PROCEDURE — 99233 SBSQ HOSP IP/OBS HIGH 50: CPT | Performed by: INTERNAL MEDICINE

## 2024-05-06 RX ORDER — SODIUM BICARBONATE 325 MG/1
325 TABLET ORAL AS NEEDED
Status: DISCONTINUED | OUTPATIENT
Start: 2024-05-06 | End: 2024-05-18

## 2024-05-06 RX ORDER — MELATONIN
100 DAILY
Status: DISPENSED | OUTPATIENT
Start: 2024-05-06 | End: 2024-05-13

## 2024-05-06 NOTE — PHYSICAL THERAPY NOTE
PHYSICAL THERAPY TREATMENT NOTE - INPATIENT    Room Number: 404/404-A     Session: 2     Number of Visits to Meet Established Goals: 5    Presenting Problem: diff eating  Co-Morbidities : Colon Ca with mets with resulting stomatitis after recent chemo, Liver mets    ASSESSMENT   Patient demonstrates limited progress this session, goals  remain in progress.    Patient continues to function below baseline with bed mobility, transfers, gait, and stair negotiation.  Contributing factors to remaining limitations include decreased functional strength, decreased muscular endurance, medical status, and pt declining both food and feeding tube .  Next session anticipate patient to progress bed mobility, transfers, and gait.  Physical Therapy will continue to follow patient for duration of hospitalization.    Patient continues to benefit from continued skilled PT services: to promote return to prior level of function and safety with continuous assistance and gradual rehabilitative therapy .    PLAN  PT Treatment Plan: Bed mobility;Patient education;Gait training;Range of motion;Strengthening;Stair training;Transfer training  Rehab Potential : Fair  Frequency (Obs): 3-5x/week    CURRENT GOALS     Goal #1 Patient is able to demonstrate supine - sit EOB @ level: modified independent      Goal #2 Patient is able to demonstrate transfers Sit to/from Stand at assistance level: independent      Goal #3 Patient is able to ambulate 150 feet with assist device: none at assistance level: independent      Goal #4 Patient independent with stair negotiation 18 steps step-to pattern with use of at least one railing   Goal #5     Goal #6     Goal Comments: Goals established on 4/28/2024 5/6/2024 all goals ongoing    History related to current admission: Patient is a 73 year old female admitted on 4/27/2024 from home for diff eating.  Pt diagnosed with mucositis due to chemotherapy.        HOME SITUATION  Type of Home: Apartment   Home  Layout: One level  Stairs to Enter : 18  Railing: Yes     Lives With: Alone  Patient Owned Equipment: None     Prior Level of [place]: Patient is independent gait without device, was able to drive, independent with ADL/self-care.        SUBJECTIVE  \"I'm cold, keep me covered.\"    OBJECTIVE  Precautions: Bed/chair alarm    WEIGHT BEARING RESTRICTION  Weight Bearing Restriction: None                PAIN ASSESSMENT   Ratin  Location: Pt denied any pain this session       BALANCE                                                                                                                       Static Sitting: Not tested  Dynamic Sitting: Not tested           Static Standing: Not tested  Dynamic Standing: Not tested    ACTIVITY TOLERANCE                         O2 WALK         AM-PAC '6-Clicks' INPATIENT SHORT FORM - BASIC MOBILITY  How much difficulty does the patient currently have...  Patient Difficulty: Turning over in bed (including adjusting bedclothes, sheets and blankets)?: Unable   Patient Difficulty: Sitting down on and standing up from a chair with arms (e.g., wheelchair, bedside commode, etc.): Unable   Patient Difficulty: Moving from lying on back to sitting on the side of the bed?: Unable   How much help from another person does the patient currently need...   Help from Another: Moving to and from a bed to a chair (including a wheelchair)?: Total   Help from Another: Need to walk in hospital room?: Total   Help from Another: Climbing 3-5 steps with a railing?: Total       AM-PAC Score:  Raw Score: 6   Approx Degree of Impairment: 100%   Standardized Score (AM-PAC Scale): 23.55   CMS Modifier (G-Code): CN    FUNCTIONAL ABILITY STATUS  Gait Assessment   Functional Mobility/Gait Assessment  Gait Assistance: Not tested  Distance (ft): 0  Assistive Device:  (N/a)  Pattern:  (n/a)    Skilled Therapy Provided  Pt declined any oob activity despite education and encouragement.  Pt was agreeable to ex in  the bed.  Per rn, staff did have her get in the chair yesterday, but she only tolerated it for 10 minutes.  Pt even declining chair mode of bed, wants to keep sleeping.    THERAPEUTIC EXERCISES  Lower Extremity Ankle pumps  Hip AB/AD  Heel slides     Upper Extremity Elbow flex/ext and OH Reaching     Position Supine     Repetitions   10   Sets   1         Patient End of Session: In bed;Needs met;Call light within reach;RN aware of session/findings;All patient questions and concerns addressed (Simone Zapata aware of  treatment session)    PT Session Time: 11 minutes  Gait Trainin minutes  Therapeutic Activity: 0 minutes  Therapeutic Exercise: 11 minutes   Neuromuscular Re-education: 0 minutes

## 2024-05-06 NOTE — PLAN OF CARE
Pt alert. Oriented to self. VSS. RA.  Refusing food/drink and PO medications.   Up x 2 assist. Stayed in bed per request.  Dobhoff tube inserted into the R nare. Pt vomiting small amount bile when nurse was inserting the tube. Than some green discharge obtained after tube insertion through the tube,  Tube placement confirmed by x-ray.  Hold the feedings till tomorrow.    Problem: PAIN - ADULT  Goal: Verbalizes/displays adequate comfort level or patient's stated pain goal  Description: INTERVENTIONS:  - Encourage pt to monitor pain and request assistance  - Assess pain using appropriate pain scale  - Administer analgesics based on type and severity of pain and evaluate response  - Implement non-pharmacological measures as appropriate and evaluate response  - Consider cultural and social influences on pain and pain management  - Manage/alleviate anxiety  - Utilize distraction and/or relaxation techniques  - Monitor for opioid side effects  - Notify MD/LIP if interventions unsuccessful or patient reports new pain  - Anticipate increased pain with activity and pre-medicate as appropriate  Outcome: Adequate for Discharge     Problem: SAFETY ADULT - FALL  Goal: Free from fall injury  Description: INTERVENTIONS:  - Assess pt frequently for physical needs  - Identify cognitive and physical deficits and behaviors that affect risk of falls.  - [place] fall precautions as indicated by assessment.  - Educate pt/family on patient safety including physical limitations  - Instruct pt to call for assistance with activity based on assessment  - Modify environment to reduce risk of injury  - Provide assistive devices as appropriate  - Consider OT/PT consult to assist with strengthening/mobility  - Encourage toileting schedule  Outcome: Progressing     Problem: DISCHARGE PLANNING  Goal: Discharge to home or other facility with appropriate resources  Description: INTERVENTIONS:  - Identify barriers to discharge w/pt and caregiver  -  Include patient/family/discharge partner in discharge planning  - Arrange for needed discharge resources and transportation as appropriate  - Identify discharge learning needs (meds, wound care, etc)  - Arrange for interpreters to assist at discharge as needed  - Consider post-discharge preferences of patient/family/discharge partner  - Complete POLST form as appropriate  - Assess patient's ability to be responsible for managing their own health  - Refer to Case Management Department for coordinating discharge planning if the patient needs post-hospital services based on physician/LIP order or complex needs related to functional status, cognitive ability or social support system  Outcome: Progressing

## 2024-05-06 NOTE — PROGRESS NOTES
[facility]   part of [facility]     Hospitalist Progress Note     Lisa Iqbal Patient Status:  Inpatient    10/11/1950 MRN [id number]   [facility] 4NW-A Attending Enedelia Lui MD   Hosp Day # 9 PCP Anusha Abraham MD     Chief Complaint:  drowsy     Subjective:     Patient still with poor appetite, not participating.     Objective:    Review of Systems:   A comprehensive review of systems was completed; pertinent positive and negatives stated in subjective.    Vital signs:  Temp:  [97.5 °F (36.4 °C)-98.5 °F (36.9 °C)] 97.7 °F (36.5 °C)  Pulse:  [78-83] 81  Resp:  [14-18] 16  BP: (131-146)/(72-86) 145/86  SpO2:  [95 %-98 %] 96 %    Physical Exam:    General: No acute distress  Respiratory: No wheezes, no rhonchi  Cardiovascular: S1, S2, regular rate and rhythm  Abdomen: Soft, Non-tender, non-distended, positive bowel sounds  Neuro: No new focal deficits.   Extremities: No edema      Diagnostic Data:    Labs:  Recent Labs   Lab 24  07524  0631 24  0752 24  0804 24  0608 24  1015 24  0608   WBC 0.7*   < > 2.5* 2.4* 2.7* 2.7* 2.5*   HGB 8.2*   < > 8.5* 8.7* 8.5* 9.7* 9.1*   MCV 83.4   < > 82.1 83.5 85.2 81.5 83.1   PLT 18.0*   < > 29.0* 30.0* 37.0* 57.0* 76.0*   BAND 32  --  6 2  --   --   --     < > = values in this interval not displayed.       Recent Labs   Lab 24  07524  0631 24  0636 24  0608 24  1144 24  0618   GLU 94  --  83  --  109*  --  124*   BUN 8*  --  8*  --  4*  --  2*   CREATSERUM 0.53*  --  0.27*  --  0.34*  --  0.60   CA 8.2*  --  8.3*  --  8.1*  --  8.5   ALB 1.9*  --  1.7*  --   --   --   --      --  142  --  138  --  134*   K 2.3*   < > 3.2*  3.2*   < > 3.6 4.1 3.5     --  115*  --  107  --  103   CO2 21.0  --  18.0*  --  26.0  --  23.0   ALKPHO 95  --  84  --   --   --   --    AST 20  --  8*  --   --   --   --    ALT 9*  --  7*  --   --   --    --    BILT 1.4  --  0.9  --   --   --   --    TP 5.2*  --  4.7*  --   --   --   --     < > = values in this interval not displayed.       Estimated Creatinine Clearance: 62.1 mL/min (based on SCr of 0.6 mg/dL).    No results for input(s): \"TROP\", \"TROPHS\", \"CK\" in the last 168 hours.    No results for input(s): \"PTP\", \"INR\" in the last 168 hours.               Microbiology    Hospital Encounter on 04/27/24   1. Urine Culture, Routine     Status: None    Collection Time: 04/30/24  3:35 PM    Specimen: Urine, clean catch   Result Value Ref Range    Urine Culture No Growth at 18-24 hrs. N/A   2. Blood Culture     Status: None    Collection Time: 04/29/24  3:21 PM    Specimen: Blood,peripheral   Result Value Ref Range    Blood Culture Result No Growth 5 Days N/A         Imaging: Reviewed in Epic.    Medications:    enoxaparin  40 mg Subcutaneous Daily    mirtazapine  15 mg Oral Nightly    pantoprazole  40 mg Intravenous Q24H    potassium chloride  20 mEq Oral Once    nystatin  500,000 Units Oral QID    fluconazole  200 mg Intravenous Q24H       Assessment & Plan:      # mucositis, oral candidiasis due to chemo- improved clinically   - cont magic mouth wash, nystatin solution   - iv Fluconazole  - pain control     # hypophosphatemia  - replaced     # neutropenic fever, resolved   - off merrem   - Blood cx NGTD    # pancytopenia with neutropenia  - due to chemo,  improving  - monitor counts, transfuse if Hgb < 7, plt < 10  - oncology following     # oral candidiasis   - IV Fluconazole, plan for 21 day course of fluconazole      # hypokalemia , replace PRN    # metastatic sigmoid colon cancer   - Dr Camarillo to further discuss GOC with family     # failure to thrive.   - cont to encourage oral intake   -  Remeron  - PT OT   - d.w family   - consider NGT placement.  Patient is now agreeable. Will reach to daughter to discuss.   - ELIANE PLACEMENT     Enedelia Lui MD      Supplementary Documentation:     Quality:  DVT Mechanical  Prophylaxis:   SCDs,    DVT Pharmacologic Prophylaxis   Medication    enoxaparin (Lovenox) 40 MG/0.4ML SUBQ injection 40 mg                Code Status: Not on file  Cuellar: No urinary catheter in place  Cuellar Duration (in days):   Central line:    ALESHA:     Discharge is dependent on: progress  At this point Ms. Félix Iqbal is expected to be discharge to: home    The 21st Century Cures Act makes medical notes like these available to patients in the interest of transparency. Please be advised this is a medical document. Medical documents are intended to carry relevant information, facts as evident, and the clinical opinion of the practitioner. The medical note is intended as peer to peer communication and may appear blunt or direct. It is written in medical language and may contain abbreviations or verbiage that are unfamiliar.

## 2024-05-06 NOTE — CM/SW NOTE
Department  notified LSW (GC) of [facility] approval, see below.      Assigned SW/CM to follow up with patient/family on discharge plan.       Neil ID [id number], Plan auth ID [id number] approved from 5/5 to 5/8.    Felicia Hermosillo, DSC

## 2024-05-06 NOTE — PROGRESS NOTES
PSYCH CONSULT    Date of Admission: 4/27/2024  Date of Consult: 5/6/2024  Reason for Consultation: Eval for depression, not eating/drinking    Impression:  Primary Psychiatric Diagnosis:  Cognitive disorder unspecified. Rule out chemo induced (ie FU) cognitive impairment.     Acute delirium/encephalopathy initially due to dehydration, hypokalemia, pain from mucositis and candidiasis, possible underlying infection treated with meropenum (completed on 5/3/24), and now from hypophosphatemia and meds (ie last morphine on 5/4/24) and ?.     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     Pertinent Medical Diagnoses:  Metastatic colon cancer. Neutropenic fever. Mucositis and oral candidiasis.     Recommendations:  1) Check serum B12 and ammonia in AM.    2) She does NOT have decision making capacity due to her delirium and cognitive disorder. Her daughter should be health care surrogate.     3) Continue Remeron 15mg nightly to help with depressive disorder and low appetite.     4) Agree with palliative care involvement for goals of care discussion.    Full note to follow.    Dr. Laci Pickering

## 2024-05-06 NOTE — CDS QUERY
CLINICAL DOCUMENTATION CLARIFICATION FORM     Dear Dr. Lui,   Clinical information (provided below) includes documentation of Malnutrition by the Clinical Dietician.  Please indicate if you are in agreement with the following assessment by the clinical dietician.   ( x  ) Severe protein calorie malnutrition is a confirmed diagnosis   (   ) Other (please comment) ______________________________________    ________________________________________________________________________________________      Documentation from the Medical Record:     Clinical Indicators   05/03/24 Dietary nutrition assessment:    Pt meets severe malnutrition criteria at this time.    Ht: 154.9 cm (5' 1\")  Wt: 65 kg (143 lb 3.2 oz).  BMI: Body mass index is 27.06 kg/m².  73 year old female with mucositis d/t chemo for metastatic colon cancer. Pt with 10 pound wt loss over past month which is significant wt loss per standards of 6.5% in 1 month. Pt with mild to moderate muscle and fat depletion. Pt with poor po intake for past week or so due to pain from mucositis.    Criteria for malnutrition diagnosis:   Acute illness/injury related to wt loss greater than 5% in 1 month, energy intake less than 50% for greater than 5 days, and body fat moderate depletion.    Nutrition related physical findings :  1.Body Fat/Muscle Mass: moderate depletion body fat Orbital fat pad and Buccal fat pad and mild muscle depletion Temple region and Clavicle region  2.Fluid Accumulation: none    Nutrition diagnosis/ problem:   Inadequate oral intake related to inability to take or tolerate and increased nutritional demands for healing as evidenced by documented/reported insufficient oral intake, documented/reported unintentional weight loss, loss of fat mass,  and loss of muscle mass  Risk Factors  Advanced age, mucositis d/t chemo for metastatic colon cancer    Treatment RD consult, Ensure Plus High Protein TID, Magic Cup TID, and Magic Shake (Ensure/Magic Cup) 2pm  daily snack  Use of terms such as suspected, possible, or probable (associated with a specific diagnosis that is being evaluated, monitored, or treated as if it exists) are acceptable and can be coded in the inpatient setting, when documented at the time of discharge.   Please add any additional documentation to your progress note and continue to document this through discharge.  If you have any questions, please contact Clinical : Tania Hernandez RN /MEGAN @ [phone] or email [email or web address]  Thank You!    THIS FORM IS A PERMANENT PART OF THE MEDICAL RECORD

## 2024-05-06 NOTE — DIETARY NOTE
[facility]   part of [facility]    NUTRITION ASSESSMENT    Pt meets severe malnutrition criteria at this time.    CRITERIA FOR MALNUTRITION DIAGNOSIS:  Criteria for severe malnutrition diagnosis: acute illness/injury related to wt loss greater than 5% in 1 month, energy intake less than 50% for greater than 5 days, and body fat moderate depletion    NUTRITION INTERVENTION:    RD nutrition Care Plan- See RD nutrition assessment for additional recommendations  Meal and Snacks - Continue low fiber/soft diet, monitor and encourage adequate PO intake.   Medical Food Supplements - Continue Ensure Plus High Protein BID. Will discontinue magic shake and magic cup.    Enteral Nutrition - Via NG Recommend starting Jevity 1.5 at 20 mL/hr advancing 10 mL/hr q 8 hours as tolerated to goal rate of 50 mL/hr.   This will provide 1800 kcal, 77 grams protein, 912 mL total free water, and 100% of RDI's.   Recommend 150 mL water flush q 6 hours, TF+FWF provides 1812 mL total fluids.     PATIENT STATUS:   5/6: Pt continues to have poor po intake. Received consult for TF. Pt open to receiving NG. Continued Ensure HP BID, d/c'd other supplements as pt not consuming much orally. Noted Phos was 1.8 yesterday. Received replacement. Expecting lytes to drop. No current phos or mag. Placed order for mag and phos. Ordered thiamine. Initiated Jevity 1.5 @ 20ml/hr x 24hrs. Replace electrolytes before advancing as pt at risk for refeeding.     5/3- pt remains with poor po intake related to mouth pain. Minimal intake.  Encouraged po intake of soft foods and ONS.      04/29/24 at risk consult  73 year old female with mucositis d/t chemo for metastatic colon cancer.  Pt with 10 pound wt loss over past month which is significant wt loss per standards of 6.5% in 1 month. Pt with mild to moderate muscle and fat depletion.  Pt with poor po intake for past week or so due to pain from mucositis.        ANTHROPOMETRICS:  Ht: 154.9 cm (5' 1\")  Wt:  65 kg (143 lb 3.2 oz).   BMI: Body mass index is 27.06 kg/m².  IBW: 47.7 kg      WEIGHT HISTORY:   Weight loss: Yes, Severe Wt loss of 10 lbs, 6.5%, over 1 months     Wt Readings from Last 10 Encounters:   05/01/24 65 kg (143 lb 3.2 oz)   04/26/24 61.3 kg (135 lb 3.2 oz)   04/25/24 64 kg (141 lb)   04/24/24 64 kg (141 lb)   04/22/24 64.9 kg (143 lb)   04/17/24 67.2 kg (148 lb 3.2 oz)   04/15/24 70.3 kg (155 lb)   04/12/24 66 kg (145 lb 6.4 oz)   04/05/24 69.4 kg (153 lb)   04/03/24 69.4 kg (153 lb)        NUTRITION:  Diet:       Procedures    Low Fiber/Soft diet Low Fiber/Soft; Is Patient on Accuchecks? No      Food Allergies: No  Cultural/Ethnic/Denominational Preferences Addressed: Yes    Percent Meals Eaten (last 3 days)       Date/Time Percent Meals Eaten (%)    05/05/24 1247 0 %     Percent Meals Eaten (%): pt refuses to eat food at 05/05/24 1247    05/06/24 1119 0 %            GI system review:  trouble eating due to mucositits  Last BM: 5/6  Skin and wounds: none    NUTRITION RELATED PHYSICAL FINDINGS:     1. Body Fat/Muscle Mass: moderate depletion body fat Orbital fat pad and Buccal fat pad and mild muscle depletion Temple region and Clavicle region     2. Fluid Accumulation: upper extremity edema and lower extremity edema     NUTRITION PRESCRIPTION: 64.9kg  Calories: 9478-1629 calories/day (25-30 kcal/kg)  Protein: 78-97 grams protein/day (1.2-1.5 grams protein per kg)  Fluid: ~1 ml/kcal or per MD discretion    NUTRITION DIAGNOSIS/PROBLEM:  Inadequate oral intake related to inability to take or tolerate and increased nutritional demands for healing as evidenced by documented/reported insufficient oral intake, documented/reported unintentional weight loss, loss of fat mass, and loss of muscle mass      MONITOR AND EVALUATE/NUTRITION GOALS:  PO intake of 75% of meals TID - Not met, Continues  PO intake of 75% of oral nutrition supplement/s - Not met, Continues  Weight stable within 1 to 2 lbs during admission -  Ongoing  Tolerate alternative nutrition at 100% of goal - New      MEDICATIONS:  Remeron, PPI, thiamine, KCL 20 meq    LABS:  Reviewed    Pt is at High nutrition risk    Bridget \"Aurora\" Angelia RD, LDN  Clinical Dietitian

## 2024-05-06 NOTE — CONSULTS
[facility]  Report of Psychiatric Consultation    Lisa Iqbal Patient Status:  Inpatient    10/11/1950 MRN [id number]   Location [facility] 4NW-A Attending Enedelia Lui MD   Hosp Day # 9 PCP Anusha Abraham MD     Date of Admission: 2024  Date of Consult: 2024  Reason for Consultation: Eval for depression, not eating/drinking    Impression:  Primary Psychiatric Diagnosis:  Cognitive disorder unspecified. Rule out chemo induced (ie FU) cognitive impairment.     Acute delirium/encephalopathy initially due to dehydration, hypokalemia, pain from mucositis and candidiasis, possible underlying infection treated with meropenum (completed on 5/3/24), and now from hypophosphatemia and meds (ie last morphine on 24) and ?.     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     Pertinent Medical Diagnoses:  Metastatic colon cancer. Neutropenic fever. Mucositis and oral candidiasis.     Recommendations:  1) Check serum B12 and ammonia in AM.    2) She does NOT have decision making capacity due to her delirium and cognitive disorder. Her daughter should be health care surrogate.     3) Continue Remeron 15mg nightly to help with depressive disorder and low appetite.     4) Agree with palliative care involvement for goals of care discussion.    Laci Pickering MD  2024  4:48 PM    History of Present Illness:  72 yo female with metastatic colon cancer to the liver was admitted on 24 for management of mucositis and candidiasis causing odynophagia and low po intake. Started on IVF's and fluconazole and magic mouthwash. She is s/p FOLFOX cycle 3 on 2024 causing neutropenia. She developed a fever and was empirically treated with meropenum (no source of infection identified).     Psych consulted because of her failure to thrive with low motivation and a lack of appetite. She has appeared depressed and withdrawn and confused. She was started on  Remeron. She initially agreed to NG tube feeds, but then refused this afternoon. She has been declining PT as well.     With the aid of video , she is able to tell me that she has fatigue, depressed mood, low motivation, and a lack of appetite. She isn't eating because she isn't hungry, not because it hurts to swallow anymore. Regarding her medical issues, she is able to tell me that she has cancer and believes she will be cured. She is unable to tell me if she has received chemotherapy or any treatments. She has no idea of the stage of cancer or her prognosis. She believes she is fine and will be healed. Of note, she appeared confused at times and the interpretor had to ask her the same questions several times.     Psychiatry Review Systems: No voices or visions. No hopelessness or suicidal ideation.     Past Psychiatric History: None    Social and Developmental History:  with 2 children per patient.     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    Colon cancer (HCC)    Disorder of thyroid    Esophageal reflux    Exposure to medical diagnostic radiation    Last treatment 2018    History of adverse reaction to anesthesia    Has anxiety/nervousness when waking up    Migraines    Nausea and vomiting in adult    Osteopenia    Osteoporosis    Vertigo    Visual impairment    Glasses     Past Surgical History:   Procedure Laterality Date          x 3    Colonoscopy N/A 2024    Procedure: COLONOSCOPY;  Surgeon: Po Aviles MD;  Location:  ENDOSCOPY    Hysterectomy      Bilateral ovaries remain    Laryngoscopy,dirct,op,biopsy  01/15/2018     Family History   Problem Relation Age of Onset    Cancer Father         liver cancer    Cancer Brother         liver cancer      reports that she has never smoked. She has never used smokeless tobacco. She reports that she does not drink alcohol and does not use drugs.    Allergies:  Allergies   Allergen Reactions     Septra [Sulfamethoxazole W/Trimethoprim] RASH    Sulfa Antibiotics RASH       Medications:    Current Facility-Administered Medications:     thiamine (Vitamin B1) tab 100 mg, 100 mg, Oral, Daily    dextrose 10% infusion (TPN no rate), , Intravenous, Continuous PRN    pancrelipase (Lip-Prot-Amyl) (Zenpep) DR particles cap 10,000 Units, 10,000 Units, Per G Tube, PRN **AND** sodium bicarbonate tab 325 mg, 325 mg, Per G Tube, PRN    maalox/diphenhydramine/lidocaine (First Mouthwash BLM) oral suspension 10 mL, 10 mL, Oral, QID PRN    enoxaparin (Lovenox) 40 MG/0.4ML SUBQ injection 40 mg, 40 mg, Subcutaneous, Daily    potassium chloride 20 mEq in dextrose 5%-sodium chloride 0.9% 1000mL infusion premix, , Intravenous, Continuous    mirtazapine (Remeron) tab 15 mg, 15 mg, Oral, Nightly    pantoprazole (Protonix) 40 mg in sodium chloride 0.9% PF 10 mL IV push, 40 mg, Intravenous, Q24H    acetaminophen (Ofirmev) 10 mg/mL infusion premix 1,000 mg, 1,000 mg, Intravenous, Q6H PRN    potassium chloride (Klor-Con) 20 MEQ oral powder 20 mEq, 20 mEq, Oral, Once    morphINE PF 4 MG/ML injection 4 mg, 4 mg, Intravenous, Q30 Min PRN    nystatin (Mycostatin) 446517 UNIT/ML oral suspension 500,000 Units, 500,000 Units, Oral, QID    Lidocaine Viscous HCl (XYLOCAINE) 2 % mouth solution 5 mL, 5 mL, Mouth/Throat, Q3H PRN    morphINE PF 2 MG/ML injection 1 mg, 1 mg, Intravenous, Q2H PRN **OR** morphINE PF 2 MG/ML injection 2 mg, 2 mg, Intravenous, Q2H PRN **OR** morphINE PF 4 MG/ML injection 4 mg, 4 mg, Intravenous, Q2H PRN    melatonin tab 3 mg, 3 mg, Oral, Nightly PRN    ondansetron (Zofran) 4 MG/2ML injection 4 mg, 4 mg, Intravenous, Q6H PRN    metoclopramide (Reglan) 5 mg/mL injection 10 mg, 10 mg, Intravenous, Q8H PRN    polyethylene glycol (PEG 3350) (Miralax) 17 g oral packet 17 g, 17 g, Oral, Daily PRN    sennosides (Senokot) tab 17.2 mg, 17.2 mg, Oral, Nightly PRN    fluconazole in sodium chloride 0.9% (Diflucan) 200 mg/100mL IVPB  premix 200 mg, 200 mg, Intravenous, Q24H    Review of Systems   Constitutional:  Positive for malaise/fatigue.     Mental Status Exam:     Objective      Vitals:    05/06/24 1630   BP: 141/72   Pulse: 83   Resp: 16   Temp: 97.9 °F (36.6 °C)     Appearance: fair grooming  Behavior: normal psychomotor  Attitude: cooperative and guarded  Gait: not observed  Speech: soft, slow at times  Mood: Tired with depressed mood  Affect: Congruent    Thought process: logical to most questions  Thought content: no hallucinations    Orientation: oriented person, place  Attention and Concentration: poor  Memory: intact remote  Language: Intact naming  Fund of Knowledge: Unable to recite name of [place] president    Insight: limited  Judgment: limited    Laboratory Data:  Lab Results   Component Value Date    WBC 2.5 05/06/2024    HGB 9.1 05/06/2024    HCT 28.1 05/06/2024    PLT 76.0 05/06/2024    CREATSERUM 0.60 05/06/2024    BUN 2 05/06/2024     05/06/2024    K 3.5 05/06/2024     05/06/2024    CO2 23.0 05/06/2024     05/06/2024    CA 8.5 05/06/2024    MG 1.9 05/06/2024    PHOS 1.7 05/06/2024

## 2024-05-06 NOTE — PLAN OF CARE
Received pt alert and oriented x3. Drowsy, asleep most of the night. VSS on RA. Tele. No complaints of pain. Refused all PO medications. Pt encouraged to eat or drink, but declined.  IVF infusing per MAR. Fall and safety precautions in place. Call light within reach.     Problem: SAFETY ADULT - FALL  Goal: Free from fall injury  Description: INTERVENTIONS:  - Assess pt frequently for physical needs  - Identify cognitive and physical deficits and behaviors that affect risk of falls.  - [place] fall precautions as indicated by assessment.  - Educate pt/family on patient safety including physical limitations  - Instruct pt to call for assistance with activity based on assessment  - Modify environment to reduce risk of injury  - Provide assistive devices as appropriate  - Consider OT/PT consult to assist with strengthening/mobility  - Encourage toileting schedule  Outcome: Progressing

## 2024-05-06 NOTE — PROGRESS NOTES
Heme/Onc Progress Note - [facility]      Chief Complaint:    Follow up for evaluation and management of metastatic colon cancer.    Interim History:      She is very weak. She refuses to eat. She declines consideration for a feeding tube. She has no pain at this visit. She remains afebrile. She has no motivation to eat.       Physical Examination:    Vital Signs: /83 (BP Location: Right arm)   Pulse 79   Temp 98.3 °F (36.8 °C) (Oral)   Resp 16   Ht 1.549 m (5' 1\")   Wt 65 kg (143 lb 3.2 oz)   SpO2 97%   BMI 27.06 kg/m²     General: Patient is alert and oriented x 3, She is very weak and uncomfortable.  HEENT: Oral mucositis.   Chest: Clear to auscultation. No rales and no wheezes.  Heart: Regular rate and rhythm.   Abdomen: Soft and nontender. good bowel sounds.  Extremities: No edema. Non-tender.  Skin:  Warm, dry.      Labs reviewed at this visit:     Recent Labs   Lab 05/03/24  0804 05/04/24  0608 05/05/24  1015 05/06/24  0608   RBC 3.16* 3.10* 3.57* 3.38*   HGB 8.7* 8.5* 9.7* 9.1*   HCT 26.4* 26.4* 29.1* 28.1*   MCV 83.5 85.2 81.5 83.1   MCH 27.5 27.4 27.2 26.9   MCHC 33.0 32.2 33.3 32.4   RDW 21.5 21.0 20.9 21.0   NEPRELIM 1.21*  --  1.45* 1.18*   WBC 2.4* 2.7* 2.7* 2.5*   PLT 30.0* 37.0* 57.0* 76.0*       Recent Labs   Lab 04/30/24  0750 04/30/24  2121 05/01/24  0631 05/02/24  0636 05/04/24  0608 05/04/24  1144 05/06/24  0618   GLU 94  --  83  --  109*  --  124*   BUN 8*  --  8*  --  4*  --  2*   CREATSERUM 0.53*  --  0.27*  --  0.34*  --  0.60   CA 8.2*  --  8.3*  --  8.1*  --  8.5   ALB 1.9*  --  1.7*  --   --   --   --      --  142  --  138  --  134*   K 2.3*   < > 3.2*  3.2*   < > 3.6 4.1 3.5     --  115*  --  107  --  103   CO2 21.0  --  18.0*  --  26.0  --  23.0   ALKPHO 95  --  84  --   --   --   --    AST 20  --  8*  --   --   --   --    ALT 9*  --  7*  --   --   --   --    BILT 1.4  --  0.9  --   --   --   --    TP 5.2*  --  4.7*  --   --   --   --     < > = values in this interval  not displayed.       Radiologic imaging reviewed at this visit:    CT abd/pelvis on 4/15/2024:  FINDINGS:    LIVER:  Extensive hepatic metastatic deposits throughout the liver.  BILIARY:  High density in the gallbladder is nonspecific.  PANCREAS:  No lesion, fluid collection, ductal dilatation, or atrophy.    SPLEEN:  No enlargement or focal lesion.    KIDNEYS:  Large cyst in the mid left kidney measures 6 x 7 x 6.0 cm.  Cyst in the upper pole the right kidney measures 5.3 x 5 1 cm. No hydronephrosis.  No calculi in the kidneys.  ADRENALS:  No mass or enlargement.    AORTA/VASCULAR:    Unremarkable as seen on non-contrast imaging.  RETROPERITONEUM:  No mass or adenopathy.    BOWEL/MESENTERY:  Visualized part of the appendix is unremarkable.  Mild thickening of the sigmoid colon.  ABDOMINAL WALL:  Tiny fat containing umbilical hernia.  URINARY BLADDER:  No visible focal wall thickening, lesion, or calculus.    PELVIC NODES:  No adenopathy.    PELVIC ORGANS:  Sequelae of hysterectomy.  BONES:  No bony lesion or fracture.    LUNG BASES:  No visible pulmonary or pleural disease.    OTHER:  Negative.       Impression   CONCLUSION:       1. No calculi in the kidneys.  No hydronephrosis.     2. Extensive metastatic deposits throughout the liver.     3. There is mild thickening of the sigmoid colon.  This may be sequelae of a localized colitis.  This may be sequelae of infectious or inflammatory etiology.  Ischemia is considered unlikely.         Impression/Plan:     Metastatic Colon Cancer:  Liver metastases:    S/P FOLFOX cycle 3 on 4/17/2024. Having complications from treatment even with dose reduction.  I would recommend stopping chemotherapy due to toxicity and her very poor performance status. She no longer has motiviation for treatment of her metastatic disease. I think we will need to focus on palliative care. She is refusing to try to eat because of lack of appetite. She declines enteral tube feeding. She has  little motivation to discuss her care.      Mucositis secondary to chemotherapy:  Nausea secondary to chemotherapy  Chemo induced neutropenia with fever s/p peg-filgrastim:  Oral candidiasis    ANC has recovered to greater than 1000 and she is afebrile. Mucositis has improved.    Continue IV fluconizole for oral candidiasis.     Anemia complicating neoplastic disease:  Thrombocytopenia secondary to chemotherapy:    Platelets are increasing without bleeding. HGB has been adequate.     Disposition:    I continue to encourage her to eat but she continues to decline any oral nutrition and she declines enteral feeing. Her performance status is very poor. We will need a family meeting to discuss her overall care concerning palliative care and possible hospice care given her decline. I spoke with her daughter today at the visit. The family will meet with the patient today to discuss hospice care. They think she would need to be in a facility for her care.        Ronan Camarillo MD  [facility]

## 2024-05-07 ENCOUNTER — APPOINTMENT (OUTPATIENT)
Dept: CT IMAGING | Facility: HOSPITAL | Age: 74
DRG: 157 | End: 2024-05-07
Attending: NURSE PRACTITIONER
Payer: MEDICARE

## 2024-05-07 PROBLEM — Z51.5 PALLIATIVE CARE ENCOUNTER: Status: ACTIVE | Noted: 2024-05-07

## 2024-05-07 PROBLEM — Z71.89 GOALS OF CARE, COUNSELING/DISCUSSION: Status: ACTIVE | Noted: 2024-05-07

## 2024-05-07 PROBLEM — R13.10 DYSPHAGIA: Status: ACTIVE | Noted: 2024-04-27

## 2024-05-07 LAB
AMMONIA PLAS-MCNC: 35 UMOL/L (ref 11–32)
ANION GAP SERPL CALC-SCNC: 9 MMOL/L (ref 0–18)
BUN BLD-MCNC: 3 MG/DL (ref 9–23)
CALCIUM BLD-MCNC: 8.2 MG/DL (ref 8.5–10.1)
CHLORIDE SERPL-SCNC: 104 MMOL/L (ref 98–112)
CO2 SERPL-SCNC: 23 MMOL/L (ref 21–32)
CREAT BLD-MCNC: 0.58 MG/DL
EGFRCR SERPLBLD CKD-EPI 2021: 95 ML/MIN/1.73M2 (ref 60–?)
GLUCOSE BLD-MCNC: 122 MG/DL (ref 70–99)
MAGNESIUM SERPL-MCNC: 1.7 MG/DL (ref 1.6–2.6)
OSMOLALITY SERPL CALC.SUM OF ELEC: 280 MOSM/KG (ref 275–295)
PHOSPHATE SERPL-MCNC: 2.3 MG/DL (ref 2.5–4.9)
POTASSIUM SERPL-SCNC: 3.5 MMOL/L (ref 3.5–5.1)
SODIUM SERPL-SCNC: 136 MMOL/L (ref 136–145)
VIT B12 SERPL-MCNC: >2000 PG/ML (ref 193–986)

## 2024-05-07 PROCEDURE — 99221 1ST HOSP IP/OBS SF/LOW 40: CPT | Performed by: CLINICAL NURSE SPECIALIST

## 2024-05-07 PROCEDURE — 99497 ADVNCD CARE PLAN 30 MIN: CPT | Performed by: CLINICAL NURSE SPECIALIST

## 2024-05-07 PROCEDURE — 99232 SBSQ HOSP IP/OBS MODERATE 35: CPT | Performed by: INTERNAL MEDICINE

## 2024-05-07 PROCEDURE — 74176 CT ABD & PELVIS W/O CONTRAST: CPT | Performed by: NURSE PRACTITIONER

## 2024-05-07 NOTE — PLAN OF CARE
Pt alert and oriented x1. VSS. Afebrile. Complained of abd pain, PRN Morphine given with good relief. Pt continues to refuse PO medications, food and drink. Fall and safety precautions in place. Call light within reach.     Problem: RISK FOR INFECTION - ADULT  Goal: Absence of fever/infection during anticipated neutropenic period  Description: INTERVENTIONS  - Monitor WBC  - Administer growth factors as ordered  - Implement neutropenic guidelines  Outcome: Progressing     Problem: SAFETY ADULT - FALL  Goal: Free from fall injury  Description: INTERVENTIONS:  - Assess pt frequently for physical needs  - Identify cognitive and physical deficits and behaviors that affect risk of falls.  - [place]lap fall precautions as indicated by assessment.  - Educate pt/family on patient safety including physical limitations  - Instruct pt to call for assistance with activity based on assessment  - Modify environment to reduce risk of injury  - Provide assistive devices as appropriate  - Consider OT/PT consult to assist with strengthening/mobility  - Encourage toileting schedule  Outcome: Progressing

## 2024-05-07 NOTE — PROGRESS NOTES
Gastroenterology Progress Note  Lisa Iqbal Patient Status:  Inpatient    10/11/1950 MRN [id number]   Location [facility] 4NW-A Attending Enedelia Lui MD   Hosp Day # 10 PCP Anusha Abraham MD     Chief Complaint: Vomiting, poor PO intake   S: Called back to assess pt vomiting   Pt had Dobbhoff tube placed yesterday--NO feeds started thus far. Today when water bolus was administered pt with vomiting of the bolus. No hematemesis. Pt denies odynophagia or abd pain. She is unable to recall if passing flatus. BM x 2 documented yesterday--no reports of melena or hematochezia   O: /78 (BP Location: Right arm)   Pulse 81   Temp 98.2 °F (36.8 °C) (Oral)   Resp 18   Ht 5' 1\" (1.549 m)   Wt 143 lb 3.2 oz (65 kg)   SpO2 98%   BMI 27.06 kg/m²   Gen: Awake, alert to self/place when using Georgian translation   CV: RRR with normal S1 / S2  Resp: Diminished bilaterally; No increase in respiratory effort   Abd: (+)BS, soft, non-tender, non-distended; no rebound or guarding  Ext: No edema or cyanosis  Skin: Warm and dry  Laboratory Data:     No results for input(s): \"PGLU\" in the last 168 hours.  No results for input(s): \"INR\" in the last 168 hours.      Recent Labs   Lab 24  0752 24  0804 24  0608 24  1015 24  0608   WBC 2.5* 2.4* 2.7* 2.7* 2.5*   HGB 8.5* 8.7* 8.5* 9.7* 9.1*   PLT 29.0* 30.0* 37.0* 57.0* 76.0*       Recent Labs   Lab 24  0631 24  0636 24  1532 24  0608 24  1144 24  0618 24  0651     --   --  138  --  134* 136   K 3.2*  3.2*   < > 3.1* 3.6 4.1 3.5 3.5   *  --   --  107  --  103 104   CO2 18.0*  --   --  26.0  --  23.0 23.0   BUN 8*  --   --  4*  --  2* 3*   CREATSERUM 0.27*  --   --  0.34*  --  0.60 0.58    < > = values in this interval not displayed.       Recent Labs   Lab 24  0631   ALT 7*   AST 8*     Assessment: 73 yr-old female with  hx of head/neck cancer (dx 2018) s/p RTX and more recently dx with metastatic colon cancer with liver mets (Cycle 3 4/17/24; FOLFOX/joselin) admitted 4/27 with poor appetite in setting of mouth pain. Pt was dx with oral candidiasis treated with fluconazole.   Pt now with improved/resolved odynophagia however continues to have little to no appetite and this AM vomiting. Will obtain new abd imaging to assess for ileus, obstruction, esophagitis, colitis--pending results and clinical course can consider EGD if thrombocytopenia continues to improve and neutropenia resolves   Plan:   CT a/p to assess for obstruction, ileus, ulceration  Continue antiemetics as needed   PPI IV daily + Fluconazole IV  Consider EGD pending above and clinical course as plt count and neutropenia improved   Case discussed with Dr RODNEY Becker, APRN  10:38 AM  5/7/2024  [facility]  [phone]

## 2024-05-07 NOTE — CONSULTS
[facility]   part of [facility]  Palliative Care Initial Consult Note    Lisa Iqbal Patient Status:  Inpatient    10/11/1950 MRN [id number]   Location [facility] 4NW-A Attending Enedelia Lui MD   Hosp Day # 10 PCP Anusha Abraham MD     Date of Consult: 2024  Patient seen at: [facility] In[facility]    Reason for Consultation: Consult ordered by:: Dr. Lui for evaluation of Palliative Care needs and Goals of care discussion    Subjective     History of Present Illness: Lisa Iqbal is a 73 year old female with history of metastatic colon cancer with liver mets (on active chemo prior to admission, last tx ) who was admitted on 2024 for mouth pain, mucositis (now resolved). Work up in our hospital revealed neg blood cultures, neutropenic fever now resolved, continued anorexia now with DHT in place but feedings not yet started.  History was obtained from [facility], the patient's daughter Beryl and her nurse.      Today is day #10 of hospitalization.     When I entered the room, the patient was awake but lethargic.  No family present at bedside.      Review of Systems:   Symptoms(s): Confusion;Anorexia    Dyspnea: none noted  Cough: none noted  Nausea: denies  Appetite: poor  Pain: denies  Constipation: states she has to poop, RN notified  Last BM:   Bowel Movement         2024             Stool Count Calculated for I/O: 1            Palliative Care Social History:   Marital Status: Single  Children:  2 daughters  Living Situation Prior to Admit: Home  Does Patient Live Alone: Yes  Is Patient Confused: Yes, per psych evaluation, not decisional at present. Daughter reports she has \"dementia\" but official dx not noted in medical hx. + delirium/encephalopathy per psych consult. States she has been very forgetful of events and discussions that have occurred.     Substance History:   reports that she has never smoked. She has never used smokeless  tobacco.  reports no history of alcohol use.  reports no history of drug use.    Spiritual Assessment:   Sabianist - Parish Not Listed    Past Medical History/Past Surgical History:   This is the 5th hospitalization in the past 6 months.    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    Colon cancer (HCC)    Disorder of thyroid    Esophageal reflux    Exposure to medical diagnostic radiation    Last treatment 2018    History of adverse reaction to anesthesia    Has anxiety/nervousness when waking up    Migraines    Nausea and vomiting in adult    Osteopenia    Osteoporosis    Vertigo    Visual impairment    Glasses     Past Surgical History:   Procedure Laterality Date          x 3    Colonoscopy N/A 2024    Procedure: COLONOSCOPY;  Surgeon: Po Aviles MD;  Location:  ENDOSCOPY    Hysterectomy      Bilateral ovaries remain    Laryngoscopy,dirct,op,biopsy  01/15/2018       Family History: obtained from [facility]  Family History   Problem Relation Age of Onset    Cancer Father         liver cancer    Cancer Brother         liver cancer       Allergies:  Allergies   Allergen Reactions    Septra [Sulfamethoxazole W/Trimethoprim] RASH    Sulfa Antibiotics RASH       Medications:     Current Facility-Administered Medications:     thiamine (Vitamin B1) tab 100 mg, 100 mg, Oral, Daily    dextrose 10% infusion (TPN no rate), , Intravenous, Continuous PRN    pancrelipase (Lip-Prot-Amyl) (Zenpep) DR particles cap 10,000 Units, 10,000 Units, Per G Tube, PRN **AND** sodium bicarbonate tab 325 mg, 325 mg, Per G Tube, PRN    maalox/diphenhydramine/lidocaine (First Mouthwash BLM) oral suspension 10 mL, 10 mL, Oral, QID PRN    enoxaparin (Lovenox) 40 MG/0.4ML SUBQ injection 40 mg, 40 mg, Subcutaneous, Daily    potassium chloride 20 mEq in dextrose 5%-sodium chloride 0.9% 1000mL infusion premix, , Intravenous, Continuous    mirtazapine (Remeron) tab 15  mg, 15 mg, Oral, Nightly    pantoprazole (Protonix) 40 mg in sodium chloride 0.9% PF 10 mL IV push, 40 mg, Intravenous, Q24H    acetaminophen (Ofirmev) 10 mg/mL infusion premix 1,000 mg, 1,000 mg, Intravenous, Q6H PRN    potassium chloride (Klor-Con) 20 MEQ oral powder 20 mEq, 20 mEq, Oral, Once    morphINE PF 4 MG/ML injection 4 mg, 4 mg, Intravenous, Q30 Min PRN    nystatin (Mycostatin) 896421 UNIT/ML oral suspension 500,000 Units, 500,000 Units, Oral, QID    Lidocaine Viscous HCl (XYLOCAINE) 2 % mouth solution 5 mL, 5 mL, Mouth/Throat, Q3H PRN    morphINE PF 2 MG/ML injection 1 mg, 1 mg, Intravenous, Q2H PRN **OR** morphINE PF 2 MG/ML injection 2 mg, 2 mg, Intravenous, Q2H PRN **OR** morphINE PF 4 MG/ML injection 4 mg, 4 mg, Intravenous, Q2H PRN    melatonin tab 3 mg, 3 mg, Oral, Nightly PRN    ondansetron (Zofran) 4 MG/2ML injection 4 mg, 4 mg, Intravenous, Q6H PRN    metoclopramide (Reglan) 5 mg/mL injection 10 mg, 10 mg, Intravenous, Q8H PRN    polyethylene glycol (PEG 3350) (Miralax) 17 g oral packet 17 g, 17 g, Oral, Daily PRN    sennosides (Senokot) tab 17.2 mg, 17.2 mg, Oral, Nightly PRN    fluconazole in sodium chloride 0.9% (Diflucan) 200 mg/100mL IVPB premix 200 mg, 200 mg, Intravenous, Q24H    Functional Status History:  ADLs: bathing or showering, dressing, getting in and out of bed or a chair, walking, using the toilet, and eating  - Independent, requiring significant assistance presently  IADLs: use the phone, shop for groceries, meal preparation, manage medicines, clean living area, use transportation by self, manage money  Requiring significant assistance    Palliative Performance Scale:   Prior to admission: (pt/family reported) 60 %  Observed during hospitalization: 30 %  % Ambulation Activity Level Self-Care Intake Consciousness   100 Full  Normal  No Disease Full Normal Full   90 Full  Normal  Some Disease Full Normal Full   80 Full  Normal w/effort  Some Disease Full Normal or reduced Full    70 Reduced  Can't Perform Job  Some Disease Full Normal or reduced Full   60 Reduced  Can't Perform Hobby   Significant Disease Occ Assist Normal or reduced Full or confused   50 Mainly sit/lie Can't do any work  Extensive Disease Partial Assist Normal or reduced Full or confused   40 Mainly in bed Can't do any work  Extensive Disease Mainly Assist Normal or reduced Full or confused   30 Bed Bound Can't do any work  Extensive Disease Max Assist  Total Care Reduced  Drowsy/confused   20 Bed Bound Can't do any work  Extensive Disease Max Assist  Total Care Minimal  Drowsy/confused   10 Bed Bound Can't do any work  Extensive Disease Max Assist  Total Care Mouth Care  Drowsy/confused   0 Death        Objective      Vital Signs:  Blood pressure 135/78, pulse 81, temperature 98.2 °F (36.8 °C), temperature source Oral, resp. rate 18, height 5' 1\" (1.549 m), weight 143 lb 3.2 oz (65 kg), SpO2 98%.  Body mass index is 27.06 kg/m².    Physical Exam:  General:  awake, lethargic . In no apparent respiratory distress. Body habitus BMI WNL   Wt Readings from Last 6 Encounters:   05/01/24 143 lb 3.2 oz (65 kg)   04/26/24 135 lb 3.2 oz (61.3 kg)   04/25/24 141 lb (64 kg)   04/24/24 141 lb (64 kg)   04/22/24 143 lb (64.9 kg)   04/17/24 148 lb 3.2 oz (67.2 kg)   Albumin noted at 1.7    HEENT: AT/NC. No gross focal deficits. Dry MM , DHT in place but feedings have not started.   Lungs: Normal effort on RA  Abdomen: Soft, non-tender, non-distended  Extremities: No LE edema present  Neurologic: Alert and oriented to person and place   Psychiatric: Mood flat affect   Skin: Warm and dry.    Hematology:  Lab Results   Component Value Date    WBC 2.5 (L) 05/06/2024    HGB 9.1 (L) 05/06/2024    HCT 28.1 (L) 05/06/2024    PLT 76.0 (L) 05/06/2024       Coags:  Lab Results   Component Value Date    INR 1.5 (A) 03/27/2024    PTT 29.8 12/19/2017       Chemistry:  Lab Results   Component Value Date    CREATSERUM 0.58 05/07/2024    BUN 3 (L)  05/07/2024     05/07/2024    K 3.5 05/07/2024     05/07/2024    CO2 23.0 05/07/2024     (H) 05/07/2024    CA 8.2 (L) 05/07/2024    ALB 1.7 (L) 05/01/2024    ALKPHO 84 05/01/2024    BILT 0.9 05/01/2024    TP 4.7 (L) 05/01/2024    AST 8 (L) 05/01/2024    ALT 7 (L) 05/01/2024    MG 1.7 05/07/2024    PHOS 2.3 (L) 05/07/2024       Imaging:  XR CHEST AP PORTABLE  (CPT=71045)    Result Date: 5/6/2024  CONCLUSION:  Dobbhoff tube has been placed, and the radiograph demonstrates the tube with the metal tip within the stomach.    LOCATION:  [id number]      Dictated by (CST): Daron Rodriguez MD on 5/06/2024 at 6:13 PM     Finalized by (CST): Daron Rodriguez MD on 5/06/2024 at 6:20 PM        Summary of Discussion      I discussed reason for palliative care consultation with Daughter Beryl by phone.     I differentiated the palliative treatment-focus model versus the hospice comfort-focused philosophy of care. I informed the patient/family that having palliative care support does not limit medical treatment options or decisions to those who wish to continue curative or restorative medical therapies. I discussed the benefits of palliative care to include an extra layer of support, to ensure GOC are respected throughout healthcare continuum, and assist with transition to hospice care when appropriate.      Prognostic awareness/understanding: Remains in the information gathering phase.  Beryl shared that she needs to talk to Dr. Camarillo again to get a clear answer regarding the potential for further cancer tx and her prognosis. She noted she has been following along with Tracy notes and acknowledges that consideration for hospice has been presented. She is aware at this point that current cancer tx is on hold until she would be able to improve her performance status which includes taking adequate PO.   Beryl stated her mom didn't remember agreeing to placement of DHT after d/w  and d/w Beryl  after that (to confirm her agreement and understanding). She noted her progressive forgetfulness that started prior to admission.   Beryl did not think her mom would want nor would she want to pursue a permanent feeding tube if her PO intake didn't improve.   We discussed current clinical condition and overall Poor prognosis per clinical team.     Hopes/goals:   Beryl hopeful her mom will increase her PO intake but also realistic this may not happen. She shared that the mucositis that was a source for her anorexia is not not an issue and she is still refusing to eat.     Fears/concerns:   Concern about anorexia and her mom's cognitive impairment. We discussed Dr. Pickering's assessment that she is not decisional and to cautiously involve her in any discussion regarding her GOC/POC as she is likely not able to fully comprehend her situation which may cause more distress for her.   Provided emotional support to  Beryl .    Advance Care Planning counseling and discussion:   HCPOA:  Document on file Yes [] No [x]. Requested for file []  Healthcare Agent Appointed: Yes  Healthcare Agent's Name: Beryl Blanchard - daughter,   Contact information for daughter Shanice obtained from Beryl (added to EMR). Informed I would need to call her to confirm Beryl has been designated as surrogate decision maker since no HCPOA document previously created. She voiced understanding and requested I wait until this afternoon so she could inform Shanice I would be calling to confirm and to discuss code status with her as well.      A voluntary discussion of the risks vs benefits of life sustaining treatments in the setting of advanced age and cancer was had with  Beryl. I discussed that her code status has not been addressed. I discussed what Full Code entailed for CPR/shock/intubation. She didn't think her mom would want that nor would she want that for her considering her poor prognosis with cancer. She would like to d/w her  sister Shanice but was in favor of changing to DNAR/Selective although she is still considering hospice pending her d/w Dr. Camarillo.     Code Status:  No Order, presumed full code, see above.   POLST:  Deferred as GOC are ongoing. Informed Beryl document would be created closer to discharge to reflect current wishes.     Problem List:  Principal Problem:    Mucositis due to chemotherapy  Active Problems:    Malignant neoplasm of colon (HCC)    Metastatic colon cancer to liver (HCC)    Anemia complicating neoplastic disease    Hypokalemia    Chemotherapy-induced neutropenia (HCC)    Oral phase dysphagia    Thrombocytopenia (HCC)    Febrile neutropenia (HCC)    Failure to thrive in adult    Hypophosphatemia    Cognitive disorder    Acute encephalopathy    Palliative care encounter    Goals of care, counseling/discussion      Assessment and Recommendations      Goals of care: ongoing   Continue treatment focused care for now. Beryl will be reaching out to Dr. Camarillo to request input on prognosis and if she would be a candidate for further cancer tx.   Beryl ok with continuing pursuit of use of DHT but is not in favor of PEG if no improvement in PO intake.   Note GI re-consult, CT pending.     Advanced Care Planning:  Code Status: No Order, Beryl leaning toward changing to DNAR/Selective but wants to d/w her sister first. Permission given to call Shanice later this afternoon.   POLST: Deferred as GOC are ongoing. Informed Beryl document would be created closer to discharge to reflect current wishes.   Healthcare agent: No document previously created. Healthcare Agent's Name: Beryl Blanchard - daughter. Will call kori Prasad to confirm this afternoon.     Discussed today's visit with  RN, Care team, and hospitalist.    Palliative Care Follow Up: Palliative care team will Continue to follow while inpatient.  Palliative care follow up at discharge: TBD.    Thank you for allowing Palliative Care services to  participate in the care of Lisa Iqbal.    A total of 55 minutes were spent on this consult, which included all of the following: chart review, direct face to face contact, history taking, physical examination, counseling and coordinating care, and documentation including 16 minutes specifically related to Advanced Care Planning (discussion or form completion).         Magaly Parker, APRN  5/7/2024  10:41 AM  Palliative Care Services    The 21st Century Cures Act makes medical notes like these available to patients in the interest of transparency. Please be advised this is a medical document. Medical documents are intended to carry relevant information, facts as evident, and the clinical opinion of the practitioner. The medical note is intended as peer to peer communication and may appear blunt or direct. It is written in medical language and may contain abbreviations or verbiage that are unfamiliar.

## 2024-05-07 NOTE — PROGRESS NOTES
Ct results called to gi orders received. Patient received at a slow rate soap suds enema that she was not able to hold in. Stool had clogged up tube when inserting. Patient daughter here at bedside explained ct results and enema and plan of care.

## 2024-05-07 NOTE — PROGRESS NOTES
[facility]   part of [facility]     Hospitalist Progress Note     Lisa Iqbal Patient Status:  Inpatient    10/11/1950 MRN [id number]   Location [facility] 4NW-A Attending Enedelia Lui MD   Hosp Day # 10 PCP Anusha Abraham MD     Chief Complaint:  \" I am trying to eat\"    Subjective:     Patient unable to tolerate DHT flushing and vomited. Per RN any sips of fluid even makes her nauseated.     Objective:    Review of Systems:   A comprehensive review of systems was completed; pertinent positive and negatives stated in subjective.    Vital signs:  Temp:  [97.5 °F (36.4 °C)-98.5 °F (36.9 °C)] 98.2 °F (36.8 °C)  Pulse:  [74-86] 81  Resp:  [14-18] 18  BP: (109-141)/(71-87) 135/78  SpO2:  [97 %-99 %] 98 %    Physical Exam:    General: No acute distress  Respiratory: No wheezes, no rhonchi  Cardiovascular: S1, S2, regular rate and rhythm  Abdomen: Soft, Non-tender, non-distended, positive bowel sounds  Neuro: No new focal deficits.   Extremities: No edema      Diagnostic Data:    Labs:  Recent Labs   Lab 24  0752 24  0804 24  0608 24  1015 24  0608   WBC 2.5* 2.4* 2.7* 2.7* 2.5*   HGB 8.5* 8.7* 8.5* 9.7* 9.1*   MCV 82.1 83.5 85.2 81.5 83.1   PLT 29.0* 30.0* 37.0* 57.0* 76.0*   BAND 6 2  --   --   --        Recent Labs   Lab 24  0631 24  0636 24  0608 24  1144 24  0618 24  0651   GLU 83  --  109*  --  124* 122*   BUN 8*  --  4*  --  2* 3*   CREATSERUM 0.27*  --  0.34*  --  0.60 0.58   CA 8.3*  --  8.1*  --  8.5 8.2*   ALB 1.7*  --   --   --   --   --      --  138  --  134* 136   K 3.2*  3.2*   < > 3.6 4.1 3.5 3.5   *  --  107  --  103 104   CO2 18.0*  --  26.0  --  23.0 23.0   ALKPHO 84  --   --   --   --   --    AST 8*  --   --   --   --   --    ALT 7*  --   --   --   --   --    BILT 0.9  --   --   --   --   --    TP 4.7*  --   --   --   --   --     < > = values in this interval not displayed.        Estimated Creatinine Clearance: 64.2 mL/min (based on SCr of 0.58 mg/dL).    No results for input(s): \"TROP\", \"TROPHS\", \"CK\" in the last 168 hours.    No results for input(s): \"PTP\", \"INR\" in the last 168 hours.               Microbiology    Hospital Encounter on 04/27/24   1. Urine Culture, Routine     Status: None    Collection Time: 04/30/24  3:35 PM    Specimen: Urine, clean catch   Result Value Ref Range    Urine Culture No Growth at 18-24 hrs. N/A   2. Blood Culture     Status: None    Collection Time: 04/29/24  3:21 PM    Specimen: Blood,peripheral   Result Value Ref Range    Blood Culture Result No Growth 5 Days N/A         Imaging: Reviewed in Epic.    Medications:    thiamine  100 mg Oral Daily    enoxaparin  40 mg Subcutaneous Daily    mirtazapine  15 mg Oral Nightly    pantoprazole  40 mg Intravenous Q24H    potassium chloride  20 mEq Oral Once    nystatin  500,000 Units Oral QID    fluconazole  200 mg Intravenous Q24H       Assessment & Plan:      # dysphjagia   - continue PP IV  - DHT placed  - will reconsult GI today for possible EGD?    # mucositis, oral candidiasis due to chemo- improved clinically   - cont magic mouth wash, nystatin solution   - iv Fluconazole x21 days ( D# 10/)  - pain control     # hypophosphatemia  - replaced     # neutropenic fever, resolved   - off merrem   - Blood cx NGTD    # pancytopenia with neutropenia  - due to chemo,  improving  - monitor counts, transfuse if Hgb < 7, plt < 10  - oncology following     # oral candidiasis   - IV Fluconazole, plan for 21 day course of fluconazole      # hypokalemia , replace PRN    # metastatic sigmoid colon cancer   - Dr Camarillo d/w pt and daughter that no plan for chemo at this time.   - Palliative care consult today     # failure to thrive.   - cont to encourage oral intake   -  Remeron  - PT OT   - d.w family   - DHT placed   - psych eval appreciated. D/w Dr Pickering  =  - ELIANE PLACEMENT when medically ready. Not ready for dc     Enedelia  MD Hu      Supplementary Documentation:     Quality:  DVT Mechanical Prophylaxis:   SCDs,    DVT Pharmacologic Prophylaxis   Medication    enoxaparin (Lovenox) 40 MG/0.4ML SUBQ injection 40 mg                Code Status: Not on file  Cuellar: External urinary catheter in place  Cuellar Duration (in days):   Central line:    ALESHA:     Discharge is dependent on: progress  At this point Ms. Félix Iqbal is expected to be discharge to: home    The 21st Century Cures Act makes medical notes like these available to patients in the interest of transparency. Please be advised this is a medical document. Medical documents are intended to carry relevant information, facts as evident, and the clinical opinion of the practitioner. The medical note is intended as peer to peer communication and may appear blunt or direct. It is written in medical language and may contain abbreviations or verbiage that are unfamiliar.

## 2024-05-07 NOTE — PROGRESS NOTES
Heme/Onc Progress Note - [facility]      Chief Complaint:    Follow up for evaluation and management of metastatic colon cancer.    Interim History:      Patient has NG feeding DHT. She has no appetite. She has no pain at this visit. Her mood was better this morning.    Physical Examination:    Vital Signs: /78 (BP Location: Right arm)   Pulse 81   Temp 98.2 °F (36.8 °C) (Oral)   Resp 18   Ht 1.549 m (5' 1\")   Wt 65 kg (143 lb 3.2 oz)   SpO2 98%   BMI 27.06 kg/m²     General: Patient is alert and oriented x 3, She is very weak and uncomfortable.  HEENT: Oral mucositis.   Chest: Clear to auscultation. No rales and no wheezes.  Heart: Regular rate and rhythm.   Abdomen: Soft and nontender. good bowel sounds.  Extremities: No edema. Non-tender.  Skin:  Warm, dry.      Labs reviewed at this visit:     Recent Labs   Lab 05/03/24  0804 05/04/24  0608 05/05/24  1015 05/06/24  0608   RBC 3.16* 3.10* 3.57* 3.38*   HGB 8.7* 8.5* 9.7* 9.1*   HCT 26.4* 26.4* 29.1* 28.1*   MCV 83.5 85.2 81.5 83.1   MCH 27.5 27.4 27.2 26.9   MCHC 33.0 32.2 33.3 32.4   RDW 21.5 21.0 20.9 21.0   NEPRELIM 1.21*  --  1.45* 1.18*   WBC 2.4* 2.7* 2.7* 2.5*   PLT 30.0* 37.0* 57.0* 76.0*       Recent Labs   Lab 05/01/24  0631 05/02/24  0636 05/04/24  0608 05/04/24  1144 05/06/24  0618 05/07/24  0651   GLU 83  --  109*  --  124* 122*   BUN 8*  --  4*  --  2* 3*   CREATSERUM 0.27*  --  0.34*  --  0.60 0.58   CA 8.3*  --  8.1*  --  8.5 8.2*   ALB 1.7*  --   --   --   --   --      --  138  --  134* 136   K 3.2*  3.2*   < > 3.6 4.1 3.5 3.5   *  --  107  --  103 104   CO2 18.0*  --  26.0  --  23.0 23.0   ALKPHO 84  --   --   --   --   --    AST 8*  --   --   --   --   --    ALT 7*  --   --   --   --   --    BILT 0.9  --   --   --   --   --    TP 4.7*  --   --   --   --   --     < > = values in this interval not displayed.       Radiologic imaging reviewed at this visit:    CT abd/pelvis on 4/15/2024:  FINDINGS:    LIVER:  Extensive hepatic  metastatic deposits throughout the liver.  BILIARY:  High density in the gallbladder is nonspecific.  PANCREAS:  No lesion, fluid collection, ductal dilatation, or atrophy.    SPLEEN:  No enlargement or focal lesion.    KIDNEYS:  Large cyst in the mid left kidney measures 6 x 7 x 6.0 cm.  Cyst in the upper pole the right kidney measures 5.3 x 5 1 cm. No hydronephrosis.  No calculi in the kidneys.  ADRENALS:  No mass or enlargement.    AORTA/VASCULAR:    Unremarkable as seen on non-contrast imaging.  RETROPERITONEUM:  No mass or adenopathy.    BOWEL/MESENTERY:  Visualized part of the appendix is unremarkable.  Mild thickening of the sigmoid colon.  ABDOMINAL WALL:  Tiny fat containing umbilical hernia.  URINARY BLADDER:  No visible focal wall thickening, lesion, or calculus.    PELVIC NODES:  No adenopathy.    PELVIC ORGANS:  Sequelae of hysterectomy.  BONES:  No bony lesion or fracture.    LUNG BASES:  No visible pulmonary or pleural disease.    OTHER:  Negative.       Impression   CONCLUSION:       1. No calculi in the kidneys.  No hydronephrosis.     2. Extensive metastatic deposits throughout the liver.     3. There is mild thickening of the sigmoid colon.  This may be sequelae of a localized colitis.  This may be sequelae of infectious or inflammatory etiology.  Ischemia is considered unlikely.         Impression/Plan:     Metastatic Colon Cancer:  Liver metastases:    S/P FOLFOX cycle 3 on 4/17/2024. Having complications from treatment even with dose reduction.  Plan is to stop chemotherapy due to toxicity and her very poor performance status. I think we will need to continue to focus on palliative care. I agree with NG tube feeding to see if she can get stronger.     Mucositis secondary to chemotherapy:  Nausea secondary to chemotherapy  Chemo induced neutropenia with fever s/p peg-filgrastim:  Oral candidiasis    ANC has recovered to greater than 1000 and she is afebrile. Mucositis has improved.    Continue  IV fluconizole for oral candidiasis.     Anemia complicating neoplastic disease:  Thrombocytopenia secondary to chemotherapy:    Platelets are increasing without bleeding. HGB has been adequate. Repeat cbc in am.      Ronan Camarillo MD  [facility]

## 2024-05-08 ENCOUNTER — APPOINTMENT (OUTPATIENT)
Dept: GENERAL RADIOLOGY | Facility: HOSPITAL | Age: 74
DRG: 157 | End: 2024-05-08
Attending: NURSE PRACTITIONER
Payer: MEDICARE

## 2024-05-08 LAB
BASOPHILS # BLD AUTO: 0.04 X10(3) UL (ref 0–0.2)
BASOPHILS NFR BLD AUTO: 1 %
EOSINOPHIL # BLD AUTO: 0.13 X10(3) UL (ref 0–0.7)
EOSINOPHIL NFR BLD AUTO: 3.4 %
ERYTHROCYTE [DISTWIDTH] IN BLOOD BY AUTOMATED COUNT: 20.3 %
HCT VFR BLD AUTO: 27.4 %
HGB BLD-MCNC: 9 G/DL
IMM GRANULOCYTES # BLD AUTO: 0.13 X10(3) UL (ref 0–1)
IMM GRANULOCYTES NFR BLD: 3.4 %
LYMPHOCYTES # BLD AUTO: 0.83 X10(3) UL (ref 1–4)
LYMPHOCYTES NFR BLD AUTO: 21.8 %
MCH RBC QN AUTO: 27.2 PG (ref 26–34)
MCHC RBC AUTO-ENTMCNC: 32.8 G/DL (ref 31–37)
MCV RBC AUTO: 82.8 FL
MONOCYTES # BLD AUTO: 0.83 X10(3) UL (ref 0.1–1)
MONOCYTES NFR BLD AUTO: 21.8 %
NEUTROPHILS # BLD AUTO: 1.85 X10 (3) UL (ref 1.5–7.7)
NEUTROPHILS # BLD AUTO: 1.85 X10(3) UL (ref 1.5–7.7)
NEUTROPHILS NFR BLD AUTO: 48.6 %
PLATELET # BLD AUTO: 138 10(3)UL (ref 150–450)
RBC # BLD AUTO: 3.31 X10(6)UL
WBC # BLD AUTO: 3.8 X10(3) UL (ref 4–11)

## 2024-05-08 PROCEDURE — 99233 SBSQ HOSP IP/OBS HIGH 50: CPT | Performed by: HOSPITALIST

## 2024-05-08 PROCEDURE — 74283 THER NMA RDCTJ INTUS/OBSTRCJ: CPT | Performed by: NURSE PRACTITIONER

## 2024-05-08 PROCEDURE — 99232 SBSQ HOSP IP/OBS MODERATE 35: CPT | Performed by: INTERNAL MEDICINE

## 2024-05-08 NOTE — PROGRESS NOTES
Heme/Onc Progress Note - [facility]      Chief Complaint:    Follow up for evaluation and management of metastatic colon cancer.    Interim History:      Patient has NG feeding DHT. She has no appetite but she says she will try to eat a banana today. She has no pain at this visit. She has no dyspnea.    Physical Examination:    Vital Signs: /68 (BP Location: Right arm)   Pulse 75   Temp 97.4 °F (36.3 °C) (Oral)   Resp 20   Ht 1.549 m (5' 1\")   Wt 65 kg (143 lb 3.2 oz)   SpO2 100%   BMI 27.06 kg/m²     General: Patient is alert and oriented x 3, She is very weak and uncomfortable.  HEENT: Oral mucositis.   Chest: Clear to auscultation. No rales and no wheezes.  Heart: Regular rate and rhythm.   Abdomen: Soft and nontender. good bowel sounds.  Extremities: No edema. Non-tender.  Skin:  Warm, dry.      Labs reviewed at this visit:     Recent Labs   Lab 05/05/24  1015 05/06/24  0608 05/08/24  0702   RBC 3.57* 3.38* 3.31*   HGB 9.7* 9.1* 9.0*   HCT 29.1* 28.1* 27.4*   MCV 81.5 83.1 82.8   MCH 27.2 26.9 27.2   MCHC 33.3 32.4 32.8   RDW 20.9 21.0 20.3   NEPRELIM 1.45* 1.18* 1.85   WBC 2.7* 2.5* 3.8*   PLT 57.0* 76.0* 138.0*       Recent Labs   Lab 05/04/24  0608 05/04/24  1144 05/06/24  0618 05/07/24  0651   *  --  124* 122*   BUN 4*  --  2* 3*   CREATSERUM 0.34*  --  0.60 0.58   CA 8.1*  --  8.5 8.2*     --  134* 136   K 3.6 4.1 3.5 3.5     --  103 104   CO2 26.0  --  23.0 23.0       Radiologic imaging reviewed at this visit:    CT abd/pelvis on 4/15/2024:  FINDINGS:    LIVER:  Extensive hepatic metastatic deposits throughout the liver.  BILIARY:  High density in the gallbladder is nonspecific.  PANCREAS:  No lesion, fluid collection, ductal dilatation, or atrophy.    SPLEEN:  No enlargement or focal lesion.    KIDNEYS:  Large cyst in the mid left kidney measures 6 x 7 x 6.0 cm.  Cyst in the upper pole the right kidney measures 5.3 x 5 1 cm. No hydronephrosis.  No calculi in the  kidneys.  ADRENALS:  No mass or enlargement.    AORTA/VASCULAR:    Unremarkable as seen on non-contrast imaging.  RETROPERITONEUM:  No mass or adenopathy.    BOWEL/MESENTERY:  Visualized part of the appendix is unremarkable.  Mild thickening of the sigmoid colon.  ABDOMINAL WALL:  Tiny fat containing umbilical hernia.  URINARY BLADDER:  No visible focal wall thickening, lesion, or calculus.    PELVIC NODES:  No adenopathy.    PELVIC ORGANS:  Sequelae of hysterectomy.  BONES:  No bony lesion or fracture.    LUNG BASES:  No visible pulmonary or pleural disease.    OTHER:  Negative.       Impression   CONCLUSION:       1. No calculi in the kidneys.  No hydronephrosis.     2. Extensive metastatic deposits throughout the liver.     3. There is mild thickening of the sigmoid colon.  This may be sequelae of a localized colitis.  This may be sequelae of infectious or inflammatory etiology.  Ischemia is considered unlikely.         Impression/Plan:     Metastatic Colon Cancer:  Liver metastases:    S/P FOLFOX cycle 3 on 4/17/2024. Having complications from treatment even with dose reduction.  Plan is to stop chemotherapy due to toxicity and her very poor performance status. I think we will need to continue to focus on palliative care. I agree with NG tube feeding to see if she can get stronger.     Mucositis secondary to chemotherapy:  Nausea secondary to chemotherapy  Chemo induced neutropenia with fever s/p peg-filgrastim:  Oral candidiasis    ANC has recovered to normal. She remains afebrile. Mucositis has improved.    Continue IV fluconizole for oral candidiasis.     Anemia complicating neoplastic disease:  Thrombocytopenia secondary to chemotherapy:    Platelets are increasing without bleeding. HGB has been adequate and stable.    Disposition:    Continue tube feeds and push/encourage PO diet. Will need rehab with plans for outpatient follow up after rehab to decide on further management.    MD Ron Perez  [facility]

## 2024-05-08 NOTE — PAYOR COMM NOTE
--------------  CONTINUED STAY REVIEW---CLINICAL UPDATE FOR 5/7--STILL IN HOUSE      Payor: LUCIAN MA O  Subscriber #:  [id number]  Authorization Number: [id number]    Admit date: 4/27/24  Admit time:  9:12 PM    Subjective:      Patient unable to tolerate DHT flushing and vomited. Per RN any sips of fluid even makes her nauseated.      Objective:    Review of Systems:   A comprehensive review of systems was completed; pertinent positive and negatives stated in subjective.     Vital signs:  Temp:  [97.5 °F (36.4 °C)-98.5 °F (36.9 °C)] 98.2 °F (36.8 °C)  Pulse:  [74-86] 81  Resp:  [14-18] 18  BP: (109-141)/(71-87) 135/78  SpO2:  [97 %-99 %] 98 %     Physical Exam:    General: No acute distress  Respiratory: No wheezes, no rhonchi  Cardiovascular: S1, S2, regular rate and rhythm  Abdomen: Soft, Non-tender, non-distended, positive bowel sounds  Neuro: No new focal deficits.   Extremities: No edema        Diagnostic Data:    Labs:          Recent Labs   Lab 05/02/24  0752 05/03/24  0804 05/04/24  0608 05/05/24  1015 05/06/24  0608   WBC 2.5* 2.4* 2.7* 2.7* 2.5*   HGB 8.5* 8.7* 8.5* 9.7* 9.1*   MCV 82.1 83.5 85.2 81.5 83.1   PLT 29.0* 30.0* 37.0* 57.0* 76.0*   BAND 6 2  --   --   --                   Recent Labs   Lab 05/01/24  0631 05/02/24  0636 05/04/24  0608 05/04/24  1144 05/06/24  0618 05/07/24  0651   GLU 83  --  109*  --  124* 122*   BUN 8*  --  4*  --  2* 3*   CREATSERUM 0.27*  --  0.34*  --  0.60 0.58   CA 8.3*  --  8.1*  --  8.5 8.2*   ALB 1.7*  --   --   --   --   --      --  138  --  134* 136   K 3.2*  3.2*   < > 3.6 4.1 3.5 3.5   *  --  107  --  103 104   CO2 18.0*  --  26.0  --  23.0 23.0   ALKPHO 84  --   --   --   --   --    AST 8*  --   --   --   --   --    ALT 7*  --   --   --   --   --    BILT 0.9  --   --   --   --   --    TP 4.7*  --   --   --   --   --     < > = values in this interval not displayed.         Estimated Creatinine Clearance: 64.2 mL/min (based on SCr of 0.58 mg/dL).      No results for input(s): \"TROP\", \"TROPHS\", \"CK\" in the last 168 hours.     No results for input(s): \"PTP\", \"INR\" in the last 168 hours.                 Microbiology           Hospital Encounter on 04/27/24   1. Urine Culture, Routine     Status: None     Collection Time: 04/30/24  3:35 PM     Specimen: Urine, clean catch   Result Value Ref Range     Urine Culture No Growth at 18-24 hrs. N/A   2. Blood Culture     Status: None     Collection Time: 04/29/24  3:21 PM     Specimen: Blood,peripheral   Result Value Ref Range     Blood Culture Result No Growth 5 Days N/A            Imaging: Reviewed in Epic.     Medications:    thiamine  100 mg Oral Daily    enoxaparin  40 mg Subcutaneous Daily    mirtazapine  15 mg Oral Nightly    pantoprazole  40 mg Intravenous Q24H    potassium chloride  20 mEq Oral Once    nystatin  500,000 Units Oral QID    fluconazole  200 mg Intravenous Q24H         Assessment & Plan:       # dysphjagia   - continue PP IV  - DHT placed  - will reconsult GI today for possible EGD?     # mucositis, oral candidiasis due to chemo- improved clinically   - cont magic mouth wash, nystatin solution   - iv Fluconazole x21 days ( D# 10/)  - pain control      # hypophosphatemia  - replaced      # neutropenic fever, resolved   - off merrem   - Blood cx NGTD     # pancytopenia with neutropenia  - due to chemo,  improving  - monitor counts, transfuse if Hgb < 7, plt < 10  - oncology following     # oral candidiasis   - IV Fluconazole, plan for 21 day course of fluconazole      # hypokalemia , replace PRN     # metastatic sigmoid colon cancer   - Dr Camarillo d/w pt and daughter that no plan for chemo at this time.   - Palliative care consult today      # failure to thrive.   - cont to encourage oral intake   -  Remeron  - PT OT   - d.w family   - DHT placed   - psych eval appreciated. D/w Dr Pickering  =  - ELIANE PLACEMENT when medically ready. Not ready for dc      Enedelia Lui MD              MEDICATIONS ADMINISTERED IN  LAST 1 DAY:  enoxaparin (Lovenox) 40 MG/0.4ML SUBQ injection 40 mg       Date Action Dose Route User    5/8/2024 0939 Given 40 mg Subcutaneous (Left Lower Abdomen) Nancy Randolph RN          fluconazole in sodium chloride 0.9% (Diflucan) 200 mg/100mL IVPB premix 200 mg       Date Action Dose Route User    5/8/2024 0012 New Bag 200 mg Intravenous Angelika Mora RN          potassium chloride 20 mEq in dextrose 5%-sodium chloride 0.9% 1000mL infusion premix       Date Action Dose Route User    5/8/2024 1147 New Bag (none) Intravenous Nancy Randolph RN    5/7/2024 2205 New Bag (none) Intravenous Angelika Mora RN          morphINE PF 2 MG/ML injection 2 mg       Date Action Dose Route User    5/8/2024 0439 Given 2 mg Intravenous Angelika Mora RN          ondansetron (Zofran) 4 MG/2ML injection 4 mg       Date Action Dose Route User    5/8/2024 0643 Given 4 mg Intravenous Angelika Mora RN          polyethylene glycol-electrolyte (Golytely) 236 g oral solution 2,000 mL       Date Action Dose Route User    5/7/2024 1850 Given 2,000 mL Oral Nancy Randolph RN            Vitals (last day)       Date/Time Temp Pulse Resp BP SpO2 Weight O2 Device O2 Flow Rate (L/min) [facility]    05/08/24 1213 98.1 °F (36.7 °C) 79 16 131/71 98 % -- None (Room air) -- KS    05/08/24 0747 97.4 °F (36.3 °C) 75 20 134/68 100 % -- None (Room air) -- KS    05/08/24 0520 -- 77 18 137/79 98 % -- None (Room air) -- TT    05/08/24 0520 98.2 °F (36.8 °C) -- -- -- -- -- -- -- GS    05/08/24 0042 -- 85 18 129/86 98 % -- None (Room air) -- TT    05/07/24 2057 98.5 °F (36.9 °C) 93 20 118/78 98 % -- None (Room air) -- TT    05/07/24 1608 98.3 °F (36.8 °C) 88 18 133/73 98 % -- None (Room air) -- KS    05/07/24 1254 98.7 °F (37.1 °C) 80 18 135/83 99 % -- None (Room air) -- KS    05/07/24 0803 98.2 °F (36.8 °C) 81 18 135/78 98 % -- None (Room air) -- KS    05/07/24 0515 98.2 °F (36.8 °C) 81 18 135/73 99 % -- None (Room air) -- AMBER ALONSO  Scores (since admission)       None

## 2024-05-08 NOTE — PROGRESS NOTES
[facility]   part of [facility]     Hospitalist Progress Note     Lisa Iqbal Patient Status:  Inpatient    10/11/1950 MRN [id number]   Formerly [facility] 4NW-A Attending Enedelia Lui MD   Hosp Day # 11 PCP Anusha Abraham MD     Chief Complaint:  \" I am trying to eat\"    Subjective:     No acute events, pain controlled, NG feeding, appetite remains poor, going for gastrograffin enema today.    Objective:    Review of Systems:   A comprehensive review of systems was completed; pertinent positive and negatives stated in subjective.    Vital signs:  Temp:  [97.4 °F (36.3 °C)-98.7 °F (37.1 °C)] 97.4 °F (36.3 °C)  Pulse:  [75-93] 75  Resp:  [18-20] 20  BP: (118-137)/(68-86) 134/68  SpO2:  [98 %-100 %] 100 %    Physical Exam:    General: No acute distress  Respiratory: No wheezes, no rhonchi  Cardiovascular: S1, S2, regular rate and rhythm  Abdomen: Soft, Non-tender, non-distended, positive bowel sounds  Neuro: No new focal deficits.   Extremities: No edema      Diagnostic Data:    Labs:  Recent Labs   Lab 24  0752 24  0804 24  0608 24  1015 24  0608 24  0702   WBC 2.5* 2.4* 2.7* 2.7* 2.5* 3.8*   HGB 8.5* 8.7* 8.5* 9.7* 9.1* 9.0*   MCV 82.1 83.5 85.2 81.5 83.1 82.8   PLT 29.0* 30.0* 37.0* 57.0* 76.0* 138.0*   BAND 6 2  --   --   --   --        Recent Labs   Lab 24  0608 24  1144 24  0618 24  0651   *  --  124* 122*   BUN 4*  --  2* 3*   CREATSERUM 0.34*  --  0.60 0.58   CA 8.1*  --  8.5 8.2*     --  134* 136   K 3.6 4.1 3.5 3.5     --  103 104   CO2 26.0  --  23.0 23.0       Estimated Creatinine Clearance: 64.2 mL/min (based on SCr of 0.58 mg/dL).    No results for input(s): \"TROP\", \"TROPHS\", \"CK\" in the last 168 hours.    No results for input(s): \"PTP\", \"INR\" in the last 168 hours.               Microbiology    Hospital Encounter on 24   1. Urine Culture, Routine     Status: None    Collection  Time: 04/30/24  3:35 PM    Specimen: Urine, clean catch   Result Value Ref Range    Urine Culture No Growth at 18-24 hrs. N/A   2. Blood Culture     Status: None    Collection Time: 04/29/24  3:21 PM    Specimen: Blood,peripheral   Result Value Ref Range    Blood Culture Result No Growth 5 Days N/A         Imaging: Reviewed in Epic.    Medications:    thiamine  100 mg Oral Daily    enoxaparin  40 mg Subcutaneous Daily    mirtazapine  15 mg Oral Nightly    pantoprazole  40 mg Intravenous Q24H    potassium chloride  20 mEq Oral Once    nystatin  500,000 Units Oral QID    fluconazole  200 mg Intravenous Q24H       Assessment & Plan:      # dysphagia   - continue PP IV  - DHT placed  - will reconsult GI today for possible EGD  - CT a/p reviewed, concern for colitis, fecal impaction, stercoral colitis, widespread liver mes  - Fluconazole IV    # mucositis, oral candidiasis due to chemo- improved clinically   - cont magic mouth wash, nystatin solution   - iv Fluconazole x21 days ( D# 10/)  - pain control     # hypophosphatemia  - replaced     # neutropenic fever, resolved   - off merrem   - Blood cx NGTD    # pancytopenia with neutropenia  - due to chemo,  improving  - monitor counts, transfuse if Hgb < 7, plt < 10  - oncology following     # oral candidiasis   - IV Fluconazole, plan for 21 day course of fluconazole      # hypokalemia , replace PRN    # metastatic sigmoid colon cancer   - Dr Camarillo d/w pt and daughter that no plan for chemo at this time.   - Palliative care consult following     # failure to thrive.   - cont to encourage oral intake   -  Remeron  - PT OT     - ELIANE PLACEMENT when medically ready. Not ready for dc     Win Iyer MD        Supplementary Documentation:     Quality:  DVT Mechanical Prophylaxis:   SCDs,    DVT Pharmacologic Prophylaxis   Medication    enoxaparin (Lovenox) 40 MG/0.4ML SUBQ injection 40 mg                Code Status: Not on file  Cuellar: External urinary catheter in place  Cuellar  Duration (in days):   Central line:    ALESHA:     Discharge is dependent on: progress  At this point Ms. Félix Iqbal is expected to be discharge to: home    The 21st Century Cures Act makes medical notes like these available to patients in the interest of transparency. Please be advised this is a medical document. Medical documents are intended to carry relevant information, facts as evident, and the clinical opinion of the practitioner. The medical note is intended as peer to peer communication and may appear blunt or direct. It is written in medical language and may contain abbreviations or verbiage that are unfamiliar.

## 2024-05-08 NOTE — PLAN OF CARE
Problem: RISK FOR INFECTION - ADULT  Goal: Absence of fever/infection during anticipated neutropenic period  Description: INTERVENTIONS  - Monitor WBC  - Administer growth factors as ordered  - Implement neutropenic guidelines  Outcome: Progressing     Problem: SAFETY ADULT - FALL  Goal: Free from fall injury  Description: INTERVENTIONS:  - Assess pt frequently for physical needs  - Identify cognitive and physical deficits and behaviors that affect risk of falls.  - [place] fall precautions as indicated by assessment.  - Educate pt/family on patient safety including physical limitations  - Instruct pt to call for assistance with activity based on assessment  - Modify environment to reduce risk of injury  - Provide assistive devices as appropriate  - Consider OT/PT consult to assist with strengthening/mobility  - Encourage toileting schedule  Outcome: Progressing     Problem: DISCHARGE PLANNING  Goal: Discharge to home or other facility with appropriate resources  Description: INTERVENTIONS:  - Identify barriers to discharge w/pt and caregiver  - Include patient/family/discharge partner in discharge planning  - Arrange for needed discharge resources and transportation as appropriate  - Identify discharge learning needs (meds, wound care, etc)  - Arrange for interpreters to assist at discharge as needed  - Consider post-discharge preferences of patient/family/discharge partner  - Complete POLST form as appropriate  - Assess patient's ability to be responsible for managing their own health  - Refer to Case Management Department for coordinating discharge planning if the patient needs post-hospital services based on physician/LIP order or complex needs related to functional status, cognitive ability or social support system  Outcome: Not Progressing     Problem: Altered Communication/Language Barrier  Goal: Patient/Family is able to understand and participate in their care  Description: Interventions:  - Assess  communication ability and preferred communication style  - Implement communication aides and strategies  - Use visual cues when possible  - Listen attentively, be patient, do not interrupt  - Minimize distractions  - Allow time for understanding and response  - Establish method for patient to ask for assistance (call light)  - Provide an  as needed  - Communicate barriers and strategies to overcome with those who interact with patient  Outcome: Progressing     Problem: METABOLIC/FLUID AND ELECTROLYTES - ADULT  Goal: Electrolytes maintained within normal limits  Description: INTERVENTIONS:  - Monitor labs and rhythm and assess patient for signs and symptoms of electrolyte imbalances  - Administer electrolyte replacement as ordered  - Monitor response to electrolyte replacements, including rhythm and repeat lab results as appropriate  - Fluid restriction as ordered  - Instruct patient on fluid and nutrition restrictions as appropriate  Outcome: Progressing   Patient had a small stool after receiving gastrografin . Patient denies nausea at this time states she only vomits when she drinks. Ng to rt nare clamped.

## 2024-05-08 NOTE — CM/SW NOTE
LACY sent updated clinical information to [facility] for eventual patient admission. LACY will continue to follow for medical clearance.     Insurance authorization is approved for admission through today. If not Dc'ed, LACY will request auth extension.     SW will continue to follow for plan of care changes and remain available for any additional DC needs or concerns.     Harika Haro MSW, LSW  Discharge Planner   [phone]

## 2024-05-08 NOTE — PROGRESS NOTES
Gastroenterology Progress Note  Lisa Iqbal Patient Status:  Inpatient    10/11/1950 MRN [id number]   Location [facility] 4NW-A Attending Win Iyer MD   Hosp Day # 11 PCP Anusha Abraham MD     Chief Complaint: Nausea, vomiting, poor PO intake   S: CT with fecal impaction--pt did not tolerate tap water enema x 1 and after ~400 ml of Golytely pt with only a smear of stool and non-bloody vomiting   Pt denies abd pain and odynophagia. Does not think she is constipated. + flatus   O: /68 (BP Location: Right arm)   Pulse 75   Temp 97.4 °F (36.3 °C) (Oral)   Resp 20   Ht 5' 1\" (1.549 m)   Wt 143 lb 3.2 oz (65 kg)   SpO2 100%   BMI 27.06 kg/m²   Gen: AAOx2 (person, place)  CV: RRR with normal S1 / S2  Resp: CTA bilaterally; No increase in respiratory effort   Abd: (+)BS, soft, non-tender, non-distended; no rebound or guarding  Ext: No edema or cyanosis  Skin: Warm and dry  Laboratory Data:     No results for input(s): \"PGLU\" in the last 168 hours.  No results for input(s): \"INR\" in the last 168 hours.      Recent Labs   Lab 24  0804 24  0608 24  1015 24  0608 24  0702   WBC 2.4* 2.7* 2.7* 2.5* 3.8*   HGB 8.7* 8.5* 9.7* 9.1* 9.0*   PLT 30.0* 37.0* 57.0* 76.0* 138.0*       Recent Labs   Lab 24  1532 24  0608 24  1144 24  0618 24  0651   NA  --  138  --  134* 136   K 3.1* 3.6 4.1 3.5 3.5   CL  --  107  --  103 104   CO2  --  26.0  --  23.0 23.0   BUN  --  4*  --  2* 3*   CREATSERUM  --  0.34*  --  0.60 0.58       No results for input(s): \"ALT\", \"AST\" in the last 168 hours.    Invalid input(s): \"ALPHOS\", \"TBIL\"  Imaging:  PROCEDURE:  CT ABDOMEN+PELVIS (CPT=74176)     COMPARISON:  [facility] , CT, CT ABDOMEN+PELVIS(CPT=74176), 4/15/2024, 10:09 PM.     INDICATIONS:  nausea, vomiting     TECHNIQUE:  Unenhanced multislice CT scanning was performed from the dome of the diaphragm to the  pubic symphysis.  Dose reduction techniques were used. Dose information is transmitted to the ACR (American College of Radiology) NRDR (National Radiology  Data Registry) which includes the Dose Index Registry.     PATIENT STATED HISTORY: (As transcribed by Technologist)  Patient is here for nausea and vomitting.         FINDINGS:    This scan performed without IV or oral contrast, with limitations thereof.     LIVER:  Redemonstration numerous hepatic metastatic lesions are seen including areas of confluent poorly defined low-attenuation in the liver, more so than well-defined measurable masses, in both left and right hepatic lobes..     BILIARY:  Biliary sludge.     PANCREAS:  Unremarkable.     SPLEEN:  Unremarkable.     KIDNEYS:  No acute abnormality cysts are present, including a large cyst lateral left kidney 6.2 cm, and a sizable with smaller cyst upper right kidney 5.2 cm.     ADRENALS:  Unremarkable.     AORTA/VASCULAR:  No aortic aneurysm.     RETROPERITONEUM:  Unremarkable.     BOWEL/MESENTERY:  Nasogastric tube present with the tip in the stomach.  No free air is identified.  No ascites.  Rectal fecal impaction with desiccated-appearing hyperdense stool in the rectum transverse dimension 7.5 cm.  In addition there is  thickening of the colon and stranding of the fat is Mihir posterior between the rectum and the sacrum, concerning for stercoral colitis.  More modest amounts of stool throughout the rest of the colon.  Thickened appearance of the ascending as well as  descending colon, with some pericolonic stranding concerning for areas of colitis in these regions.  No bowel wall pneumatosis or portal venous gas, no sign of toxic megacolon.  Probable small amount of fluid in the right pericolic gutter but no large or   drainable ascites.     ABDOMINAL WALL:  Unremarkable.     URINARY BLADDER:  Unremarkable.     LYMPH NODES PELVIS:  Unremarkable.     PELVIC ORGANS:  No acute process.     LUNG BASES:   Development of bilateral pleural effusion, small bilateral, although larger on the right side, along with bilateral basilar atelectasis.     BONES:  No acute abnormality. Note is made of degenerative changes present in the spine.     Impression   CONCLUSION:       1. Concern for colitis involving the ascending and descending colon.  Trace fluid in the pericolic gutter but no large or drainable ascites.  No free air or bowel obstruction.  Nasogastric tube tip in the stomach.     2. Separate from the areas of suspected colitis of the ascending and descending colon, there is rectal fecal impaction and concern for stercoral colitis with stranding of the fat between the rectum in the sacrum.     3. Widespread infiltrating disease in the liver concerning for metastatic infiltration.  Poorly defined without contrast.  Involvement of both right and left hepatic lobes.     Hepatic cyst.  Degenerative changes the spine.          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 Pt was dx with oral candidiasis treated with fluconazole.   Pt now with improved/resolved odynophagia however continues to have little to no appetite and this AM vomiting. CT a/p completed 5/7 (unable to tolerate oral contrast) which suggested fecal impaction c/b stercoral colitis and separate areas of colitis. No improvement with enema and pt unable to tolerate Golytely.   Discussed an EGD and possible flex-sig with pt's daughter if Gastrografin enema not therapeutic. The risks, benefits, alternatives of the procedure including the risks of anesthesia, bleeding, perforation, missed lesions, need for surgery, and  infection were discussed with the patient's daughter Beryl via telephone conversation. She expressed understanding of the risks and was agreeable to proceed.  Plan:   Gastrografin enema now  EGD under MAC in AM and pending results of above will also consider flex-sig under MAC in AM  Npo at midnight  Continue PPI daily + Fluconazole IV  Cased discussed and reviewed with Dr Nahum Becker, INDIRA  10:25 AM  5/8/2024  [facility]astroenter[facility]  [phone]

## 2024-05-08 NOTE — PLAN OF CARE
Pt alert and oriented. RA. Tele. VSS. Afebrile. PRN IV Morphine given for abdominal pain. Pt received 399ml of golytely at 30ml/hr. Pt started having episodes of emesis at 0500, golytely stopped. One small smear of stool. No other BM during the night. Fall and safety precautions in place. Call light within reach.

## 2024-05-09 ENCOUNTER — ANESTHESIA (OUTPATIENT)
Dept: ENDOSCOPY | Facility: HOSPITAL | Age: 74
DRG: 157 | End: 2024-05-09
Payer: MEDICARE

## 2024-05-09 ENCOUNTER — ANESTHESIA EVENT (OUTPATIENT)
Dept: ENDOSCOPY | Facility: HOSPITAL | Age: 74
DRG: 157 | End: 2024-05-09
Payer: MEDICARE

## 2024-05-09 LAB
ANION GAP SERPL CALC-SCNC: 10 MMOL/L (ref 0–18)
BASOPHILS # BLD AUTO: 0.07 X10(3) UL (ref 0–0.2)
BASOPHILS NFR BLD AUTO: 2 %
BUN BLD-MCNC: 3 MG/DL (ref 9–23)
CALCIUM BLD-MCNC: 8.6 MG/DL (ref 8.5–10.1)
CHLORIDE SERPL-SCNC: 103 MMOL/L (ref 98–112)
CO2 SERPL-SCNC: 19 MMOL/L (ref 21–32)
CREAT BLD-MCNC: 0.66 MG/DL
EGFRCR SERPLBLD CKD-EPI 2021: 92 ML/MIN/1.73M2 (ref 60–?)
EOSINOPHIL # BLD AUTO: 0.07 X10(3) UL (ref 0–0.7)
EOSINOPHIL NFR BLD AUTO: 2 %
ERYTHROCYTE [DISTWIDTH] IN BLOOD BY AUTOMATED COUNT: 21.1 %
GLUCOSE BLD-MCNC: 105 MG/DL (ref 70–99)
HCT VFR BLD AUTO: 28.4 %
HGB BLD-MCNC: 8.7 G/DL
IMM GRANULOCYTES # BLD AUTO: 0.12 X10(3) UL (ref 0–1)
IMM GRANULOCYTES NFR BLD: 3.5 %
LYMPHOCYTES # BLD AUTO: 0.69 X10(3) UL (ref 1–4)
LYMPHOCYTES NFR BLD AUTO: 19.9 %
MAGNESIUM SERPL-MCNC: 1.7 MG/DL (ref 1.6–2.6)
MCH RBC QN AUTO: 27.9 PG (ref 26–34)
MCHC RBC AUTO-ENTMCNC: 30.6 G/DL (ref 31–37)
MCV RBC AUTO: 91 FL
MONOCYTES # BLD AUTO: 0.81 X10(3) UL (ref 0.1–1)
MONOCYTES NFR BLD AUTO: 23.4 %
NEUTROPHILS # BLD AUTO: 1.7 X10 (3) UL (ref 1.5–7.7)
NEUTROPHILS # BLD AUTO: 1.7 X10(3) UL (ref 1.5–7.7)
NEUTROPHILS NFR BLD AUTO: 49.2 %
OSMOLALITY SERPL CALC.SUM OF ELEC: 271 MOSM/KG (ref 275–295)
PLATELET # BLD AUTO: 146 10(3)UL (ref 150–450)
PLATELETS.RETICULATED NFR BLD AUTO: 4.4 % (ref 0–7)
POTASSIUM SERPL-SCNC: 3.6 MMOL/L (ref 3.5–5.1)
RBC # BLD AUTO: 3.12 X10(6)UL
SODIUM SERPL-SCNC: 132 MMOL/L (ref 136–145)
WBC # BLD AUTO: 3.5 X10(3) UL (ref 4–11)

## 2024-05-09 PROCEDURE — 99232 SBSQ HOSP IP/OBS MODERATE 35: CPT | Performed by: INTERNAL MEDICINE

## 2024-05-09 PROCEDURE — 99233 SBSQ HOSP IP/OBS HIGH 50: CPT | Performed by: CLINICAL NURSE SPECIALIST

## 2024-05-09 PROCEDURE — 99233 SBSQ HOSP IP/OBS HIGH 50: CPT | Performed by: HOSPITALIST

## 2024-05-09 NOTE — PROGRESS NOTES
Patient pulled out ng tube this am states that she didn't recall pulling it out. Also had a soft med brown stool.

## 2024-05-09 NOTE — ANESTHESIA POSTPROCEDURE EVALUATION
[facility]    Lisa Iqbal Patient Status:  Inpatient   Age/Gender 73 year old female MRN [id number]   Location [facility] ENDOS[facility] Attending Win Iyer MD   Hosp Day # 12 PCP Anusha Abraham MD       Anesthesia Post-op Note    ESOPHAGOGASTRODUODENOSCOPY (EGD), FLEXIBLE SIGMOIDOSCOPY    Procedure Summary       Date: 05/09/24 Room / Location:  ENDOSCOPY 02 /  ENDOSCOPY    Anesthesia Start: 0935 Anesthesia Stop: 0950    Procedures:       ESOPHAGOGASTRODUODENOSCOPY (EGD), FLEXIBLE SIGMOIDOSCOPY      FLEXIBLE SIGMOIDOSCOPY Diagnosis: (nausea, vomiting, poor po intake)    Surgeons: Heath Sidhu MD Anesthesiologist: Holden Hudson MD    Anesthesia Type: MAC ASA Status: 3            Anesthesia Type: No value filed.    Vitals Value Taken Time   /67 05/09/24 1011   Temp 98 05/09/24 1025   Pulse 74 05/09/24 1012   Resp 16 05/09/24 1011   SpO2 99 % 05/09/24 1012   Vitals shown include unfiled device data.    Patient Location: Endoscopy    Postop Anesthesia Type: CANCELLED.    Airway Patency: patent and extubated    Postop Pain Control: adequate    Mental Status: preanesthetic baseline    Nausea/Vomiting: none    Cardiopulmonary/Hydration status: stable euvolemic    Complications: no apparent anesthesia related complications    Postop vital signs: stable    Dental Exam: Unchanged from Preop    Patient to be discharged from PACU when criteria met.

## 2024-05-09 NOTE — PROGRESS NOTES
Brief GI Note:     Patient scheduled for upper endoscopy and flexible sigmoidoscopy today with consent obtained from patient's daughter.  However, patient actively not allowing us to proceed with examination by refusing to let us put oxygen, or access the IV.  Before the procedure started, in the procedure room, the patient's daughter was called in order to discuss with the patient.  After long discussion between the patient and the daughter, the patient still would not let us place oxygen or access or IV which would be essential for proceeding with the procedure safely.  I discussed this with the daughter that despite not having the ability to consent, the practicality of proceeding in a procedure on a patient who is not allowing us to complete steps in order to safely proceed with the examination is not feasible.  This was discussed with anesthesia at bedside, who were also in agreement.  Given this as well as stable vital signs and no acute gastrointestinal emergency, will look to obtain ethics consult to decide on how to proceed proceed as well as give the patient and daughter time to discuss in hopes of finding a way to proceed with examinations if/once patient is amenable and able to practically let us complete the examination.    Heath Sidhu MD, 05/09/24, 10:00 AM    Pt's daughter was updated with this and understands and is in agreement with this plan.

## 2024-05-09 NOTE — PLAN OF CARE
Patient alert and oriented x3. Remains afebrile. Room air. No SOB. No complaints of pain at this time but still refusing PO medications due to Oral thrush. No further nausea and vomiting observed so far. Had BM x1, soft formed medium size tonight. NPO post midnight in preparation for GI procedures in the morning. Fall precautions in place. Frequent rounds provided. Needs attended. Call light in reach.     Problem: GASTROINTESTINAL - ADULT  Goal: Minimal or absence of nausea and vomiting  Description: INTERVENTIONS:  - Maintain adequate hydration with IV or PO as ordered and tolerated  - Nasogastric tube to low intermittent suction as ordered  - Evaluate effectiveness of ordered antiemetic medications  - Provide nonpharmacologic comfort measures as appropriate  - Advance diet as tolerated, if ordered  - Obtain nutritional consult as needed  - Evaluate fluid balance  Outcome: Progressing     Problem: RISK FOR INFECTION - ADULT  Goal: Absence of fever/infection during anticipated neutropenic period  Description: INTERVENTIONS  - Monitor WBC  - Administer growth factors as ordered  - Implement neutropenic guidelines  Outcome: Progressing     Problem: SAFETY ADULT - FALL  Goal: Free from fall injury  Description: INTERVENTIONS:  - Assess pt frequently for physical needs  - Identify cognitive and physical deficits and behaviors that affect risk of falls.  - [place] fall precautions as indicated by assessment.  - Educate pt/family on patient safety including physical limitations  - Instruct pt to call for assistance with activity based on assessment  - Modify environment to reduce risk of injury  - Provide assistive devices as appropriate  - Consider OT/PT consult to assist with strengthening/mobility  - Encourage toileting schedule  Outcome: Progressing     Problem: METABOLIC/FLUID AND ELECTROLYTES - ADULT  Goal: Electrolytes maintained within normal limits  Description: INTERVENTIONS:  - Monitor labs and rhythm and  assess patient for signs and symptoms of electrolyte imbalances  - Administer electrolyte replacement as ordered  - Monitor response to electrolyte replacements, including rhythm and repeat lab results as appropriate  - Fluid restriction as ordered  - Instruct patient on fluid and nutrition restrictions as appropriate  Outcome: Progressing

## 2024-05-09 NOTE — ANESTHESIA PREPROCEDURE EVALUATION
PRE-OP EVALUATION    Patient Name: Lisa Iqbal    Admit Diagnosis: Metastatic malignant neoplasm, unspecified site (HCC) [C79.9]    Pre-op Diagnosis: Metastatic malignant neoplasm, unspecified site (HCC) [C79.9]    ESOPHAGOGASTRODUODENOSCOPY (EGD), FLEXIBLE SIGMOIDOSCOPY    Anesthesia Procedure: ESOPHAGOGASTRODUODENOSCOPY (EGD), FLEXIBLE SIGMOIDOSCOPY  FLEXIBLE SIGMOIDOSCOPY    Surgeon(s) and Role:     * Po Aviles MD - Primary    Pre-op vitals reviewed.  Temp: 97.7 °F (36.5 °C)  Pulse: 85  Resp: 18  BP: 148/82  SpO2: 96 %  Body mass index is 27.06 kg/m².    Current medications reviewed.  Hospital Medications:   [COMPLETED] polyethylene glycol-electrolyte (Golytely) 236 g oral solution 2,000 mL  2,000 mL Oral Once    thiamine (Vitamin B1) tab 100 mg  100 mg Oral Daily    dextrose 10% infusion (TPN no rate)   Intravenous Continuous PRN    pancrelipase (Lip-Prot-Amyl) (Zenpep) DR particles cap 10,000 Units  10,000 Units Per G Tube PRN    And    sodium bicarbonate tab 325 mg  325 mg Per G Tube PRN    [COMPLETED] sodium phosphate 15 mmol in 0.9% NaCl 100mL IVPB premix  15 mmol Intravenous Once    maalox/diphenhydramine/lidocaine (First Mouthwash BLM) oral suspension 10 mL  10 mL Oral QID PRN    enoxaparin (Lovenox) 40 MG/0.4ML SUBQ injection 40 mg  40 mg Subcutaneous Daily    [COMPLETED] potassium chloride 40 mEq in 250mL sodium chloride 0.9% IVPB premix  40 mEq Intravenous Once    Followed by    [COMPLETED] potassium chloride 20 mEq/100mL IVPB premix 20 mEq  20 mEq Intravenous Once    [COMPLETED] sodium phosphate 30 mmol in sodium chloride 0.9% 150 mL IVPB  30 mmol Intravenous Once    [COMPLETED] sodium phosphate 15 mmol in 0.9% NaCl 100mL IVPB premix  15 mmol Intravenous Once    [COMPLETED] potassium chloride 20 mEq/100mL IVPB premix 20 mEq  20 mEq Intravenous Once    potassium chloride 20 mEq in dextrose 5%-sodium chloride 0.9% 1000mL infusion premix   Intravenous Continuous    [COMPLETED]  potassium chloride 40 mEq in 250mL sodium chloride 0.9% IVPB premix  40 mEq Intravenous Once    Followed by    [COMPLETED] potassium chloride 20 mEq/100mL IVPB premix 20 mEq  20 mEq Intravenous Once    mirtazapine (Remeron) tab 15 mg  15 mg Oral Nightly    [COMPLETED] potassium chloride 40 mEq in 250mL sodium chloride 0.9% IVPB premix  40 mEq Intravenous Once    [COMPLETED] potassium chloride 40 mEq in 250mL sodium chloride 0.9% IVPB premix  40 mEq Intravenous Once    Followed by    [COMPLETED] potassium chloride 20 mEq/100mL IVPB premix 20 mEq  20 mEq Intravenous Once    pantoprazole (Protonix) 40 mg in sodium chloride 0.9% PF 10 mL IV push  40 mg Intravenous Q24H    [COMPLETED] potassium chloride 40 mEq in 250mL sodium chloride 0.9% IVPB premix  40 mEq Intravenous Once    [COMPLETED] sodium chloride 0.9 % IV bolus 1,000 mL  1,000 mL Intravenous Once    [COMPLETED] acetaminophen (Ofirmev) 10 mg/mL infusion premix 1,000 mg  1,000 mg Intravenous Once    acetaminophen (Ofirmev) 10 mg/mL infusion premix 1,000 mg  1,000 mg Intravenous Q6H PRN    [COMPLETED] sodium chloride 0.9 % IV bolus 1,000 mL  1,000 mL Intravenous Once    potassium chloride (Klor-Con) 20 MEQ oral powder 20 mEq  20 mEq Oral Once    [COMPLETED] potassium chloride 20 mEq/100mL IVPB premix 20 mEq  20 mEq Intravenous Once    morphINE PF 4 MG/ML injection 4 mg  4 mg Intravenous Q30 Min PRN    [COMPLETED] ondansetron (Zofran) 4 MG/2ML injection 4 mg  4 mg Intravenous Once    nystatin (Mycostatin) 929862 UNIT/ML oral suspension 500,000 Units  500,000 Units Oral QID    Lidocaine Viscous HCl (XYLOCAINE) 2 % mouth solution 5 mL  5 mL Mouth/Throat Q3H PRN    morphINE PF 2 MG/ML injection 1 mg  1 mg Intravenous Q2H PRN    Or    morphINE PF 2 MG/ML injection 2 mg  2 mg Intravenous Q2H PRN    Or    morphINE PF 4 MG/ML injection 4 mg  4 mg Intravenous Q2H PRN    melatonin tab 3 mg  3 mg Oral Nightly PRN    ondansetron (Zofran) 4 MG/2ML injection 4 mg  4 mg  Intravenous Q6H PRN    metoclopramide (Reglan) 5 mg/mL injection 10 mg  10 mg Intravenous Q8H PRN    polyethylene glycol (PEG 3350) (Miralax) 17 g oral packet 17 g  17 g Oral Daily PRN    sennosides (Senokot) tab 17.2 mg  17.2 mg Oral Nightly PRN    fluconazole in sodium chloride 0.9% (Diflucan) 200 mg/100mL IVPB premix 200 mg  200 mg Intravenous Q24H       Outpatient Medications:     Medications Prior to Admission   Medication Sig Dispense Refill Last Dose    maalox/diphenhydramine/sucralfate Oral Suspension Take 10 mL by mouth TID & HS. 240 mL 0 2024    polyethylene glycol, PEG 3350, 17 g Oral Powd Pack Take 17 g by mouth daily as needed. 12 each 0 Past Month    [] nystatin 817442 UNIT/ML Mouth/Throat Suspension Take 5 mL (500,000 Units total) by mouth 4 (four) times daily for 7 days. 200 mL 0 2024    morphINE ER 15 MG Oral Tab CR Take 1 tablet (15 mg total) by mouth every 12 (twelve) hours. 60 tablet 0 Past Month    prochlorperazine (COMPAZINE) 10 mg tablet Take 1 tablet (10 mg total) by mouth every 6 (six) hours as needed for Nausea. 30 tablet 3 Past Month    sennosides 8.6 MG Oral Tab Take 1 tablet (8.6 mg total) by mouth nightly. 60 tablet 1 Taking    polyethylene glycol, PEG 3350, (MIRALAX) 17 GM/SCOOP Oral Powder Take 17 g by mouth 2 (two) times daily. 578 g 1 Past Month    OLANZapine 2.5 MG Oral Tab Take 1 tablet (2.5 mg total) by mouth nightly. 30 tablet 0 Past Week    levothyroxine 25 MCG Oral Tab Take 1 tablet (25 mcg total) by mouth before breakfast.   2024    ondansetron (ZOFRAN) 8 MG tablet Take 1 tablet (8 mg total) by mouth every 8 (eight) hours as needed. 30 tablet 0 Past Month    Lidocaine Viscous HCl 2 % Mouth/Throat Solution Take 5 mL by mouth every 3 (three) hours as needed for Pain. Swish in the mouth and spit out. 100 mL 1 More than a month       Allergies: Septra [sulfamethoxazole w/trimethoprim] and Sulfa antibiotics      Anesthesia Evaluation        Anesthetic  Complications  (-) history of anesthetic complications         GI/Hepatic/Renal      (+) GERD                           Cardiovascular        Exercise tolerance: good     MET: >4                             (-) angina              Endo/Other      (-) diabetes     (+) hypothyroidism                  (-) rheumatoid arthritis     Pulmonary               (-) shortness of breath  (-) recent URI          Neuro/Psych                              74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 female w h/o laryngeal ca, hypothyroid now found to have liver lesions presents for EGD/cscope for further workup         Past Surgical History:   Procedure Laterality Date          x 3    Colonoscopy N/A 2024    Procedure: COLONOSCOPY;  Surgeon: Po Aviles MD;  Location:  ENDOSCOPY    Hysterectomy      Bilateral ovaries remain    Laryngoscopy,dirct,op,biopsy  01/15/2018     Social History     Socioeconomic History    Marital status: Single    Number of children: 2   Tobacco Use    Smoking status: Never    Smokeless tobacco: Never   Vaping Use    Vaping status: Never Used   Substance and Sexual Activity    Alcohol use: No    Drug use: No   Other Topics Concern    Caffeine Concern No    Exercise Yes    Seat Belt No    Special Diet Yes     Comment: Balanced    Stress Concern No     History   Drug Use No     Available pre-op labs reviewed.  Lab Results   Component Value Date    WBC 3.5 (L) 2024    RBC 3.12 (L) 2024    HGB 8.7 (L) 2024    HCT 28.4 (L) 2024    MCV 91.0 2024    MCH 27.9 2024    MCHC 30.6 (L) 2024    RDW 21.1 2024    .0 (L) 2024     Lab Results   Component Value Date     (L) 2024    K 3.6 2024     2024    CO2 19.0 (L) 2024    BUN 3 (L) 2024    CREATSERUM 0.66 2024     (H) 2024    CA 8.6 2024              Airway      Mallampati: II  Mouth opening: >3 FB  TM distance: 4 - 6 cm  Neck ROM: full  Cardiovascular    Cardiovascular exam normal.         Dental    Dentition appears grossly intact         Pulmonary    Pulmonary exam normal.                 Other findings              ASA: 3   Plan: MAC  NPO status verified and patient meets guidelines.    Post-procedure pain management plan discussed with surgeon and patient.    Comment: Intraoperative awareness, conversion to GA (PONV, dental/oral injury, corneal abrasion, postoperative pain, allergic reaction, death, aspiration)  Plan/risks discussed with: patient  Use of blood product(s) discussed with: patient    Consented to blood products.          Present on Admission:   Metastatic colon cancer to liver (HCC)   Anemia complicating neoplastic disease   Malignant neoplasm of colon (HCC)

## 2024-05-09 NOTE — OR NURSING
Patient arrived Endo. Stating she knows nothing about this procedure. Juan C from transport assisted with translation.  Patient stated her name and . She did not know what town she is currently in. Stated Gaamliel Gillette is president. She knew the year, \"\".  Stated she lives in [place]. Patient slow to answer all questions. Repeatedly said she did not want procedure. Emotional support provided.

## 2024-05-09 NOTE — PLAN OF CARE
Problem: RISK FOR INFECTION - ADULT  Goal: Absence of fever/infection during anticipated neutropenic period  Description: INTERVENTIONS  - Monitor WBC  - Administer growth factors as ordered  - Implement neutropenic guidelines  Outcome: Progressing     Problem: SAFETY ADULT - FALL  Goal: Free from fall injury  Description: INTERVENTIONS:  - Assess pt frequently for physical needs  - Identify cognitive and physical deficits and behaviors that affect risk of falls.  - [place] fall precautions as indicated by assessment.  - Educate pt/family on patient safety including physical limitations  - Instruct pt to call for assistance with activity based on assessment  - Modify environment to reduce risk of injury  - Provide assistive devices as appropriate  - Consider OT/PT consult to assist with strengthening/mobility  - Encourage toileting schedule  Outcome: Progressing     Problem: Altered Communication/Language Barrier  Goal: Patient/Family is able to understand and participate in their care  Description: Interventions:  - Assess communication ability and preferred communication style  - Implement communication aides and strategies  - Use visual cues when possible  - Listen attentively, be patient, do not interrupt  - Minimize distractions  - Allow time for understanding and response  - Establish method for patient to ask for assistance (call light)  - Provide an  as needed  - Communicate barriers and strategies to overcome with those who interact with patient  Outcome: Progressing     Problem: METABOLIC/FLUID AND ELECTROLYTES - ADULT  Goal: Electrolytes maintained within normal limits  Description: INTERVENTIONS:  - Monitor labs and rhythm and assess patient for signs and symptoms of electrolyte imbalances  - Administer electrolyte replacement as ordered  - Monitor response to electrolyte replacements, including rhythm and repeat lab results as appropriate  - Fluid restriction as ordered  - Instruct  patient on fluid and nutrition restrictions as appropriate  Outcome: Progressing     Problem: GASTROINTESTINAL - ADULT  Goal: Minimal or absence of nausea and vomiting  Description: INTERVENTIONS:  - Maintain adequate hydration with IV or PO as ordered and tolerated  - Nasogastric tube to low intermittent suction as ordered  - Evaluate effectiveness of ordered antiemetic medications  - Provide nonpharmacologic comfort measures as appropriate  - Advance diet as tolerated, if ordered  - Obtain nutritional consult as needed  - Evaluate fluid balance  Outcome: Progressing  Goal: Maintains or returns to baseline bowel function  Description: INTERVENTIONS:  - Assess bowel function  - Maintain adequate hydration with IV or PO as ordered and tolerated  - Evaluate effectiveness of GI medications  - Encourage mobilization and activity  - Obtain nutritional consult as needed  - Establish a toileting routine/schedule  - Consider collaborating with pharmacy to review patient's medication profile  Outcome: Progressing   Patient has declined anything by mouth and declined oral care used language line and patient states that she wants to left alone. Denies any c/o pain. Patient daughter has been informed of patient wishes.

## 2024-05-09 NOTE — PROGRESS NOTES
[facility]   part of [facility]     Hospitalist Progress Note     Lisa Iqbal Patient Status:  Inpatient    10/11/1950 MRN [id number]   Location [facility] 4NW-A Attending Enedelia Lui MD   Hosp Day # 12 PCP Anusha Abraham MD     Chief Complaint:  \" I am trying to eat\"    Subjective:     Pt down for scopes today, actively resisting, states she does not want the procedure done.    Objective:    Review of Systems:   A comprehensive review of systems was completed; pertinent positive and negatives stated in subjective.    Vital signs:  Temp:  [97.7 °F (36.5 °C)-98.7 °F (37.1 °C)] 97.7 °F (36.5 °C)  Pulse:  [77-88] 88  Resp:  [16-20] 16  BP: (128-148)/(65-87) 132/72  SpO2:  [96 %-98 %] 98 %    Physical Exam:    General: No acute distress  Respiratory: No wheezes, no rhonchi  Cardiovascular: S1, S2, regular rate and rhythm  Abdomen: Soft, Non-tender, non-distended, positive bowel sounds  Neuro: No new focal deficits.   Extremities: No edema      Diagnostic Data:    Labs:  Recent Labs   Lab 24  0804 24  0608 24  1015 24  0608 24  0702 24  0737   WBC 2.4* 2.7* 2.7* 2.5* 3.8* 3.5*   HGB 8.7* 8.5* 9.7* 9.1* 9.0* 8.7*   MCV 83.5 85.2 81.5 83.1 82.8 91.0   PLT 30.0* 37.0* 57.0* 76.0* 138.0* 146.0*   BAND 2  --   --   --   --   --        Recent Labs   Lab 24  0618 24  0651 24  0737   * 122* 105*   BUN 2* 3* 3*   CREATSERUM 0.60 0.58 0.66   CA 8.5 8.2* 8.6   * 136 132*   K 3.5 3.5 3.6    104 103   CO2 23.0 23.0 19.0*       Estimated Creatinine Clearance: 56.4 mL/min (based on SCr of 0.66 mg/dL).    No results for input(s): \"TROP\", \"TROPHS\", \"CK\" in the last 168 hours.    No results for input(s): \"PTP\", \"INR\" in the last 168 hours.               Microbiology    Hospital Encounter on 24   1. Urine Culture, Routine     Status: None    Collection Time: 24  3:35 PM    Specimen: Urine, clean catch   Result Value  Ref Range    Urine Culture No Growth at 18-24 hrs. N/A   2. Blood Culture     Status: None    Collection Time: 04/29/24  3:21 PM    Specimen: Blood,peripheral   Result Value Ref Range    Blood Culture Result No Growth 5 Days N/A         Imaging: Reviewed in Epic.    Medications:    thiamine  100 mg Oral Daily    enoxaparin  40 mg Subcutaneous Daily    mirtazapine  15 mg Oral Nightly    pantoprazole  40 mg Intravenous Q24H    potassium chloride  20 mEq Oral Once    nystatin  500,000 Units Oral QID    fluconazole  200 mg Intravenous Q24H       Assessment & Plan:      # dysphagia   - continue PP IV  - DHT placed  - GI following, had gastrografin enema yesterday  - attempted EGD and possible flex sig, but pt could not complete, will have psych assist in medications to sedate pt in order to proceed  - CT a/p reviewed, concern for colitis, fecal impaction, stercoral colitis, widespread liver mets  - Fluconazole IV    # mucositis, oral candidiasis due to chemo- improved clinically   - cont magic mouth wash, nystatin solution   - iv Fluconazole x21 days ( D# 10/)  - pain control     # hypophosphatemia  - replaced     # neutropenic fever, resolved   - off merrem   - Blood cx NGTD    # pancytopenia with neutropenia  - due to chemo,  improving  - monitor counts, transfuse if Hgb < 7, plt < 10  - oncology following     # oral candidiasis   - IV Fluconazole, plan for 21 day course of fluconazole      # hypokalemia , replace PRN    # metastatic sigmoid colon cancer   - Dr Camarillo d/w pt and daughter that no plan for chemo at this time.   - Palliative care following     # failure to thrive.   - cont to encourage oral intake   -  Remeron  - PT OT     - ELIANE PLACEMENT when medically ready. Not ready for dc     Win Iyer MD        Supplementary Documentation:     Quality:  DVT Mechanical Prophylaxis:   SCDs,    DVT Pharmacologic Prophylaxis   Medication    enoxaparin (Lovenox) 40 MG/0.4ML SUBQ injection 40 mg                Code  Status: Not on file  Cuellar: No urinary catheter in place  Cuellar Duration (in days):   Central line:    ALESHA:     Discharge is dependent on: progress  At this point Ms. Félix Iqbal is expected to be discharge to: home    The 21st Century Cures Act makes medical notes like these available to patients in the interest of transparency. Please be advised this is a medical document. Medical documents are intended to carry relevant information, facts as evident, and the clinical opinion of the practitioner. The medical note is intended as peer to peer communication and may appear blunt or direct. It is written in medical language and may contain abbreviations or verbiage that are unfamiliar.

## 2024-05-09 NOTE — PROGRESS NOTES
Heme/Onc Progress Note - [facility]      Chief Complaint:    Follow up for evaluation and management of metastatic colon cancer.    Interim History:      She has no appetite. She has no pain at this visit. She has no dyspnea.    Physical Examination:    Vital Signs: /74 (BP Location: Right arm)   Pulse 80   Temp 97.9 °F (36.6 °C) (Oral)   Resp 16   Ht 1.549 m (5' 1\")   Wt 65 kg (143 lb 3.2 oz)   SpO2 97%   BMI 27.06 kg/m²     General: Patient is alert and oriented x 3, She is very weak and uncomfortable.  HEENT: Oral mucositis.   Chest: Clear to auscultation. No rales and no wheezes.  Heart: Regular rate and rhythm.   Abdomen: Soft and nontender. good bowel sounds.  Extremities: No edema. Non-tender.  Skin:  Warm, dry.      Labs reviewed at this visit:     Recent Labs   Lab 05/05/24  1015 05/06/24  0608 05/08/24  0702   RBC 3.57* 3.38* 3.31*   HGB 9.7* 9.1* 9.0*   HCT 29.1* 28.1* 27.4*   MCV 81.5 83.1 82.8   MCH 27.2 26.9 27.2   MCHC 33.3 32.4 32.8   RDW 20.9 21.0 20.3   NEPRELIM 1.45* 1.18* 1.85   WBC 2.7* 2.5* 3.8*   PLT 57.0* 76.0* 138.0*       Recent Labs   Lab 05/04/24  0608 05/04/24  1144 05/06/24  0618 05/07/24  0651   *  --  124* 122*   BUN 4*  --  2* 3*   CREATSERUM 0.34*  --  0.60 0.58   CA 8.1*  --  8.5 8.2*     --  134* 136   K 3.6 4.1 3.5 3.5     --  103 104   CO2 26.0  --  23.0 23.0       Radiologic imaging reviewed at this visit:    CT abd/pelvis on 4/15/2024:  FINDINGS:    LIVER:  Extensive hepatic metastatic deposits throughout the liver.  BILIARY:  High density in the gallbladder is nonspecific.  PANCREAS:  No lesion, fluid collection, ductal dilatation, or atrophy.    SPLEEN:  No enlargement or focal lesion.    KIDNEYS:  Large cyst in the mid left kidney measures 6 x 7 x 6.0 cm.  Cyst in the upper pole the right kidney measures 5.3 x 5 1 cm. No hydronephrosis.  No calculi in the kidneys.  ADRENALS:  No mass or enlargement.    AORTA/VASCULAR:    Unremarkable as seen on  non-contrast imaging.  RETROPERITONEUM:  No mass or adenopathy.    BOWEL/MESENTERY:  Visualized part of the appendix is unremarkable.  Mild thickening of the sigmoid colon.  ABDOMINAL WALL:  Tiny fat containing umbilical hernia.  URINARY BLADDER:  No visible focal wall thickening, lesion, or calculus.    PELVIC NODES:  No adenopathy.    PELVIC ORGANS:  Sequelae of hysterectomy.  BONES:  No bony lesion or fracture.    LUNG BASES:  No visible pulmonary or pleural disease.    OTHER:  Negative.       Impression   CONCLUSION:       1. No calculi in the kidneys.  No hydronephrosis.     2. Extensive metastatic deposits throughout the liver.     3. There is mild thickening of the sigmoid colon.  This may be sequelae of a localized colitis.  This may be sequelae of infectious or inflammatory etiology.  Ischemia is considered unlikely.         Impression/Plan:     Metastatic Colon Cancer:  Liver metastases:    S/P FOLFOX cycle 3 on 4/17/2024. Having complications from treatment even with dose reduction.  Plan is to stop chemotherapy due to toxicity and her very poor performance status. I think we will need to continue to focus on palliative care.    The patient removed the NG tube. I spoke with the patient's daughter Beryl. The patient declines aggressive care. The family will discuss options with patient tonight. They are considering hospice care. Will finalize a decision tomorrow.    Mucositis secondary to chemotherapy:  Nausea secondary to chemotherapy  Chemo induced neutropenia with fever s/p peg-filgrastim:  Oral candidiasis    ANC has recovered to normal. She remains afebrile. Mucositis has improved.    Continue IV fluconizole for oral candidiasis.     Anemia complicating neoplastic disease:  Thrombocytopenia secondary to chemotherapy:    Platelets are increasing without bleeding. HGB has been adequate and stable.      Ronan Camarillo MD  [facility]

## 2024-05-09 NOTE — PROGRESS NOTES
[facility]   part of [facility]  Palliative Care Progress Note    Lisa Iqbal Patient Status:  Inpatient    10/11/1950 MRN [id number]   Location [facility] 4N-A Attending Win Iyer MD   Hosp Day # 12 PCP Anusha Abraham MD         Summary     Lisa Iqbal is a 73 year old female with history of metastatic colon cancer with liver mets (on active chemo prior to admission, last tx ) who was admitted on 2024 for mouth pain, mucositis (now resolved). Work up in our hospital revealed neg blood cultures, neutropenic fever now resolved, continued anorexia she had DHT in place but feedings not yet started.     Consult ordered by:: Dr. Lui for evaluation of Palliative Care needs and Goals of care discussion.    Subjective     Interval events: Since last encounter, , CT AP was performed with concern for colitis of the ascending and descending colon and rectal/fecal impaction/concern for stercoral colitis.  go-lytely prep via DHT was attempted with limited success of 400ml given until patient vomited. She has had a couple BM's since. DHT feedings were never initiated/tolerated.  Today,  she pulled out her DHT this morning prior to an abandoned attempt for EGD/Flex sig due to her refusal to allow O2 or access to her IV.     When I entered the room, the patient was lying in bed. No family present at bedside. She aroused to conversation and language line  Dashawn #[id number] was utilized for the patient encounter.     Review of Systems:    Symptoms(s): Anorexia;Drowsiness    Dyspnea: none noted  Cough: none noted  Nausea: denies  Appetite: poor, refusing any PO  Pain: denies  Constipation: small BM's as documented  Last BM:   Bowel Movement         2024  0812             Stool Count Calculated for I/O: 1            Allergies:  Allergies   Allergen Reactions    Septra [Sulfamethoxazole W/Trimethoprim] RASH    Sulfa Antibiotics RASH        Medications:     Current Facility-Administered Medications:     thiamine (Vitamin B1) tab 100 mg, 100 mg, Oral, Daily    dextrose 10% infusion (TPN no rate), , Intravenous, Continuous PRN    pancrelipase (Lip-Prot-Amyl) (Zenpep) DR particles cap 10,000 Units, 10,000 Units, Per G Tube, PRN **AND** sodium bicarbonate tab 325 mg, 325 mg, Per G Tube, PRN    maalox/diphenhydramine/lidocaine (First Mouthwash BLM) oral suspension 10 mL, 10 mL, Oral, QID PRN    enoxaparin (Lovenox) 40 MG/0.4ML SUBQ injection 40 mg, 40 mg, Subcutaneous, Daily    potassium chloride 20 mEq in dextrose 5%-sodium chloride 0.9% 1000mL infusion premix, , Intravenous, Continuous    mirtazapine (Remeron) tab 15 mg, 15 mg, Oral, Nightly    pantoprazole (Protonix) 40 mg in sodium chloride 0.9% PF 10 mL IV push, 40 mg, Intravenous, Q24H    acetaminophen (Ofirmev) 10 mg/mL infusion premix 1,000 mg, 1,000 mg, Intravenous, Q6H PRN    potassium chloride (Klor-Con) 20 MEQ oral powder 20 mEq, 20 mEq, Oral, Once    morphINE PF 4 MG/ML injection 4 mg, 4 mg, Intravenous, Q30 Min PRN    nystatin (Mycostatin) 438886 UNIT/ML oral suspension 500,000 Units, 500,000 Units, Oral, QID    Lidocaine Viscous HCl (XYLOCAINE) 2 % mouth solution 5 mL, 5 mL, Mouth/Throat, Q3H PRN    morphINE PF 2 MG/ML injection 1 mg, 1 mg, Intravenous, Q2H PRN **OR** morphINE PF 2 MG/ML injection 2 mg, 2 mg, Intravenous, Q2H PRN **OR** morphINE PF 4 MG/ML injection 4 mg, 4 mg, Intravenous, Q2H PRN    melatonin tab 3 mg, 3 mg, Oral, Nightly PRN    ondansetron (Zofran) 4 MG/2ML injection 4 mg, 4 mg, Intravenous, Q6H PRN    metoclopramide (Reglan) 5 mg/mL injection 10 mg, 10 mg, Intravenous, Q8H PRN    polyethylene glycol (PEG 3350) (Miralax) 17 g oral packet 17 g, 17 g, Oral, Daily PRN    sennosides (Senokot) tab 17.2 mg, 17.2 mg, Oral, Nightly PRN    fluconazole in sodium chloride 0.9% (Diflucan) 200 mg/100mL IVPB premix 200 mg, 200 mg, Intravenous, Q24H    Objective     Vital  Signs:  Blood pressure 132/72, pulse 88, temperature 97.7 °F (36.5 °C), temperature source Axillary, resp. rate 16, height 5' 1\" (1.549 m), weight 143 lb 3.2 oz (65 kg), SpO2 98%.  Body mass index is 27.06 kg/m².    Physical Exam:  General: Lethargic. In no apparent respiratory distress. Body habitus BMI WNL   HEENT: AT/NC. No gross focal deficits. Dry MM   Lungs: Normal effort on RA  Abdomen: Soft, non-tender, non-distended  Extremities: No LE edema present  Neurologic: Alert and oriented to person and place   Psychiatric: Mood flat affect     Prior to admission Palliative performance scale PPSv2 (%): 40  Observed during hospitalization: 20 %    % Ambulation Activity Level Self-Care Intake Consciousness   100 Full  Normal  No Disease Full Normal Full   90 Full  Normal  Some Disease Full Normal Full   80 Full  Normal w/effort  Some Disease Full Normal or reduced Full   70 Reduced  Can't Perform Job  Some Disease Full Normal or reduced Full   60 Reduced  Can't Perform Hobby   Significant Disease Occ Assist Normal or reduced Full or confused   50 Mainly sit/lie Can't do any work  Extensive Disease Partial Assist Normal or reduced Full or confused   40 Mainly in bed Can't do any work  Extensive Disease Mainly Assist Normal or reduced Full or confused   30 Bed Bound Can't do any work  Extensive Disease Max Assist  Total Care Reduced  Drowsy/confused   20 Bed Bound Can't do any work  Extensive Disease Max Assist  Total Care Minimal  Drowsy/confused   10 Bed Bound Can't do any work  Extensive Disease Max Assist  Total Care Mouth Care  Drowsy/confused   0 Death        Hematology:  Lab Results   Component Value Date    WBC 3.5 (L) 05/09/2024    HGB 8.7 (L) 05/09/2024    HCT 28.4 (L) 05/09/2024    .0 (L) 05/09/2024       Coags:  Lab Results   Component Value Date    INR 1.5 (A) 03/27/2024    PTT 29.8 12/19/2017       Chemistry:  Lab Results   Component Value Date    CREATSERUM 0.66 05/09/2024    BUN 3 (L) 05/09/2024      (L) 05/09/2024    K 3.6 05/09/2024     05/09/2024    CO2 19.0 (L) 05/09/2024     (H) 05/09/2024    CA 8.6 05/09/2024    ALB 1.7 (L) 05/01/2024    ALKPHO 84 05/01/2024    BILT 0.9 05/01/2024    TP 4.7 (L) 05/01/2024    AST 8 (L) 05/01/2024    ALT 7 (L) 05/01/2024    MG 1.7 05/09/2024    PHOS 2.3 (L) 05/07/2024       Imaging:  XR THERAPEUTIC ENEMA WITH OR WITHOUT AIR  (CPT=74283)    Result Date: 5/8/2024  CONCLUSION:  Rectal fecal impaction, with large amount of stool in the rectum, and only mild stool throughout the remainder of the colon.  Gastrografin enema successfully passes contrast all the way through to the cecum without sign of obstruction.  Scattered diverticula in the descending colon and proximal sigmoid colon, with a generally more narrowed appearance of the colon in these regions of diverticula when compared to other portions the colon.  Some of these areas of narrowing are irregular in  outline.  This narrowing could be chronic secondary to diverticulosis, scarring, muscular hypertrophy, and possibly previous episodes of diverticulitis if part of the patient's history.  No specific apple-core constricting obvious mass lesion seen, but non prepped Gastrografin enema has limitations in the detection of lesions of the colon including malignant lesions, and therefore appropriate colonoscopy follow-up in this patient would be advised.   LOCATION:  [facility]    Dictated by (CST): Daron Rodriguez MD on 5/08/2024 at 3:35 PM     Finalized by (CST): Daron Rodriguez MD on 5/08/2024 at 3:39 PM       CT ABDOMEN+PELVIS(CPT=74176)    Result Date: 5/7/2024  CONCLUSION:   1. Concern for colitis involving the ascending and descending colon.  Trace fluid in the pericolic gutter but no large or drainable ascites.  No free air or bowel obstruction.  Nasogastric tube tip in the stomach.  2. Separate from the areas of suspected colitis of the ascending and descending colon, there is rectal fecal impaction  and concern for stercoral colitis with stranding of the fat between the rectum in the sacrum.  3. Widespread infiltrating disease in the liver concerning for metastatic infiltration.  Poorly defined without contrast.  Involvement of both right and left hepatic lobes.  Hepatic cyst.  Degenerative changes the spine.    LOCATION:  [facility]   Dictated by (CST): Daron Rodriguez MD on 5/07/2024 at 3:50 PM     Finalized by (CST): Daron Rodriguez MD on 5/07/2024 at 3:55 PM        Summary of Discussion      Call to daughter Beryl prior to visiting patient. Events of this morning reviewed to further explore GOC/POC. Beryl shared she has been reviewing MyChart notes but is unclear what oncology recommendations are although \"all roads are pointing to hospice\". Informed messages from Dr. Camarillo indicate unless Lisa's performance status improves (nutritional status and physical status, ability to participate in care) she would not be a candidate for further cancer tx. She is aware of this but is requesting to talk directly with Dr. Camarillo. Request sent to Dr. Camarillo to call.   Using , d/w with Lisa revealed she did not want to have further tests/treatments. When asked why she refused test this morning she just shrugged her shoulders. She is refusing any PO intake and acknowledged that without nutrition she could not survive and that would lead to EOL. She acknowledged that she just wanted to go home and be comfortable. Her responses were brief and she closed her eyes when we left.   Beryl informed of above interaction. She will be in this afternoon to review with her mom and hopes to have spoken with Dr. Camarillo by then. She requested that no further attempts at procedures incur. She will likely have a decision on POC tomorrow.     Advance Care Planning counseling and discussion:  HCPOA:  Document on file Yes [] No []. Requested for file []  Healthcare Agent Appointed: Yes  Healthcare Agent's Name: Beryl Blanchard -  daughter     Code Status:  Changed to DNAR/Selective Treatment, Beryl states she has discussed with her sister and she would like code status changed.   POLST:  Deferred as GOC are ongoing.    Inpatient Problem List:   Principal Problem:    Mucositis due to chemotherapy  Active Problems:    Malignant neoplasm of colon (HCC)    Metastatic colon cancer to liver (HCC)    Anemia complicating neoplastic disease    Hypokalemia    Chemotherapy-induced neutropenia (HCC)    Dysphagia    Thrombocytopenia (HCC)    Febrile neutropenia (HCC)    Failure to thrive in adult    Hypophosphatemia    Cognitive disorder    Acute encephalopathy    Palliative care encounter    Goals of care, counseling/discussion        Assessment and Recommendation     Goals of care: ongoing   Patient refused EGD/Flex sig attempted this morning, refusing PO, pulled out DHT this morning.  Acknowledged that without nutrition she could not survive and that would lead to EOL.States she wants to go home and be comfortable.  Daughter Beryl informed and will be in this afternoon to d/w mom. She is also requesting to d/w Dr. Camarillo. She is open to consideration for hospice. She requested no further attempts be made for procedures today.   Advanced Care Planning:  Code Status: Changed to DNAR/Selective Treatment  HCPOA: No document previously created Healthcare Agent's Name: Beryl Blanchard - daughter  POLST: Deferred as GOC are ongoing.    Discussed today's visit with    , RN, SW/CM, Care team, and hospitalist.    Palliative Care Follow Up: Palliative care team will Continue to follow while inpatient. Unless decision for hospice has been made.   Palliative care follow up at discharge: TBD.    Thank you for allowing Palliative Care services to participate in the care of Lisa Iqbal.    A total of 50 minutes were spent on this consult, which included all of the following: chart review, direct face to face contact, history taking, physical  examination, counseling and coordinating care, and documentation         INDIRA Sanchez  5/9/2024  12:30 PM  Palliative Care Services    ADDENDUM:   Call from daughter Shanice who confirmed she was visiting tonight with her sister Beryl to discuss GOC/POC. With her inquiry, I briefly explained hospice and the hospice benefit as it appears they were ready to change focus to comfort. Explained  they could notify RN who could inform hospitalist if decision made tonight or Palliative Care would f/u tomorrow. Also confirmed Beryl as surrogate decision maker and agrees to DNAR/Selective change in code status for today.     Magaly Parker, INDIRA  Palliative Care   Phone: [phone]     5/9/2024  5:21 PM        The 21st Century Cures Act makes medical notes like these available to patients in the interest of transparency. Please be advised this is a medical document. Medical documents are intended to carry relevant information, facts as evident, and the clinical opinion of the practitioner. The medical note is intended as peer to peer communication and may appear blunt or direct. It is written in medical language and may contain abbreviations or verbiage that are unfamiliar.

## 2024-05-10 PROCEDURE — 99232 SBSQ HOSP IP/OBS MODERATE 35: CPT | Performed by: STUDENT IN AN ORGANIZED HEALTH CARE EDUCATION/TRAINING PROGRAM

## 2024-05-10 PROCEDURE — 99233 SBSQ HOSP IP/OBS HIGH 50: CPT | Performed by: HOSPITALIST

## 2024-05-10 PROCEDURE — 99232 SBSQ HOSP IP/OBS MODERATE 35: CPT | Performed by: INTERNAL MEDICINE

## 2024-05-10 PROCEDURE — 99232 SBSQ HOSP IP/OBS MODERATE 35: CPT | Performed by: OTHER

## 2024-05-10 RX ORDER — THIAMINE HYDROCHLORIDE 100 MG/ML
500 INJECTION, SOLUTION INTRAMUSCULAR; INTRAVENOUS EVERY 8 HOURS
Qty: 30 ML | Refills: 0 | Status: DISPENSED | OUTPATIENT
Start: 2024-05-10 | End: 2024-05-12

## 2024-05-10 NOTE — DIETARY NOTE
[facility]   part of [facility]    NUTRITION ASSESSMENT    Pt meets severe malnutrition criteria at this time.    CRITERIA FOR MALNUTRITION DIAGNOSIS:  Criteria for severe malnutrition diagnosis: acute illness/injury related to wt loss greater than 5% in 1 month, energy intake less than 50% for greater than 5 days, and body fat moderate depletion    NUTRITION INTERVENTION:    RD nutrition Care Plan- See RD nutrition assessment for additional recommendations  Meal and Snacks - Continue low fiber/soft diet, monitor and encourage adequate PO intake.   Medical Food Supplements - Continue Ensure Plus High Protein BID. Will discontinue magic shake and magic cup.    Enteral Nutrition - Via NG Recommend starting Jevity 1.5 at 20 mL/hr advancing 10 mL/hr q 8 hours as tolerated to goal rate of 50 mL/hr.   This will provide 1800 kcal, 77 grams protein, 912 mL total free water, and 100% of RDI's.   Recommend 150 mL water flush q 6 hours, TF+FWF provides 1812 mL total fluids.     PATIENT STATUS:   5/10- pt not started on TF and pt pulled out dobhoff feeding tube yesterday. Pt is refusing replacing dobhoff, meds, and anything to eat or drink. Pt asked for ice today time of RD visit and RN reports offering but pt refused. Daughter is meeting with palliative care today to discuss goals of care.     5/6: Pt continues to have poor po intake. Received consult for TF. Pt open to receiving NG. Continued Ensure HP BID, d/c'd other supplements as pt not consuming much orally. Noted Phos was 1.8 yesterday. Received replacement. Expecting lytes to drop. No current phos or mag. Placed order for mag and phos. Ordered thiamine. Initiated Jevity 1.5 @ 20ml/hr x 24hrs. Replace electrolytes before advancing as pt at risk for refeeding.     5/3- pt remains with poor po intake related to mouth pain. Minimal intake.  Encouraged po intake of soft foods and ONS.      04/29/24 at risk consult  73 year old female with mucositis d/t chemo for  metastatic colon cancer.  Pt with 10 pound wt loss over past month which is significant wt loss per standards of 6.5% in 1 month. Pt with mild to moderate muscle and fat depletion.  Pt with poor po intake for past week or so due to pain from mucositis.        ANTHROPOMETRICS:  Ht: 154.9 cm (5' 1\")  Wt: 65 kg (143 lb 3.2 oz).   BMI: Body mass index is 27.06 kg/m².  IBW: 47.7 kg      WEIGHT HISTORY:   Weight loss: Yes, Severe Wt loss of 10 lbs, 6.5%, over 1 months     Wt Readings from Last 10 Encounters:   05/01/24 65 kg (143 lb 3.2 oz)   04/26/24 61.3 kg (135 lb 3.2 oz)   04/25/24 64 kg (141 lb)   04/24/24 64 kg (141 lb)   04/22/24 64.9 kg (143 lb)   04/17/24 67.2 kg (148 lb 3.2 oz)   04/15/24 70.3 kg (155 lb)   04/12/24 66 kg (145 lb 6.4 oz)   04/05/24 69.4 kg (153 lb)   04/03/24 69.4 kg (153 lb)        NUTRITION:  Diet:       Procedures    NPO      Food Allergies: No  Cultural/Ethnic/Cheondoism Preferences Addressed: Yes    Percent Meals Eaten (last 3 days)       Date/Time Percent Meals Eaten (%)    05/07/24 0803 --     Percent Meals Eaten (%): NPO at 05/07/24 0803    05/08/24 1213 0 %     Percent Meals Eaten (%): pt refused at 05/08/24 1213    05/10/24 1309 0 %            GI system review:  trouble eating due to mucositits  Last BM: 5/6  Skin and wounds: none    NUTRITION RELATED PHYSICAL FINDINGS:     1. Body Fat/Muscle Mass: moderate depletion body fat Orbital fat pad and Buccal fat pad and mild muscle depletion Temple region and Clavicle region     2. Fluid Accumulation: upper extremity edema and lower extremity edema     NUTRITION PRESCRIPTION: 64.9kg  Calories: 3210-5093 calories/day (25-30 kcal/kg)  Protein: 78-97 grams protein/day (1.2-1.5 grams protein per kg)  Fluid: ~1 ml/kcal or per MD discretion    NUTRITION DIAGNOSIS/PROBLEM:  Inadequate oral intake related to inability to take or tolerate and increased nutritional demands for healing as evidenced by documented/reported insufficient oral intake,  documented/reported unintentional weight loss, loss of fat mass, and loss of muscle mass      MONITOR AND EVALUATE/NUTRITION GOALS:  PO intake of 75% of meals TID - Not met, Continues  PO intake of 75% of oral nutrition supplement/s - Not met, Continues  Weight stable within 1 to 2 lbs during admission - Ongoing  Tolerate alternative nutrition at 100% of goal - not met      MEDICATIONS:   PPI  Ivf D5/NS + 20 mEq Kcl at 100ml/hr    LABS:  Sodium 132    Pt is at High nutrition risk    Karine Clark RD, LDN  Clinical Dietitian   [id number]

## 2024-05-10 NOTE — PROGRESS NOTES
[facility]   part of [facility]     Hospitalist Progress Note     Lisa Iqbal Patient Status:  Inpatient    10/11/1950 MRN [id number]   [facility] 4N-A Attending Enedelia Lui MD   Hosp Day # 13 PCP Anusha Abraham MD     Chief Complaint:  \" I am trying to eat\"    Subjective:     No acute events, no new complaints.    Objective:    Review of Systems:   A comprehensive review of systems was completed; pertinent positive and negatives stated in subjective.    Vital signs:  Temp:  [97.7 °F (36.5 °C)-98.5 °F (36.9 °C)] 98.5 °F (36.9 °C)  Pulse:  [80-89] 86  Resp:  [16-18] 18  BP: (124-141)/(69-85) 129/74  SpO2:  [98 %-100 %] 100 %    Physical Exam:    General: No acute distress  Respiratory: No wheezes, no rhonchi  Cardiovascular: S1, S2, regular rate and rhythm  Abdomen: Soft, Non-tender, non-distended, positive bowel sounds  Neuro: No new focal deficits.   Extremities: No edema      Diagnostic Data:    Labs:  Recent Labs   Lab 24  0608 24  1015 24  0608 24  0702 24  0737   WBC 2.7* 2.7* 2.5* 3.8* 3.5*   HGB 8.5* 9.7* 9.1* 9.0* 8.7*   MCV 85.2 81.5 83.1 82.8 91.0   PLT 37.0* 57.0* 76.0* 138.0* 146.0*       Recent Labs   Lab 24  0618 24  0651 24  0737   * 122* 105*   BUN 2* 3* 3*   CREATSERUM 0.60 0.58 0.66   CA 8.5 8.2* 8.6   * 136 132*   K 3.5 3.5 3.6    104 103   CO2 23.0 23.0 19.0*       Estimated Creatinine Clearance: 56.4 mL/min (based on SCr of 0.66 mg/dL).    No results for input(s): \"TROP\", \"TROPHS\", \"CK\" in the last 168 hours.    No results for input(s): \"PTP\", \"INR\" in the last 168 hours.               Microbiology    Hospital Encounter on 24   1. Urine Culture, Routine     Status: None    Collection Time: 24  3:35 PM    Specimen: Urine, clean catch   Result Value Ref Range    Urine Culture No Growth at 18-24 hrs. N/A   2. Blood Culture     Status: None    Collection Time: 24   3:21 PM    Specimen: Blood,peripheral   Result Value Ref Range    Blood Culture Result No Growth 5 Days N/A         Imaging: Reviewed in Epic.    Medications:    thiamine  100 mg Oral Daily    enoxaparin  40 mg Subcutaneous Daily    mirtazapine  15 mg Oral Nightly    pantoprazole  40 mg Intravenous Q24H    potassium chloride  20 mEq Oral Once    nystatin  500,000 Units Oral QID    fluconazole  200 mg Intravenous Q24H       Assessment & Plan:      # dysphagia   - continue PP IV  - DHT placed  - GI following, had gastrografin enema yesterday  - attempted EGD and possible flex sig, but pt could not complete, actively resisting  - CT a/p reviewed, concern for colitis, fecal impaction, stercoral colitis, widespread liver mets  - Fluconazole IV    # mucositis, oral candidiasis due to chemo- improved clinically   - cont magic mouth wash, nystatin solution   - iv Fluconazole x21 days ( D# 10/)  - pain control     # hypophosphatemia  - replaced     # neutropenic fever, resolved   - off merrem   - Blood cx NGTD    # pancytopenia with neutropenia  - due to chemo,  improving  - monitor counts, transfuse if Hgb < 7, plt < 10  - oncology following     # oral candidiasis   - IV Fluconazole, plan for 21 day course of fluconazole      # hypokalemia , replace PRN    # metastatic sigmoid colon cancer   - Dr Camarillo d/w pt and daughter that no plan for chemo at this time.   - Palliative care following, family to decide on hospice     # failure to thrive.   - cont to encourage oral intake   -  Remeron  - PT OT       Win Iyer MD        Supplementary Documentation:     Quality:  DVT Mechanical Prophylaxis:   SCDs,    DVT Pharmacologic Prophylaxis   Medication    enoxaparin (Lovenox) 40 MG/0.4ML SUBQ injection 40 mg                Code Status: DNAR/Selective Treatment  Cuellar: No urinary catheter in place  Cuellar Duration (in days):   Central line:    ALESHA:     Discharge is dependent on: progress  At this point Ms. Félix Iqbal is  expected to be discharge to: home    The 21st Century Cures Act makes medical notes like these available to patients in the interest of transparency. Please be advised this is a medical document. Medical documents are intended to carry relevant information, facts as evident, and the clinical opinion of the practitioner. The medical note is intended as peer to peer communication and may appear blunt or direct. It is written in medical language and may contain abbreviations or verbiage that are unfamiliar.

## 2024-05-10 NOTE — PLAN OF CARE
Pt A&Ox2, VSS, afebrile. IVF infusing. No complaints of pain. Pt refusing all PO medications, food, and drink. Offered ice chips and mouth swabs, also refused. Physicians informed. Extensive conversations between oncologist, psych, and palliative care about possible POC. Psych visited and confirmed that pt is not decisional. Daughter (POA) informed. Consult placed to hospice. Call light within reach, safety precautions in place.     Problem: RISK FOR INFECTION - ADULT  Goal: Absence of fever/infection during anticipated neutropenic period  Description: INTERVENTIONS  - Monitor WBC  - Administer growth factors as ordered  - Implement neutropenic guidelines  Outcome: Progressing     Problem: SAFETY ADULT - FALL  Goal: Free from fall injury  Description: INTERVENTIONS:  - Assess pt frequently for physical needs  - Identify cognitive and physical deficits and behaviors that affect risk of falls.  - [place] fall precautions as indicated by assessment.  - Educate pt/family on patient safety including physical limitations  - Instruct pt to call for assistance with activity based on assessment  - Modify environment to reduce risk of injury  - Provide assistive devices as appropriate  - Consider OT/PT consult to assist with strengthening/mobility  - Encourage toileting schedule  Outcome: Progressing     Problem: GASTROINTESTINAL - ADULT  Goal: Minimal or absence of nausea and vomiting  Description: INTERVENTIONS:  - Maintain adequate hydration with IV or PO as ordered and tolerated  - Nasogastric tube to low intermittent suction as ordered  - Evaluate effectiveness of ordered antiemetic medications  - Provide nonpharmacologic comfort measures as appropriate  - Advance diet as tolerated, if ordered  - Obtain nutritional consult as needed  - Evaluate fluid balance  Outcome: Progressing

## 2024-05-10 NOTE — CM/SW NOTE
SW noted that palliative care is consulted and working to establish GOC.     Insurance authorization has  for [facility] and patient continues to refuse therapy services per chart review.     SW will continue to follow for GOC determination.     SW will continue to follow for plan of care changes and remain available for any additional DC needs or concerns.     Harika Haro MSW, LSW  Discharge Planner   [id number]

## 2024-05-10 NOTE — PLAN OF CARE
Patient alert and oriented x2-3. VSS. Afebrile.  used - pt nodded yes to being in pain. PRN morphine administered. Refused PO medications. See MAR. IVF infusing. Did not want to be changed when soiled. Explanation via  provided about the importance of maintaining hygiene. Refused to converse with . Small BM. Safety precautions continued. Call light within reach.   Problem: RISK FOR INFECTION - ADULT  Goal: Absence of fever/infection during anticipated neutropenic period  Description: INTERVENTIONS  - Monitor WBC  - Administer growth factors as ordered  - Implement neutropenic guidelines  Outcome: Progressing     Problem: SAFETY ADULT - FALL  Goal: Free from fall injury  Description: INTERVENTIONS:  - Assess pt frequently for physical needs  - Identify cognitive and physical deficits and behaviors that affect risk of falls.  - [place] fall precautions as indicated by assessment.  - Educate pt/family on patient safety including physical limitations  - Instruct pt to call for assistance with activity based on assessment  - Modify environment to reduce risk of injury  - Provide assistive devices as appropriate  - Consider OT/PT consult to assist with strengthening/mobility  - Encourage toileting schedule  Outcome: Progressing     Problem: METABOLIC/FLUID AND ELECTROLYTES - ADULT  Goal: Electrolytes maintained within normal limits  Description: INTERVENTIONS:  - Monitor labs and rhythm and assess patient for signs and symptoms of electrolyte imbalances  - Administer electrolyte replacement as ordered  - Monitor response to electrolyte replacements, including rhythm and repeat lab results as appropriate  - Fluid restriction as ordered  - Instruct patient on fluid and nutrition restrictions as appropriate  Outcome: Progressing     Problem: GASTROINTESTINAL - ADULT  Goal: Minimal or absence of nausea and vomiting  Description: INTERVENTIONS:  - Maintain adequate hydration  with IV or PO as ordered and tolerated  - Nasogastric tube to low intermittent suction as ordered  - Evaluate effectiveness of ordered antiemetic medications  - Provide nonpharmacologic comfort measures as appropriate  - Advance diet as tolerated, if ordered  - Obtain nutritional consult as needed  - Evaluate fluid balance  Outcome: Progressing  Goal: Maintains or returns to baseline bowel function  Description: INTERVENTIONS:  - Assess bowel function  - Maintain adequate hydration with IV or PO as ordered and tolerated  - Evaluate effectiveness of GI medications  - Encourage mobilization and activity  - Obtain nutritional consult as needed  - Establish a toileting routine/schedule  - Consider collaborating with pharmacy to review patient's medication profile  Outcome: Progressing

## 2024-05-10 NOTE — HOSPICE RN NOTE
[facility] phone informational provided with daughter eBryl. RH discussed hospice benefit, goals of care, levels of hospice care, medications used to treat symptoms related to end of life, revocation, medicare coverage. All questions encouraged and answered.    Beryl states she would like to pursue PEG tube placement, tube feed diet, and ideally facility placement for acute rehab. If these interventions cannot be achieved, then daughter is open to hospice. Pt will likely need facility placement and daughter would like to know her options as well as cost if on hospice. Also discussed private care giving options, Beryl to discuss with her sister as well. Pt was admitted from home alone.    RH to follow peripherally based off PEG tube placement, TF, etc.     Please call with concerns or questions.     Gerri Crooks RN, BSN  [facility] Nurse Liaison  Office: [phone] or After Hours: [phone]

## 2024-05-10 NOTE — PROGRESS NOTES
[facility]   part of [facility]  Palliative Care Progress Note    Lisa Iqbal Patient Status:  Inpatient    10/11/1950 MRN [id number]   Location [facility] 4NW-A Attending Win Iyer MD   Hosp Day # 13 PCP Anusha Abraham MD         Summary     Lisa Iqbal is a 73 year old female with history of metastatic colon cancer with liver mets (on active chemo prior to admission, last tx ) who was admitted on 2024 for mouth pain, mucositis (now resolved). Work up in our hospital revealed neg blood cultures, neutropenic fever now resolved, continued anorexia.    Consult ordered by:: Dr. Lui for evaluation of Palliative Care needs and Goals of care discussion.    Subjective     Interval events:  , CT AP was performed with concern for colitis of the ascending and descending colon and rectal/fecal impaction/concern for stercoral colitis.  go-lytely prep via DHT. DHT feedings were never initiated/tolerated.   she pulled out her DHT this morning prior to an abandoned attempt for EGD/Flex sig due to her refusal to allow O2 or access to her IV.     Attempted to see earlier but several family members at bedside engaging her in conversation.     When I entered the room, the patient was lying in bed and sleeping. No family present at bedside.   Called daughter to discuss. See summary below.     Review of Systems:    Symptoms(s): Anorexia;Drowsiness    Dyspnea: none noted  Cough: none noted  Appetite: poor, refusing any PO  Pain: sharda   Constipation: small BM's as documented  Bowel Movement         2024  2234             Stool Count Calculated for I/O: 1          Allergies:  Allergies   Allergen Reactions    Septra [Sulfamethoxazole W/Trimethoprim] RASH    Sulfa Antibiotics RASH       Medications:     Current Facility-Administered Medications:     thiamine (Vitamin B1) tab 100 mg, 100 mg, Oral, Daily    dextrose 10% infusion (TPN no rate), , Intravenous,  Continuous PRN    pancrelipase (Lip-Prot-Amyl) (Zenpep) DR particles cap 10,000 Units, 10,000 Units, Per G Tube, PRN **AND** sodium bicarbonate tab 325 mg, 325 mg, Per G Tube, PRN    maalox/diphenhydramine/lidocaine (First Mouthwash BLM) oral suspension 10 mL, 10 mL, Oral, QID PRN    enoxaparin (Lovenox) 40 MG/0.4ML SUBQ injection 40 mg, 40 mg, Subcutaneous, Daily    potassium chloride 20 mEq in dextrose 5%-sodium chloride 0.9% 1000mL infusion premix, , Intravenous, Continuous    mirtazapine (Remeron) tab 15 mg, 15 mg, Oral, Nightly    pantoprazole (Protonix) 40 mg in sodium chloride 0.9% PF 10 mL IV push, 40 mg, Intravenous, Q24H    acetaminophen (Ofirmev) 10 mg/mL infusion premix 1,000 mg, 1,000 mg, Intravenous, Q6H PRN    potassium chloride (Klor-Con) 20 MEQ oral powder 20 mEq, 20 mEq, Oral, Once    morphINE PF 4 MG/ML injection 4 mg, 4 mg, Intravenous, Q30 Min PRN    nystatin (Mycostatin) 297856 UNIT/ML oral suspension 500,000 Units, 500,000 Units, Oral, QID    Lidocaine Viscous HCl (XYLOCAINE) 2 % mouth solution 5 mL, 5 mL, Mouth/Throat, Q3H PRN    morphINE PF 2 MG/ML injection 1 mg, 1 mg, Intravenous, Q2H PRN **OR** morphINE PF 2 MG/ML injection 2 mg, 2 mg, Intravenous, Q2H PRN **OR** morphINE PF 4 MG/ML injection 4 mg, 4 mg, Intravenous, Q2H PRN    melatonin tab 3 mg, 3 mg, Oral, Nightly PRN    ondansetron (Zofran) 4 MG/2ML injection 4 mg, 4 mg, Intravenous, Q6H PRN    metoclopramide (Reglan) 5 mg/mL injection 10 mg, 10 mg, Intravenous, Q8H PRN    polyethylene glycol (PEG 3350) (Miralax) 17 g oral packet 17 g, 17 g, Oral, Daily PRN    sennosides (Senokot) tab 17.2 mg, 17.2 mg, Oral, Nightly PRN    fluconazole in sodium chloride 0.9% (Diflucan) 200 mg/100mL IVPB premix 200 mg, 200 mg, Intravenous, Q24H    Objective     Vital Signs:  Blood pressure 132/73, pulse 86, temperature 98.5 °F (36.9 °C), temperature source Oral, resp. rate 15, height 5' 1\" (1.549 m), weight 143 lb 3.2 oz (65 kg), SpO2 99%.  Body mass  index is 27.06 kg/m².    Physical Exam:  Pt resting. I did not disturb her as she had a bunch of visitors this morning.     Prior to admission Palliative performance scale PPSv2 (%): 40  Observed during hospitalization: 20 %    % Ambulation Activity Level Self-Care Intake Consciousness   100 Full  Normal  No Disease Full Normal Full   90 Full  Normal  Some Disease Full Normal Full   80 Full  Normal w/effort  Some Disease Full Normal or reduced Full   70 Reduced  Can't Perform Job  Some Disease Full Normal or reduced Full   60 Reduced  Can't Perform Hobby   Significant Disease Occ Assist Normal or reduced Full or confused   50 Mainly sit/lie Can't do any work  Extensive Disease Partial Assist Normal or reduced Full or confused   40 Mainly in bed Can't do any work  Extensive Disease Mainly Assist Normal or reduced Full or confused   30 Bed Bound Can't do any work  Extensive Disease Max Assist  Total Care Reduced  Drowsy/confused   20 Bed Bound Can't do any work  Extensive Disease Max Assist  Total Care Minimal  Drowsy/confused   10 Bed Bound Can't do any work  Extensive Disease Max Assist  Total Care Mouth Care  Drowsy/confused   0 Death        Hematology:  Lab Results   Component Value Date    WBC 3.5 (L) 05/09/2024    HGB 8.7 (L) 05/09/2024    HCT 28.4 (L) 05/09/2024    .0 (L) 05/09/2024       Coags:  Lab Results   Component Value Date    INR 1.5 (A) 03/27/2024    PTT 29.8 12/19/2017       Chemistry:  Lab Results   Component Value Date    CREATSERUM 0.66 05/09/2024    BUN 3 (L) 05/09/2024     (L) 05/09/2024    K 3.6 05/09/2024     05/09/2024    CO2 19.0 (L) 05/09/2024     (H) 05/09/2024    CA 8.6 05/09/2024    ALB 1.7 (L) 05/01/2024    ALKPHO 84 05/01/2024    BILT 0.9 05/01/2024    TP 4.7 (L) 05/01/2024    AST 8 (L) 05/01/2024    ALT 7 (L) 05/01/2024    MG 1.7 05/09/2024    PHOS 2.3 (L) 05/07/2024       Imaging:  No results found.    Summary of Discussion      Call to daughter Beryl. She  said that she is leaning toward hospice, however, they want a PEG placed before being admitted to hospice. pt is not decisional at this time and refused scope previously by being combative. Family wants pt to be sedated and have a PEG placed. Her reasoning is that pt is refusing to eat or eating very little and she does not want pt to dehydrate or starve. We discussed the natural process of dying and that force feeding is not within the realm of comfort especially since she pulled her NG tube. We discussed keeping someone alive vs meaningful quality of life. Daughter willing to hear from hospice team, but at this time wants a PEG. It is unclear if other providers have had this discussion.   I recommended hospice without a PEG given pt's adamant refusal of care.      Advance Care Planning counseling and discussion:  HCPOA:  Document on file Yes [] No [x]. Requested for file []  Healthcare Agent Appointed: Yes  Healthcare Agent's Name: Beryl Blanchard - daughter     Code Status:  DNAR/Selective Treatment,   POLST:  Deferred as GOC are ongoing.    Inpatient Problem List:   Principal Problem:    Mucositis due to chemotherapy  Active Problems:    Malignant neoplasm of colon (HCC)    Metastatic colon cancer to liver (HCC)    Anemia complicating neoplastic disease    Hypokalemia    Chemotherapy-induced neutropenia (HCC)    Dysphagia    Thrombocytopenia (HCC)    Febrile neutropenia (HCC)    Failure to thrive in adult    Hypophosphatemia    Cognitive disorder    Acute encephalopathy    Palliative care encounter    Goals of care, counseling/discussion        Assessment and Recommendation     Goals of care: ongoing   Patient refused EGD/Flex sig, refusing PO, pulled out DHT 5/9. Family requesting PEG at this time followed by hospice care.   Hospice consult placed.   Advanced Care Planning:  Code Status:  DNAR/Selective Treatment  HCPOA: No document previously created Healthcare Agent's Name: Beryl Blanchard -  daughter  POLST: Deferred as GOC are ongoing.    Discussed today's visit with  Family, RN, SW/CM, Care team, and hospitalist.    Palliative Care Follow Up: Palliative care team will Continue to follow while inpatient. Unless decision for hospice has been made.   Palliative care follow up at discharge: TBD.  The Palliative Care Service does not round on the weekends but providers are available by [place] [employer], if needed.   Will follow up on Monday    Thank you for allowing Palliative Care services to participate in the care of Lisa Iqbal.    A total of 35 minutes were spent on this consult, which included all of the following: chart review, direct face to face contact, history taking, physical examination, counseling and coordinating care, and documentation       Jacqueline Loredo MD, 05/10/24, 4:19 PM  Palliative Care Services    The 21st Century Cures Act makes medical notes like these available to patients in the interest of transparency. Please be advised this is a medical document. Medical documents are intended to carry relevant information, facts as evident, and the clinical opinion of the practitioner. The medical note is intended as peer to peer communication and may appear blunt or direct. It is written in medical language and may contain abbreviations or verbiage that are unfamiliar.

## 2024-05-10 NOTE — PROGRESS NOTES
[facility]  Report of Psychiatric Progress Note    Lisa Iqbal Patient Status:  Inpatient    10/11/1950 MRN [id number]   Location [facility] 4NW-A Attending Enedelia Lui MD   Hosp Day # 13 PCP Anusha Abraham MD     Date of Admission: 2024  Date of Service: 5/10/2024  Reason for Consultation: Eval for depression, not eating/drinking    Impression:  Primary Psychiatric Diagnosis:  Cognitive disorder unspecified. Rule out chemo induced (ie FU) cognitive impairment. Rule out Wernicke's encephalopathy from B1 deficiency (mental status change, poor diet, no opthalmoplegia, unable to assess for ataxia).     Acute delirium/encephalopathy initially due to dehydration, hypokalemia, pain from mucositis and candidiasis, possible underlying infection treated with meropenum (completed on 5/3/24), and now from hypophosphatemia and meds (ie last morphine on 24) and ?.     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     Pertinent Medical Diagnoses:  Metastatic colon cancer. Neutropenic fever. Mucositis and oral candidiasis.     Recommendations:  1) She does NOT have decision making capacity due to her delirium and cognitive disorder. Her daughter is the health care surrogate. She does not appreciate or understand the importance of nutrition at this time, nor appreciate or understand her diagnosis of cancer. She is unable to tell me what type of cancer she has or what treatments she has received. She believes she is fine and the cancer will heal on its own.     2) Empiric high dose IV thiamine 500mg every 8 hrs x 6 doses.     3) Continue Remeron 15mg nightly to help with depressive disorder and low appetite.     4) Left a message with daughter with an update on the plan above. Mentioned that a G tube is NOT recommended because she will likely pull it out given her cognitive disorder and delirium, increasing risk for infection and bleeding. And she will  likely have to be restrained when tube feeds are given. Hospice is appropriate at this time    Laci Pickering MD    History of Present Illness:  74 yo female with metastatic colon cancer to the liver was admitted on 4/27/24 for management of mucositis and candidiasis causing odynophagia and low po intake. Started on IVF's and fluconazole and magic mouthwash. She is s/p FOLFOX cycle 3 on 4/17/2024 causing neutropenia. She developed a fever and was empirically treated with meropenum (no source of infection identified).     Psych consulted because of her failure to thrive with low motivation and a lack of appetite. She has appeared depressed and withdrawn and confused. She was started on Remeron. She initially agreed to NG tube feeds, but then refused this afternoon. She has been declining PT as well.     With the aid of video , she is able to tell me that she has fatigue, depressed mood, low motivation, and a lack of appetite. She isn't eating because she isn't hungry, not because it hurts to swallow anymore. Regarding her medical issues, she is able to tell me that she has cancer and believes she will be cured. She is unable to tell me if she has received chemotherapy or any treatments. She has no idea of the stage of cancer or her prognosis. She believes she is fine and will be healed. Of note, she appeared confused at times and the interpretor had to ask her the same questions several times.     Interval Hx:  5/10/2024- (With video ; she has to be asked to speak louder several times). She feels tired and denies feeling anxious. She has depressed mood, but denies hopeless feelings or suicidal ideation. She is oriented to self and hospital and year. Thought it was April (it was when she was admitted). She is able to tell me that she has cancer. She is unable to tell me what type of cancer or the stage of cancer or what treatments she has received. She believes she is fine and the  cancer will heal on its own. She has no appetite. She is not hungry and has no desire to eat. She is very clear about not wanting a feeding tube. She tells me that she wants to live but doesn't care to eat. She thinks she will be fine. She often says, she will \"evaluate\" or \"analyze\". Per RN, she refused all po meds, food, and drink today.     Past Psychiatric History: None    Social and Developmental History:  with 2 children per patient.     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    Colon cancer (HCC)    Disorder of thyroid    Esophageal reflux    Exposure to medical diagnostic radiation    Last treatment 2018    History of adverse reaction to anesthesia    Has anxiety/nervousness when waking up    Migraines    Nausea and vomiting in adult    Osteopenia    Osteoporosis    Vertigo    Visual impairment    Glasses     Past Surgical History:   Procedure Laterality Date          x 3    Colonoscopy N/A 2024    Procedure: COLONOSCOPY;  Surgeon: Po Aviles MD;  Location:  ENDOSCOPY    Hysterectomy      Bilateral ovaries remain    Laryngoscopy,dirct,op,biopsy  01/15/2018     Family History   Problem Relation Age of Onset    Cancer Father         liver cancer    Cancer Brother         liver cancer      reports that she has never smoked. She has never used smokeless tobacco. She reports that she does not drink alcohol and does not use drugs.    Allergies:  Allergies   Allergen Reactions    Septra [Sulfamethoxazole W/Trimethoprim] RASH    Sulfa Antibiotics RASH       Medications:    Current Facility-Administered Medications:     thiamine (Vitamin B1) tab 100 mg, 100 mg, Oral, Daily    dextrose 10% infusion (TPN no rate), , Intravenous, Continuous PRN    pancrelipase (Lip-Prot-Amyl) (Zenpep) DR particles cap 10,000 Units, 10,000 Units, Per G Tube, PRN **AND** sodium bicarbonate tab 325 mg, 325 mg, Per G Tube, PRN    maalox/diphenhydramine/lidocaine (First Mouthwash  BLM) oral suspension 10 mL, 10 mL, Oral, QID PRN    enoxaparin (Lovenox) 40 MG/0.4ML SUBQ injection 40 mg, 40 mg, Subcutaneous, Daily    potassium chloride 20 mEq in dextrose 5%-sodium chloride 0.9% 1000mL infusion premix, , Intravenous, Continuous    mirtazapine (Remeron) tab 15 mg, 15 mg, Oral, Nightly    pantoprazole (Protonix) 40 mg in sodium chloride 0.9% PF 10 mL IV push, 40 mg, Intravenous, Q24H    acetaminophen (Ofirmev) 10 mg/mL infusion premix 1,000 mg, 1,000 mg, Intravenous, Q6H PRN    potassium chloride (Klor-Con) 20 MEQ oral powder 20 mEq, 20 mEq, Oral, Once    morphINE PF 4 MG/ML injection 4 mg, 4 mg, Intravenous, Q30 Min PRN    nystatin (Mycostatin) 199323 UNIT/ML oral suspension 500,000 Units, 500,000 Units, Oral, QID    Lidocaine Viscous HCl (XYLOCAINE) 2 % mouth solution 5 mL, 5 mL, Mouth/Throat, Q3H PRN    morphINE PF 2 MG/ML injection 1 mg, 1 mg, Intravenous, Q2H PRN **OR** morphINE PF 2 MG/ML injection 2 mg, 2 mg, Intravenous, Q2H PRN **OR** morphINE PF 4 MG/ML injection 4 mg, 4 mg, Intravenous, Q2H PRN    melatonin tab 3 mg, 3 mg, Oral, Nightly PRN    ondansetron (Zofran) 4 MG/2ML injection 4 mg, 4 mg, Intravenous, Q6H PRN    metoclopramide (Reglan) 5 mg/mL injection 10 mg, 10 mg, Intravenous, Q8H PRN    polyethylene glycol (PEG 3350) (Miralax) 17 g oral packet 17 g, 17 g, Oral, Daily PRN    sennosides (Senokot) tab 17.2 mg, 17.2 mg, Oral, Nightly PRN    fluconazole in sodium chloride 0.9% (Diflucan) 200 mg/100mL IVPB premix 200 mg, 200 mg, Intravenous, Q24H    Review of Systems   Constitutional:  Positive for malaise/fatigue.     Mental Status Exam:     Objective      Vitals:    05/10/24 1648   BP: 128/66   Pulse: 88   Resp: 18   Temp: 97.3 °F (36.3 °C)     Appearance: fair grooming  Behavior: normal psychomotor  Attitude: cooperative and guarded  Gait: not observed  Speech: soft, slow  Mood: Tired with depressed mood  Affect: Congruent    Thought process: logical to most questions; often  deflected and did not answer the question  Thought content: no hallucinations    Orientation: oriented person, place, and year  Attention and Concentration: poor  Memory: intact remote  Language: Intact naming  Fund of Knowledge: Unable to recite name of [place] president    Insight: limited  Judgment: limited    Laboratory Data:  Ammonia minimally elevated at 35 on 5/7/24.  B12 > 2000 on 5/7/24.

## 2024-05-10 NOTE — PROGRESS NOTES
Gastroenterology Progress Note  Lisa Iqbal Patient Status:  Inpatient    10/11/1950 MRN [id number]   Location [facility] 4NW-A Attending Win Iyer MD   Hosp Day # 13 PCP Anusha Abraham MD     Chief Complaint: Nausea, vomiting, poor PO intake   S: No acute events overnight;p had a BM overnight   O: /74 (BP Location: Left arm)   Pulse 86   Temp 98.5 °F (36.9 °C) (Oral)   Resp 18   Ht 5' 1\" (1.549 m)   Wt 143 lb 3.2 oz (65 kg)   SpO2 100%   BMI 27.06 kg/m²   Gen: AAOx2 (person, place)  CV: RRR with normal S1 / S2  Resp: CTA bilaterally; No increase in respiratory effort   Abd: (+)BS, soft, non-tender, non-distended; no rebound or guarding  Ext: No edema or cyanosis  Skin: Warm and dry  Laboratory Data:     No results for input(s): \"PGLU\" in the last 168 hours.  No results for input(s): \"INR\" in the last 168 hours.      Recent Labs   Lab 24  0608 24  1015 24  0608 24  0702 24  0737   WBC 2.7* 2.7* 2.5* 3.8* 3.5*   HGB 8.5* 9.7* 9.1* 9.0* 8.7*   PLT 37.0* 57.0* 76.0* 138.0* 146.0*       Recent Labs   Lab 24  0608 24  1144 24  0618 24  0651 24  0737     --  134* 136 132*   K 3.6 4.1 3.5 3.5 3.6     --  103 104 103   CO2 26.0  --  23.0 23.0 19.0*   BUN 4*  --  2* 3* 3*   CREATSERUM 0.34*  --  0.60 0.58 0.66       No results for input(s): \"ALT\", \"AST\" in the last 168 hours.    Invalid input(s): \"ALPHOS\", \"TBIL\"  Imaging:  PROCEDURE:  CT ABDOMEN+PELVIS (CPT=74176)     COMPARISON:  [facility] , CT, CT ABDOMEN+PELVIS(CPT=74176), 4/15/2024, 10:09 PM.     INDICATIONS:  nausea, vomiting     TECHNIQUE:  Unenhanced multislice CT scanning was performed from the dome of the diaphragm to the pubic symphysis.  Dose reduction techniques were used. Dose information is transmitted to the ACR (American College of Radiology) NRDR (National Radiology  Data Registry) which includes  the Dose Index Registry.     PATIENT STATED HISTORY: (As transcribed by Technologist)  Patient is here for nausea and vomitting.         FINDINGS:    This scan performed without IV or oral contrast, with limitations thereof.     LIVER:  Redemonstration numerous hepatic metastatic lesions are seen including areas of confluent poorly defined low-attenuation in the liver, more so than well-defined measurable masses, in both left and right hepatic lobes..     BILIARY:  Biliary sludge.     PANCREAS:  Unremarkable.     SPLEEN:  Unremarkable.     KIDNEYS:  No acute abnormality cysts are present, including a large cyst lateral left kidney 6.2 cm, and a sizable with smaller cyst upper right kidney 5.2 cm.     ADRENALS:  Unremarkable.     AORTA/VASCULAR:  No aortic aneurysm.     RETROPERITONEUM:  Unremarkable.     BOWEL/MESENTERY:  Nasogastric tube present with the tip in the stomach.  No free air is identified.  No ascites.  Rectal fecal impaction with desiccated-appearing hyperdense stool in the rectum transverse dimension 7.5 cm.  In addition there is  thickening of the colon and stranding of the fat is Mihir posterior between the rectum and the sacrum, concerning for stercoral colitis.  More modest amounts of stool throughout the rest of the colon.  Thickened appearance of the ascending as well as  descending colon, with some pericolonic stranding concerning for areas of colitis in these regions.  No bowel wall pneumatosis or portal venous gas, no sign of toxic megacolon.  Probable small amount of fluid in the right pericolic gutter but no large or   drainable ascites.     ABDOMINAL WALL:  Unremarkable.     URINARY BLADDER:  Unremarkable.     LYMPH NODES PELVIS:  Unremarkable.     PELVIC ORGANS:  No acute process.     LUNG BASES:  Development of bilateral pleural effusion, small bilateral, although larger on the right side, along with bilateral basilar atelectasis.     BONES:  No acute abnormality. Note is made of  degenerative changes present in the spine.     Impression   CONCLUSION:       1. Concern for colitis involving the ascending and descending colon.  Trace fluid in the pericolic gutter but no large or drainable ascites.  No free air or bowel obstruction.  Nasogastric tube tip in the stomach.     2. Separate from the areas of suspected colitis of the ascending and descending colon, there is rectal fecal impaction and concern for stercoral colitis with stranding of the fat between the rectum in the sacrum.     3. Widespread infiltrating disease in the liver concerning for metastatic infiltration.  Poorly defined without contrast.  Involvement of both right and left hepatic lobes.     Hepatic cyst.  Degenerative changes the spine.          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 Pt was dx with oral candidiasis treated with fluconazole.   Pt with improved/resolved odynophagia however continues to have little to no appetite and this AM vomiting. CT a/p completed 5/7 (unable to tolerate oral contrast) which suggested fecal impaction c/b stercoral colitis and separate areas of colitis. No improvement with enema and pt unable to tolerate Golytely. Gastrografin enema done 5/8 with some release of stool. Plan for EGD and Flex sig was for 5/9, however despite not being consentable, pt aggressively refusing procedures and not letting team access IV and place Oxygen. After discussion with anesthesia, it was deemed unsafe to proceed due to unwillingness to comply, which was discussed with daughter extensively, procedure was postponed. At this time, pt's daughter wants to hold on all  further procedures until discussion with heme onc about prognosis.   Plan:   Continue bowel regimen  Continue PPI daily + Fluconazole IV  Await wishes about further endoscopic intervention    Heath Sidhu MD, 05/10/24, 7:57 AM  [facility]  [phone]

## 2024-05-10 NOTE — CM/SW NOTE
Care Progression Note:  Length of stay: 13  GMLOS: 4.7  Avoidable Delays:   Code Status: DNAR/Select    Acute Medical Issue/Factors:   Mucositis due to chemotherapy - day 13/21 IV Fluconazole. Mucositis improved per MD notes.   Odynophagia- improved but continues with poor po intake and nausea and emesis this am. Further GI work up declined by pt.   Metastatic colon cancer- Oncology following. Pt unable to receive chemo safely with her poor performance status.     Pt remains very weak, declining PO meds and intake. Per nursing notes reports wanting to be \"left alone.\" Family assisting with care planning.     Pt not appropriate for [facility] admission at this time d/t poor nutrition and ability/ willingness to participate in therapy services. Palliative care is following. Hospice care is being considered by pt's dtr and family.      Discharge Barriers: Goals of care establishment.  Expected discharge date: TBD   Expected next site of care: TBD.     / available for discharge planning.       MARANAD MusaN, CMSRN    [id number]

## 2024-05-10 NOTE — PROGRESS NOTES
Heme/Onc Progress Note - [facility]      Chief Complaint:    Follow up for evaluation and management of metastatic colon cancer.    Interim History:      She has no new complaints. She is not eating or drinking orally. She has no pain at this visit. She has no dyspnea.    Physical Examination:    Vital Signs: /74 (BP Location: Left arm)   Pulse 86   Temp 98.5 °F (36.9 °C) (Oral)   Resp 18   Ht 1.549 m (5' 1\")   Wt 65 kg (143 lb 3.2 oz)   SpO2 100%   BMI 27.06 kg/m²     General: Patient is alert and oriented x 3, She is very weak and uncomfortable.  HEENT: Oral mucositis.   Chest: Clear to auscultation. No rales and no wheezes.  Heart: Regular rate and rhythm.   Abdomen: Soft and nontender. good bowel sounds.  Extremities: No edema. Non-tender.  Skin:  Warm, dry.      Labs reviewed at this visit:     Recent Labs   Lab 05/06/24  0608 05/08/24  0702 05/09/24  0737   RBC 3.38* 3.31* 3.12*   HGB 9.1* 9.0* 8.7*   HCT 28.1* 27.4* 28.4*   MCV 83.1 82.8 91.0   MCH 26.9 27.2 27.9   MCHC 32.4 32.8 30.6*   RDW 21.0 20.3 21.1   NEPRELIM 1.18* 1.85 1.70   WBC 2.5* 3.8* 3.5*   PLT 76.0* 138.0* 146.0*       Recent Labs   Lab 05/06/24  0618 05/07/24  0651 05/09/24  0737   * 122* 105*   BUN 2* 3* 3*   CREATSERUM 0.60 0.58 0.66   CA 8.5 8.2* 8.6   * 136 132*   K 3.5 3.5 3.6    104 103   CO2 23.0 23.0 19.0*       Radiologic imaging reviewed at this visit:    CT abd/pelvis on 4/15/2024:  FINDINGS:    LIVER:  Extensive hepatic metastatic deposits throughout the liver.  BILIARY:  High density in the gallbladder is nonspecific.  PANCREAS:  No lesion, fluid collection, ductal dilatation, or atrophy.    SPLEEN:  No enlargement or focal lesion.    KIDNEYS:  Large cyst in the mid left kidney measures 6 x 7 x 6.0 cm.  Cyst in the upper pole the right kidney measures 5.3 x 5 1 cm. No hydronephrosis.  No calculi in the kidneys.  ADRENALS:  No mass or enlargement.    AORTA/VASCULAR:    Unremarkable as seen on non-contrast  imaging.  RETROPERITONEUM:  No mass or adenopathy.    BOWEL/MESENTERY:  Visualized part of the appendix is unremarkable.  Mild thickening of the sigmoid colon.  ABDOMINAL WALL:  Tiny fat containing umbilical hernia.  URINARY BLADDER:  No visible focal wall thickening, lesion, or calculus.    PELVIC NODES:  No adenopathy.    PELVIC ORGANS:  Sequelae of hysterectomy.  BONES:  No bony lesion or fracture.    LUNG BASES:  No visible pulmonary or pleural disease.    OTHER:  Negative.       Impression   CONCLUSION:       1. No calculi in the kidneys.  No hydronephrosis.     2. Extensive metastatic deposits throughout the liver.     3. There is mild thickening of the sigmoid colon.  This may be sequelae of a localized colitis.  This may be sequelae of infectious or inflammatory etiology.  Ischemia is considered unlikely.         Impression/Plan:     Metastatic Colon Cancer:  Liver metastases:    S/P FOLFOX cycle 3 on 4/17/2024. Having complications from treatment even with dose reduction.  Plan is to stop chemotherapy due to toxicity and her very poor performance status. I think we will need to continue to focus on palliative care.    The patient removed the NG tube yesterday. I again spoke with the patient's daughter Beryl just now. The family wants palliative care but they would like an attempt at enteral route for nutrition and fluids. I think this is reasonable for her palliative care. The patient does not have the capacity for decision making and the family would like to pursue GI consultation for possible PEG tube placement.      Anemia complicating neoplastic disease:  Thrombocytopenia secondary to chemotherapy:    Platelets are increasing without bleeding. HGB has been adequate and stable.      Ronan Camarillo MD  [facility]

## 2024-05-10 NOTE — HOSPICE RN NOTE
New hospice referral received. Called and spoke to dtr Beryl. Daughter is agreeable to phone informational at 5pm tonight. Otherwise not able to meet in person until Sunday. RH to provide informational to daughter via phone at 5pm tonight. Please call with any questions/changes.    Gerri Crooks, RN, BSN  [facility]  Transitional Nurse Liaison  [phone] or After hours: [phone]

## 2024-05-11 LAB
IRON SATN MFR SERPL: 48 %
IRON SERPL-MCNC: 84 UG/DL
TIBC SERPL-MCNC: 174 UG/DL (ref 240–450)
TRANSFERRIN SERPL-MCNC: 117 MG/DL (ref 200–360)

## 2024-05-11 PROCEDURE — 99233 SBSQ HOSP IP/OBS HIGH 50: CPT | Performed by: INTERNAL MEDICINE

## 2024-05-11 PROCEDURE — 99233 SBSQ HOSP IP/OBS HIGH 50: CPT | Performed by: HOSPITALIST

## 2024-05-11 NOTE — HOSPICE RN NOTE
[facility] did a follow-up phone call to the patient's daughter, Beryl. Beryl is not ready to make a hospice decision, she wants to talk to the patient's Oncologist first and left a message for him. She is wondering what other options her mom has.    Cheryl Salter RN  [facility] RN Liaison  [phone] (After-hours)

## 2024-05-11 NOTE — PLAN OF CARE
Pt continues to refuse most care. VS generally stable except for a very low grade fever. Ice packs and acetaminophen offered but refused. Pt also refused to allow us to put in an new PIV line; physicians informed. Pt asked repeatedly in [place] if she was in pain; denied pain each time. Pt reminded to press the call light if she is in need of care. Refused all medications, food, and drink. Call light within reach, safety precautions in place.     Problem: SAFETY ADULT - FALL  Goal: Free from fall injury  Description: INTERVENTIONS:  - Assess pt frequently for physical needs  - Identify cognitive and physical deficits and behaviors that affect risk of falls.  - [place] fall precautions as indicated by assessment.  - Educate pt/family on patient safety including physical limitations  - Instruct pt to call for assistance with activity based on assessment  - Modify environment to reduce risk of injury  - Provide assistive devices as appropriate  - Consider OT/PT consult to assist with strengthening/mobility  - Encourage toileting schedule  Outcome: Progressing     Problem: Altered Communication/Language Barrier  Goal: Patient/Family is able to understand and participate in their care  Description: Interventions:  - Assess communication ability and preferred communication style  - Implement communication aides and strategies  - Use visual cues when possible  - Listen attentively, be patient, do not interrupt  - Minimize distractions  - Allow time for understanding and response  - Establish method for patient to ask for assistance (call light)  - Provide an  as needed  - Communicate barriers and strategies to overcome with those who interact with patient  Outcome: Not Progressing

## 2024-05-11 NOTE — PROGRESS NOTES
Heme/Onc Progress Note    Subjective:    No new events.  Deemed not decisional by psychiatry.  No family at bedside.  She appears weak and confused.  She is in restraints.    Physical Examination:    Vital Signs: /74 (BP Location: Left arm)   Pulse 91   Temp 100.2 °F (37.9 °C) (Axillary)   Resp 18   Ht 1.549 m (5' 1\")   Wt 65 kg (143 lb 3.2 oz)   SpO2 95%   BMI 27.06 kg/m²     General: Awake, confused, restrained.  Pallor noted.  HEENT: Oral mucositis.   Chest: Clear to auscultation. No rales and no wheezes.  Heart: Regular rate and rhythm.   Abdomen: Soft and nontender. good bowel sounds.  Extremities: No edema. Non-tender.  Skin:  Warm, dry.      Labs reviewed at this visit:     Recent Labs   Lab 05/06/24  0608 05/08/24  0702 05/09/24  0737   RBC 3.38* 3.31* 3.12*   HGB 9.1* 9.0* 8.7*   HCT 28.1* 27.4* 28.4*   MCV 83.1 82.8 91.0   MCH 26.9 27.2 27.9   MCHC 32.4 32.8 30.6*   RDW 21.0 20.3 21.1   NEPRELIM 1.18* 1.85 1.70   WBC 2.5* 3.8* 3.5*   PLT 76.0* 138.0* 146.0*       Recent Labs   Lab 05/06/24  0618 05/07/24  0651 05/09/24  0737   * 122* 105*   BUN 2* 3* 3*   CREATSERUM 0.60 0.58 0.66   CA 8.5 8.2* 8.6   * 136 132*   K 3.5 3.5 3.6    104 103   CO2 23.0 23.0 19.0*       Radiologic imaging reviewed at this visit:    Impression/Plan:     # Metastatic colon carcinoma: Metastatic and incurable disease.  Goal of treatment was to prolong survival and improve quality of life.  Even with dose reduction, she continues to have cytopenias and given declining performance status, chemotherapy is not a good option at present.    # Nutrition: Reduced oral intake.  Family wants PEG tube to help her get stronger and go to rehab.  G-tube not recommended by psychiatry given underlying delirium and cognitive disorder.    # Anemia: Due to chemotherapy.  Check iron level.    # Anemia/thrombocytopenia: Mild, due to chemotherapy.  Anticipate improvement.    Spoke to daughter at length via telephone.   Discussed that goals of care for metastatic disease are to improve quality of life.  Her quality of life at present is extremely poor and putting in a PEG tube and restraining her while feeding is certainly not going to help her QOL improved.  Comfort measures are passed.  Daughter will continue to discuss with family and make decisions.    Chikis Traylor M.D.    [facility]    [address] [place], [address]    5/11/2024    11:36 AM

## 2024-05-11 NOTE — PROGRESS NOTES
[facility]   part of [facility]     Hospitalist Progress Note     Lisa Iqbal Patient Status:  Inpatient    10/11/1950 MRN [id number]   Location [facility] 4NW-A Attending Enedelia Lui MD   Hosp Day # 14 PCP Anusha Abraham MD     Chief Complaint:  \" I am trying to eat\"    Subjective:     No acute events, no new complaints, family considering goals of care.    Objective:    Review of Systems:   A comprehensive review of systems was completed; pertinent positive and negatives stated in subjective.    Vital signs:  Temp:  [97.3 °F (36.3 °C)-100.2 °F (37.9 °C)] 100.2 °F (37.9 °C)  Pulse:  [85-95] 91  Resp:  [15-18] 18  BP: (107-140)/(66-74) 140/74  SpO2:  [95 %-99 %] 95 %    Physical Exam:    General: No acute distress  Respiratory: No wheezes, no rhonchi  Cardiovascular: S1, S2, regular rate and rhythm  Abdomen: Soft, Non-tender, non-distended, positive bowel sounds  Neuro: No new focal deficits.   Extremities: No edema      Diagnostic Data:    Labs:  Recent Labs   Lab 24  1015 24  0608 24  0702 24  0737   WBC 2.7* 2.5* 3.8* 3.5*   HGB 9.7* 9.1* 9.0* 8.7*   MCV 81.5 83.1 82.8 91.0   PLT 57.0* 76.0* 138.0* 146.0*       Recent Labs   Lab 24  0618 24  0651 24  0737   * 122* 105*   BUN 2* 3* 3*   CREATSERUM 0.60 0.58 0.66   CA 8.5 8.2* 8.6   * 136 132*   K 3.5 3.5 3.6    104 103   CO2 23.0 23.0 19.0*       Estimated Creatinine Clearance: 56.4 mL/min (based on SCr of 0.66 mg/dL).    No results for input(s): \"TROP\", \"TROPHS\", \"CK\" in the last 168 hours.    No results for input(s): \"PTP\", \"INR\" in the last 168 hours.               Microbiology    Hospital Encounter on 24   1. Urine Culture, Routine     Status: None    Collection Time: 24  3:35 PM    Specimen: Urine, clean catch   Result Value Ref Range    Urine Culture No Growth at 18-24 hrs. N/A   2. Blood Culture     Status: None    Collection Time: 24  3:21  PM    Specimen: Blood,peripheral   Result Value Ref Range    Blood Culture Result No Growth 5 Days N/A         Imaging: Reviewed in Epic.    Medications:    thiamine  500 mg Intravenous Q8H    [Held by provider] thiamine  100 mg Oral Daily    enoxaparin  40 mg Subcutaneous Daily    mirtazapine  15 mg Oral Nightly    pantoprazole  40 mg Intravenous Q24H    potassium chloride  20 mEq Oral Once    nystatin  500,000 Units Oral QID    fluconazole  200 mg Intravenous Q24H       Assessment & Plan:      # dysphagia   - continue PP IV  - DHT placed  - GI following, had gastrografin enema yesterday  - attempted EGD and possible flex sig, but pt could not complete, actively resisting  - CT a/p reviewed, concern for colitis, fecal impaction, stercoral colitis, widespread liver mets  - Fluconazole IV  - family would like to pursue PEG but hospice recommended    # mucositis, oral candidiasis due to chemo- improved clinically   - cont magic mouth wash, nystatin solution   - iv Fluconazole x21 days ( D# 10/)  - pain control     # hypophosphatemia  - replaced     # neutropenic fever, resolved   - off merrem   - Blood cx NGTD    # pancytopenia with neutropenia  - due to chemo,  improving  - monitor counts, transfuse if Hgb < 7, plt < 10  - oncology following     # oral candidiasis   - IV Fluconazole, plan for 21 day course of fluconazole      # hypokalemia , replace PRN    # metastatic sigmoid colon cancer   - Dr Camarillo d/w pt and daughter that no plan for chemo at this time.   - Palliative care following, family to decide on hospice, at this time they want PEG tube but daughter Beryl will talk with other sister today    # failure to thrive.   - cont to encourage oral intake   - Remeron  - PT OT       Win Iyer MD        Supplementary Documentation:     Quality:  DVT Mechanical Prophylaxis:   SCDs,    DVT Pharmacologic Prophylaxis   Medication    enoxaparin (Lovenox) 40 MG/0.4ML SUBQ injection 40 mg                Code Status:  DNAR/Selective Treatment  Cuellar: No urinary catheter in place  Cuellar Duration (in days):   Central line:    ALESHA:     Discharge is dependent on: progress  At this point Ms. Félix Iqbal is expected to be discharge to: home    The 21st Century Cures Act makes medical notes like these available to patients in the interest of transparency. Please be advised this is a medical document. Medical documents are intended to carry relevant information, facts as evident, and the clinical opinion of the practitioner. The medical note is intended as peer to peer communication and may appear blunt or direct. It is written in medical language and may contain abbreviations or verbiage that are unfamiliar.

## 2024-05-11 NOTE — PHYSICAL THERAPY NOTE
Per d/w Tamara OBRIEN,  and chart review, pt is not appropriate for therapy this date. Ongoing GOC discussions and family has not yet decided. Per chart, pt is non decisional. Will continue to follow and resume therapy as appropriate.

## 2024-05-11 NOTE — PROGRESS NOTES
Inpatient Follow up Note    Lisa Iqbal Patient Status:  Inpatient    10/11/1950 MRN [id number]   Location [facility] 4NW-A Attending Win Iyer MD   Hosp Day # 14 PCP Anusha Abraham MD     Reason for Consultation   Nausea /Vomiting, poor PO intake     Subjective   Patient found resting in bed in no apparent distress, She opens eyes to name called however does not respond to simple questions only nods her head no. No significant changes reported overnight per primary nurse; she continues to refuse care; IV access dislodge overnight. Primary nurse reports no episodes of nausea/vomiting/diarrhea/hematemesis/hematochezia or melena stools.                        Objective:     /74 (BP Location: Left arm)   Pulse 91   Temp 100.2 °F (37.9 °C) (Axillary)   Resp 18   Ht 5' 1\" (1.549 m)   Wt 143 lb 3.2 oz (65 kg)   SpO2 95%   BMI 27.06 kg/m²   Gen: AAO -?  CV: RRR with normal S1 / S2  Resp: CTA bilaterally  Abd: (+)BS, soft, non-tender, non-distended; no rebound or guarding  Ext: No edema or cyanosis  Skin: Warm and dry     Labs/Imaging     LABRCNTIP[PGLU:5@  No results for input(s): \"INR\" in the last 168 hours.      Recent Labs   Lab 24  1015 24  0608 24  0702 24  0737   WBC 2.7* 2.5* 3.8* 3.5*   HGB 9.7* 9.1* 9.0* 8.7*   PLT 57.0* 76.0* 138.0* 146.0*       Recent Labs   Lab 24  0618 24  0651 24  0737   * 136 132*   K 3.5 3.5 3.6    104 103   CO2 23.0 23.0 19.0*   BUN 2* 3* 3*   CREATSERUM 0.60 0.58 0.66       No results for input(s): \"ALT\", \"AST\" in the last 168 hours.    Invalid input(s): \"ALPHOS\", \"TBIL\"  XR THERAPEUTIC ENEMA WITH OR WITHOUT AIR  (CPT=74283)    Result Date: 2024  CONCLUSION:  Rectal fecal impaction, with large amount of stool in the rectum, and only mild stool throughout the remainder of the colon.  Gastrografin enema successfully passes contrast all the way through to the cecum without sign of  obstruction.  Scattered diverticula in the descending colon and proximal sigmoid colon, with a generally more narrowed appearance of the colon in these regions of diverticula when compared to other portions the colon.  Some of these areas of narrowing are irregular in  outline.  This narrowing could be chronic secondary to diverticulosis, scarring, muscular hypertrophy, and possibly previous episodes of diverticulitis if part of the patient's history.  No specific apple-core constricting obvious mass lesion seen, but non prepped Gastrografin enema has limitations in the detection of lesions of the colon including malignant lesions, and therefore appropriate colonoscopy follow-up in this patient would be advised.   LOCATION:  [facility]    Dictated by (CST): Daron Rodriguez MD on 5/08/2024 at 3:35 PM     Finalized by (CST): Daron Rodriguez MD on 5/08/2024 at 3:39 PM      AST   Date/Time Value Ref Range Status   05/01/2024 06:31 AM 8 (L) 15 - 37 U/L Final   12/03/2021 12:00 AM 17  Final     ALT   Date/Time Value Ref Range Status   05/01/2024 06:31 AM 7 (L) 13 - 56 U/L Final   12/03/2021 12:00 AM 13  Final     BUN   Date/Time Value Ref Range Status   05/09/2024 07:37 AM 3 (L) 9 - 23 mg/dL Final   12/03/2021 12:00 AM 17  Final                        Assessment    73 yr-old female with hx of head/neck cancer (dx 2018) s/p RTX and more recently dx with metastatic colon cancer with liver mets (Cycle 3 4/17/24; FOLFOX/joselin) admitted 4/27 with poor appetite in setting of mouth pain. Pt was dx with oral candidiasis treated with fluconazole.     No immediate plans for endoscopic evaluation; goals of care discussion ongoing with family         Plan     No immediate plans for endoscopic evaluation; will await decisions from goals of care discussion with family           INDIRA Bailey  11:57 AM  5/11/2024  [facility]  [phone]     Attending physician addendum:   I personally saw and examined the patient,  agree with the above findings, assessment and plan. She denies abdominal pain. She denies recent vomiting. Her abdomen is soft, NT/ND, +BS and without rebound or guarding. She is on Fluconazole. A G tube is not recommended as she can pull it out due to delerium per psychiatry. Can consider dobhoff with tube feeds for now. Please call back if mental status improves. Recommend further discuss of goals of care. We will sign off at this time.     Luna Anderson DO  1:03 PM  5/11/2024  [facility]enter[facility]  [phone]

## 2024-05-11 NOTE — PLAN OF CARE
Patient alert and oriented x2-3. VSS. Tmax - 99.3 No c/o pain at this time. Refused PO medications. IVF infusing. Brief changed. Rounded on throughout shift. Safety precautions continued. Call light within reach.   0251: Pt c/o of pain at PIV site when attempting to flush. Area around site reddened. Refusing to allow insertion of new IV - saying \"no\" and pushing at staff. Utilized language line to confirm that pt understood the importance of placing another IV. Shook head \"no\" in response to language line. Agreeable to maybe having port accessed in AM/afternoon. MD notified, no new orders received.   0500: PT refused AM vitals  0630: Able to obtain AM vitals  Problem: RISK FOR INFECTION - ADULT  Goal: Absence of fever/infection during anticipated neutropenic period  Description: INTERVENTIONS  - Monitor WBC  - Administer growth factors as ordered  - Implement neutropenic guidelines  Outcome: Progressing     Problem: SAFETY ADULT - FALL  Goal: Free from fall injury  Description: INTERVENTIONS:  - Assess pt frequently for physical needs  - Identify cognitive and physical deficits and behaviors that affect risk of falls.  - [place] fall precautions as indicated by assessment.  - Educate pt/family on patient safety including physical limitations  - Instruct pt to call for assistance with activity based on assessment  - Modify environment to reduce risk of injury  - Provide assistive devices as appropriate  - Consider OT/PT consult to assist with strengthening/mobility  - Encourage toileting schedule  Outcome: Progressing     Problem: METABOLIC/FLUID AND ELECTROLYTES - ADULT  Goal: Electrolytes maintained within normal limits  Description: INTERVENTIONS:  - Monitor labs and rhythm and assess patient for signs and symptoms of electrolyte imbalances  - Administer electrolyte replacement as ordered  - Monitor response to electrolyte replacements, including rhythm and repeat lab results as appropriate  - Fluid restriction  as ordered  - Instruct patient on fluid and nutrition restrictions as appropriate  Outcome: Progressing     Problem: GASTROINTESTINAL - ADULT  Goal: Minimal or absence of nausea and vomiting  Description: INTERVENTIONS:  - Maintain adequate hydration with IV or PO as ordered and tolerated  - Nasogastric tube to low intermittent suction as ordered  - Evaluate effectiveness of ordered antiemetic medications  - Provide nonpharmacologic comfort measures as appropriate  - Advance diet as tolerated, if ordered  - Obtain nutritional consult as needed  - Evaluate fluid balance  Outcome: Progressing  Goal: Maintains or returns to baseline bowel function  Description: INTERVENTIONS:  - Assess bowel function  - Maintain adequate hydration with IV or PO as ordered and tolerated  - Evaluate effectiveness of GI medications  - Encourage mobilization and activity  - Obtain nutritional consult as needed  - Establish a toileting routine/schedule  - Consider collaborating with pharmacy to review patient's medication profile  Outcome: Progressing

## 2024-05-12 PROCEDURE — 99233 SBSQ HOSP IP/OBS HIGH 50: CPT | Performed by: HOSPITALIST

## 2024-05-12 PROCEDURE — 99233 SBSQ HOSP IP/OBS HIGH 50: CPT | Performed by: INTERNAL MEDICINE

## 2024-05-12 NOTE — PROGRESS NOTES
Gastroenterology Progress Note  Patient Name: Lisa Iqbal  Chief Complaint: feeding difficulties  S: Pt refused to be examined today.   O: /70 (BP Location: Right arm)   Pulse 88   Temp 98.2 °F (36.8 °C) (Temporal)   Resp 18   Ht 5' 1\" (1.549 m)   Wt 143 lb 3.2 oz (65 kg)   SpO2 94%   BMI 27.06 kg/m²   Gen: Awake  Resp: No increased respiratory effort when talking  Skin: Warm and dry  Laboratory Data:    Recent Labs   Lab 05/06/24  0618 05/07/24  0651 05/09/24  0737   * 122* 105*   BUN 2* 3* 3*   CREATSERUM 0.60 0.58 0.66   CA 8.5 8.2* 8.6   * 136 132*   K 3.5 3.5 3.6    104 103   CO2 23.0 23.0 19.0*     Recent Labs   Lab 05/09/24  0737   RBC 3.12*   HGB 8.7*   HCT 28.4*   MCV 91.0   MCH 27.9   MCHC 30.6*   RDW 21.1   NEPRELIM 1.70   WBC 3.5*   .0*       No results for input(s): \"ALKPHOS\", \"BILITOT\", \"ALT\", \"AST\" in the last 168 hours.    Invalid input(s): \"BILIDIR\", \"PROT\", \"LABALBU\"    Assessment/Plan:   73 yr-old female with hx of head/neck cancer (dx 2018) s/p RTX and more recently dx with metastatic colon cancer with liver mets (Cycle 3 4/17/24; FOLFOX/joselin) admitted 4/27 with poor appetite in setting of mouth pain. Pt was dx with oral candidiasis treated with fluconazole.      No immediate plans for endoscopic evaluation; goals of care discussion ongoing with family  Per psychiatry she would be at high risk for pulling G tube given delerium and per oncology given poor prognosis, they are also in agreement that hospice is most appropriate.     Eulalia (GI NP) spoke with her daughter yesterday and she declined dobhoff placement for temporary feeding as she reports that she will pull it out.     Luna Anderson DO  10:53 AM  5/12/2024  [facility]astroenter[facility]  [phone]

## 2024-05-12 NOTE — PLAN OF CARE
Pt had a visit from family today. They were able to get her to eat and drink convinced her to allow us to put in a PIV. She was then restarted on fluids and given pain medication. Still refusing the nyastatin rinse. Physician updated on pt's newfound willingness to allow us to care for her. Pt reminded to press the call light if she is in need of care. Call light within reach, safety precautions in place.    Problem: RISK FOR INFECTION - ADULT  Goal: Absence of fever/infection during anticipated neutropenic period  Description: INTERVENTIONS  - Monitor WBC  - Administer growth factors as ordered  - Implement neutropenic guidelines  Outcome: Progressing     Problem: SAFETY ADULT - FALL  Goal: Free from fall injury  Description: INTERVENTIONS:  - Assess pt frequently for physical needs  - Identify cognitive and physical deficits and behaviors that affect risk of falls.  - [place] fall precautions as indicated by assessment.  - Educate pt/family on patient safety including physical limitations  - Instruct pt to call for assistance with activity based on assessment  - Modify environment to reduce risk of injury  - Provide assistive devices as appropriate  - Consider OT/PT consult to assist with strengthening/mobility  - Encourage toileting schedule  Outcome: Progressing     Problem: GASTROINTESTINAL - ADULT  Goal: Minimal or absence of nausea and vomiting  Description: INTERVENTIONS:  - Maintain adequate hydration with IV or PO as ordered and tolerated  - Nasogastric tube to low intermittent suction as ordered  - Evaluate effectiveness of ordered antiemetic medications  - Provide nonpharmacologic comfort measures as appropriate  - Advance diet as tolerated, if ordered  - Obtain nutritional consult as needed  - Evaluate fluid balance  Outcome: Progressing  Goal: Maintains or returns to baseline bowel function  Description: INTERVENTIONS:  - Assess bowel function  - Maintain adequate hydration with IV or PO as ordered  and tolerated  - Evaluate effectiveness of GI medications  - Encourage mobilization and activity  - Obtain nutritional consult as needed  - Establish a toileting routine/schedule  - Consider collaborating with pharmacy to review patient's medication profile  Outcome: Progressing

## 2024-05-12 NOTE — PROGRESS NOTES
Heme/Onc Progress Note    Subjective:    No new events.  Lethargic.  No family at bedside.    Physical Examination:    Vital Signs: /69 (BP Location: Left arm)   Pulse 90   Temp 98.3 °F (36.8 °C) (Temporal)   Resp 18   Ht 1.549 m (5' 1\")   Wt 65 kg (143 lb 3.2 oz)   SpO2 94%   BMI 27.06 kg/m²     General: Lethargic, opens eyes to name calling.  Chest: Clear to auscultation. No rales and no wheezes.  Heart: Regular rate and rhythm.   Abdomen: Soft and nontender. good bowel sounds.  Extremities: No edema. Non-tender.      Labs reviewed at this visit:     Recent Labs   Lab 05/06/24  0608 05/08/24  0702 05/09/24  0737   RBC 3.38* 3.31* 3.12*   HGB 9.1* 9.0* 8.7*   HCT 28.1* 27.4* 28.4*   MCV 83.1 82.8 91.0   MCH 26.9 27.2 27.9   MCHC 32.4 32.8 30.6*   RDW 21.0 20.3 21.1   NEPRELIM 1.18* 1.85 1.70   WBC 2.5* 3.8* 3.5*   PLT 76.0* 138.0* 146.0*       Recent Labs   Lab 05/06/24  0618 05/07/24  0651 05/09/24  0737   * 122* 105*   BUN 2* 3* 3*   CREATSERUM 0.60 0.58 0.66   CA 8.5 8.2* 8.6   * 136 132*   K 3.5 3.5 3.6    104 103   CO2 23.0 23.0 19.0*       Radiologic imaging reviewed at this visit:    Impression/Plan:     # Metastatic colon carcinoma: She has incurable disease.  Due to declining performance status and severe cytopenias even with dose reduction of chemotherapy, she is no longer a candidate for chemotherapy.    # Nutrition: Agree with GI and psychiatry, do not feel PEG tube is appropriate at this time.  Her declining performance status and advanced malignancy are a bigger issue.    # Pancytopenia: Mild, due to chemotherapy.  Continue to follow.    I have spoken with daughter yesterday.  Daughter is still interested in improving mother's nutrition.  They wish to speak with her primary oncologist Dr. Camarillo and will make a decision after that.    Dr. Camarillo to follow tomorrow.    Chikis Traylor M.D.    [facility]    [address]  [address] [place], [address]      5/12/2024    8:33 AM

## 2024-05-12 NOTE — PROGRESS NOTES
[facility]   part of [facility]     Hospitalist Progress Note     Lisa Iqbal Patient Status:  Inpatient    10/11/1950 MRN [id number]   [facility] 4NW-A Attending Enedelia Lui MD   Hosp Day # 15 PCP Anusha Abraham MD     Chief Complaint:  \" I am trying to eat\"    Subjective:     No acute events, no new complaints, family considering goals of care, family stating her PO intake is improving.    Objective:    Review of Systems:   A comprehensive review of systems was completed; pertinent positive and negatives stated in subjective.    Vital signs:  Temp:  [98.1 °F (36.7 °C)-100.2 °F (37.9 °C)] 98.3 °F (36.8 °C)  Pulse:  [90-94] 90  Resp:  [17-18] 18  BP: (116-140)/(69-76) 116/69  SpO2:  [94 %-97 %] 94 %    Physical Exam:    General: No acute distress  Respiratory: No wheezes, no rhonchi  Cardiovascular: S1, S2, regular rate and rhythm  Abdomen: Soft, Non-tender, non-distended, positive bowel sounds  Neuro: No new focal deficits.   Extremities: No edema      Diagnostic Data:    Labs:  Recent Labs   Lab 24  1015 24  0608 24  0702 24  0737   WBC 2.7* 2.5* 3.8* 3.5*   HGB 9.7* 9.1* 9.0* 8.7*   MCV 81.5 83.1 82.8 91.0   PLT 57.0* 76.0* 138.0* 146.0*       Recent Labs   Lab 24  0618 24  0651 24  0737   * 122* 105*   BUN 2* 3* 3*   CREATSERUM 0.60 0.58 0.66   CA 8.5 8.2* 8.6   * 136 132*   K 3.5 3.5 3.6    104 103   CO2 23.0 23.0 19.0*       Estimated Creatinine Clearance: 56.4 mL/min (based on SCr of 0.66 mg/dL).    No results for input(s): \"TROP\", \"TROPHS\", \"CK\" in the last 168 hours.    No results for input(s): \"PTP\", \"INR\" in the last 168 hours.               Microbiology    Hospital Encounter on 24   1. Urine Culture, Routine     Status: None    Collection Time: 24  3:35 PM    Specimen: Urine, clean catch   Result Value Ref Range    Urine Culture No Growth at 18-24 hrs. N/A   2. Blood Culture      Status: None    Collection Time: 04/29/24  3:21 PM    Specimen: Blood,peripheral   Result Value Ref Range    Blood Culture Result No Growth 5 Days N/A         Imaging: Reviewed in Epic.    Medications:    thiamine  500 mg Intravenous Q8H    [Held by provider] thiamine  100 mg Oral Daily    enoxaparin  40 mg Subcutaneous Daily    mirtazapine  15 mg Oral Nightly    pantoprazole  40 mg Intravenous Q24H    potassium chloride  20 mEq Oral Once    nystatin  500,000 Units Oral QID    fluconazole  200 mg Intravenous Q24H       Assessment & Plan:      # dysphagia   - continue PP IV  - DHT placed  - GI following, had gastrografin enema yesterday  - attempted EGD and possible flex sig, but pt could not complete, actively resisting  - CT a/p reviewed, concern for colitis, fecal impaction, stercoral colitis, widespread liver mets  - Fluconazole IV  - family would like to pursue PEG but hospice recommended    # mucositis, oral candidiasis due to chemo- improved clinically   - cont magic mouth wash, nystatin solution   - iv Fluconazole x21 days5  - pain control     # hypophosphatemia  - replaced     # neutropenic fever, resolved   - off merrem   - Blood cx NGTD    # pancytopenia with neutropenia  - due to chemo,  improving  - monitor counts, transfuse if Hgb < 7, plt < 10  - oncology following     # oral candidiasis   - IV Fluconazole, plan for 21 day course of fluconazole      # hypokalemia , replace PRN    # metastatic sigmoid colon cancer   - Dr Camarillo d/w pt and daughter that no plan for chemo at this time.   - Palliative care following, family to decide on hospice, at this time they want PEG tube but daughter Beryl will talk with other sister today    # failure to thrive.   - cont to encourage oral intake   - Remeron  - PT OT     # Dispo, spoke with daughter Beryl regarding goals, she was unaware that her mom was refusing IV insertion, vitals, will readdress goals of care after discussion with family.      Win Iyer,  MD        Supplementary Documentation:     Quality:  DVT Mechanical Prophylaxis:   SCDs,    DVT Pharmacologic Prophylaxis   Medication    enoxaparin (Lovenox) 40 MG/0.4ML SUBQ injection 40 mg                Code Status: DNAR/Selective Treatment  Cuellar: External urinary catheter in place  Cuellar Duration (in days):   Central line:    ALESHA:     Discharge is dependent on: progress  At this point Ms. Félix Iqbal is expected to be discharge to: home    The 21st Century Cures Act makes medical notes like these available to patients in the interest of transparency. Please be advised this is a medical document. Medical documents are intended to carry relevant information, facts as evident, and the clinical opinion of the practitioner. The medical note is intended as peer to peer communication and may appear blunt or direct. It is written in medical language and may contain abbreviations or verbiage that are unfamiliar.

## 2024-05-12 NOTE — OCCUPATIONAL THERAPY NOTE
IP OT attempted. Discussed with RN. Pt not appropriate at this time. Pt is refusing care. Will continue to follow while pt is in house.

## 2024-05-12 NOTE — PLAN OF CARE
Patient alert and oriented x1-2, drowsy, open eyes once in a while. Still refusing all PO medications, food and drinks. Does not want to be bothered. Refusing new IV, MD aware. No complaints of pain. No nausea and vomiting. Fall precautions in place. Call light in reach. Per Psych, patient not decisional. Daughter not yet ready to make Hospice decision.     Problem: RISK FOR INFECTION - ADULT  Goal: Absence of fever/infection during anticipated neutropenic period  Description: INTERVENTIONS  - Monitor WBC  - Administer growth factors as ordered  - Implement neutropenic guidelines  Outcome: Progressing     Problem: GASTROINTESTINAL - ADULT  Goal: Minimal or absence of nausea and vomiting  Description: INTERVENTIONS:  - Maintain adequate hydration with IV or PO as ordered and tolerated  - Nasogastric tube to low intermittent suction as ordered  - Evaluate effectiveness of ordered antiemetic medications  - Provide nonpharmacologic comfort measures as appropriate  - Advance diet as tolerated, if ordered  - Obtain nutritional consult as needed  - Evaluate fluid balance  Outcome: Progressing  Goal: Maintains or returns to baseline bowel function  Description: INTERVENTIONS:  - Assess bowel function  - Maintain adequate hydration with IV or PO as ordered and tolerated  - Evaluate effectiveness of GI medications  - Encourage mobilization and activity  - Obtain nutritional consult as needed  - Establish a toileting routine/schedule  - Consider collaborating with pharmacy to review patient's medication profile  Outcome: Progressing

## 2024-05-13 LAB
BASOPHILS # BLD AUTO: 0.05 X10(3) UL (ref 0–0.2)
BASOPHILS NFR BLD AUTO: 0.3 %
EOSINOPHIL # BLD AUTO: 0.04 X10(3) UL (ref 0–0.7)
EOSINOPHIL NFR BLD AUTO: 0.3 %
ERYTHROCYTE [DISTWIDTH] IN BLOOD BY AUTOMATED COUNT: 21.2 %
HCT VFR BLD AUTO: 24.2 %
HGB BLD-MCNC: 8.1 G/DL
IMM GRANULOCYTES # BLD AUTO: 0.31 X10(3) UL (ref 0–1)
IMM GRANULOCYTES NFR BLD: 2.1 %
LYMPHOCYTES # BLD AUTO: 1.01 X10(3) UL (ref 1–4)
LYMPHOCYTES NFR BLD AUTO: 6.8 %
MCH RBC QN AUTO: 27.4 PG (ref 26–34)
MCHC RBC AUTO-ENTMCNC: 33.5 G/DL (ref 31–37)
MCV RBC AUTO: 81.8 FL
MONOCYTES # BLD AUTO: 3.44 X10(3) UL (ref 0.1–1)
MONOCYTES NFR BLD AUTO: 23.1 %
NEUTROPHILS # BLD AUTO: 10.06 X10 (3) UL (ref 1.5–7.7)
NEUTROPHILS # BLD AUTO: 10.06 X10(3) UL (ref 1.5–7.7)
NEUTROPHILS NFR BLD AUTO: 67.4 %
PLATELET # BLD AUTO: 202 10(3)UL (ref 150–450)
RBC # BLD AUTO: 2.96 X10(6)UL
WBC # BLD AUTO: 14.9 X10(3) UL (ref 4–11)

## 2024-05-13 PROCEDURE — 99232 SBSQ HOSP IP/OBS MODERATE 35: CPT | Performed by: INTERNAL MEDICINE

## 2024-05-13 PROCEDURE — 99232 SBSQ HOSP IP/OBS MODERATE 35: CPT | Performed by: STUDENT IN AN ORGANIZED HEALTH CARE EDUCATION/TRAINING PROGRAM

## 2024-05-13 PROCEDURE — 99233 SBSQ HOSP IP/OBS HIGH 50: CPT | Performed by: HOSPITALIST

## 2024-05-13 NOTE — CM/SW NOTE
LACY called patient's daughter Beryl to discuss DC planning. Beryl reported that she is now wanting Lisa to go [facility] and would like for her to go to [facility] is possible.     LACY informed her that patient has refused the last several attempts made by physical therapy and that there is a very high likelihood that insurance will deny rehab due to patient's refusal. Beryl stated that she really wants her mom to go to rehab and will call her to discuss with her that she needs to participate in therapy.     LACY sent updated clinical information to [facility] to having their liaison re-review. LACY requested PT to come try again with patient. LACY update palliative care MD, Dr. Loredo.    SW will continue to follow for plan of care changes and remain available for any additional DC needs or concerns.     Harika Haro MSW, LSW  Discharge Planner   [phone]

## 2024-05-13 NOTE — PROGRESS NOTES
[facility]   part of [facility]  Palliative Care Progress Note    Lisa Iqbal Patient Status:  Inpatient    10/11/1950 MRN [id number]   Location [facility] 4NW-A Attending Win Iyer MD   Hosp Day # 16 PCP Anusha Abraham MD         Summary     Lisa Iqbal is a 73 year old female with history of metastatic colon cancer with liver mets (on active chemo prior to admission, last tx ) who was admitted on 2024 for mouth pain, mucositis (now resolved). Work up in our hospital revealed neg blood cultures, neutropenic fever now resolved, continued anorexia.    Consult ordered by:: Dr. Lui for evaluation of Palliative Care needs and Goals of care discussion.    Subjective     Interval events:  , CT AP was performed with concern for colitis of the ascending and descending colon and rectal/fecal impaction/concern for stercoral colitis.  go-lytely prep via DHT. DHT feedings were never initiated/tolerated.   she pulled out her DHT this morning prior to an abandoned attempt for EGD/Flex sig due to her refusal to allow O2 or access to her IV.   5/10- continued to refuse most care, PO medications, and PT/OT. Per notes and family, pt did eat some food after repetitive coaxing.     No family at bedside and breakfast arrived. In [place], pt states that family is planning to visit, but unclear what time today. She also states that she would like to try eating tray that came, but needs assistance to sit up; RN made aware. Kenny OBRIEN kindly to message me when family arrives.   Jacqueline Loredo MD, 24, 9:42 AM    Called daughter and left a voicemail. Expressed that SW and I would like to assist on devising a plan of care suitable to pt/family wishes. RN and SW updated via Epic secure chat.   Jacqueline Loredo MD, 24, 1:26 PM    Received a call back from Yaneli. We discussed current plan of care. She would like ELIANE and feels confident that Lisa will agree to  PT/OT this afternoon. She was also encouraged by increased oral intake. She is willing to have CPC at discharge and will complete POLST to maintain DNAR/Select; emailed to daughter at [email or web address]. Will follow up with pt at 3pm with Lisa and her other daughter.   Jacqueline Loredo MD, 05/13/24, 1:54 PM    Returned to room at 3:03 PM. When I entered the room, the patient was lying in bed and sleeping. No family present at bedside. Daughter Shanice was planning to be at bedside per Yaneli.   Unfortunately, PT/OT signed off an appear to be unable to see patient d/t days of refusal of their services.     Review of Systems:    Symptoms(s): Anorexia;Drowsiness  Dyspnea: none noted  Cough: none noted  Appetite: Remains poor. Some toast and crackers this AM.   Pain: denies   Bowel Movement         5/12/2024  0000             Stool Count Calculated for I/O: 1          Allergies:  Allergies   Allergen Reactions    Septra [Sulfamethoxazole W/Trimethoprim] RASH    Sulfa Antibiotics RASH       Medications:     Current Facility-Administered Medications:     dextrose 10% infusion (TPN no rate), , Intravenous, Continuous PRN    pancrelipase (Lip-Prot-Amyl) (Zenpep) DR particles cap 10,000 Units, 10,000 Units, Per G Tube, PRN **AND** sodium bicarbonate tab 325 mg, 325 mg, Per G Tube, PRN    maalox/diphenhydramine/lidocaine (First Mouthwash BLM) oral suspension 10 mL, 10 mL, Oral, QID PRN    enoxaparin (Lovenox) 40 MG/0.4ML SUBQ injection 40 mg, 40 mg, Subcutaneous, Daily    potassium chloride 20 mEq in dextrose 5%-sodium chloride 0.9% 1000mL infusion premix, , Intravenous, Continuous    mirtazapine (Remeron) tab 15 mg, 15 mg, Oral, Nightly    pantoprazole (Protonix) 40 mg in sodium chloride 0.9% PF 10 mL IV push, 40 mg, Intravenous, Q24H    acetaminophen (Ofirmev) 10 mg/mL infusion premix 1,000 mg, 1,000 mg, Intravenous, Q6H PRN    potassium chloride (Klor-Con) 20 MEQ oral powder 20 mEq, 20 mEq, Oral, Once    morphINE PF 4  MG/ML injection 4 mg, 4 mg, Intravenous, Q30 Min PRN    nystatin (Mycostatin) 105230 UNIT/ML oral suspension 500,000 Units, 500,000 Units, Oral, QID    Lidocaine Viscous HCl (XYLOCAINE) 2 % mouth solution 5 mL, 5 mL, Mouth/Throat, Q3H PRN    morphINE PF 2 MG/ML injection 1 mg, 1 mg, Intravenous, Q2H PRN **OR** morphINE PF 2 MG/ML injection 2 mg, 2 mg, Intravenous, Q2H PRN **OR** morphINE PF 4 MG/ML injection 4 mg, 4 mg, Intravenous, Q2H PRN    melatonin tab 3 mg, 3 mg, Oral, Nightly PRN    ondansetron (Zofran) 4 MG/2ML injection 4 mg, 4 mg, Intravenous, Q6H PRN    metoclopramide (Reglan) 5 mg/mL injection 10 mg, 10 mg, Intravenous, Q8H PRN    polyethylene glycol (PEG 3350) (Miralax) 17 g oral packet 17 g, 17 g, Oral, Daily PRN    sennosides (Senokot) tab 17.2 mg, 17.2 mg, Oral, Nightly PRN    fluconazole in sodium chloride 0.9% (Diflucan) 200 mg/100mL IVPB premix 200 mg, 200 mg, Intravenous, Q24H    Objective     Vital Signs:  Blood pressure 115/70, pulse 84, temperature 97.2 °F (36.2 °C), temperature source Oral, resp. rate 16, height 5' 1\" (1.549 m), weight 143 lb 3.2 oz (65 kg), SpO2 97%.  Body mass index is 27.06 kg/m².    Physical Exam:  Pt resting comfortably and no family at bedside.     Prior to admission Palliative performance scale PPSv2 (%): 40  Observed during hospitalization: 20 %    % Ambulation Activity Level Self-Care Intake Consciousness   100 Full  Normal  No Disease Full Normal Full   90 Full  Normal  Some Disease Full Normal Full   80 Full  Normal w/effort  Some Disease Full Normal or reduced Full   70 Reduced  Can't Perform Job  Some Disease Full Normal or reduced Full   60 Reduced  Can't Perform Hobby   Significant Disease Occ Assist Normal or reduced Full or confused   50 Mainly sit/lie Can't do any work  Extensive Disease Partial Assist Normal or reduced Full or confused   40 Mainly in bed Can't do any work  Extensive Disease Mainly Assist Normal or reduced Full or confused   30 Bed Bound  Can't do any work  Extensive Disease Max Assist  Total Care Reduced  Drowsy/confused   20 Bed Bound Can't do any work  Extensive Disease Max Assist  Total Care Minimal  Drowsy/confused   10 Bed Bound Can't do any work  Extensive Disease Max Assist  Total Care Mouth Care  Drowsy/confused   0 Death        Hematology:  Lab Results   Component Value Date    WBC 14.9 (H) 05/13/2024    HGB 8.1 (L) 05/13/2024    HCT 24.2 (L) 05/13/2024    .0 05/13/2024       Coags:  Lab Results   Component Value Date    INR 1.5 (A) 03/27/2024    PTT 29.8 12/19/2017       Chemistry:  Lab Results   Component Value Date    CREATSERUM 0.66 05/09/2024    BUN 3 (L) 05/09/2024     (L) 05/09/2024    K 3.6 05/09/2024     05/09/2024    CO2 19.0 (L) 05/09/2024     (H) 05/09/2024    CA 8.6 05/09/2024    ALB 1.7 (L) 05/01/2024    ALKPHO 84 05/01/2024    BILT 0.9 05/01/2024    TP 4.7 (L) 05/01/2024    AST 8 (L) 05/01/2024    ALT 7 (L) 05/01/2024    MG 1.7 05/09/2024    PHOS 2.3 (L) 05/07/2024       Imaging:  No results found.    Summary of Discussion      See above.     Advance Care Planning counseling and discussion:  HCPOA:  Document on file Yes [] No [x]. Requested for file []  Healthcare Agent Appointed: Yes  Healthcare Agent's Name: Beryl Blanchard - daughter     Code Status:  DNAR/Selective Treatment,   POLST:  Emailed and awaiting response     Inpatient Problem List:   Principal Problem:    Mucositis due to chemotherapy  Active Problems:    Malignant neoplasm of colon (HCC)    Metastatic colon cancer to liver (HCC)    Anemia complicating neoplastic disease    Hypokalemia    Chemotherapy-induced neutropenia (HCC)    Dysphagia    Thrombocytopenia (HCC)    Febrile neutropenia (HCC)    Failure to thrive in adult    Hypophosphatemia    Cognitive disorder    Acute encephalopathy    Palliative care encounter    Goals of care, counseling/discussion        Assessment and Recommendation     Goals of care: ongoing   Family wishing  to go to [facility] with hopes to get stronger so that she can return home.   Family accepting of no further chemo treatment  Goals do not align with hospice  This is the current recommendation d/t cancer, symptom burden (constipation), failure to thrive (poor PO intake, 25# weight loss, Alb <2), and refusal of most cares.     Advanced Care Planning:  Code Status:  DNAR/Selective Treatment  HCPOA: No document previously created Healthcare Agent's Name: Beryl Blanchard - daughter  If pt continues to lack complex decision making capacity, ongoing discussions need to continue with surrogate decision maker.   POLST: Awaiting email response.     Discussed today's visit with  PT, Family, RN, and SW/CM.    Palliative Care Follow Up: Palliative care team will Continue to follow while inpatient.    Palliative care follow up at discharge: Community palliative care ordered.    Thank you for allowing Palliative Care services to participate in the care of Lisa Iqbal.    A total of 35 minutes were spent on this consult, which included all of the following: chart review, direct face to face contact, history taking, physical examination, counseling and coordinating care, and documentation       Jacqueline Loredo MD, 05/13/24, 3:27 PM    Palliative Care Services    The 21st Century Cures Act makes medical notes like these available to patients in the interest of transparency. Please be advised this is a medical document. Medical documents are intended to carry relevant information, facts as evident, and the clinical opinion of the practitioner. The medical note is intended as peer to peer communication and may appear blunt or direct. It is written in medical language and may contain abbreviations or verbiage that are unfamiliar.

## 2024-05-13 NOTE — PROGRESS NOTES
Heme/Onc Progress Note - [facility]      Chief Complaint:    Follow up for evaluation and management of metastatic colon cancer.    Interim History:      The patient has had some oral intake this weekend. She has some motivation to eat this morning. I spoke to her daughter this morning. Beryl says that her mom is more motivated. She has no pain at this visit. She has no dyspnea.    Physical Examination:    Vital Signs: /64 (BP Location: Left arm)   Pulse 87   Temp 98.2 °F (36.8 °C) (Temporal)   Resp 17   Ht 1.549 m (5' 1\")   Wt 65 kg (143 lb 3.2 oz)   SpO2 94%   BMI 27.06 kg/m²     General: Patient is alert and oriented x 3, She is very weak and uncomfortable.  HEENT: Oral mucositis.   Chest: Clear to auscultation. No rales and no wheezes.  Heart: Regular rate and rhythm.   Abdomen: Soft and nontender. good bowel sounds.  Extremities: No edema. Non-tender.  Skin:  Warm, dry.      Labs reviewed at this visit:     Recent Labs   Lab 05/08/24  0702 05/09/24  0737   RBC 3.31* 3.12*   HGB 9.0* 8.7*   HCT 27.4* 28.4*   MCV 82.8 91.0   MCH 27.2 27.9   MCHC 32.8 30.6*   RDW 20.3 21.1   NEPRELIM 1.85 1.70   WBC 3.8* 3.5*   .0* 146.0*       Recent Labs   Lab 05/07/24  0651 05/09/24  0737   * 105*   BUN 3* 3*   CREATSERUM 0.58 0.66   CA 8.2* 8.6    132*   K 3.5 3.6    103   CO2 23.0 19.0*       Radiologic imaging reviewed at this visit:    CT abd/pelvis on 4/15/2024:  FINDINGS:    LIVER:  Extensive hepatic metastatic deposits throughout the liver.  BILIARY:  High density in the gallbladder is nonspecific.  PANCREAS:  No lesion, fluid collection, ductal dilatation, or atrophy.    SPLEEN:  No enlargement or focal lesion.    KIDNEYS:  Large cyst in the mid left kidney measures 6 x 7 x 6.0 cm.  Cyst in the upper pole the right kidney measures 5.3 x 5 1 cm. No hydronephrosis.  No calculi in the kidneys.  ADRENALS:  No mass or enlargement.    AORTA/VASCULAR:    Unremarkable as seen on non-contrast  imaging.  RETROPERITONEUM:  No mass or adenopathy.    BOWEL/MESENTERY:  Visualized part of the appendix is unremarkable.  Mild thickening of the sigmoid colon.  ABDOMINAL WALL:  Tiny fat containing umbilical hernia.  URINARY BLADDER:  No visible focal wall thickening, lesion, or calculus.    PELVIC NODES:  No adenopathy.    PELVIC ORGANS:  Sequelae of hysterectomy.  BONES:  No bony lesion or fracture.    LUNG BASES:  No visible pulmonary or pleural disease.    OTHER:  Negative.       Impression   CONCLUSION:       1. No calculi in the kidneys.  No hydronephrosis.     2. Extensive metastatic deposits throughout the liver.     3. There is mild thickening of the sigmoid colon.  This may be sequelae of a localized colitis.  This may be sequelae of infectious or inflammatory etiology.  Ischemia is considered unlikely.         Impression/Plan:     Metastatic Colon Cancer:  Liver metastases:    S/P FOLFOX cycle 3 on 4/17/2024. Having complications from treatment even with dose reduction.  Plan is to stop chemotherapy due to toxicity and her very poor performance status.     The patient has more motivation to eat and drink. I spoke to daughter Beryl. If she continues to have oral intake today, they would like to reconsider ELIANE as an option prior to getting her home.     Anemia complicating neoplastic disease:  Thrombocytopenia secondary to chemotherapy:    HGB has been adequate and stable. Repeat cbc in today.      Ronan Camarillo MD  [facility]

## 2024-05-13 NOTE — PLAN OF CARE
Patient alert, more calm. Drowsy but easy to arouse. Remains afebrile. Room air. No SOB. Still refusing care, PO medications, food and drinks. IV fluid continuous. No complaints of pain at this time. No nausea and vomiting. Medications given via IV. Fall precautions in place. Call light in reach. Frequent rounds provided. Needs attended.     Problem: RISK FOR INFECTION - ADULT  Goal: Absence of fever/infection during anticipated neutropenic period  Description: INTERVENTIONS  - Monitor WBC  - Administer growth factors as ordered  - Implement neutropenic guidelines  Outcome: Progressing     Problem: SAFETY ADULT - FALL  Goal: Free from fall injury  Description: INTERVENTIONS:  - Assess pt frequently for physical needs  - Identify cognitive and physical deficits and behaviors that affect risk of falls.  - [place] fall precautions as indicated by assessment.  - Educate pt/family on patient safety including physical limitations  - Instruct pt to call for assistance with activity based on assessment  - Modify environment to reduce risk of injury  - Provide assistive devices as appropriate  - Consider OT/PT consult to assist with strengthening/mobility  - Encourage toileting schedule  Outcome: Progressing     Problem: GASTROINTESTINAL - ADULT  Goal: Maintains or returns to baseline bowel function  Description: INTERVENTIONS:  - Assess bowel function  - Maintain adequate hydration with IV or PO as ordered and tolerated  - Evaluate effectiveness of GI medications  - Encourage mobilization and activity  - Obtain nutritional consult as needed  - Establish a toileting routine/schedule  - Consider collaborating with pharmacy to review patient's medication profile  Outcome: Progressing

## 2024-05-13 NOTE — CM/SW NOTE
SW received a message from [facility] that they are unable to accommodate patient and are denying her for admission.     SW called patient's daughter Beryl to notify. She expressed understanding and is agreeable to referral being expanded in hopes of locating another facility that is willing to accept.     Therapy Director Ev Pedroza spoke with patient's daughter earlier and plan for PT re-eval will take place tomorrow at 1pm.     Palliative care MD requested calorie count. RN and dietary notified.     SW will continue to follow for plan of care changes and remain available for any additional DC needs or concerns.     Harika Haro MSW, LSW  Discharge Planner   [id number]

## 2024-05-13 NOTE — PROGRESS NOTES
Inpatient Follow up Note    Lisa Iqbal Patient Status:  Inpatient    10/11/1950 MRN [id number]   Location [facility] 4NW-A Attending Win Iyer MD   Hosp Day # 16 PCP Anusha Abraham MD     Reason for Consultation   Feeding difficulties     Subjective   A&Ox1, denies abdominal pain, vomiting. Per notes, family meeting may happen today.             Objective:     /60 (BP Location: Left arm)   Pulse 82   Temp 98.5 °F (36.9 °C) (Oral)   Resp 16   Ht 5' 1\" (1.549 m)   Wt 143 lb 3.2 oz (65 kg)   SpO2 95%   BMI 27.06 kg/m²   Gen: AAOx1  CV: RRR with normal S1 / S2  Resp: CTA bilaterally  Abd: (+)BS, soft, non-tender, non-distended; no rebound or guarding  Ext: No edema or cyanosis  Skin: Warm and dry     Labs/Imaging     LABRCNTIP[PGLU:5@  No results for input(s): \"INR\" in the last 168 hours.      Recent Labs   Lab 24  0702 24  0737 24  0846   WBC 3.8* 3.5* 14.9*   HGB 9.0* 8.7* 8.1*   .0* 146.0* 202.0       Recent Labs   Lab 24  0651 24  0737    132*   K 3.5 3.6    103   CO2 23.0 19.0*   BUN 3* 3*   CREATSERUM 0.58 0.66       No results for input(s): \"ALT\", \"AST\" in the last 168 hours.    Invalid input(s): \"ALPHOS\", \"TBIL\"  No results found.  AST   Date/Time Value Ref Range Status   2024 06:31 AM 8 (L) 15 - 37 U/L Final   2021 12:00 AM 17  Final     ALT   Date/Time Value Ref Range Status   2024 06:31 AM 7 (L) 13 - 56 U/L Final   2021 12:00 AM 13  Final     BUN   Date/Time Value Ref Range Status   2024 07:37 AM 3 (L) 9 - 23 mg/dL Final   2021 12:00 AM 17  Final              Assessment   Ms Félix Iqbal is a 73 yr-old female with hx of head/neck cancer (dx ) s/p RTX and more recently dx with metastatic colon cancer with liver mets (Cycle 3 24; FOLFOX/joselin) admitted  with poor appetite in setting of mouth pain. Pt was dx with oral candidiasis treated with fluconazole.       No immediate plans for endoscopic evaluation; goals of care discussion ongoing with family.  Per psychiatry she would be at high risk for pulling G tube given delerium and per oncology given poor prognosis, they are also in agreement that hospice is most appropriate, and are planning on not pursuing further oncologic treatment.     Eulalia (GI NP) spoke with her daughter and she declined dobhoff placement for temporary feeding as she reports that she will pull it out.        Plan   -continued goals of care discussions with family  -appreciate onc/palliative care consults    GI will sign off now. Please re-consult with questions/concerns.     Wilbert Ames MD  11:30 AM  5/13/2024  [facility]  [phone]

## 2024-05-13 NOTE — PLAN OF CARE
Pt A/O x2. VSS. Afebrile. Pt denied pain throughout day. Pt refused most medications, MD aware. Pt repositioned in bed. Pt seen by palliative and SW. Now calorie counting per RD. Fall and safety precautions in place. Call light within reach.

## 2024-05-13 NOTE — CM/SW NOTE
SW noted that patient continues to refuse therapy services. Due to patient continued refusal for therapy services subacute rehab insurance authorization will likely be denied.     SW plans to join palliative care discuss today once patient's daughters arrive.    SW will continue to follow for plan of care changes and remain available for any additional DC needs or concerns.     Harika Haro MSW, LSW  Discharge Planner   [id number]

## 2024-05-13 NOTE — PROGRESS NOTES
[facility]   part of [facility]     Hospitalist Progress Note     Lisa Iqbal Patient Status:  Inpatient    10/11/1950 MRN [id number]   Location [facility] 4NW-A Attending Enedelia Lui MD   Hosp Day # 16 PCP Anusha Abraham MD     Chief Complaint:  \" I am trying to eat\"    Subjective:     No acute events, no new complaints, PO intake improving, family would like her to go to rehab, hospice not being considered at this time.    Objective:    Review of Systems:   A comprehensive review of systems was completed; pertinent positive and negatives stated in subjective.    Vital signs:  Temp:  [97.8 °F (36.6 °C)-98.5 °F (36.9 °C)] 98.5 °F (36.9 °C)  Pulse:  [82-89] 82  Resp:  [16-18] 16  BP: (107-122)/(60-67) 122/60  SpO2:  [94 %-95 %] 95 %    Physical Exam:    General: No acute distress  Respiratory: No wheezes, no rhonchi  Cardiovascular: S1, S2, regular rate and rhythm  Abdomen: Soft, Non-tender, non-distended, positive bowel sounds  Neuro: No new focal deficits.   Extremities: No edema      Diagnostic Data:    Labs:  Recent Labs   Lab 24  0702 24  0737 24  0846   WBC 3.8* 3.5* 14.9*   HGB 9.0* 8.7* 8.1*   MCV 82.8 91.0 81.8   .0* 146.0* 202.0       Recent Labs   Lab 24  0651 24  0737   * 105*   BUN 3* 3*   CREATSERUM 0.58 0.66   CA 8.2* 8.6    132*   K 3.5 3.6    103   CO2 23.0 19.0*       Estimated Creatinine Clearance: 56.4 mL/min (based on SCr of 0.66 mg/dL).    No results for input(s): \"TROP\", \"TROPHS\", \"CK\" in the last 168 hours.    No results for input(s): \"PTP\", \"INR\" in the last 168 hours.               Microbiology    Hospital Encounter on 24   1. Urine Culture, Routine     Status: None    Collection Time: 24  3:35 PM    Specimen: Urine, clean catch   Result Value Ref Range    Urine Culture No Growth at 18-24 hrs. N/A   2. Blood Culture     Status: None    Collection Time: 24  3:21 PM    Specimen:  Blood,peripheral   Result Value Ref Range    Blood Culture Result No Growth 5 Days N/A         Imaging: Reviewed in Epic.    Medications:    enoxaparin  40 mg Subcutaneous Daily    mirtazapine  15 mg Oral Nightly    pantoprazole  40 mg Intravenous Q24H    potassium chloride  20 mEq Oral Once    nystatin  500,000 Units Oral QID    fluconazole  200 mg Intravenous Q24H       Assessment & Plan:      # dysphagia   - continue PP IV  - DHT removed  - GI following, had gastrografin enema yesterday  - attempted EGD and possible flex sig, but pt could not complete, actively resisting  - CT a/p reviewed, concern for colitis, fecal impaction, stercoral colitis, widespread liver mets  - Fluconazole IV  - family would like to pursue PEG but hospice recommended    # mucositis, oral candidiasis due to chemo- improved clinically   - cont magic mouth wash, nystatin solution   - iv Fluconazole x21 days5  - pain control     # hypophosphatemia  - replaced     # neutropenic fever, resolved   - off merrem   - Blood cx NGTD    # pancytopenia with neutropenia  - due to chemo,  improving  - monitor counts, transfuse if Hgb < 7, plt < 10  - oncology following     # oral candidiasis   - IV Fluconazole, plan for 21 day course of fluconazole      # hypokalemia , replace PRN    # metastatic sigmoid colon cancer   - Dr Camarillo d/w pt and daughter that no plan for chemo at this time.   - Palliative care following, family to decide on hospice, at this time they want PEG tube but daughter Beryl will talk with other sister today    # failure to thrive.   - cont to encourage oral intake   - Remeron  - PT OT     # Dispo, spoke with daughter Beryl regarding goals, she states mom is better, would like mom to pursue treatment and improve enough to go to rehab.      Win Iyer MD        Supplementary Documentation:     Quality:  DVT Mechanical Prophylaxis:   SCDs,    DVT Pharmacologic Prophylaxis   Medication    enoxaparin (Lovenox) 40 MG/0.4ML SUBQ  injection 40 mg                Code Status: DNAR/Selective Treatment  Cuellar: External urinary catheter in place  Cuellar Duration (in days):   Central line:    ALESHA:     Discharge is dependent on: progress  At this point Ms. Félix Iqbal is expected to be discharge to: home    The 21st Century Cures Act makes medical notes like these available to patients in the interest of transparency. Please be advised this is a medical document. Medical documents are intended to carry relevant information, facts as evident, and the clinical opinion of the practitioner. The medical note is intended as peer to peer communication and may appear blunt or direct. It is written in medical language and may contain abbreviations or verbiage that are unfamiliar.

## 2024-05-13 NOTE — PHYSICAL THERAPY NOTE
Patient was approached in supine. Blankets removed and plan of therapy session discussed. Patient clearly declined and states \" I just want to stay in bed.\" Encouragement and education provided but pt is not interested in therapy. Based on chart review, patient has been declining nursing services as well. GOC ongoing.

## 2024-05-14 LAB
ANION GAP SERPL CALC-SCNC: 8 MMOL/L (ref 0–18)
BASOPHILS # BLD AUTO: 0.05 X10(3) UL (ref 0–0.2)
BASOPHILS NFR BLD AUTO: 0.4 %
BUN BLD-MCNC: 5 MG/DL (ref 9–23)
CALCIUM BLD-MCNC: 8.4 MG/DL (ref 8.5–10.1)
CHLORIDE SERPL-SCNC: 104 MMOL/L (ref 98–112)
CO2 SERPL-SCNC: 21 MMOL/L (ref 21–32)
CREAT BLD-MCNC: 0.46 MG/DL
EGFRCR SERPLBLD CKD-EPI 2021: 100 ML/MIN/1.73M2 (ref 60–?)
EOSINOPHIL # BLD AUTO: 0.08 X10(3) UL (ref 0–0.7)
EOSINOPHIL NFR BLD AUTO: 0.6 %
ERYTHROCYTE [DISTWIDTH] IN BLOOD BY AUTOMATED COUNT: 22.4 %
GLUCOSE BLD-MCNC: 106 MG/DL (ref 70–99)
HCT VFR BLD AUTO: 24.5 %
HGB BLD-MCNC: 7.9 G/DL
IMM GRANULOCYTES # BLD AUTO: 0.31 X10(3) UL (ref 0–1)
IMM GRANULOCYTES NFR BLD: 2.2 %
LYMPHOCYTES # BLD AUTO: 0.88 X10(3) UL (ref 1–4)
LYMPHOCYTES NFR BLD AUTO: 6.3 %
MCH RBC QN AUTO: 27 PG (ref 26–34)
MCHC RBC AUTO-ENTMCNC: 32.2 G/DL (ref 31–37)
MCV RBC AUTO: 83.6 FL
MONOCYTES # BLD AUTO: 2.59 X10(3) UL (ref 0.1–1)
MONOCYTES NFR BLD AUTO: 18.4 %
NEUTROPHILS # BLD AUTO: 10.14 X10 (3) UL (ref 1.5–7.7)
NEUTROPHILS # BLD AUTO: 10.14 X10(3) UL (ref 1.5–7.7)
NEUTROPHILS NFR BLD AUTO: 72.1 %
OSMOLALITY SERPL CALC.SUM OF ELEC: 274 MOSM/KG (ref 275–295)
PLATELET # BLD AUTO: 227 10(3)UL (ref 150–450)
PLATELET MORPHOLOGY: NORMAL
POTASSIUM SERPL-SCNC: 4 MMOL/L (ref 3.5–5.1)
RBC # BLD AUTO: 2.93 X10(6)UL
SODIUM SERPL-SCNC: 133 MMOL/L (ref 136–145)
WBC # BLD AUTO: 14.1 X10(3) UL (ref 4–11)

## 2024-05-14 PROCEDURE — 99233 SBSQ HOSP IP/OBS HIGH 50: CPT | Performed by: STUDENT IN AN ORGANIZED HEALTH CARE EDUCATION/TRAINING PROGRAM

## 2024-05-14 PROCEDURE — 99233 SBSQ HOSP IP/OBS HIGH 50: CPT | Performed by: HOSPITALIST

## 2024-05-14 RX ORDER — MUSCLE RUB CREAM 100; 150 MG/G; MG/G
CREAM TOPICAL 3 TIMES DAILY
Status: DISCONTINUED | OUTPATIENT
Start: 2024-05-14 | End: 2024-05-18

## 2024-05-14 NOTE — PLAN OF CARE
Pt A/O x2. VSS. Afebrile. Pt denied pain throughout day. Medications admin per MAR and pt approval. Pt refused various medications during day.  services utilized. Pt repositioned in bed. Pt and daughter seen by palliative today. Calorie counting, pt has very poor appetite. MD aware. Pt has dryness to upper back, barrier cream provided. Fall and safety precautions in place. Call light within reach.

## 2024-05-14 NOTE — DIETARY NOTE
[facility]   part of [facility]    NUTRITION ASSESSMENT    Pt meets severe malnutrition criteria at this time.    CRITERIA FOR MALNUTRITION DIAGNOSIS:  Criteria for severe malnutrition diagnosis: acute illness/injury related to wt loss greater than 5% in 1 month, energy intake less than 50% for greater than 5 days, and body fat moderate depletion    NUTRITION INTERVENTION:    RD nutrition Care Plan- See RD nutrition assessment for additional recommendations  Recommend starting nutrition support if part of care plan   Meal and Snacks - Continue low fiber/soft diet, monitor and encourage adequate PO intake.   Medical Food Supplements - Continue Ensure Plus High Protein BID. Will discontinue magic shake and magic cup.    5. Enteral Nutrition - Via NG Recommend starting Jevity 1.5 at 20 mL/hr advancing 10 mL/hr q 8 hours as tolerated to goal rate of 50 mL/hr.   This will provide 1800 kcal, 77 grams protein, 912 mL total free water, and 100% of RDI's.   Recommend 150 mL water flush q 6 hours, TF+FWF provides 1812 mL total fluids.     PATIENT STATUS:   5/14 - Dr Loredo wanted a calorie count. Spoke with RN and calorie count envelop hanging on door. RN documented in I/O flowsheet but pt only taking bites of toast at breakfast and dinner yesterday. And this morning for breakfast only bites as well. This is not meeting nutritional needs, less than 25 % of nutritional needs. If part of plan of care pt would benefit for nutrition support as she has been meeting less than 50% of needs since admission  over 2 weeks ago   5/10- pt not started on TF and pt pulled out dobhoff feeding tube yesterday. Pt is refusing replacing dobhoff, meds, and anything to eat or drink. Pt asked for ice today time of RD visit and RN reports offering but pt refused. Daughter is meeting with palliative care today to discuss goals of care.     5/6: Pt continues to have poor po intake. Received consult for TF. Pt open to receiving NG. Continued  Ensure HP BID, d/c'd other supplements as pt not consuming much orally. Noted Phos was 1.8 yesterday. Received replacement. Expecting lytes to drop. No current phos or mag. Placed order for mag and phos. Ordered thiamine. Initiated Jevity 1.5 @ 20ml/hr x 24hrs. Replace electrolytes before advancing as pt at risk for refeeding.     5/3- pt remains with poor po intake related to mouth pain. Minimal intake.  Encouraged po intake of soft foods and ONS.      04/29/24 at risk consult  73 year old female with mucositis d/t chemo for metastatic colon cancer.  Pt with 10 pound wt loss over past month which is significant wt loss per standards of 6.5% in 1 month. Pt with mild to moderate muscle and fat depletion.  Pt with poor po intake for past week or so due to pain from mucositis.        ANTHROPOMETRICS:  Ht: 154.9 cm (5' 1\")  Wt: 65 kg (143 lb 3.2 oz).   BMI: Body mass index is 27.06 kg/m².  IBW: 47.7 kg      WEIGHT HISTORY:   Weight loss: Yes, Severe Wt loss of 10 lbs, 6.5%, over 1 months     Wt Readings from Last 10 Encounters:   05/01/24 65 kg (143 lb 3.2 oz)   04/26/24 61.3 kg (135 lb 3.2 oz)   04/25/24 64 kg (141 lb)   04/24/24 64 kg (141 lb)   04/22/24 64.9 kg (143 lb)   04/17/24 67.2 kg (148 lb 3.2 oz)   04/15/24 70.3 kg (155 lb)   04/12/24 66 kg (145 lb 6.4 oz)   04/05/24 69.4 kg (153 lb)   04/03/24 69.4 kg (153 lb)        NUTRITION:  Diet:       Procedures    Regular/General diet Is Patient on Accuchecks? No      Food Allergies: No  Cultural/Ethnic/Muslim Preferences Addressed: Yes    Percent Meals Eaten (last 3 days)       Date/Time Percent Meals Eaten (%)    05/14/24 0900 --     Percent Meals Eaten (%): a few bites at 05/14/24 0900            GI system review:  trouble eating due to mucositits  Last BM: 5/6  Skin and wounds: none    NUTRITION RELATED PHYSICAL FINDINGS:     1. Body Fat/Muscle Mass: moderate depletion body fat Orbital fat pad and Buccal fat pad and mild muscle depletion Temple region and  Clavicle region     2. Fluid Accumulation: upper extremity edema and lower extremity edema     NUTRITION PRESCRIPTION: 64.9kg  Calories: 0967-0767 calories/day (25-30 kcal/kg)  Protein: 78-97 grams protein/day (1.2-1.5 grams protein per kg)  Fluid: ~1 ml/kcal or per MD discretion    NUTRITION DIAGNOSIS/PROBLEM:  Inadequate oral intake related to inability to take or tolerate and increased nutritional demands for healing as evidenced by documented/reported insufficient oral intake, documented/reported unintentional weight loss, loss of fat mass, and loss of muscle mass      MONITOR AND EVALUATE/NUTRITION GOALS:  PO intake of 75% of meals TID - Not met, Continues  PO intake of 75% of oral nutrition supplement/s - Not met, Continues  Weight stable within 1 to 2 lbs during admission - Ongoing  Tolerate alternative nutrition at 100% of goal - not met      MEDICATIONS:     Ivf D5/NS + 20 mEq Kcl at 100ml/hr    LABS:  Sodium 133, BUN 5, creatinine 0.46    Pt is at High nutrition risk    Karine Clark RD, LDN  Clinical Dietitian   [id number]

## 2024-05-14 NOTE — PROGRESS NOTES
[facility]   part of [facility]  Palliative Care Progress Note    Lisa Iqbal Patient Status:  Inpatient    10/11/1950 MRN [id number]   Location [facility] 4NW-A Attending Win Iyer MD   Hosp Day # 17 PCP Anusha Abraham MD         Summary     Lisa Iqbal is a 73 year old female with history of metastatic colon cancer with liver mets (on active chemo prior to admission, last tx ) who was admitted on 2024 for mouth pain, mucositis (now resolved). Work up in our hospital revealed neg blood cultures, neutropenic fever now resolved, continued anorexia.    Consult ordered by:: Dr. Lui for evaluation of Palliative Care needs and Goals of care discussion.    Subjective     Interval events:  still refusing PO medication. Calorie count started.    When I entered the room, the patient was  awake and lying in bed.  Daughter  present at bedside.  PT began working with pt, which I watched. It took significant coaxing to get her to participate, but she was mostly cooperative and pleasant. She became very fatigued within minutes and requested to lay down. PT and family tried very hard to continue session, but pt began waving her finger and laid down in bed.   Pt is oriented to Month and year, but not situation nor location.     See summary of discussion below.     Review of Systems:    Symptoms(s): Anorexia;Drowsiness  Dyspnea: none noted  Cough: none noted  Appetite: Remains poor.   Pain: denies but has pain with moving her neck from right to left   Bowel Movement         2024  0022             Stool Count Calculated for I/O: 1          Allergies:  Allergies   Allergen Reactions    Septra [Sulfamethoxazole W/Trimethoprim] RASH    Sulfa Antibiotics RASH       Medications:     Current Facility-Administered Medications:     dextrose 10% infusion (TPN no rate), , Intravenous, Continuous PRN    pancrelipase (Lip-Prot-Amyl) (Zenpep) DR particles cap 10,000 Units,  10,000 Units, Per G Tube, PRN **AND** sodium bicarbonate tab 325 mg, 325 mg, Per G Tube, PRN    maalox/diphenhydramine/lidocaine (First Mouthwash BLM) oral suspension 10 mL, 10 mL, Oral, QID PRN    enoxaparin (Lovenox) 40 MG/0.4ML SUBQ injection 40 mg, 40 mg, Subcutaneous, Daily    potassium chloride 20 mEq in dextrose 5%-sodium chloride 0.9% 1000mL infusion premix, , Intravenous, Continuous    mirtazapine (Remeron) tab 15 mg, 15 mg, Oral, Nightly    pantoprazole (Protonix) 40 mg in sodium chloride 0.9% PF 10 mL IV push, 40 mg, Intravenous, Q24H    acetaminophen (Ofirmev) 10 mg/mL infusion premix 1,000 mg, 1,000 mg, Intravenous, Q6H PRN    potassium chloride (Klor-Con) 20 MEQ oral powder 20 mEq, 20 mEq, Oral, Once    morphINE PF 4 MG/ML injection 4 mg, 4 mg, Intravenous, Q30 Min PRN    nystatin (Mycostatin) 576954 UNIT/ML oral suspension 500,000 Units, 500,000 Units, Oral, QID    Lidocaine Viscous HCl (XYLOCAINE) 2 % mouth solution 5 mL, 5 mL, Mouth/Throat, Q3H PRN    morphINE PF 2 MG/ML injection 1 mg, 1 mg, Intravenous, Q2H PRN **OR** morphINE PF 2 MG/ML injection 2 mg, 2 mg, Intravenous, Q2H PRN **OR** morphINE PF 4 MG/ML injection 4 mg, 4 mg, Intravenous, Q2H PRN    melatonin tab 3 mg, 3 mg, Oral, Nightly PRN    ondansetron (Zofran) 4 MG/2ML injection 4 mg, 4 mg, Intravenous, Q6H PRN    metoclopramide (Reglan) 5 mg/mL injection 10 mg, 10 mg, Intravenous, Q8H PRN    polyethylene glycol (PEG 3350) (Miralax) 17 g oral packet 17 g, 17 g, Oral, Daily PRN    sennosides (Senokot) tab 17.2 mg, 17.2 mg, Oral, Nightly PRN    fluconazole in sodium chloride 0.9% (Diflucan) 200 mg/100mL IVPB premix 200 mg, 200 mg, Intravenous, Q24H    Objective     Vital Signs:  Blood pressure 134/78, pulse 91, temperature 97.4 °F (36.3 °C), temperature source Oral, resp. rate 20, height 5' 1\" (1.549 m), weight 143 lb 3.2 oz (65 kg), SpO2 97%.  Body mass index is 27.06 kg/m².    Physical Exam:  General: Lethargic. In no apparent respiratory  distress.    HEENT: AT/NC. No gross focal deficits. Unable to keep eyes open during visit.    Lungs: Normal effort on RA  Neurologic: Alert and oriented to person and month/year; not place or situation   Psychiatric: Mood flat affect; more cooperative today     Prior to admission Palliative performance scale PPSv2 (%): 40  Observed during hospitalization: 20 %    % Ambulation Activity Level Self-Care Intake Consciousness   100 Full  Normal  No Disease Full Normal Full   90 Full  Normal  Some Disease Full Normal Full   80 Full  Normal w/effort  Some Disease Full Normal or reduced Full   70 Reduced  Can't Perform Job  Some Disease Full Normal or reduced Full   60 Reduced  Can't Perform Hobby   Significant Disease Occ Assist Normal or reduced Full or confused   50 Mainly sit/lie Can't do any work  Extensive Disease Partial Assist Normal or reduced Full or confused   40 Mainly in bed Can't do any work  Extensive Disease Mainly Assist Normal or reduced Full or confused   30 Bed Bound Can't do any work  Extensive Disease Max Assist  Total Care Reduced  Drowsy/confused   20 Bed Bound Can't do any work  Extensive Disease Max Assist  Total Care Minimal  Drowsy/confused   10 Bed Bound Can't do any work  Extensive Disease Max Assist  Total Care Mouth Care  Drowsy/confused   0 Death        Hematology:  Lab Results   Component Value Date    WBC 14.1 (H) 05/14/2024    HGB 7.9 (L) 05/14/2024    HCT 24.5 (L) 05/14/2024    .0 05/14/2024       Coags:  Lab Results   Component Value Date    INR 1.5 (A) 03/27/2024    PTT 29.8 12/19/2017       Chemistry:  Lab Results   Component Value Date    CREATSERUM 0.46 (L) 05/14/2024    BUN 5 (L) 05/14/2024     (L) 05/14/2024    K 4.0 05/14/2024     05/14/2024    CO2 21.0 05/14/2024     (H) 05/14/2024    CA 8.4 (L) 05/14/2024    ALB 1.7 (L) 05/01/2024    ALKPHO 84 05/01/2024    BILT 0.9 05/01/2024    TP 4.7 (L) 05/01/2024    AST 8 (L) 05/01/2024    ALT 7 (L) 05/01/2024     MG 1.7 05/09/2024    PHOS 2.3 (L) 05/07/2024       Imaging:  No results found.    Summary of Discussion      Spoke with Yaneli after PT session.   Would ideally like [facility] with hopes she can get home safely in time. Yaneli states that she understands that if pt does not improve, she will need another plan. Yaneli is okay with hospice care. She worries that hospice with SNF is not feasible, but possibly hospice at home with caregiver support (private pay) would be obtainable. Her mom has expressed wanting to go home on several occasions. Yaneli is open to having [facility] hospice team involved for a consult.     Advance Care Planning counseling and discussion:  HCPOA:  Document on file Yes [] No [x]. Requested for file []  Healthcare Agent Appointed: Yes  Healthcare Agent's Name: Beryl Blanchard - daughter     Code Status:  DNAR/Selective Treatment,   POLST:  completed and in chart     Inpatient Problem List:   Principal Problem:    Mucositis due to chemotherapy  Active Problems:    Malignant neoplasm of colon (HCC)    Metastatic colon cancer to liver (HCC)    Anemia complicating neoplastic disease    Hypokalemia    Chemotherapy-induced neutropenia (HCC)    Dysphagia    Thrombocytopenia (HCC)    Febrile neutropenia (HCC)    Failure to thrive in adult    Hypophosphatemia    Cognitive disorder    Acute encephalopathy    Palliative care encounter    Goals of care, counseling/discussion        Assessment and Recommendation     Goals of care: ongoing   Family wishing to go to [facility] with hopes to get stronger so that she can return home.   Family accepting of no further chemo treatment  Goals do align with hospice, but concern about lack of IVF and need for caregiver at home   [facility] hospice to discuss options; SW aware   This is the current recommendation d/t cancer, symptom burden (constipation), failure to thrive (poor PO intake, 25# weight loss, Alb <2), and refusal of most cares.     Advanced Care Planning:  Code  Status:  DNAR/Selective Treatment  HCPOA: No document previously created Healthcare Agent's Name: Beryl Blanchard - daughter  If pt continues to lack complex decision making capacity, ongoing discussions need to continue with surrogate decision maker.   POLST: Completed and in Epic   Symptoms:   Left neck pain: theragesic topical     Discussed today's visit with  Dr. Camarillo, PT, Family, RN, and SW/CM.    Palliative Care Follow Up: Palliative care team will Continue to follow while inpatient.    Palliative care follow up at discharge: Community palliative care ordered. Ongoing discussions.     Thank you for allowing Palliative Care services to participate in the care of Lisa Iqbal.    A total of  65  minutes were spent on this consult, which included all of the following: chart review, direct face to face contact, history taking, physical examination, counseling and coordinating care, and documentation.     Jacqueline Loredo MD, 05/14/24, 2:38 PM  Palliative Care Services    The 21st Century Cures Act makes medical notes like these available to patients in the interest of transparency. Please be advised this is a medical document. Medical documents are intended to carry relevant information, facts as evident, and the clinical opinion of the practitioner. The medical note is intended as peer to peer communication and may appear blunt or direct. It is written in medical language and may contain abbreviations or verbiage that are unfamiliar.

## 2024-05-14 NOTE — PROGRESS NOTES
Heme/Onc Progress Note - [facility]      Chief Complaint:    Follow up for evaluation and management of metastatic colon cancer.    Interim History:      The patient is very weak this morning. She says she does not want to eat and won't eat. She says she does not want to participate with physical therapy. She wants to go home. I spoke with her daughter Beryl this morning. She has no pain at this visit. She has no dyspnea.    Physical Examination:    Vital Signs: /78 (BP Location: Right arm)   Pulse 91   Temp 97.4 °F (36.3 °C) (Oral)   Resp 20   Ht 1.549 m (5' 1\")   Wt 65 kg (143 lb 3.2 oz)   SpO2 97%   BMI 27.06 kg/m²     General: Patient is alert and oriented x 3, She is very weak and uncomfortable.  HEENT: Oral mucositis.   Chest: Clear to auscultation. No rales and no wheezes.  Heart: Regular rate and rhythm.   Abdomen: Soft and nontender. good bowel sounds.  Extremities: No edema. Non-tender.  Skin:  Warm, dry.      Labs reviewed at this visit:     Recent Labs   Lab 05/08/24  0702 05/09/24  0737 05/13/24  0846   RBC 3.31* 3.12* 2.96*   HGB 9.0* 8.7* 8.1*   HCT 27.4* 28.4* 24.2*   MCV 82.8 91.0 81.8   MCH 27.2 27.9 27.4   MCHC 32.8 30.6* 33.5   RDW 20.3 21.1 21.2   NEPRELIM 1.85 1.70 10.06*   WBC 3.8* 3.5* 14.9*   .0* 146.0* 202.0       Recent Labs   Lab 05/09/24  0737   *   BUN 3*   CREATSERUM 0.66   CA 8.6   *   K 3.6      CO2 19.0*       Radiologic imaging reviewed at this visit:    CT abd/pelvis on 4/15/2024:  FINDINGS:    LIVER:  Extensive hepatic metastatic deposits throughout the liver.  BILIARY:  High density in the gallbladder is nonspecific.  PANCREAS:  No lesion, fluid collection, ductal dilatation, or atrophy.    SPLEEN:  No enlargement or focal lesion.    KIDNEYS:  Large cyst in the mid left kidney measures 6 x 7 x 6.0 cm.  Cyst in the upper pole the right kidney measures 5.3 x 5 1 cm. No hydronephrosis.  No calculi in the kidneys.  ADRENALS:  No mass or  enlargement.    AORTA/VASCULAR:    Unremarkable as seen on non-contrast imaging.  RETROPERITONEUM:  No mass or adenopathy.    BOWEL/MESENTERY:  Visualized part of the appendix is unremarkable.  Mild thickening of the sigmoid colon.  ABDOMINAL WALL:  Tiny fat containing umbilical hernia.  URINARY BLADDER:  No visible focal wall thickening, lesion, or calculus.    PELVIC NODES:  No adenopathy.    PELVIC ORGANS:  Sequelae of hysterectomy.  BONES:  No bony lesion or fracture.    LUNG BASES:  No visible pulmonary or pleural disease.    OTHER:  Negative.       Impression   CONCLUSION:       1. No calculi in the kidneys.  No hydronephrosis.     2. Extensive metastatic deposits throughout the liver.     3. There is mild thickening of the sigmoid colon.  This may be sequelae of a localized colitis.  This may be sequelae of infectious or inflammatory etiology.  Ischemia is considered unlikely.         Impression/Plan:     Metastatic Colon Cancer:  Liver metastases:    S/P FOLFOX cycle 3 on 4/17/2024. Having complications from treatment even with dose reduction.  Plan is to stop chemotherapy due to toxicity and her very poor performance status.     The patient has no motivation to eat and drink and no motivation for physical therapy. I spoke with Beryl. The daughter is accepting of this. If the patient declines physical therapy today then the daughter does agree to proceed with home hospice care. I think this is reasonable and I support this. The patient likely will not liver more than days and weeks at home. The daughter understands.         Ronan Camarillo MD  [facility]

## 2024-05-14 NOTE — CM/SW NOTE
Pt discussed w/therapy supervisor and with Dr Loredo from Palliative Care.    PT/OT evaluated pt today and Anticipated therapy need: Home with Home Healthcare  Plan is to evaluate again tomorrow to see if any progress.     Dr Loredo met with patient's daughter, Yaneli, after therapy session.  Daughter is hopeful for [facility] placement with eventual discharge home.  Noted pt has removed her DHT and is on a calorie count, but with minimal PO intake.  Dr Loredo placed orders to consult [facility] and provide caregiver info to family.  Referral sent via [employer].     Called daughter, Yaneli, and emailed her list of caregivers and contact info for [facility] liaison,Noemi.  Copy also left at bedside for other family members.    / to remain available for support and/or discharge planning.     Noelle Ramos MBA MSN, RN CTL/  [id number]

## 2024-05-14 NOTE — OCCUPATIONAL THERAPY NOTE
OCCUPATIONAL THERAPY TREATMENT NOTE - INPATIENT     Room Number: 404/404-A  Session: 1/3   Number of Visits to Meet Established Goals: Trial;3    Presenting Problem: mucositis due to chemotherapy    HOME SITUATION  Type of Home: Apartment  Home Layout: One level  Lives With: Alone   Patient Regularly Uses: Glasses   Prior Level of Function: Pt is IND with BADLs, IADLs, driving.  ASSESSMENT   Patient demonstrates limited progress this session, goals updated to reflect patient performance.    Patient continues to function below baseline with toileting, bathing, upper body dressing, lower body dressing, grooming, eating, bed mobility, and transfers.   Contributing factors to remaining limitations include decreased functional strength, decreased endurance, impaired sitting balance, decreased muscular endurance, decreased compliance/participation, and decreased safety awareness.  Next session anticipate patient to progress grooming, bed mobility, transfers, and maintaining seated position.  Occupational Therapy will continue to follow patient for duration of hospitalization.    Patient will benefit from skilled OT intervention on a trial basis to maximize rehab potential.  Patient will benefit from home w/ increased caregiver support, appropriate DME  and HHOT to continue to maximize rehab potential in a familiar environment w/ potential to improve and transition to gradual therapy.       OT Device Recommendations: Hospital bed (w/c, mechanical lift)    History:  Patient is a 73 year old female admitted on 4/27/2024 with Presenting Problem: mucositis due to chemotherapy. Co-Morbidities : Colon Ca with mets with resulting stomatitis after recent chemo, Liver mets     WEIGHT BEARING RESTRICTION  Weight Bearing Restriction: None                Recommendations for nursing staff:   Transfers: mechanical lift  Toileting location: bed    TREATMENT SESSION:  Patient Start of Session: supine  FUNCTIONAL TRANSFER ASSESSMENT  Sit  to Stand: Edge of Bed  Edge of Bed: Not Tested (patient declined despite encouragement)    BED MOBILITY  Rolling: Moderate Assist  Supine to Sit : Moderate Assist  Sit to Supine (OT): Maximum Assist  Scooting: max A    BALANCE ASSESSMENT  Static Sitting: Moderate Assist  Sitting Unilateral: Moderate Assist  Static Standing: Not Tested  Standing Unilateral: Not Tested    FUNCTIONAL ADL ASSESSMENT  Eating: Moderate Assist  Grooming Seated: Moderate Assist  LB Dressing Seated: Dependent  Toileting Seated: Dependent      ACTIVITY TOLERANCE: patient tolerated sitting EOB x 11 min w/ encouragement from therapists and daughter.  Patient reporting needing to get back to supine, however unable to state why.      BP: 124/78 EOB      O2 SATURATIONS       EDUCATION PROVIDED  Patient: Role of Occupational Therapy; Plan of Care; Functional Transfer Techniques; Posture/Positioning; Proper Body Mechanics  Patient's Response to Education: Demonstrates Poor Carry Over to Information  Family/Caregiver: Role of Occupational Therapy; Plan of Care; Discharge Recommendations; Posture/Positioning  Family/Caregiver's Response to Education: Verbalized Understanding      Equipment used: hospital bed functions       Exercises:    Exercises Repetitions Comments   Scapular elevation     Scapular retraction     Shoulder rolls 5    Shoulder flexion 5    Shoulder abduction     Shoulder internal/external rotation     Forward punch     Elbow flexion     Elbow extension     Forearm pronation/supination     Wrist flexion/extension     Gross grasp/fist pumps     Ankle pumps     Knee extension 10    Marching       UPPER EXTREMITY  ROM: within functional limits    Therapist comments: patient alert and verbally communicating needs throughout session.  Oriented to self, daughter, month and year.  Patient disoriented to situation and place.    Pt is able to follow simple motor commands in [place]an.  Patient needs encouragement to actively engage in any  activity once sitting EOB with c/o wanting to lay back down, however not stating reason.  Patient closed eyes and declining further activity despite encouragement from therapists and daughter.    Patient maintains head positioned w/ right sided lean and rotated and c/o pain w/ passive range of neck to move head to neutral position.  While at EOB, patient w/ LOB to the right and required mod to max A to correct and maintain midline posture.  Discussed session findings and POC w/ daughter, PT, RN, SW and OT manager.    Patient End of Session: With  staff;Needs met;Call light within reach;RN aware of session/findings;All patient questions and concerns addressed;In bed;SCDs in place;Alarm set;Family present;Discussed recommendations with /    SUBJECTIVE  Patient reports she is tired and c/o pain R neck.    PAIN ASSESSMENT  Rating: Unable to rate  Location: right lower back d/t c/o \"itchy\"  Management Techniques: Repositioning;Nurse notified (lotion)     OBJECTIVE  Precautions: Bed/chair alarm    AM-PAC ‘6-Clicks’ Inpatient Daily Activity Short Form  -   Putting on and taking off regular lower body clothing?: Total  -   Bathing (including washing, rinsing, drying)?: Total  -   Toileting, which includes using toilet, bedpan or urinal? : Total  -   Putting on and taking off regular upper body clothing?: A Lot  -   Taking care of personal grooming such as brushing teeth?: A Lot  -   Eating meals?: A Lot    AM-PAC Score:  Score: 9  Approx Degree of Impairment: 79.59%  Standardized Score (AM-PAC Scale): 25.33    PLAN  OT Treatment Plan: Balance activities;Energy conservation/work simplification techniques;ADL training;IADL training;Functional transfer training;UE strengthening/ROM;Endurance training;Patient/Family training;Patient/Family education;Equipment eval/education;Compensatory technique education;Fine motor coordination activities;Continued evaluation  Rehab Potential : Guarded  Frequency:  3x/week    Goals not applicable at this time and will be discontinued:  ADL Goals   Patient will perform grooming: with supervision  Patient will perform upper body dressing:  with supervision  Patient will perform lower body dressing:  with supervision  Patient will perform toileting: with supervision     Functional Transfer Goals  Patient will transfer from supine to sit:  with supervision  Patient will transfer from sit to stand:  with supervision  Patient will transfer to bedside commode:  with supervision  Patient will transfer to toilet:  with supervision     UE Exercise Program Goal  Patient will be supervision with bilateral AROM HEP (home exercise program).     Additional Goals  Pt will tolerate standing for 7 minutes utilizing AD as needed in preparation to perform grooming ADLs at sink level    NEW GOALS 5/14/24:  Patient will perform grooming w/ min A with cues prn while in supported sitting.  Patient will tolerate EOB sitting x 15 minutes w/ min A for balance in prep for out of bed transfers.  Patient will actively participate in 3/3 15 minute sessions to maximize independence w/ BADLs.    OT Session Time: 30 minutes  Self-Care Home Management: 15 minutes  Therapeutic Activity: 15 minutes

## 2024-05-14 NOTE — PLAN OF CARE
Patient is A/O x2, Greenlandic speaking but able to communicate needs and understand English. Refusing meds, unable to educate and refusing oral hydration when offered. Needs addressed. Call light within reach, fall prec maintained.     Problem: RISK FOR INFECTION - ADULT  Goal: Absence of fever/infection during anticipated neutropenic period  Description: INTERVENTIONS  - Monitor WBC  - Administer growth factors as ordered  - Implement neutropenic guidelines  Outcome: Progressing     Problem: SAFETY ADULT - FALL  Goal: Free from fall injury  Description: INTERVENTIONS:  - Assess pt frequently for physical needs  - Identify cognitive and physical deficits and behaviors that affect risk of falls.  - [place] fall precautions as indicated by assessment.  - Educate pt/family on patient safety including physical limitations  - Instruct pt to call for assistance with activity based on assessment  - Modify environment to reduce risk of injury  - Provide assistive devices as appropriate  - Consider OT/PT consult to assist with strengthening/mobility  - Encourage toileting schedule  Outcome: Progressing     Problem: GASTROINTESTINAL - ADULT  Goal: Minimal or absence of nausea and vomiting  Description: INTERVENTIONS:  - Maintain adequate hydration with IV or PO as ordered and tolerated  - Nasogastric tube to low intermittent suction as ordered  - Evaluate effectiveness of ordered antiemetic medications  - Provide nonpharmacologic comfort measures as appropriate  - Advance diet as tolerated, if ordered  - Obtain nutritional consult as needed  - Evaluate fluid balance  Outcome: Progressing

## 2024-05-14 NOTE — PHYSICAL THERAPY NOTE
PHYSICAL THERAPY TREATMENT NOTE - INPATIENT    Room Number: 404/404-A     Session: trial 1      Number of Visits to Meet Established Goals: Trial (3 day trial starting 5/14.)    Presenting Problem: diff eating  Co-Morbidities : Colon Ca with mets with resulting stomatitis after recent chemo, Liver mets    ASSESSMENT   Patient demonstrates limited progress this session, goals are not progressing.  Patient is lethargic throughout session, shows decreased activity tolerance and maximum encouragement and significant cues to participate in sitting at EOB with assist.   Patient was not oriented to place, oriented to family member's name and unaware of the reason why she is in the hospital. Needs longer time to initiate tasks due to decreased interest, lethargy and strength.     Patient continues to function below baseline with bed mobility, transfers, gait, and stair negotiation.  Contributing factors to remaining limitations include decreased functional strength, decreased muscular endurance, medical status, and pt declining both food and feeding tube .  Next session anticipate patient to progress bed mobility, transfers, and gait.  Physical Therapy will continue to follow patient for duration of hospitalization.    Patient continues to benefit from continued skilled PT services as a 3 day trial to monitor progress and participation. Patient will benefit from home with home health PT and additional support.   PLAN  PT Treatment Plan: Bed mobility;Patient education;Gait training;Range of motion;Strengthening;Stair training;Transfer training  Rehab Potential : Guarded  Frequency (Obs): 3-5x/week    CURRENT GOALS     Goal #1 Patient is able to demonstrate supine - sit EOB @ level: modified independent      Goal #2 Patient is able to demonstrate transfers Sit to/from Stand at assistance level: independent      Goal #3 Patient is able to ambulate 150 feet with assist device: none at assistance level: independent      Goal  #4 Patient independent with stair negotiation 18 steps step-to pattern with use of at least one railing   Goal #5     Goal #6     Goal Comments: Goals established on 2024 all goals remain in place.    History related to current admission: Patient is a 73 year old female admitted on 2024 from home for diff eating.  Pt diagnosed with mucositis due to chemotherapy.        HOME SITUATION  Type of Home: Apartment   Home Layout: One level  Stairs to Enter : 18  Railing: Yes     Lives With: Alone  Patient Owned Equipment: None     Prior Level of Ora: Patient is independent gait without device, was able to drive, independent with ADL/self-care.        SUBJECTIVE  \" I want to lie down, because\".   Patient unable to provide reason, multiple coaxing to participate in sitting.     OBJECTIVE  Precautions: Bed/chair alarm    WEIGHT BEARING RESTRICTION  Weight Bearing Restriction: None                PAIN ASSESSMENT   Ratin  Location: Pt denied any pain this session       BALANCE                                                                                                                       Static Sitting: Poor  Dynamic Sitting: Poor           Static Standing: Not tested  Dynamic Standing: Not tested    ACTIVITY TOLERANCE                         O2 WALK         AM-PAC '6-Clicks' INPATIENT SHORT FORM - BASIC MOBILITY  How much difficulty does the patient currently have...  Patient Difficulty: Turning over in bed (including adjusting bedclothes, sheets and blankets)?: A Little   Patient Difficulty: Sitting down on and standing up from a chair with arms (e.g., wheelchair, bedside commode, etc.): Unable   Patient Difficulty: Moving from lying on back to sitting on the side of the bed?: A Lot   How much help from another person does the patient currently need...   Help from Another: Moving to and from a bed to a chair (including a wheelchair)?: Total   Help from Another: Need to walk in hospital  room?: Total   Help from Another: Climbing 3-5 steps with a railing?: Total       AM-PAC Score:  Raw Score: 9   Approx Degree of Impairment: 81.38%   Standardized Score (AM-PAC Scale): 30.55   CMS Modifier (G-Code): CM    FUNCTIONAL ABILITY STATUS  Gait Assessment   Functional Mobility/Gait Assessment  Gait Assistance: Not tested  Distance (ft): 0  Assistive Device:  (N/a)  Pattern:  (n/a)    Skilled Therapy Provided  Pt seen in presence of her daughter in the afternoon. Session was pre arranged by the daughter.   Patient required maximum encouragement and significant cues for active participation. Patient needs constant cues to maintain sitting. Patient maintained sitting with R lean ( wanting to lie down), continuous min assist to maintain sitting position, head tilted to R. CS ROM performed with cues in limited range. Educated on importance of sitting up and out of bed mobility. Patient followed commands for LAQ. Patient communicated in her native language with the OT and all conversations and cues provided via translation from the daughter or OT.    Pt declined sit to stand despite cues from her daughter. Patient proceeded to lie down towards R side. Assist to bring LE's up in bed. During session, pt noted to scratch her back a lot, cues to prevent but pt adamant and noted to have redness and skin breakdown. RNKenny was notified. With RN permission skin barrier moisture cream applied. Longer session time.   THERAPEUTIC EXERCISES  Lower Extremity Knee flexion  LAQ  Ankle pumps  Hip abd x 2         Position Supine and sitting     Repetitions   10   Sets   1         Patient End of Session: In bed;Needs met;Call light within reach;RN aware of session/findings;All patient questions and concerns addressed;Alarm set;Family present    PT Session Time: 45 minutes  Gait Trainin minutes  Therapeutic Activity:38 minutes

## 2024-05-14 NOTE — PROGRESS NOTES
[facility]   part of [facility]     Hospitalist Progress Note     Lisa Iqbal Patient Status:  Inpatient    10/11/1950 MRN [id number]   Location [facility] 4NW-A Attending Enedelia Lui MD   Hosp Day # 17 PCP Anusha Abraham MD     Chief Complaint:  \" I am trying to eat\"    Subjective:     No acute events, no new complaints, PO intake improving, family would like her to go to rehab, however unclear if pt able to participate in therapy, has been refusing, hospice not being considered at this time.    Objective:    Review of Systems:   A comprehensive review of systems was completed; pertinent positive and negatives stated in subjective.    Vital signs:  Temp:  [97.4 °F (36.3 °C)-98.8 °F (37.1 °C)] 97.4 °F (36.3 °C)  Pulse:  [87-97] 91  Resp:  [14-20] 20  BP: (116-135)/(68-78) 134/78  SpO2:  [94 %-98 %] 97 %    Physical Exam:    General: No acute distress  Respiratory: No wheezes, no rhonchi  Cardiovascular: S1, S2, regular rate and rhythm  Abdomen: Soft, Non-tender, non-distended, positive bowel sounds  Neuro: No new focal deficits.   Extremities: No edema      Diagnostic Data:    Labs:  Recent Labs   Lab 24  0702 24  0737 24  0846   WBC 3.8* 3.5* 14.9*   HGB 9.0* 8.7* 8.1*   MCV 82.8 91.0 81.8   .0* 146.0* 202.0       Recent Labs   Lab 24  0737   *   BUN 3*   CREATSERUM 0.66   CA 8.6   *   K 3.6      CO2 19.0*       Estimated Creatinine Clearance: 56.4 mL/min (based on SCr of 0.66 mg/dL).    No results for input(s): \"TROP\", \"TROPHS\", \"CK\" in the last 168 hours.    No results for input(s): \"PTP\", \"INR\" in the last 168 hours.               Microbiology    Hospital Encounter on 24   1. Urine Culture, Routine     Status: None    Collection Time: 24  3:35 PM    Specimen: Urine, clean catch   Result Value Ref Range    Urine Culture No Growth at 18-24 hrs. N/A   2. Blood Culture     Status: None    Collection Time: 24   3:21 PM    Specimen: Blood,peripheral   Result Value Ref Range    Blood Culture Result No Growth 5 Days N/A         Imaging: Reviewed in Epic.    Medications:    enoxaparin  40 mg Subcutaneous Daily    mirtazapine  15 mg Oral Nightly    pantoprazole  40 mg Intravenous Q24H    potassium chloride  20 mEq Oral Once    nystatin  500,000 Units Oral QID    fluconazole  200 mg Intravenous Q24H       Assessment & Plan:      # dysphagia   - continue PP IV  - DHT removed  - GI following, had gastrografin enema yesterday  - attempted EGD and possible flex sig, but pt could not complete, actively resisting  - CT a/p reviewed, concern for colitis, fecal impaction, stercoral colitis, widespread liver mets  - Fluconazole IV  - family would like to pursue PEG but hospice recommended    # mucositis, oral candidiasis due to chemo- improved clinically   - cont magic mouth wash, nystatin solution   - iv Fluconazole x21 days5  - pain control     # hypophosphatemia  - replaced     # neutropenic fever, resolved   - off merrem   - Blood cx NGTD    # pancytopenia with neutropenia  - due to chemo,  improving  - monitor counts, transfuse if Hgb < 7, plt < 10  - oncology following     # oral candidiasis   - IV Fluconazole, plan for 21 day course of fluconazole      # hypokalemia , replace PRN    # metastatic sigmoid colon cancer   - Dr Camarillo d/w pt and daughter that no plan for chemo at this time.   - Palliative care following, family to decide on hospice, at this time they want PEG tube but daughter Beryl will talk with other sister today    # failure to thrive.   - cont to encourage oral intake   - Remeron  - PT OT     # Dispo, spoke with daughter Beryl regarding goals, she states mom is better, would like mom to pursue treatment and improve enough to go to rehab.      Win Iyer MD        Supplementary Documentation:     Quality:  DVT Mechanical Prophylaxis:   SCDs,    DVT Pharmacologic Prophylaxis   Medication    enoxaparin (Lovenox)  40 MG/0.4ML SUBQ injection 40 mg                Code Status: DNAR/Selective Treatment  Cuellar: External urinary catheter in place  Cuellar Duration (in days):   Central line:    ALESHA:     Discharge is dependent on: progress  At this point Ms. Félix Iqbal is expected to be discharge to: home    The 21st Century Cures Act makes medical notes like these available to patients in the interest of transparency. Please be advised this is a medical document. Medical documents are intended to carry relevant information, facts as evident, and the clinical opinion of the practitioner. The medical note is intended as peer to peer communication and may appear blunt or direct. It is written in medical language and may contain abbreviations or verbiage that are unfamiliar.

## 2024-05-15 PROCEDURE — 99232 SBSQ HOSP IP/OBS MODERATE 35: CPT | Performed by: INTERNAL MEDICINE

## 2024-05-15 PROCEDURE — 99231 SBSQ HOSP IP/OBS SF/LOW 25: CPT | Performed by: STUDENT IN AN ORGANIZED HEALTH CARE EDUCATION/TRAINING PROGRAM

## 2024-05-15 PROCEDURE — 99232 SBSQ HOSP IP/OBS MODERATE 35: CPT | Performed by: HOSPITALIST

## 2024-05-15 NOTE — PLAN OF CARE
Pt a/o x2, Occitan speaking. VSS, remained afebrile. Denied any pain. Poor oral intake. Refused to eat anything today, offered yogurt and toast. Refused to drink any water or fluids. Repositioned frequently. Fall precautions in place. Call light within reach.     Problem: Altered Communication/Language Barrier  Goal: Patient/Family is able to understand and participate in their care  Description: Interventions:  - Assess communication ability and preferred communication style  - Implement communication aides and strategies  - Use visual cues when possible  - Listen attentively, be patient, do not interrupt  - Minimize distractions  - Allow time for understanding and response  - Establish method for patient to ask for assistance (call light)  - Provide an  as needed  - Communicate barriers and strategies to overcome with those who interact with patient  Outcome: Not Progressing     Problem: GASTROINTESTINAL - ADULT  Goal: Minimal or absence of nausea and vomiting  Description: INTERVENTIONS:  - Maintain adequate hydration with IV or PO as ordered and tolerated  - Nasogastric tube to low intermittent suction as ordered  - Evaluate effectiveness of ordered antiemetic medications  - Provide nonpharmacologic comfort measures as appropriate  - Advance diet as tolerated, if ordered  - Obtain nutritional consult as needed  - Evaluate fluid balance  Outcome: Not Progressing  Goal: Maintains or returns to baseline bowel function  Description: INTERVENTIONS:  - Assess bowel function  - Maintain adequate hydration with IV or PO as ordered and tolerated  - Evaluate effectiveness of GI medications  - Encourage mobilization and activity  - Obtain nutritional consult as needed  - Establish a toileting routine/schedule  - Consider collaborating with pharmacy to review patient's medication profile  Outcome: Not Progressing     Problem: SAFETY ADULT - FALL  Goal: Free from fall injury  Description: INTERVENTIONS:  - Assess  pt frequently for physical needs  - Identify cognitive and physical deficits and behaviors that affect risk of falls.  - [place] fall precautions as indicated by assessment.  - Educate pt/family on patient safety including physical limitations  - Instruct pt to call for assistance with activity based on assessment  - Modify environment to reduce risk of injury  - Provide assistive devices as appropriate  - Consider OT/PT consult to assist with strengthening/mobility  - Encourage toileting schedule  Outcome: Not Progressing

## 2024-05-15 NOTE — DISCHARGE INSTRUCTIONS
Sometimes managing your health at home requires assistance.  The [facility] team has recognized your preference to use [facility].  They can be reached by phone at [phone].  The fax number for your reference is [phone].. A representative from the home health agency will contact you or your family to schedule your first visit.        Patient has been refusing all meds, speaks Japanese and is brief and chucked. Patient is non-decisional, regular diet. Family has been communicating with hospice to see if that is an appropriate decision for patient. Patient  has port and was de-accesssed 5/18/2024.

## 2024-05-15 NOTE — CM/SW NOTE
SW sent updated clinical information to rehab referrals via [employer] to verify accepting facilities. Per chart review, anticipated therapy need is now home health services but patient's daughter is requesting [facility] placement.     SW also sent referral to patient's previous HH provider, [facility] to verify if they would be able to continue services with patient in the event that she goes home.     LACY will email choice lists to patient's daughter Yaneli at email [email or web address] once they are available.     [facility] will continue to follow for plan of care changes and remain available for any additional DC needs or concerns.     Harika Haro MSW, LSW  Discharge Planner   [id number]

## 2024-05-15 NOTE — PROGRESS NOTES
[facility]   part of [facility]     Hospitalist Progress Note     Lisa Iqbal Patient Status:  Inpatient    10/11/1950 MRN [id number]   Location [facility] 4NW-A Attending Nel Cueva MD   Hosp Day # 18 PCP Anusha Abraham MD     Chief Complaint: unable to eat    Subjective:     Patient is weak and tired, keeps her eyes closed, asked me why I want to check on her, denies any pain    Objective:    Review of Systems:   A comprehensive review of systems was not completed    Vital signs:  Temp:  [97.4 °F (36.3 °C)-100.1 °F (37.8 °C)] 100.1 °F (37.8 °C)  Pulse:  [] 101  Resp:  [14-20] 14  BP: (124-150)/(70-83) 124/70  SpO2:  [95 %-97 %] 97 %    Physical Exam:    General: No acute distress  Respiratory: No wheezes, no rhonchi  Cardiovascular: S1, S2, regular rate and rhythm  Abdomen: Soft, Non-tender, non-distended, positive bowel sounds  Neuro: No new focal deficits.   Extremities: No edema      Diagnostic Data:    Labs:  Recent Labs   Lab 24  0737 24  0846 24  1250   WBC 3.5* 14.9* 14.1*   HGB 8.7* 8.1* 7.9*   MCV 91.0 81.8 83.6   .0* 202.0 227.0       Recent Labs   Lab 24  0737 24  1250   * 106*   BUN 3* 5*   CREATSERUM 0.66 0.46*   CA 8.6 8.4*   * 133*   K 3.6 4.0    104   CO2 19.0* 21.0       Estimated Creatinine Clearance: 81 mL/min (A) (based on SCr of 0.46 mg/dL (L)).    No results for input(s): \"TROP\", \"TROPHS\", \"CK\" in the last 168 hours.    No results for input(s): \"PTP\", \"INR\" in the last 168 hours.               Microbiology    Hospital Encounter on 24   1. Urine Culture, Routine     Status: None    Collection Time: 24  3:35 PM    Specimen: Urine, clean catch   Result Value Ref Range    Urine Culture No Growth at 18-24 hrs. N/A   2. Blood Culture     Status: None    Collection Time: 24  3:21 PM    Specimen: Blood,peripheral   Result Value Ref Range    Blood Culture Result No Growth 5 Days N/A          Imaging: Reviewed in Epic.    Medications:    camphor/menthol/methyl salicylate   Topical TID    enoxaparin  40 mg Subcutaneous Daily    mirtazapine  15 mg Oral Nightly    pantoprazole  40 mg Intravenous Q24H    potassium chloride  20 mEq Oral Once    nystatin  500,000 Units Oral QID    fluconazole  200 mg Intravenous Q24H       Assessment & Plan:      # dysphagia   - continue PP IV daily  - DHT removed  - GI following, had gastrografin enema  - attempted EGD and possible flex sig, but pt could not complete, actively resisting  - CT a/p reviewed, concern for colitis, fecal impaction, stercoral colitis, widespread liver mets  - Fluconazole IV  - family would like to pursue PEG but hospice recommended     # mucositis, oral candidiasis due to chemo- improved clinically   - cont magic mouth wash, nystatin solution   - iv Fluconazole x21 days5  - pain control      # hypophosphatemia  - replaced      # neutropenic fever, resolved   - off merrem   - Blood cx NGTD     # pancytopenia with neutropenia  - due to chemo,  improving  - monitor counts, transfuse if Hgb < 7, plt < 10  - oncology following     # oral candidiasis   - IV Fluconazole, plan for 21 day course of fluconazole      # hypokalemia , replace PRN     # metastatic sigmoid colon cancer   - Dr Camarillo d/w pt and daughter that no plan for chemo at this time.   - Palliative care following, family to decide on hospice     # failure to thrive.   - cont to encourage oral intake   - Remeron  - PT OT      #awaiting PT eval this am       Nel Cueva MD    Supplementary Documentation:     Quality:  DVT Mechanical Prophylaxis:   SCDs,    DVT Pharmacologic Prophylaxis   Medication    enoxaparin (Lovenox) 40 MG/0.4ML SUBQ injection 40 mg                Code Status: DNAR/Selective Treatment  Cuellar: External urinary catheter in place  Cuellar Duration (in days):   Central line:    ALESHA:     Discharge is dependent on: progress  At this point Ms. Félix Iqbal is expected to  be discharge to: tbd    The 21st Century Cures Act makes medical notes like these available to patients in the interest of transparency. Please be advised this is a medical document. Medical documents are intended to carry relevant information, facts as evident, and the clinical opinion of the practitioner. The medical note is intended as peer to peer communication and may appear blunt or direct. It is written in medical language and may contain abbreviations or verbiage that are unfamiliar.     Dietitian Malnutrition Assessment        Evaluation for Malnutrition: Criteria for severe malnutrition diagnosis- acute illness/injury related to Wt loss greater than 5% in 1 month., Energy intake less than 50% for greater than 5 days., Body fat moderate depletion.                 RD Malnutrition Care Plan: See RD nutrition assessment for additional recommendations.    Body Fat/Muscle Mass:          Physician Assessment    Patient has a diagnosis of severe malnutrition

## 2024-05-15 NOTE — PROGRESS NOTES
Patient is A/O x2, Romanian speaking, able to understand english  and make needs known but declining most care and meds. Becomes irritable when med education or med administration is attempted. Turned/ repositioned, declined oral hydration. Needs addressed, call light within reach. Potassium infusing and tolerating.

## 2024-05-15 NOTE — OCCUPATIONAL THERAPY NOTE
OCCUPATIONAL THERAPY TREATMENT NOTE - INPATIENT     Room Number: 404/404-A  Session: Trial 2/3  Number of Visits to Meet Established Goals: Trial;3    Presenting Problem: mucositis due to chemotherapy    HOME SITUATION  Type of Home: Apartment  Home Layout: One level  Lives With: Alone   Patient Regularly Uses: Glasses   Prior Level of Function: Pt is IND with BADLs, IADLs, driving.  ASSESSMENT   Patient demonstrates limited progress this session, goals remain in progress.    Patient continues to function below baseline with toileting, bathing, upper body dressing, lower body dressing, grooming, eating, bed mobility, and transfers.   Contributing factors to remaining limitations include decreased functional strength, decreased endurance, impaired sitting balance, decreased muscular endurance, decreased compliance/participation, and decreased safety awareness.  Next session anticipate patient to progress grooming, bed mobility, transfers, and maintaining seated position.  Occupational Therapy will continue to follow patient for duration of hospitalization.    Patient will benefit from skilled OT intervention on a trial basis to maximize rehab potential.  Patient will benefit from home w/ increased caregiver support, appropriate DME  and HHOT to continue to maximize rehab potential in a familiar environment w/ potential to improve and transition to gradual therapy.       OT Device Recommendations: Hospital bed (w/c, mechanical lift)    History:  Patient is a 73 year old female admitted on 4/27/2024 with Presenting Problem: mucositis due to chemotherapy. Co-Morbidities : Colon Ca with mets with resulting stomatitis after recent chemo, Liver mets     WEIGHT BEARING RESTRICTION  Weight Bearing Restriction: None                Recommendations for nursing staff:   Transfers: mechanical lift  Toileting location: bed    TREATMENT SESSION:  Patient Start of Session: supine  FUNCTIONAL TRANSFER ASSESSMENT  Sit to Stand: Edge  of Bed  Edge of Bed: Not Tested    BED MOBILITY  Rolling: Maximum Assist  Supine to Sit : Maximum Assist  Sit to Supine (OT): Maximum Assist  Scooting: Max A    BALANCE ASSESSMENT  Static Sitting: Maximum Assist  Sitting Unilateral: Maximum Assist  Static Standing: Not Tested  Standing Unilateral: Not Tested    FUNCTIONAL ADL ASSESSMENT  Eating: Not Tested  Grooming Seated: Not Tested  LB Dressing Seated: Not Tested  Toileting Seated: Not Tested      ACTIVITY TOLERANCE: patient tolerated sitting EOB x 8 min w/ encouragement from therapists to increase participation    O2 SATURATIONS       EDUCATION PROVIDED  Patient: Role of Occupational Therapy; Plan of Care; Discharge Recommendations; Functional Transfer Techniques  Patient's Response to Education: Demonstrates Poor Carry Over to Information; Does Not Demonstrate Skills Needed for Learning  Family/Caregiver: Role of Occupational Therapy; Plan of Care; Discharge Recommendations; Posture/Positioning  Family/Caregiver's Response to Education: Verbalized Understanding      Equipment used: hospital bed functions      UPPER EXTREMITY  ROM: within functional limits    Therapist comments:  used to maximize communication with pt. Pt performs bed mobility at max A to sit EOB with pt requiring max A for upright support. Max A required for pt to engage in facilitated weight shifting from sitting to scooting to EOB. While sitting EOB, pt's phione rang and pt demos ability to answer cell phone (using touch screen functions). When pt ended phone call, pt demos poor sitting balance with cues provided to self correct, but unwilling to support self when prompted. Pt with inconsistent volitional effort during session as pt demos falling asleep while seated EOB, but when pt's family friend was present (Sommer surgical tech here at ), pt demos giving/receiving a hug. Pt intermittently would open eyes during session, but then close them again. Pt declined to have phone put  away during session and continued to grasp onto it. Pt returns supine in bed requiring max A. RN updated.  Patient End of Session: In bed;Needs met;Call light within reach;RN aware of session/findings;All patient questions and concerns addressed;Alarm set    SUBJECTIVE  \"No, done.\"    PAIN ASSESSMENT  Rating: Unable to rate  Location: right lower back d/t c/o \"itchy\"  Management Techniques: Repositioning;Nurse notified (lotion)     OBJECTIVE  Precautions: Bed/chair alarm    AM-PAC ‘6-Clicks’ Inpatient Daily Activity Short Form  -   Putting on and taking off regular lower body clothing?: Total  -   Bathing (including washing, rinsing, drying)?: Total  -   Toileting, which includes using toilet, bedpan or urinal? : Total  -   Putting on and taking off regular upper body clothing?: A Lot  -   Taking care of personal grooming such as brushing teeth?: A Lot  -   Eating meals?: A Lot    AM-PAC Score:  Score: 9  Approx Degree of Impairment: 79.59%  Standardized Score (AM-PAC Scale): 25.33    PLAN  OT Treatment Plan: Balance activities;Energy conservation/work simplification techniques;ADL training;IADL training;Functional transfer training;UE strengthening/ROM;Endurance training;Patient/Family training;Patient/Family education;Equipment eval/education;Compensatory technique education;Fine motor coordination activities;Continued evaluation  Rehab Potential : Guarded  Frequency: 3x/week    Goals not applicable at this time and will be discontinued:  ADL Goals   Patient will perform grooming: with supervision  Patient will perform upper body dressing:  with supervision  Patient will perform lower body dressing:  with supervision  Patient will perform toileting: with supervision     Functional Transfer Goals  Patient will transfer from supine to sit:  with supervision  Patient will transfer from sit to stand:  with supervision  Patient will transfer to bedside commode:  with supervision  Patient will transfer to toilet:  with  supervision     UE Exercise Program Goal  Patient will be supervision with bilateral AROM HEP (home exercise program).     Additional Goals  Pt will tolerate standing for 7 minutes utilizing AD as needed in preparation to perform grooming ADLs at sink level    NEW GOALS 5/14/24:  Patient will perform grooming w/ min A with cues prn while in supported sitting.  Patient will tolerate EOB sitting x 15 minutes w/ min A for balance in prep for out of bed transfers.  Patient will actively participate in 3/3 15 minute sessions to maximize independence w/ BADLs.    OT Session Time: 15 minutes  Therapeutic Activity: 15 minutes

## 2024-05-15 NOTE — PROGRESS NOTES
Palliative Care Brief Note    Lisa Iqbal Patient Status:  Inpatient    10/11/1950 MRN [id number]   Location [facility] 4NW-A Attending Nel Cueva MD   Hosp Day # 18 PCP Anusha Abraham MD     Date of Consult: 5/15/2024  [facility] In[facility]    Lisa Iqbal is a 73 year old female with history of metastatic colon cancer with liver mets (on active chemo prior to admission, last tx ) who was admitted on 2024 for mouth pain, mucositis (now resolved). Work up in our hospital revealed neg blood cultures, neutropenic fever now resolved, continued anorexia.     Consult ordered by:: Dr. Lui for evaluation of Palliative Care needs and Goals of care discussion.    Subjective:   Patient is sleeping most of the day. Lisa briefly worked with PT/OT this morning but unable to tolerate more than sitting and requested to end session early. Pt not eating and calorie count documented as such. Roman declined [facility] request as pt has no nutritional support. Per , Yaneli requested a call after 5pm.     Objective:   Allowed pt to rest comfortably given circumstances.     Assessment:   72 yo female with metastatic colon cancer and failure to thrive declining to eat and very fatigued. Despite minimally participating in PT services, her nutritional status is prohibiting her from [facility] placement. Hospice has been recommended by all services.     Plan:   Called Yaneli at 4:30 PM per email request. We spoke for 15 minutes. Questions addressed regarding hospice and POC.   Yaneli will reach out to [facility] this evening to discuss set up at home.   Likely will rehire a caregiver used in the past.   Requesting SE to send additional caregiver resources just in case.     Total time spent is 25 minutes were spent on this consult, which included all of the following: chart review, direct face to face contact, history taking, physical examination, counseling and coordinating care, and  documentation.     Jacqueline Loredo MD  5/15/2024 4:50 PM  Palliative Care Services    The 21st Century Cures Act makes medical notes like these available to patients in the interest of transparency. Please be advised this is a medical document. Medical documents are intended to carry relevant information, facts as evident, and the clinical opinion of the practitioner. The medical note is intended as peer to peer communication and may appear blunt or direct. It is written in medical language and may contain abbreviations or verbiage that are unfamiliar.

## 2024-05-15 NOTE — CM/SW NOTE
SW received a call back from patient's daughter Yaneli who reported that she spoke with her sister and would like to pursue rehab placement at [facility].     SW sent dietary note to [facility] to verify that they are accepting of patient's poor PO intake and nutritional plan.     Update: SW received note from [facility] requesting documentation with calorie count. LACY verified with RN that patient has not had any oral intake to document. SW notified [facility] who then denied due to lack of nutritional plan. SW called patient's daughter Yaneli to notify and she stated that she understands and appreciate's  effort in trying to locate placement for patient. Yaneli is aware that Palliative MD was awaiting response from [facility] and requested that palliative conversation happen after 5PM today or tomorrow morning. SW notified Palliative MD.     SW will continue to follow for plan of care changes and remain available for any additional DC needs or concerns.     Harika Haro MSW, LSW  Discharge Planner   [id number]

## 2024-05-15 NOTE — PHYSICAL THERAPY NOTE
PHYSICAL THERAPY TREATMENT NOTE - INPATIENT    Room Number: 404/404-A     Session: trial 2/3      Number of Visits to Meet Established Goals: Trial (3 day trial starting 5/14.)    Presenting Problem: diff eating  Co-Morbidities : Colon Ca with mets with resulting stomatitis after recent chemo, Liver mets    ASSESSMENT   Patient demonstrates limited progress this session, goals remain stagnant.    Writer/BHAVNA Fong utilized iPad intrepreter ([employer]). CAMILLE Ferris updated after session.      Pt able to answer her cell phone by swiping right to accept call, but when asked to support herself on EOB, unwilling.    Pt with inconsistent volitional effort during session - started to fall asleep while on EOB, but then Pt's family friend (Sommer, who is a surgical tech here at ), was able to give a hug while sitting on EOB.      Patient continues to function below baseline with bed mobility, transfers, gait, and stair negotiation.  Contributing factors to remaining limitations include decreased functional strength, decreased muscular endurance, medical status, and pt declining both food and feeding tube .  Next session anticipate patient to progress bed mobility, transfers, and gait.  Physical Therapy will continue to follow patient for duration of hospitalization.    Patient continues to benefit from continued skilled PT services as a 3 day trial to monitor progress and participation. Patient will benefit from home with home health PT and additional support.     PLAN  PT Treatment Plan: Bed mobility;Patient education;Gait training;Range of motion;Strengthening;Stair training;Transfer training  Rehab Potential : Guarded  Frequency (Obs): 3-5x/week    CURRENT GOALS     Goal #1 Patient is able to demonstrate supine - sit EOB @ level: modified independent      Goal #2 Patient is able to demonstrate transfers Sit to/from Stand at assistance level: independent      Goal #3 Patient is able to ambulate 150 feet with assist device:  none at assistance level: independent      Goal #4 Patient independent with stair negotiation 18 steps step-to pattern with use of at least one railing   Goal #5     Goal #6     Goal Comments: Goals established on 2024 all goals remain in place.    History related to current admission: Patient is a 73 year old female admitted on 2024 from home for diff eating.  Pt diagnosed with mucositis due to chemotherapy.        HOME SITUATION  Type of Home: Apartment   Home Layout: One level  Stairs to Enter : 18  Railing: Yes     Lives With: Alone  Patient Owned Equipment: None     Prior Level of [place]: Patient is independent gait without device, was able to drive, independent with ADL/self-care.        SUBJECTIVE  \"No done\"  Pt required max encouragement to participate    OBJECTIVE  Precautions: Bed/chair alarm    WEIGHT BEARING RESTRICTION  Weight Bearing Restriction: None                PAIN ASSESSMENT   Ratin  Location: HA       BALANCE                                                                                                                       Static Sitting: Poor  Dynamic Sitting: Poor           Static Standing: Not tested  Dynamic Standing: Not tested    ACTIVITY TOLERANCE                         O2 WALK         AM-PAC '6-Clicks' INPATIENT SHORT FORM - BASIC MOBILITY  How much difficulty does the patient currently have...  Patient Difficulty: Turning over in bed (including adjusting bedclothes, sheets and blankets)?: A Little   Patient Difficulty: Sitting down on and standing up from a chair with arms (e.g., wheelchair, bedside commode, etc.): Unable   Patient Difficulty: Moving from lying on back to sitting on the side of the bed?: A Lot   How much help from another person does the patient currently need...   Help from Another: Moving to and from a bed to a chair (including a wheelchair)?: Total   Help from Another: Need to walk in hospital room?: Total   Help from Another:  Climbing 3-5 steps with a railing?: Total       AM-PAC Score:  Raw Score: 9   Approx Degree of Impairment: 81.38%   Standardized Score (AM-PAC Scale): 30.55   CMS Modifier (G-Code): CM    FUNCTIONAL ABILITY STATUS  Gait Assessment   Functional Mobility/Gait Assessment  Gait Assistance: Not tested  Distance (ft): 0  Assistive Device:  (N/a)  Pattern:  (n/a)    Skilled Therapy Provided    Supine<>sit: Max A  Sit<>Supine: Max A    Second person present for safety and inconsistent effort.     EOB 8 minutes - required Max A for static sitting - Pt attempting to return to bed multiple times    THERAPEUTIC EXERCISES  Lower Extremity Provided initial PROM - Pt unwilling to perform actively         Position Supine and sitting     Repetitions      Sets        Patient End of Session: In bed;Needs met;Call light within reach;RN aware of session/findings;All patient questions and concerns addressed;Alarm set    PT Session Time: 23 minutes  NMRE: 8 minutes  Therapeutic Activity: 15 minutes

## 2024-05-15 NOTE — CM/SW NOTE
LACY spoke with patient's daughter Yaneli via phone call. Yaneli provided update regarding patient's PT session yesterday and stated she had extensive GOC conversations with palliative and hospitalist.     Yaneli was curious if patient participated in PT/OT session today and LACY confirmed with her that she did partipcate but was Max assist per documentation.     Yaneli is still considering options for DC and would like information for accepting [facility] but also information regarding [facility]. LACY provided Yaneli with information for [facility]/Palliative who reported that they could accept patient. LACY also emailed Yaneli choice list for rehab facilities that are willing to accept patient. LACY emailed information to [email or web address] .     Yaneli is aware to call this SW back with selected DC plan. LACY will continue to follow for call back. Yaneli reported that she will likely call back this afternoon once she has spoken with palliative MD and hospitalist.     LACY will continue to follow for plan of care changes and remain available for any additional DC needs or concerns.     Harika Haro MSW, LSW  Discharge Planner   [id number]

## 2024-05-16 PROCEDURE — 99231 SBSQ HOSP IP/OBS SF/LOW 25: CPT | Performed by: INTERNAL MEDICINE

## 2024-05-16 PROCEDURE — 99232 SBSQ HOSP IP/OBS MODERATE 35: CPT | Performed by: HOSPITALIST

## 2024-05-16 NOTE — PLAN OF CARE
Pt a/o x2, confused. Divehi speaking. Refused medications. PIV infiltrated. Accessed port. IVF infusing. Refusing to eat or drink. Fall precautions in place. Call light within reach.     Problem: GASTROINTESTINAL - ADULT  Goal: Minimal or absence of nausea and vomiting  Description: INTERVENTIONS:  - Maintain adequate hydration with IV or PO as ordered and tolerated  - Nasogastric tube to low intermittent suction as ordered  - Evaluate effectiveness of ordered antiemetic medications  - Provide nonpharmacologic comfort measures as appropriate  - Advance diet as tolerated, if ordered  - Obtain nutritional consult as needed  - Evaluate fluid balance  Outcome: Not Progressing  Goal: Maintains or returns to baseline bowel function  Description: INTERVENTIONS:  - Assess bowel function  - Maintain adequate hydration with IV or PO as ordered and tolerated  - Evaluate effectiveness of GI medications  - Encourage mobilization and activity  - Obtain nutritional consult as needed  - Establish a toileting routine/schedule  - Consider collaborating with pharmacy to review patient's medication profile  Outcome: Not Progressing     Problem: Altered Communication/Language Barrier  Goal: Patient/Family is able to understand and participate in their care  Description: Interventions:  - Assess communication ability and preferred communication style  - Implement communication aides and strategies  - Use visual cues when possible  - Listen attentively, be patient, do not interrupt  - Minimize distractions  - Allow time for understanding and response  - Establish method for patient to ask for assistance (call light)  - Provide an  as needed  - Communicate barriers and strategies to overcome with those who interact with patient  Outcome: Not Progressing

## 2024-05-16 NOTE — PHYSICAL THERAPY NOTE
PHYSICAL THERAPY TREATMENT NOTE - INPATIENT    Room Number: 404/404-A     Session: trial 3/3      Number of Visits to Meet Established Goals: Trial (3 day trial starting 5/14.)    Presenting Problem: diff eating  Co-Morbidities : Colon Ca with mets with resulting stomatitis after recent chemo, Liver mets    ASSESSMENT   Patient demonstrates limited progress this session, goals remain stagnant.    Writer/BHAVNA Fong utilized iPad intrepreter ([employer]). CAMILLE Ferris updated after session.      Patient with inconsistent effort noted during 2/3 sessions.  Pt initially verbalizing goal to walk, but when lift machine brought in to transfer to the chair - Pt quickly flung herself backwards into bed stating 6/10 pain, when denying pain moments prior.       Patient continues to function below baseline with bed mobility, transfers, gait, and stair negotiation.  Contributing factors to remaining limitations include decreased functional strength, decreased muscular endurance, medical status, and pt declining both food and feeding tube .  Next session anticipate patient to progress bed mobility, transfers, and gait.  Physical Therapy will continue to follow patient for duration of hospitalization.    Patient will benefit from home with home health PT and additional support.    PLAN  PT Treatment Plan: Bed mobility;Patient education;Gait training;Range of motion;Strengthening;Stair training;Transfer training  Rehab Potential : Guarded  Frequency (Obs): 3-5x/week    CURRENT GOALS     Goal #1 Patient is able to demonstrate supine - sit EOB @ level: modified independent      Goal #2 Patient is able to demonstrate transfers Sit to/from Stand at assistance level: independent      Goal #3 Patient is able to ambulate 150 feet with assist device: none at assistance level: independent          Goal #5     Goal #6     Goal Comments: Goals established on 4/28/2024 5/16/2024 all goals remain in place.    History related to current admission:  Patient is a 73 year old female admitted on 2024 from home for diff eating.  Pt diagnosed with mucositis due to chemotherapy.        HOME SITUATION  Type of Home: Apartment   Home Layout: One level  Stairs to Enter : 18  Railing: Yes     Lives With: Alone  Patient Owned Equipment: None     Prior Level of [place]: Patient is independent gait without device, was able to drive, independent with ADL/self-care.        SUBJECTIVE  \"No I want my pillows\"  Pt required max encouragement to participate    OBJECTIVE  Precautions: Bed/chair alarm    WEIGHT BEARING RESTRICTION  Weight Bearing Restriction: None                PAIN ASSESSMENT   Ratin  Location: \"whole body\"       BALANCE                                                                                                                       Static Sitting: Fair +  Dynamic Sitting: Fair           Static Standing: Poor +  Dynamic Standing: Poor    ACTIVITY TOLERANCE                         O2 WALK         AM-PAC '6-Clicks' INPATIENT SHORT FORM - BASIC MOBILITY  How much difficulty does the patient currently have...  Patient Difficulty: Turning over in bed (including adjusting bedclothes, sheets and blankets)?: A Little   Patient Difficulty: Sitting down on and standing up from a chair with arms (e.g., wheelchair, bedside commode, etc.): A Little   Patient Difficulty: Moving from lying on back to sitting on the side of the bed?: A Lot   How much help from another person does the patient currently need...   Help from Another: Moving to and from a bed to a chair (including a wheelchair)?: Total   Help from Another: Need to walk in hospital room?: Total   Help from Another: Climbing 3-5 steps with a railing?: Total       AM-PAC Score:  Raw Score: 11   Approx Degree of Impairment: 72.57%   Standardized Score (AM-PAC Scale): 33.86   CMS Modifier (G-Code): CL    FUNCTIONAL ABILITY STATUS  Gait Assessment   Functional Mobility/Gait Assessment  Gait Assistance: Not  tested  Distance (ft): 0  Assistive Device:  (N/a)  Pattern:  (n/a)    Skilled Therapy Provided    Supine<>sit: Max A  Sit<>Supine: Max A    Second person present for safety and inconsistent effort.     EOB 8 minutes - required ranging level of assist - CGA to Mod A  Pt encouraged to participate in self care task while EOB (see OT note)    Sit<>stand: Min A to RW  Stand<>sit: Mod A (poor eccentric control)    Pt only able to stand ~15 seconds - writer went to two different units to obtain kane lift sling, but upon return to room, Pt suddenly returned back to bed 2/2 pain.      Pt requesting RN assist with blankets - writer offered.      THERAPEUTIC EXERCISES  Lower extremity Heel slides, knee ext, ankle pumps       Position Supine (chair position)     Repetitions   10   Sets   1     Patient End of Session: In bed;Needs met;RN aware of session/findings;Call light within reach;All patient questions and concerns addressed;Alarm set    PT Session Time: 23 minutes  NMRE: 15 minutes  Therex: 8 minutes

## 2024-05-16 NOTE — PROGRESS NOTES
Walked by pt's room and she was resting comfortably.   Spoke with PT about inconsistent effort and max encouragement to EOB only.   Per conversation with Yaneli yesterday evening, plan for home with [facility] and caregiver support. SW working with Yaneli and Iraida to set up services for a safe transition home.   Goals are now established and plan to go home in progress.   No further palliative care needs at this time. We will sign off. Please page our service if additional palliative care needs arise.   Jacqueline Loredo MD, 05/16/24, 11:59 AM

## 2024-05-16 NOTE — PLAN OF CARE
Problem: RISK FOR INFECTION - ADULT  Goal: Absence of fever/infection during anticipated neutropenic period  Description: INTERVENTIONS  - Monitor WBC  - Administer growth factors as ordered  - Implement neutropenic guidelines  Outcome: Progressing     Problem: SAFETY ADULT - FALL  Goal: Free from fall injury  Description: INTERVENTIONS:  - Assess pt frequently for physical needs  - Identify cognitive and physical deficits and behaviors that affect risk of falls.  - [place] fall precautions as indicated by assessment.  - Educate pt/family on patient safety including physical limitations  - Instruct pt to call for assistance with activity based on assessment  - Modify environment to reduce risk of injury  - Provide assistive devices as appropriate  - Consider OT/PT consult to assist with strengthening/mobility  - Encourage toileting schedule  Outcome: Progressing     Problem: DISCHARGE PLANNING  Goal: Discharge to home or other facility with appropriate resources  Description: INTERVENTIONS:  - Identify barriers to discharge w/pt and caregiver  - Include patient/family/discharge partner in discharge planning  - Arrange for needed discharge resources and transportation as appropriate  - Identify discharge learning needs (meds, wound care, etc)  - Arrange for interpreters to assist at discharge as needed  - Consider post-discharge preferences of patient/family/discharge partner  - Complete POLST form as appropriate  - Assess patient's ability to be responsible for managing their own health  - Refer to Case Management Department for coordinating discharge planning if the patient needs post-hospital services based on physician/LIP order or complex needs related to functional status, cognitive ability or social support system  Outcome: Progressing     Problem: Altered Communication/Language Barrier  Goal: Patient/Family is able to understand and participate in their care  Description: Interventions:  - Assess  communication ability and preferred communication style  - Implement communication aides and strategies  - Use visual cues when possible  - Listen attentively, be patient, do not interrupt  - Minimize distractions  - Allow time for understanding and response  - Establish method for patient to ask for assistance (call light)  - Provide an  as needed  - Communicate barriers and strategies to overcome with those who interact with patient  Outcome: Progressing     Problem: METABOLIC/FLUID AND ELECTROLYTES - ADULT  Goal: Electrolytes maintained within normal limits  Description: INTERVENTIONS:  - Monitor labs and rhythm and assess patient for signs and symptoms of electrolyte imbalances  - Administer electrolyte replacement as ordered  - Monitor response to electrolyte replacements, including rhythm and repeat lab results as appropriate  - Fluid restriction as ordered  - Instruct patient on fluid and nutrition restrictions as appropriate  Outcome: Progressing     Problem: GASTROINTESTINAL - ADULT  Goal: Minimal or absence of nausea and vomiting  Description: INTERVENTIONS:  - Maintain adequate hydration with IV or PO as ordered and tolerated  - Nasogastric tube to low intermittent suction as ordered  - Evaluate effectiveness of ordered antiemetic medications  - Provide nonpharmacologic comfort measures as appropriate  - Advance diet as tolerated, if ordered  - Obtain nutritional consult as needed  - Evaluate fluid balance  Outcome: Progressing  Goal: Maintains or returns to baseline bowel function  Description: INTERVENTIONS:  - Assess bowel function  - Maintain adequate hydration with IV or PO as ordered and tolerated  - Evaluate effectiveness of GI medications  - Encourage mobilization and activity  - Obtain nutritional consult as needed  - Establish a toileting routine/schedule  - Consider collaborating with pharmacy to review patient's medication profile  Outcome: Progressing

## 2024-05-16 NOTE — OCCUPATIONAL THERAPY NOTE
OCCUPATIONAL THERAPY TREATMENT NOTE - INPATIENT     Room Number: 404/404-A  Session: Trial 3/3  Number of Visits to Meet Established Goals: Trial;3    Presenting Problem: mucositis due to chemotherapy    HOME SITUATION  Type of Home: Apartment  Home Layout: One level  Lives With: Alone   Patient Regularly Uses: Glasses   Prior Level of Function: Pt is IND with BADLs, IADLs, driving.  ASSESSMENT   Patient demonstrates fair progress this session, goals remain in progress.    Patient continues to function below baseline with toileting, bathing, upper body dressing, lower body dressing, grooming, eating, bed mobility, and transfers.   Contributing factors to remaining limitations include decreased functional strength, decreased endurance, impaired sitting balance, decreased muscular endurance, decreased compliance/participation, and decreased safety awareness.  Next session anticipate patient to progress grooming, bed mobility, transfers, and maintaining seated position.  Occupational Therapy will continue to follow patient for duration of hospitalization.    Patient will benefit from skilled OT intervention on a trial basis to maximize rehab potential.  Patient will benefit from home w/ increased caregiver support, appropriate DME  and HHOT to continue to maximize rehab potential in a familiar environment w/ potential to improve and transition to gradual therapy.       OT Device Recommendations: Hospital bed (w/c, mechanical lift)    History:  Patient is a 73 year old female admitted on 4/27/2024 with Presenting Problem: mucositis due to chemotherapy. Co-Morbidities : Colon Ca with mets with resulting stomatitis after recent chemo, Liver mets     WEIGHT BEARING RESTRICTION  Weight Bearing Restriction: None                Recommendations for nursing staff:   Transfers: mechanical lift  Toileting location: bed    TREATMENT SESSION:  Patient Start of Session: semi supine  FUNCTIONAL TRANSFER ASSESSMENT  Sit to Stand: Edge  of Bed  Edge of Bed: Minimal Assist    BED MOBILITY  Rolling: Moderate Assist  Supine to Sit : Moderate Assist  Sit to Supine (OT): Moderate Assist  Scooting: Min A    BALANCE ASSESSMENT  Static Sitting: Stand-by Assist (occasional min/CGA)  Sitting Unilateral: Contact Guard Assist  Static Standing: Minimal Assist  Standing Unilateral: Not Tested    FUNCTIONAL ADL ASSESSMENT  Eating: Not Tested  Grooming Seated: Minimal Assist  LB Dressing Seated: Not Tested  Toileting Seated: Not Tested      ACTIVITY TOLERANCE: patient tolerated sitting EOB x 12 min     O2 SATURATIONS       EDUCATION PROVIDED  Patient: Role of Occupational Therapy; Plan of Care; Discharge Recommendations; Functional Transfer Techniques; Fall Prevention; Posture/Positioning; Energy Conservation; Proper Body Mechanics  Patient's Response to Education: Verbalized Understanding; Requires Further Education  Family/Caregiver: Role of Occupational Therapy; Plan of Care; Discharge Recommendations; Posture/Positioning  Family/Caregiver's Response to Education: Verbalized Understanding      Equipment used: hospital bed functions    THERAPEUTIC EXERCISES  Lower Extremity See PT note     Upper Extremity Shoulder raises  Forward punches  Elbow flexion/extension     Position Seated up in chair position     Repetitions 10   Sets 1       UPPER EXTREMITY  ROM: within functional limits    Therapist comments:  used to maximize communication with pt. Pt placed in chair position to increase alertness and participation. Pt engages in UE exercises to increase strength, ROM, and activity tolerance required for increased ADL performance, functional transfers, and UE function. With assistance of , pt agreeable to sit EOB. Pt performs bed mobility at mod A to sit EOB with cues provided for hand placement and B LE engagement. Noted active engagement when cued/prompted. Sit to stand transfer from EOB to RW performed at min A. Pt with decreased standing  tolerance due to fatigue and B LE weakness. While seated EOB, pt engages in hair care grooming in preparation to transfer to bedside chair via kane lift (PTA to find lift). Additional assist provided to remove increased knots/tangles. Pt then impulsively starts to return to supine and this clinician assists her at mod A. 2 person assist required to reposition to HOB.  Patient End of Session: In bed;Needs met;Call light within reach;RN aware of session/findings;All patient questions and concerns addressed;Alarm set    SUBJECTIVE  Pt with increased participation this date.    PAIN ASSESSMENT  Ratin  Location: states all over  Management Techniques: Repositioning;Nurse notified (lotion)     OBJECTIVE  Precautions: Bed/chair alarm    AM-PAC ‘6-Clicks’ Inpatient Daily Activity Short Form  -   Putting on and taking off regular lower body clothing?: Total  -   Bathing (including washing, rinsing, drying)?: Total  -   Toileting, which includes using toilet, bedpan or urinal? : Total  -   Putting on and taking off regular upper body clothing?: A Lot  -   Taking care of personal grooming such as brushing teeth?: A Lot  -   Eating meals?: A Lot    AM-PAC Score:  Score: 9  Approx Degree of Impairment: 79.59%  Standardized Score (AM-PAC Scale): 25.33    PLAN  OT Treatment Plan: Balance activities;Energy conservation/work simplification techniques;ADL training;IADL training;Functional transfer training;UE strengthening/ROM;Endurance training;Patient/Family training;Patient/Family education;Equipment eval/education;Compensatory technique education;Fine motor coordination activities;Continued evaluation  Rehab Potential : Guarded  Frequency: 3x/week    Goals not applicable at this time and will be discontinued:    ADL Goals   Patient will perform grooming: with supervision  Patient will perform upper body dressing:  with supervision  Patient will perform lower body dressing:  with supervision  Patient will perform toileting:  with supervision     Functional Transfer Goals  Patient will transfer from supine to sit:  with supervision  Patient will transfer from sit to stand:  with supervision  Patient will transfer to bedside commode:  with supervision  Patient will transfer to toilet:  with supervision     UE Exercise Program Goal  Patient will be supervision with bilateral AROM HEP (home exercise program).     Additional Goals  Pt will tolerate standing for 7 minutes utilizing AD as needed in preparation to perform grooming ADLs at sink level    NEW GOALS 5/14/24:  Patient will perform grooming w/ min A with cues prn while in supported sitting.- goal met 05/16  Patient will tolerate EOB sitting x 15 minutes w/ min A for balance in prep for out of bed transfers.- goal met 05/16  Patient will actively participate in 3/3 15 minute sessions to maximize independence w/ BADLs.    OT Session Time: 25 minutes  Therapeutic Activity: 18 minutes  Therapeutic Exercise: 7 minutes

## 2024-05-16 NOTE — CM/SW NOTE
SW received update from [facility] who reported that meeting is scheduled for tomorrow afternoon at 2pm for hospice consents meeting with patient's daughter.     SW will continue to follow for plan of care changes and remain available for any additional DC needs or concerns.     Harika Haro MSW, LSW  Discharge Planner   [phone]

## 2024-05-16 NOTE — PROGRESS NOTES
Pt A&Ox 2, Turkish speaking. Refusing blood pressure will try again in the morning. Other vitals normal. Refusing to eat, very poor oral intake. Call light within reach, frequent checks made, needs met.

## 2024-05-16 NOTE — PROGRESS NOTES
[facility]   part of [facility]     Hospitalist Progress Note     Lisa Iqbal Patient Status:  Inpatient    10/11/1950 MRN [id number]   Location [facility] 4NW-A Attending Nel Cueva MD   Hosp Day # 19 PCP Anusha Abraham MD     Chief Complaint: unable to eat    Subjective:     Patient is weak and tired, keeps her eyes closed, asked me why I want to check on her, denies any pain  Refused to try to walk with pt    Objective:    Review of Systems:   A comprehensive review of systems was not completed    Vital signs:  Temp:  [97.7 °F (36.5 °C)-99.5 °F (37.5 °C)] 98.6 °F (37 °C)  Pulse:  [] 84  Resp:  [16-20] 20  BP: (101-136)/(59-80) 130/76  SpO2:  [95 %-97 %] 97 %    Physical Exam:    General: No acute distress  Respiratory: No wheezes, no rhonchi  Cardiovascular: S1, S2, regular rate and rhythm  Abdomen: Soft, Non-tender, non-distended, positive bowel sounds  Neuro: No new focal deficits.   Extremities: No edema      Diagnostic Data:    Labs:  Recent Labs   Lab 24  0846 24  1250   WBC 14.9* 14.1*   HGB 8.1* 7.9*   MCV 81.8 83.6   .0 227.0       Recent Labs   Lab 24  1250   *   BUN 5*   CREATSERUM 0.46*   CA 8.4*   *   K 4.0      CO2 21.0       Estimated Creatinine Clearance: 81 mL/min (A) (based on SCr of 0.46 mg/dL (L)).    No results for input(s): \"TROP\", \"TROPHS\", \"CK\" in the last 168 hours.    No results for input(s): \"PTP\", \"INR\" in the last 168 hours.               Microbiology    Hospital Encounter on 24   1. Urine Culture, Routine     Status: None    Collection Time: 24  3:35 PM    Specimen: Urine, clean catch   Result Value Ref Range    Urine Culture No Growth at 18-24 hrs. N/A   2. Blood Culture     Status: None    Collection Time: 24  3:21 PM    Specimen: Blood,peripheral   Result Value Ref Range    Blood Culture Result No Growth 5 Days N/A         Imaging: Reviewed in Epic.    Medications:     camphor/menthol/methyl salicylate   Topical TID    enoxaparin  40 mg Subcutaneous Daily    mirtazapine  15 mg Oral Nightly    pantoprazole  40 mg Intravenous Q24H    potassium chloride  20 mEq Oral Once    nystatin  500,000 Units Oral QID    fluconazole  200 mg Intravenous Q24H       Assessment & Plan:      # dysphagia     - DHT removed  - gastrografin enema  - attempted EGD and possible flex sig, but pt could not complete, actively resisting  - CT a/p reviewed, concern for colitis, fecal impaction, stercoral colitis, widespread liver mets  - Fluconazole IV  - hospice recommended     # mucositis, oral candidiasis due to chemo- improved clinically   - cont magic mouth wash, nystatin solution   - iv Fluconazole x21 days  - pain control      # hypophosphatemia  - replaced      # neutropenic fever, resolved   - off merrem   - Blood cx NGTD     # pancytopenia with neutropenia  - due to chemo,  improving  - monitor counts, transfuse if Hgb < 7, plt < 10  - oncology following     # oral candidiasis   - IV Fluconazole, plan for 21 day course of fluconazole      # hypokalemia , replace PRN     # metastatic sigmoid colon cancer   - Dr Camarillo d/w pt and daughter that no plan for chemo at this time.   - Palliative care following, family to decide on hospice     # failure to thrive.   - cont to encourage oral intake   - Remeron  - PT OT      #awaiting hospice tamika Cueva MD    Supplementary Documentation:     Quality:  DVT Mechanical Prophylaxis:   SCDs,    DVT Pharmacologic Prophylaxis   Medication    enoxaparin (Lovenox) 40 MG/0.4ML SUBQ injection 40 mg                Code Status: DNAR/Selective Treatment  Cuellar: External urinary catheter in place  Cuellar Duration (in days):   Central line:    ALESHA:     Discharge is dependent on: progress  At this point Ms. Félix Iqbal is expected to be discharge to: tbd    The 21st Century Cures Act makes medical notes like these available to patients in the interest of  transparency. Please be advised this is a medical document. Medical documents are intended to carry relevant information, facts as evident, and the clinical opinion of the practitioner. The medical note is intended as peer to peer communication and may appear blunt or direct. It is written in medical language and may contain abbreviations or verbiage that are unfamiliar.     Dietitian Malnutrition Assessment        Evaluation for Malnutrition: Criteria for severe malnutrition diagnosis- acute illness/injury related to Wt loss greater than 5% in 1 month., Energy intake less than 50% for greater than 5 days., Body fat moderate depletion.                 RD Malnutrition Care Plan: See RD nutrition assessment for additional recommendations.    Body Fat/Muscle Mass:          Physician Assessment    Patient has a diagnosis of severe malnutrition

## 2024-05-16 NOTE — PROGRESS NOTES
Heme/Onc Progress Note - [facility]      Chief Complaint:    Follow up for evaluation and management of metastatic colon cancer.    Interim History:      Her mood is better this morning. She says she will try to eat today. She is more willing to work with physical therapy today. She wants to go home. She has no dyspnea.    Physical Examination:    Vital Signs: /80 (BP Location: Right arm)   Pulse 90   Temp 97.7 °F (36.5 °C) (Temporal)   Resp 18   Ht 1.549 m (5' 1\")   Wt 65 kg (143 lb 3.2 oz)   SpO2 97%   BMI 27.06 kg/m²     General: Patient is alert and oriented x 3, She is very weak and uncomfortable.  HEENT: Oral mucositis.   Chest: Clear to auscultation. No rales and no wheezes.  Heart: Regular rate and rhythm.   Abdomen: Soft and nontender. good bowel sounds.  Extremities: No edema. Non-tender.  Skin:  Warm, dry.      Labs reviewed at this visit:     Recent Labs   Lab 05/13/24  0846 05/14/24  1250   RBC 2.96* 2.93*   HGB 8.1* 7.9*   HCT 24.2* 24.5*   MCV 81.8 83.6   MCH 27.4 27.0   MCHC 33.5 32.2   RDW 21.2 22.4   NEPRELIM 10.06* 10.14*   WBC 14.9* 14.1*   .0 227.0       Recent Labs   Lab 05/14/24  1250   *   BUN 5*   CREATSERUM 0.46*   CA 8.4*   *   K 4.0      CO2 21.0       Radiologic imaging reviewed at this visit:    CT abd/pelvis on 4/15/2024:  FINDINGS:    LIVER:  Extensive hepatic metastatic deposits throughout the liver.  BILIARY:  High density in the gallbladder is nonspecific.  PANCREAS:  No lesion, fluid collection, ductal dilatation, or atrophy.    SPLEEN:  No enlargement or focal lesion.    KIDNEYS:  Large cyst in the mid left kidney measures 6 x 7 x 6.0 cm.  Cyst in the upper pole the right kidney measures 5.3 x 5 1 cm. No hydronephrosis.  No calculi in the kidneys.  ADRENALS:  No mass or enlargement.    AORTA/VASCULAR:    Unremarkable as seen on non-contrast imaging.  RETROPERITONEUM:  No mass or adenopathy.    BOWEL/MESENTERY:  Visualized part of the appendix is  unremarkable.  Mild thickening of the sigmoid colon.  ABDOMINAL WALL:  Tiny fat containing umbilical hernia.  URINARY BLADDER:  No visible focal wall thickening, lesion, or calculus.    PELVIC NODES:  No adenopathy.    PELVIC ORGANS:  Sequelae of hysterectomy.  BONES:  No bony lesion or fracture.    LUNG BASES:  No visible pulmonary or pleural disease.    OTHER:  Negative.       Impression   CONCLUSION:       1. No calculi in the kidneys.  No hydronephrosis.     2. Extensive metastatic deposits throughout the liver.     3. There is mild thickening of the sigmoid colon.  This may be sequelae of a localized colitis.  This may be sequelae of infectious or inflammatory etiology.  Ischemia is considered unlikely.         Impression/Plan:     Metastatic Colon Cancer:  Liver metastases:    S/P FOLFOX cycle 3 on 4/17/2024. Having complications from treatment even with dose reduction.  Plan is to stop chemotherapy due to toxicity and her very poor performance status.     Continue to encourage PO diet. Plan is to try to get her home possibly with home PT. No plans for chemotherapy. Ultimately will need hospice care.    Ronan Camarillo MD  [facility]

## 2024-05-17 PROCEDURE — 99231 SBSQ HOSP IP/OBS SF/LOW 25: CPT | Performed by: INTERNAL MEDICINE

## 2024-05-17 PROCEDURE — 99232 SBSQ HOSP IP/OBS MODERATE 35: CPT | Performed by: HOSPITALIST

## 2024-05-17 NOTE — CM/SW NOTE
LACY and Hospice RN Consuelo met with the patient and her 2 daughters.  The patient does not qualify for GIP currently but we were able to offer a respite stay at a nursing home paid for by hospice for 5 days because the patient's current caregiver needed relief and her new caregiver is unable to start until Wednesday.  Consents signed with hospice RN.  Aidin referral sent for respite.  Will speak with daughter Beryl once options for respite are available.  Respite will not be able to happen today as hospice medications would have to be ordered by a hospice nurse at a facility before 5pm for a 10pm delivery.  We also do not have a choice of respite as it was just referred.   Hospice RN will need to call Beryl in the morning and provide options of nursing homes that are available for respite.  Anticipate discharge to hospice respite care over the weekend.    Justine Bridges, [facility]  [phone]

## 2024-05-17 NOTE — PLAN OF CARE
Patient is A/O x2, drowsy. Refusing most meds, able to apply pain cream to neck and upper back. Does not participate with care, resisting care - scratching/ grabbing staff, and declining oral hydration. VSS and afebrile. Port intact and potassium infusing. Needs addressed, patient care requires 2-3 assist. Call light placed within reach.     Problem: RISK FOR INFECTION - ADULT  Goal: Absence of fever/infection during anticipated neutropenic period  Description: INTERVENTIONS  - Monitor WBC  - Administer growth factors as ordered  - Implement neutropenic guidelines  Outcome: Progressing     Problem: SAFETY ADULT - FALL  Goal: Free from fall injury  Description: INTERVENTIONS:  - Assess pt frequently for physical needs  - Identify cognitive and physical deficits and behaviors that affect risk of falls.  - [place] fall precautions as indicated by assessment.  - Educate pt/family on patient safety including physical limitations  - Instruct pt to call for assistance with activity based on assessment  - Modify environment to reduce risk of injury  - Provide assistive devices as appropriate  - Consider OT/PT consult to assist with strengthening/mobility  - Encourage toileting schedule  Outcome: Progressing     Problem: GASTROINTESTINAL - ADULT  Goal: Minimal or absence of nausea and vomiting  Description: INTERVENTIONS:  - Maintain adequate hydration with IV or PO as ordered and tolerated  - Nasogastric tube to low intermittent suction as ordered  - Evaluate effectiveness of ordered antiemetic medications  - Provide nonpharmacologic comfort measures as appropriate  - Advance diet as tolerated, if ordered  - Obtain nutritional consult as needed  - Evaluate fluid balance  Outcome: Progressing

## 2024-05-17 NOTE — DIETARY NOTE
[facility]   part of [facility]    NUTRITION ASSESSMENT    Pt meets severe malnutrition criteria at this time.    CRITERIA FOR MALNUTRITION DIAGNOSIS:  Criteria for severe malnutrition diagnosis: acute illness/injury related to wt loss greater than 5% in 1 month, energy intake less than 50% for greater than 5 days, and body fat moderate depletion    NUTRITION INTERVENTION:    RD nutrition Care Plan- See RD nutrition assessment for additional recommendations  Recommend starting nutrition support if part of care plan   Meal and Snacks - Continue low fiber/soft diet, monitor and encourage adequate PO intake.   Medical Food Supplements - Continue Ensure Plus High Protein BID so patient has it available - anticipate pt not drinking it with current appetite.        PATIENT STATUS:   5/17- Patient continues with very poor PO intake, some days with 0% intake. Nutrition support continues to be indicated if within GOC. Visited pt this am, hasn't ordered breakfast and declined offer to help her order. Noticed Ensure in room, pt reports she will be drinking it later, anticipate she will not be drinking it but will keep ONS BID for now.    5/14 - Dr Loredo wanted a calorie count. Spoke with RN and calorie count envelop hanging on door. RN documented in I/O flowsheet but pt only taking bites of toast at breakfast and dinner yesterday. And this morning for breakfast only bites as well. This is not meeting nutritional needs, less than 25 % of nutritional needs. If part of plan of care pt would benefit for nutrition support as she has been meeting less than 50% of needs since admission  over 2 weeks ago     5/10- pt not started on TF and pt pulled out dobhoff feeding tube yesterday. Pt is refusing replacing dobhoff, meds, and anything to eat or drink. Pt asked for ice today time of RD visit and RN reports offering but pt refused. Daughter is meeting with palliative care today to discuss goals of care.     5/6: Pt continues  to have poor po intake. Received consult for TF. Pt open to receiving NG. Continued Ensure HP BID, d/c'd other supplements as pt not consuming much orally. Noted Phos was 1.8 yesterday. Received replacement. Expecting lytes to drop. No current phos or mag. Placed order for mag and phos. Ordered thiamine. Initiated Jevity 1.5 @ 20ml/hr x 24hrs. Replace electrolytes before advancing as pt at risk for refeeding.     5/3- pt remains with poor po intake related to mouth pain. Minimal intake.  Encouraged po intake of soft foods and ONS.      04/29/24 at risk consult  73 year old female with mucositis d/t chemo for metastatic colon cancer.  Pt with 10 pound wt loss over past month which is significant wt loss per standards of 6.5% in 1 month. Pt with mild to moderate muscle and fat depletion.  Pt with poor po intake for past week or so due to pain from mucositis.        ANTHROPOMETRICS:  Ht: 154.9 cm (5' 1\")  Wt: 65 kg (143 lb 3.2 oz).   BMI: Body mass index is 27.06 kg/m².  IBW: 47.7 kg      WEIGHT HISTORY:   Weight loss: Yes, Severe Wt loss of 10 lbs, 6.5%, over 1 months     Wt Readings from Last 10 Encounters:   05/01/24 65 kg (143 lb 3.2 oz)   04/26/24 61.3 kg (135 lb 3.2 oz)   04/25/24 64 kg (141 lb)   04/24/24 64 kg (141 lb)   04/22/24 64.9 kg (143 lb)   04/17/24 67.2 kg (148 lb 3.2 oz)   04/15/24 70.3 kg (155 lb)   04/12/24 66 kg (145 lb 6.4 oz)   04/05/24 69.4 kg (153 lb)   04/03/24 69.4 kg (153 lb)        NUTRITION:  Diet:       Procedures    Regular/General diet Is Patient on Accuchecks? No      Food Allergies: No  Cultural/Ethnic/Scientology Preferences Addressed: Yes    Percent Meals Eaten (last 3 days)       Date/Time Percent Meals Eaten (%)    05/14/24 0900 --     Percent Meals Eaten (%): a few bites at 05/14/24 0900    05/14/24 1625 0 %     Percent Meals Eaten (%): pt refused at 05/14/24 1625    05/15/24 1000 0 %     Percent Meals Eaten (%): pt refused at 05/15/24 1000    05/15/24 1650 0 %     Percent Meals  Eaten (%): pt refused at 05/15/24 1650    05/16/24 0900 5 %    05/16/24 1228 0 %            GI system review:  trouble eating due to mucositits  Last BM: 5/17  Skin and wounds: none    NUTRITION RELATED PHYSICAL FINDINGS:     1. Body Fat/Muscle Mass: moderate depletion body fat Orbital fat pad and Buccal fat pad and mild muscle depletion Temple region and Clavicle region     2. Fluid Accumulation: upper extremity edema and lower extremity edema     NUTRITION PRESCRIPTION: 64.9kg  Calories: 2707-4878 calories/day (25-30 kcal/kg)  Protein: 78-97 grams protein/day (1.2-1.5 grams protein per kg)  Fluid: ~1 ml/kcal or per MD discretion    NUTRITION DIAGNOSIS/PROBLEM:  Inadequate oral intake related to inability to take or tolerate and increased nutritional demands for healing as evidenced by documented/reported insufficient oral intake, documented/reported unintentional weight loss, loss of fat mass, and loss of muscle mass      MONITOR AND EVALUATE/NUTRITION GOALS:  PO intake of 75% of meals TID - Not met, Continues  PO intake of 75% of oral nutrition supplement/s - Not met, Continues  Weight stable within 1 to 2 lbs during admission - Ongoing  Tolerate alternative nutrition at 100% of goal - not met      MEDICATIONS:     Ivf D5/NS + 20 mEq Kcl at 100ml/hr - 408 kcals    LABS:      Pt is at High nutrition risk    Montse Wilkinson RDN, LDN, [facility]  Clinical Dietitian   [id number]

## 2024-05-17 NOTE — PLAN OF CARE
Patient received this shift laying in bed. She is currently refusing medications and care, can be combative at times with staff when attempting to change patient or perform care duties for her. She is on IV fluids due to refusal to eat or drink at this time. Hospitalist and  made aware and recommendation is for hospice care for patient. Patient may qualify for respite care at nursing home until she can be transferred safety to home with around the clock caregiver. VSS. Patient does not appear to be in any pain at this time. Safety precautions in place. Call light within reach.    Problem: RISK FOR INFECTION - ADULT  Goal: Absence of fever/infection during anticipated neutropenic period  Description: INTERVENTIONS  - Monitor WBC  - Administer growth factors as ordered  - Implement neutropenic guidelines  Outcome: Progressing     Problem: SAFETY ADULT - FALL  Goal: Free from fall injury  Description: INTERVENTIONS:  - Assess pt frequently for physical needs  - Identify cognitive and physical deficits and behaviors that affect risk of falls.  - [place] fall precautions as indicated by assessment.  - Educate pt/family on patient safety including physical limitations  - Instruct pt to call for assistance with activity based on assessment  - Modify environment to reduce risk of injury  - Provide assistive devices as appropriate  - Consider OT/PT consult to assist with strengthening/mobility  - Encourage toileting schedule  Outcome: Progressing     Problem: DISCHARGE PLANNING  Goal: Discharge to home or other facility with appropriate resources  Description: INTERVENTIONS:  - Identify barriers to discharge w/pt and caregiver  - Include patient/family/discharge partner in discharge planning  - Arrange for needed discharge resources and transportation as appropriate  - Identify discharge learning needs (meds, wound care, etc)  - Arrange for interpreters to assist at discharge as needed  - Consider  post-discharge preferences of patient/family/discharge partner  - Complete POLST form as appropriate  - Assess patient's ability to be responsible for managing their own health  - Refer to Case Management Department for coordinating discharge planning if the patient needs post-hospital services based on physician/LIP order or complex needs related to functional status, cognitive ability or social support system  Outcome: Progressing

## 2024-05-17 NOTE — PALLIATIVE CARE NOTE
For completeness, received an email from Yaneli for Dr. Pickering regarding guardianship for financial request. I forwarded this to Dr. Pickering and SW.   Our services does not decide guardianship or assist with paperwork.   Jacqueline Loredo MD, 05/17/24, 4:07 PM

## 2024-05-17 NOTE — PROGRESS NOTES
Heme/Onc Progress Note - [facility]      Chief Complaint:    Follow up for evaluation and management of metastatic colon cancer.    Interim History:      Patient is very weak this morning and with less motivation. She wants to go home. She has no dyspnea.    Physical Examination:    Vital Signs: /78 (BP Location: Left arm)   Pulse 89   Temp 97.7 °F (36.5 °C) (Oral)   Resp 18   Ht 1.549 m (5' 1\")   Wt 65 kg (143 lb 3.2 oz)   SpO2 94%   BMI 27.06 kg/m²     General: Patient is alert and oriented x 3, She is very weak and uncomfortable.  HEENT: Oral mucositis.   Chest: Clear to auscultation. No rales and no wheezes.  Heart: Regular rate and rhythm.   Abdomen: Soft and nontender. good bowel sounds.  Extremities: No edema. Non-tender.  Skin:  Warm, dry.      Labs reviewed at this visit:     Recent Labs   Lab 05/13/24  0846 05/14/24  1250   RBC 2.96* 2.93*   HGB 8.1* 7.9*   HCT 24.2* 24.5*   MCV 81.8 83.6   MCH 27.4 27.0   MCHC 33.5 32.2   RDW 21.2 22.4   NEPRELIM 10.06* 10.14*   WBC 14.9* 14.1*   .0 227.0       Recent Labs   Lab 05/14/24  1250   *   BUN 5*   CREATSERUM 0.46*   CA 8.4*   *   K 4.0      CO2 21.0       Radiologic imaging reviewed at this visit:    CT abd/pelvis on 4/15/2024:  FINDINGS:    LIVER:  Extensive hepatic metastatic deposits throughout the liver.  BILIARY:  High density in the gallbladder is nonspecific.  PANCREAS:  No lesion, fluid collection, ductal dilatation, or atrophy.    SPLEEN:  No enlargement or focal lesion.    KIDNEYS:  Large cyst in the mid left kidney measures 6 x 7 x 6.0 cm.  Cyst in the upper pole the right kidney measures 5.3 x 5 1 cm. No hydronephrosis.  No calculi in the kidneys.  ADRENALS:  No mass or enlargement.    AORTA/VASCULAR:    Unremarkable as seen on non-contrast imaging.  RETROPERITONEUM:  No mass or adenopathy.    BOWEL/MESENTERY:  Visualized part of the appendix is unremarkable.  Mild thickening of the sigmoid colon.  ABDOMINAL WALL:   Tiny fat containing umbilical hernia.  URINARY BLADDER:  No visible focal wall thickening, lesion, or calculus.    PELVIC NODES:  No adenopathy.    PELVIC ORGANS:  Sequelae of hysterectomy.  BONES:  No bony lesion or fracture.    LUNG BASES:  No visible pulmonary or pleural disease.    OTHER:  Negative.       Impression   CONCLUSION:       1. No calculi in the kidneys.  No hydronephrosis.     2. Extensive metastatic deposits throughout the liver.     3. There is mild thickening of the sigmoid colon.  This may be sequelae of a localized colitis.  This may be sequelae of infectious or inflammatory etiology.  Ischemia is considered unlikely.         Impression/Plan:     Metastatic Colon Cancer:  Liver metastases:    S/P FOLFOX cycle 3 on 4/17/2024. Having complications from treatment even with dose reduction.  Plan is to stop chemotherapy due to toxicity and her very poor performance status.     Plan for hospice care. OK to discharge from heme/onc standpoint.    Ronan Camarillo MD  [facility]

## 2024-05-17 NOTE — CM/SW NOTE
Hospice consents have not yet been viewed or signed.  SW left a message for the patient's daughter informing her we have 2 facilities accepting for respite care.  Awaiting return call to the hospice phone,.    Justine Bridges [facility]  [phone]

## 2024-05-17 NOTE — CM/SW NOTE
SW received guardianship paperwork via private and secure email from Dr. Loredo. SW called patient's daughter Yaneli to discuss and she reported that she needs paperwork completed due to patient now being incapable of making decisions. Family is needing access to account patient has in [place] and Yaneli is trying to file to become guardian of patient's estate.     SW notified Yaneli that this is not something typically done by the hospital  MD's and that there is a strong likelihood that they will not be willing to assist with this paperwork. SW reached out to consulting MD's to see if they are willing to complete paperwork and placed paperwork in hard chart.     SW also was notified at 2pm that patient's daughter Yaneli denied [facility] services stating that she does not want patient want patient to go to their inpatient unit in [place] and does not want to pay Respite Rate for inpatient hospice. Yaneli stated they are unable to accept patient at their home until Wednesday when their caregiver is available. SW emphasized that patient is needing to sign consents with a hospice agency in order to plan patient's DC transition.     Patient's daughter Yaneli was at bedside and stated she has 20 minutes to discuss with [facility]. SW called [facility] immediately and they were able to meet with patient's two daughters at bedside. Yaneli verbally stated that she will sign consents for hospice and [facility] was able to gain approval for direct admit for respite to a SNF for patient.     Hospice is working to obtain consents and locate SNF respite placement for patient. DC planned for tomorrow.     SW will continue to follow for plan of care changes and remain available for any additional DC needs or concerns.     Harika Haro MSW, LSW  Discharge Planner   [phone]

## 2024-05-17 NOTE — PROGRESS NOTES
[facility]   part of [facility]     Hospitalist Progress Note     Lisa Iqbal Patient Status:  Inpatient    10/11/1950 MRN [id number]   Location [facility] 4NW-A Attending Nel Cueva MD   Hosp Day # 20 PCP Anusha Abraham MD     Chief Complaint: unable to eat    Subjective:     Patient is weak and tired, keeps her eyes closed, asked me why I want to check on her, denies any pain  Refused to try to walk with pt    Objective:    Review of Systems:   A comprehensive review of systems was not completed    Vital signs:  Temp:  [97.6 °F (36.4 °C)-98.4 °F (36.9 °C)] 97.7 °F (36.5 °C)  Pulse:  [] 88  Resp:  [18-20] 20  BP: (117-142)/(71-82) 132/74  SpO2:  [94 %-95 %] 94 %    Physical Exam:    General: No acute distress  Respiratory: No wheezes, no rhonchi  Cardiovascular: S1, S2, regular rate and rhythm  Abdomen: Soft, Non-tender, non-distended, positive bowel sounds  Neuro: No new focal deficits.   Extremities: No edema      Diagnostic Data:    Labs:  Recent Labs   Lab 24  0846 24  1250   WBC 14.9* 14.1*   HGB 8.1* 7.9*   MCV 81.8 83.6   .0 227.0       Recent Labs   Lab 24  1250   *   BUN 5*   CREATSERUM 0.46*   CA 8.4*   *   K 4.0      CO2 21.0       Estimated Creatinine Clearance: 81 mL/min (A) (based on SCr of 0.46 mg/dL (L)).    No results for input(s): \"TROP\", \"TROPHS\", \"CK\" in the last 168 hours.    No results for input(s): \"PTP\", \"INR\" in the last 168 hours.               Microbiology    Hospital Encounter on 24   1. Urine Culture, Routine     Status: None    Collection Time: 24  3:35 PM    Specimen: Urine, clean catch   Result Value Ref Range    Urine Culture No Growth at 18-24 hrs. N/A   2. Blood Culture     Status: None    Collection Time: 24  3:21 PM    Specimen: Blood,peripheral   Result Value Ref Range    Blood Culture Result No Growth 5 Days N/A         Imaging: Reviewed in Epic.    Medications:     camphor/menthol/methyl salicylate   Topical TID    enoxaparin  40 mg Subcutaneous Daily    mirtazapine  15 mg Oral Nightly    pantoprazole  40 mg Intravenous Q24H    potassium chloride  20 mEq Oral Once    nystatin  500,000 Units Oral QID    fluconazole  200 mg Intravenous Q24H       Assessment & Plan:      # dysphagia   - attempted EGD and possible flex sig, but pt could not complete, actively resisting  - CT a/p reviewed, concern for colitis, fecal impaction, stercoral colitis, widespread liver mets  - Fluconazole IV  - hospice recommended and plans for comfort care and hospice when family ready     # mucositis, oral candidiasis due to chemo- improved clinically   - cont magic mouth wash, nystatin solution   - iv Fluconazole x21 days  - pain control         # neutropenic fever, resolved   - off merrem   - Blood cx NGTD     # pancytopenia with neutropenia  - due to chemo,  improving  - monitor counts, transfuse if Hgb < 7, plt < 10  - oncology following     # oral candidiasis   - IV Fluconazole, plan for 21 day course of fluconazole      # hypokalemia , replace PRN     # metastatic sigmoid colon cancer   - Dr Camarillo d/w pt and daughter that no plan for chemo at this time.   - Palliative care following, family to decide on hospice     # failure to thrive.   - cont to encourage oral intake   - Remeron  - PT OT      #awaiting hospice marthaal      Nel Cueva MD    Supplementary Documentation:     Quality:  DVT Mechanical Prophylaxis:   SCDs,    DVT Pharmacologic Prophylaxis   Medication    enoxaparin (Lovenox) 40 MG/0.4ML SUBQ injection 40 mg                Code Status: DNAR/Selective Treatment  Cuellar: External urinary catheter in place  Cuellar Duration (in days):   Central line:    ALESHA:     Discharge is dependent on: progress  At this point Ms. Félix Iqbal is expected to be discharge to: tbd    The 21st Century Cures Act makes medical notes like these available to patients in the interest of transparency. Please be  advised this is a medical document. Medical documents are intended to carry relevant information, facts as evident, and the clinical opinion of the practitioner. The medical note is intended as peer to peer communication and may appear blunt or direct. It is written in medical language and may contain abbreviations or verbiage that are unfamiliar.     Dietitian Malnutrition Assessment        Evaluation for Malnutrition: Criteria for severe malnutrition diagnosis- acute illness/injury related to Wt loss greater than 5% in 1 month., Energy intake less than 50% for greater than 5 days., Body fat moderate depletion.                 RD Malnutrition Care Plan: See RD nutrition assessment for additional recommendations.    Body Fat/Muscle Mass:          Physician Assessment    Patient has a diagnosis of severe malnutrition

## 2024-05-18 VITALS
BODY MASS INDEX: 27.03 KG/M2 | HEIGHT: 61 IN | SYSTOLIC BLOOD PRESSURE: 127 MMHG | TEMPERATURE: 98 F | HEART RATE: 101 BPM | DIASTOLIC BLOOD PRESSURE: 73 MMHG | RESPIRATION RATE: 18 BRPM | OXYGEN SATURATION: 93 % | WEIGHT: 143.19 LBS

## 2024-05-18 PROCEDURE — 99238 HOSP IP/OBS DSCHRG MGMT 30/<: CPT | Performed by: HOSPITALIST

## 2024-05-18 NOTE — DISCHARGE SUMMARY
[facility]     Lisa Iqbal Patient Status:  Inpatient    10/11/1950 MRN [id number]   Location [facility] 4NW-A Attending Nel Cueva MD   Hosp Day # 21 PCP Anusha Abraham MD     Date of Admission: 2024  Date of Discharge:   2024    Discharge Disposition: Home or Self Care    Discharge Diagnosis:      History of Present Illness: 73 years old female presented to emergency room complaining of mouth sores and painful mouth.  She has history of colon cancer and she follows up with oncology.  She has been describing difficulties eating and drinking recently.  She lives by herself at home.    Brief Synopsis: Patient was diagnosed with mucositis started on nystatin and Magic mouthwash as well as IV fluids.  She was found to have pancytopenia due to chemo.  Patient was started on fluconazole IV for oral candidiasis and all electrolytes were replaced.  Oncology evaluated her as well on  as she was found to have chemotherapy-induced neutropenia thrombocytopenia oral candidiasis and she is known to have metastatic colon cancer.  Patient is known to have history of head and neck cancer diagnosed in 2018 status post radiation therapy and metastatic colon cancer.  EGD was not done due to neutropenia and thrombocytopenia and 21 days of fluconazole was recommended for esophageal candidiasis.  Patient was seen by psychiatry on May 5 as well when she developed a fever.  She was started empirically placed source of infection identified.  Patient was diagnosed with failure to thrive the patient.  Patient.  She was found to be depressed with confused.  She was on Remeron 15 mg at night and she was found to be incompetent.  Her daughter was recommended to be healthcare surrogate to make decisions.  Patient was seen by palliative care on May 7.  Patient was scheduled to have upper endoscopy and flexible sigmoidoscopy with consent from patient's daughter but patient  did not allow to proceed with examination.  Residential hospice discussed with power of  Beryl on May 10.  The plan was to see if patient can accept any feeding tube or any kind of nutrition.  Meanwhile patient continued to deteriorate and daughter states that she is considering hospice.    Lace+ Score: 85  59-90 High Risk  29-58 Medium Risk  0-28   Low Risk       TCM Follow-Up Recommendation:  LACE > 58: High Risk of readmission after discharge from the hospital.      Procedures during hospitalization:   Patient refused EGD    Incidental or significant findings and recommendations (brief descriptions):      Lab/Test results pending at Discharge:       Consultants:  Hospice and palliative care as well as oncology GI    Discharge Medication List:     Discharge Medications        ASK your doctor about these medications        Instructions Prescription details   levothyroxine 25 MCG Tabs  Commonly known as: Synthroid      Take 1 tablet (25 mcg total) by mouth before breakfast.   Refills: 0     Lidocaine Viscous HCl 2 % Soln  Commonly known as: XYLOCAINE      Take 5 mL by mouth every 3 (three) hours as needed for Pain. Swish in the mouth and spit out.   Quantity: 100 mL  Refills: 1     maalox/diphenhydramine/sucralfate Susp  Commonly known as: MAGIC MOUTHWASH      Take 10 mL by mouth TID & HS.   Quantity: 240 mL  Refills: 0     morphINE ER 15 MG Tbcr  Commonly known as: MS Contin      Take 1 tablet (15 mg total) by mouth every 12 (twelve) hours.   Quantity: 60 tablet  Refills: 0     nystatin 348699 UNIT/ML Susp  Commonly known as: Mycostatin  Ask about: Should I take this medication?      Take 5 mL (500,000 Units total) by mouth 4 (four) times daily for 7 days.   Stop taking on: April 29, 2024  Quantity: 200 mL  Refills: 0     OLANZapine 2.5 MG Tabs  Commonly known as: ZyPREXA      Take 1 tablet (2.5 mg total) by mouth nightly.   Quantity: 30 tablet  Refills: 0     ondansetron 8 MG tablet  Commonly known as:  Zofran      Take 1 tablet (8 mg total) by mouth every 8 (eight) hours as needed.   Quantity: 30 tablet  Refills: 0     polyethylene glycol (PEG 3350) 17 GM/SCOOP Powd  Commonly known as: MiraLax      Take 17 g by mouth 2 (two) times daily.   Quantity: 578 g  Refills: 1     polyethylene glycol (PEG 3350) 17 g Pack  Commonly known as: Miralax      Take 17 g by mouth daily as needed.   Stop taking on: May 25, 2024  Quantity: 12 each  Refills: 0     prochlorperazine 10 mg tablet  Commonly known as: Compazine      Take 1 tablet (10 mg total) by mouth every 6 (six) hours as needed for Nausea.   Quantity: 30 tablet  Refills: 3     sennosides 8.6 MG Tabs  Commonly known as: Senokot      Take 1 tablet (8.6 mg total) by mouth nightly.   Quantity: 60 tablet  Refills: 1              [place] reviewed:     Follow-up appointment:   Kb Reyes MD  [address] [place] [address] [phone]    Go in 2 week(s)  for a follow-up office visit with GI. The office will call you to arrange date/time of visit    Appointments for Next 30 Days 5/18/2024 - 6/17/2024        Date Arrival Time Visit Type Length Department Provider     6/3/2024  9:30 AM  EXAM - ESTABLISHED [2844] 15 min [facility] Laci Melton MD    Patient Instructions:         Location Instructions:     Your appointment is located at [address] [place] [address] in [place]; in [facility] near the intersection of [facility] and [facility].  Masks are optional for all patients and visitors, unless otherwise indicated.                      Vital signs:  Temp:  [97.3 °F (36.3 °C)-98.4 °F (36.9 °C)] 98.4 °F (36.9 °C)  Pulse:  [] 101  Resp:  [18-20] 18  BP: (120-139)/(70-80) 127/73  SpO2:  [92 %-93 %] 93 %    Physical Exam:    General: No acute distress   Lungs: clear to auscultation  Cardiovascular: S1, S2  Abdomen: Soft      -----------------------------------------------------------------------------------------------  PATIENT  DISCHARGE INSTRUCTIONS: See electronic chart    Nel Cueva MD    Total time spent on discharge plannin minutes     The  [place] Cures Act makes medical notes like these available to patients in the interest of transparency. Please be advised this is a medical document. Medical documents are intended to carry relevant information, facts as evident, and the clinical opinion of the practitioner. The medical note is intended as peer to peer communication and may appear blunt or direct. It is written in medical language and may contain abbreviations or verbiage that are unfamiliar.

## 2024-05-18 NOTE — PROGRESS NOTES
NURSING DISCHARGE NOTE    Discharged Nursing home via Ambulance.  Accompanied by Support staff  Belongings Taken by patient/family.    Called and gave report to Roman munoz with [facility]. Deaccessed port. All belongings were taken and packed up. Ambulance took all belongings and paperwork.

## 2024-05-18 NOTE — PLAN OF CARE
Patient is alert to self, lethargic opens eyes and communicates when spoken, VSS, RA, doesn't complain of pain. Appetite is poor to none. Patient is refusing all meds. Patient will be discharged and transported to [facility] per family decision for respite and/or possible hospice care. Call light and safety precautions are in place.    Problem: RISK FOR INFECTION - ADULT  Goal: Absence of fever/infection during anticipated neutropenic period  Description: INTERVENTIONS  - Monitor WBC  - Administer growth factors as ordered  - Implement neutropenic guidelines  Outcome: Progressing     Problem: SAFETY ADULT - FALL  Goal: Free from fall injury  Description: INTERVENTIONS:  - Assess pt frequently for physical needs  - Identify cognitive and physical deficits and behaviors that affect risk of falls.  - [place] fall precautions as indicated by assessment.  - Educate pt/family on patient safety including physical limitations  - Instruct pt to call for assistance with activity based on assessment  - Modify environment to reduce risk of injury  - Provide assistive devices as appropriate  - Consider OT/PT consult to assist with strengthening/mobility  - Encourage toileting schedule  Outcome: Progressing     Problem: DISCHARGE PLANNING  Goal: Discharge to home or other facility with appropriate resources  Description: INTERVENTIONS:  - Identify barriers to discharge w/pt and caregiver  - Include patient/family/discharge partner in discharge planning  - Arrange for needed discharge resources and transportation as appropriate  - Identify discharge learning needs (meds, wound care, etc)  - Arrange for interpreters to assist at discharge as needed  - Consider post-discharge preferences of patient/family/discharge partner  - Complete POLST form as appropriate  - Assess patient's ability to be responsible for managing their own health  - Refer to Case Management Department for coordinating discharge planning if the patient needs  post-hospital services based on physician/LIP order or complex needs related to functional status, cognitive ability or social support system  Outcome: Progressing     Problem: Altered Communication/Language Barrier  Goal: Patient/Family is able to understand and participate in their care  Description: Interventions:  - Assess communication ability and preferred communication style  - Implement communication aides and strategies  - Use visual cues when possible  - Listen attentively, be patient, do not interrupt  - Minimize distractions  - Allow time for understanding and response  - Establish method for patient to ask for assistance (call light)  - Provide an  as needed  - Communicate barriers and strategies to overcome with those who interact with patient  Outcome: Progressing     Problem: METABOLIC/FLUID AND ELECTROLYTES - ADULT  Goal: Electrolytes maintained within normal limits  Description: INTERVENTIONS:  - Monitor labs and rhythm and assess patient for signs and symptoms of electrolyte imbalances  - Administer electrolyte replacement as ordered  - Monitor response to electrolyte replacements, including rhythm and repeat lab results as appropriate  - Fluid restriction as ordered  - Instruct patient on fluid and nutrition restrictions as appropriate  Outcome: Progressing     Problem: GASTROINTESTINAL - ADULT  Goal: Minimal or absence of nausea and vomiting  Description: INTERVENTIONS:  - Maintain adequate hydration with IV or PO as ordered and tolerated  - Nasogastric tube to low intermittent suction as ordered  - Evaluate effectiveness of ordered antiemetic medications  - Provide nonpharmacologic comfort measures as appropriate  - Advance diet as tolerated, if ordered  - Obtain nutritional consult as needed  - Evaluate fluid balance  Outcome: Progressing  Goal: Maintains or returns to baseline bowel function  Description: INTERVENTIONS:  - Assess bowel function  - Maintain adequate hydration with  IV or PO as ordered and tolerated  - Evaluate effectiveness of GI medications  - Encourage mobilization and activity  - Obtain nutritional consult as needed  - Establish a toileting routine/schedule  - Consider collaborating with pharmacy to review patient's medication profile  Outcome: Progressing

## 2024-05-18 NOTE — PLAN OF CARE
Patient alert and oriented x2, drowsy but easy to arouse. Open eyes, then back to sleep again. Refused care and oral medications. Does not want to be bothered. IV fluid continuous. IV medication given via Port-a Cath.  Remains afebrile. Room air. No SOB. No complaints of pain. No nausea and vomiting. Needs attended. Fall precautions in place. Call light within reach.     Problem: RISK FOR INFECTION - ADULT  Goal: Absence of fever/infection during anticipated neutropenic period  Description: INTERVENTIONS  - Monitor WBC  - Administer growth factors as ordered  - Implement neutropenic guidelines  Outcome: Progressing     Problem: SAFETY ADULT - FALL  Goal: Free from fall injury  Description: INTERVENTIONS:  - Assess pt frequently for physical needs  - Identify cognitive and physical deficits and behaviors that affect risk of falls.  - [place] fall precautions as indicated by assessment.  - Educate pt/family on patient safety including physical limitations  - Instruct pt to call for assistance with activity based on assessment  - Modify environment to reduce risk of injury  - Provide assistive devices as appropriate  - Consider OT/PT consult to assist with strengthening/mobility  - Encourage toileting schedule  Outcome: Progressing

## 2024-05-20 NOTE — PAYOR COMM NOTE
--------------  DISCHARGE REVIEW    Payor: LUCIAN COLINDRES [facility]  Subscriber #:  [id number]  Authorization Number: [id number]    Admit date: 4/27/24  Admit time:   9:12 PM  Discharge Date: 5/18/2024 12:13 PM     Admitting Physician: Chasity Low MD  Attending Physician:  No att. providers found  Primary Care Physician: Anusha Lucio MD       Discharge Summary Notes    No notes of this type exist for this encounter.         REVIEWER COMMENTS

## 2024-07-02 ENCOUNTER — TELEPHONE (OUTPATIENT)
Dept: HEMATOLOGY/ONCOLOGY | Facility: HOSPITAL | Age: 74
End: 2024-07-02

## 2024-07-02 ENCOUNTER — SOCIAL WORK SERVICES (OUTPATIENT)
Dept: HEMATOLOGY/ONCOLOGY | Facility: HOSPITAL | Age: 74
End: 2024-07-02

## 2024-07-02 NOTE — TELEPHONE ENCOUNTER
[facility]-  condition update Félix BOND passed away on 06/30/2024 @ 11:33 pm  caregiver and friend present.She was at  [facility] in [place]. Thanks Vianey

## 2024-07-02 NOTE — PROGRESS NOTES
received update from [facility] (P#[phone] F#[phone]) that patient  23. MD/RN made aware.

## (undated) DEVICE — CANISTER SUCT 1200CC LID BLU HRD ADPT AUTO

## (undated) DEVICE — KIT CUSTOM ENDOPROCEDURE STERIS

## (undated) DEVICE — BITEBLOCK ENDOSCP 60FR MAXI STRP

## (undated) DEVICE — SOLUTION ENDOSCOPIC ANTI-FOG NON-TOXIC NON-ABRASIVE 6 CUBIC CENTIMETER WITH RADIOPAQUE ADHESIVE-BACKED SPONGE STERILE NOT MADE WITH NATURAL RUBBER LATEX MEDICHOICE: Brand: MEDICHOICE

## (undated) DEVICE — MARKER ENDOSCP TISS TATTOO C BLK SUSP PREFIL

## (undated) DEVICE — SOL  .9 1000ML BTL

## (undated) DEVICE — GOWN SURG AERO BLUE PERF XLG

## (undated) DEVICE — HEAD & NECK: Brand: MEDLINE INDUSTRIES, INC.

## (undated) DEVICE — 1200CC GUARDIAN II: Brand: GUARDIAN

## (undated) DEVICE — SUCTION CANISTER, 3000CC,SAFELINER: Brand: DEROYAL

## (undated) DEVICE — CANNULA NSL AD W/ FLTR 14FT O2/CO2

## (undated) DEVICE — 3M™ RED DOT™ MONITORING ELECTRODE WITH FOAM TAPE AND STICKY GEL, 50/BAG, 20/CASE, 72/PLT 2570: Brand: RED DOT™

## (undated) DEVICE — ELECTRODE EKG W3.5XL4CM ABRAD W1.25XL1.5IN

## (undated) DEVICE — KIT VLV 5 PC AIR H2O SUCT BX ENDOGATOR CONN

## (undated) DEVICE — 10FT COMBINED O2 DELIVERY/CO2 MONITORING. FILTER WITH MICROSTREAM TYPE LUER: Brand: DUAL ADULT NASAL CANNULA

## (undated) DEVICE — STERILE LATEX POWDER-FREE SURGICAL GLOVESWITH NITRILE COATING: Brand: PROTEXIS

## (undated) DEVICE — CODMAN® SURGICAL PATTIES 1/2" X 1/2" (1.27CM X 1.27CM): Brand: CODMAN®

## (undated) DEVICE — NEEDLE INJ 25GA CATH 230CM CHN 2.8MM ACUJECT

## (undated) DEVICE — Device

## (undated) NOTE — LETTER
Date: 1/15/2024    Patient Name: Lisa Iqbal          To Whom it may concern:    This letter has been written at the patient's request. The above patient was seen at the [facility] for treatment of a medical condition.    Patient needs assistance with obtaining food; goes to food pantry. She has medical conditions that make it more difficult for her to wait in cold weather for food. If it is possible for her to have a scheduled appointment to  food it would greatly help her.         Sincerely,      Anusha Abraham MD

## (undated) NOTE — LETTER
[address]  [address]  Authorization for Surgical Operation and Procedure     Date:___5/8/2024________                                                                                                         Time:__________  I hereby authorize Surgeon(s):  Heath Sidhu MD, my physician and his/her assistants (if applicable), which may include medical students, residents, and/or fellows, to perform the following surgical operation/ procedure and administer such anesthesia as may be determined necessary by my physician:  Operation/Procedure name (s) Flexible Sigmoidoscopy with possible biopsy possible control of bleeding under monitored anesthesia care. on Lisa Wilcoxista Farida   2.   I recognize that during the surgical operation/procedure, unforeseen conditions may necessitate additional or different procedures than those listed above.  I, therefore, further authorize and request that the above-named surgeon, assistants, or designees perform such procedures as are, in their judgment, necessary and desirable.    3.   My surgeon/physician has discussed prior to my surgery the potential benefits, risks and side effects of this procedure; the likelihood of achieving goals; and potential problems that might occur during recuperation.  They also discussed reasonable alternatives to the procedure, including risks, benefits, and side effects related to the alternatives and risks related to not receiving this procedure.  I have had all my questions answered and I acknowledge that no guarantee has been made as to the result that may be obtained.    4.   Should the need arise during my operation/procedure, which includes change of level of care prior to discharge, I also consent to the administration of blood and/or blood products.  Further, I understand that despite careful testing and screening of blood or blood products by collecting agencies, I may still be subject to ill  effects as a result of receiving a blood transfusion and/or blood products.  The following are some, but not all, of the potential risks that can occur: fever and allergic reactions, hemolytic reactions, transmission of diseases such as Hepatitis, AIDS and Cytomegalovirus (CMV) and fluid overload.  In the event that I wish to have an autologous transfusion of my own blood, or a directed donor transfusion, I will discuss this with my physician.  Check only if Refusing Blood or Blood Products  I understand refusal of blood or blood products as deemed necessary by my physician may have serious consequences to my condition to include possible death. I hereby assume responsibility for my refusal and release the hospital, its personnel, and my physicians from any responsibility for the consequences of my refusal.          o  Refuse      5.   I authorize the use of any specimen, organs, tissues, body parts or foreign objects that may be removed from my body during the operation/procedure for diagnosis, research or teaching purposes and their subsequent disposal by hospital authorities.  I also authorize the release of specimen test results and/or written reports to my treating physician on the hospital medical staff or other referring or consulting physicians involved in my care, at the discretion of the Pathologist or my treating physician.    6.   I consent to the photographing or videotaping of the operations or procedures to be performed, including appropriate portions of my body for medical, scientific, or educational purposes, provided my identity is not revealed by the pictures or by descriptive texts accompanying them.  If the procedure has been photographed/videotaped, the surgeon will obtain the original picture, image, videotape or CD.  The hospital will not be responsible for storage, release or maintenance of the picture, image, tape or CD.    7.   I consent to the presence of a  or observers  in the operating room as deemed necessary by my physician or their designees.    8.   I recognize that in the event my procedure results in extended X-Ray/fluoroscopy time, I may develop a skin reaction.    9. If I have a Do Not Attempt Resuscitation (DNAR) order in place, that status will be suspended while in the operating room, procedural suite, and during the recovery period unless otherwise explicitly stated by me (or a person authorized to consent on my behalf). The surgeon or my attending physician will determine when the applicable recovery period ends for purposes of reinstating the DNAR order.  10. Patients having a sterilization procedure: I understand that if the procedure is successful the results will be permanent and it will therefore be impossible for me to inseminate, conceive, or bear children.  I also understand that the procedure is intended to result in sterility, although the result has not been guaranteed.   11. I acknowledge that my physician has explained sedation/analgesia administration to me including the risk and benefits I consent to the administration of sedation/analgesia as may be necessary or desirable in the judgment of my physician.    I CERTIFY THAT I HAVE READ AND FULLY UNDERSTAND THE ABOVE CONSENT TO OPERATION and/or OTHER PROCEDURE.    _________________________________________  __________________________________  Signature of Patient     Signature of Responsible Person         ___________________________________         Printed Name of Responsible Person           _________________________________                 Relationship to Patient  _________________________________________  ______________________________  Signature of Witness          Date  Time      Patient Name: Lisa Iqbal     : 10/11/1950                 Printed: May 8, 2024     Medical Record #: [id number]                     Page 1 of 2                                    [address]  [place]  [address]    Consent for Anesthesia    I, Lisa Iqbal agree to be cared for by an anesthesiologist, who is specially trained to monitor me and give me medicine to put me to sleep or keep me comfortable during my procedure    I understand that my anesthesiologist is not an employee or agent of [facility] or [employer]. He or she works for "[employer].    As the patient asking for anesthesia services, I agree to:  Allow the anesthesiologist (anesthesia doctor) to give me medicine and do additional procedures as necessary. Some examples are: Starting or using an “IV” to give me medicine, fluids or blood during my procedure, and having a breathing tube placed to help me breathe when I’m asleep (intubation). In the event that my heart stops working properly, I understand that my anesthesiologist will make every effort to sustain my life, unless otherwise directed by [facility] Do Not Resuscitate documents.  Tell my anesthesia doctor before my procedure:  If I am pregnant.  The last time that I ate or drank.  All of the medicines I take (including prescriptions, herbal supplements, and pills I can buy without a prescription (including street drugs/illegal medications). Failure to inform my anesthesiologist about these medicines may increase my risk of anesthetic complications.  If I am allergic to anything or have had a reaction to anesthesia before.  I understand how the anesthesia medicine will help me (benefits).  I understand that with any type of anesthesia medicine there are risks:  The most common risks are: nausea, vomiting, sore throat, muscle soreness, damage to my eyes, mouth, or teeth (from breathing tube placement).  Rare risks include: remembering what happened during my procedure, allergic reactions to medications, injury to my airway, heart, lungs, vision, nerves, or muscles and in extremely rare instances death.  My doctor  has explained to me other choices available to me for my care (alternatives).  Pregnant Patients (“epidural”):  I understand that the risks of having an epidural (medicine given into my back to help control pain during labor), include itching, low blood pressure, difficulty urinating, headache or slowing of the baby’s heart. Very rare risks include infection, bleeding, seizure, irregular heart rhythms and nerve injury.  Regional Anesthesia (“spinal”, “epidural”, & “nerve blocks”):  I understand that rare but potential complications include headache, bleeding, infection, seizure, irregular heart rhythms, and nerve injury.    I can change my mind about having anesthesia services at any time before I get the medicine.    _____________________________________________________________________________  Patient (or Representative) Signature/Relationship to Patient  Date   Time    _____________________________________________________________________________   Name (if used)    Language/Organization   Time    _____________________________________________________________________________  Anesthesiologist Signature     Date   Time  I have discussed the procedure and information above with the patient (or patient’s representative) and answered their questions. The patient or their representative has agreed to have anesthesia services.    _____________________________________________________________________________  Witness        Date   Time  I have verified that the signature is that of the patient or patient’s representative, and that it was signed before the procedure  Patient Name: Lisa Iqbal     : 10/11/1950                 Printed: May 8, 2024     Medical Record #: [id number]                     Page 2 of 2

## (undated) NOTE — ED AVS SNAPSHOT
Ramon Meza   MRN: [id number]    Department:  [facility] Emergency Department   Date of Visit:  3/27/2018           Disclosure     Insurance plans vary and the physician(s) referred by the ER may not be covered by your plan.  Please c tell this physician (or your personal doctor if your instructions are to return to your personal doctor) about any new or lasting problems. The primary care or specialist physician will see patients referred from the [facility] Emergency Department.  Kory Edwards

## (undated) NOTE — LETTER
Patient Name: Trinidad Thompson  YOB: 1950          MRN :  [id number]  Date:  8/24/2021  Referring Physician:  Beth Da Silva    Discharge Summary  Pt has attended 6 visits in Physical Therapy.      Dx: Dizziness         Insurance Janessa Flores resolution of dizziness. Following this, opted to spend time mobilizing and improving mobility of the upper cervical spine based on close proximity to neural supply for the ear, case studies have shown successful improvement in tinnitus with this.  [place]

## (undated) NOTE — LETTER
11/28/18        Andres Manzano [id number]      Dear Neill Cabot,    [facility] records indicate that you have outstanding lab work and or testing that was ordered for you and has not yet been completed:  Orders Placed This Encou

## (undated) NOTE — LETTER
03/05/20        Jailene Wise Prim [id number]      Dear Chriss Schwarz,    [facility] records indicate that you have outstanding lab work and or testing that was ordered for you and has not yet been completed: Mammogram - Please contac

## (undated) NOTE — ED AVS SNAPSHOT
Ramon Meza   MRN: [id number]    Department:  [facility] Emergency Department   Date of Visit:  3/25/2018           Disclosure     Insurance plans vary and the physician(s) referred by the ER may not be covered by your plan.  Please c tell this physician (or your personal doctor if your instructions are to return to your personal doctor) about any new or lasting problems. The primary care or specialist physician will see patients referred from the [facility] Emergency Department.  Jennifer Contreras

## (undated) NOTE — ED AVS SNAPSHOT
Ramon Meza   MRN: [id number]    Department:  [facility] Emergency Department   Date of Visit:  3/29/2018           Disclosure     Insurance plans vary and the physician(s) referred by the ER may not be covered by your plan.  Please c tell this physician (or your personal doctor if your instructions are to return to your personal doctor) about any new or lasting problems. The primary care or specialist physician will see patients referred from the [facility] Emergency Department.  Iva Duffy

## (undated) NOTE — LETTER
[address]  [address]  Authorization for Surgical Operation and Procedure     Date:___________                                                                                                         Time:__________  I hereby authorize Surgeon(s):  Heath Sidhu MD, my physician and his/her assistants (if applicable), which may include medical students, residents, and/or fellows, to perform the following surgical operation/ procedure and administer such anesthesia as may be determined necessary by my physician:  Operation/Procedure name (s) Procedure(s):  ESOPHAGOGASTRODUODENOSCOPY (EGD), FLEXIBLE SIGMOIDOSCOPY  FLEXIBLE SIGMOIDOSCOPY on Lisa Iqbal   2.   I recognize that during the surgical operation/procedure, unforeseen conditions may necessitate additional or different procedures than those listed above.  I, therefore, further authorize and request that the above-named surgeon, assistants, or designees perform such procedures as are, in their judgment, necessary and desirable.    3.   My surgeon/physician has discussed prior to my surgery the potential benefits, risks and side effects of this procedure; the likelihood of achieving goals; and potential problems that might occur during recuperation.  They also discussed reasonable alternatives to the procedure, including risks, benefits, and side effects related to the alternatives and risks related to not receiving this procedure.  I have had all my questions answered and I acknowledge that no guarantee has been made as to the result that may be obtained.    4.   Should the need arise during my operation/procedure, which includes change of level of care prior to discharge, I also consent to the administration of blood and/or blood products.  Further, I understand that despite careful testing and screening of blood or blood products by collecting agencies, I may still be subject to ill effects as a result  of receiving a blood transfusion and/or blood products.  The following are some, but not all, of the potential risks that can occur: fever and allergic reactions, hemolytic reactions, transmission of diseases such as Hepatitis, AIDS and Cytomegalovirus (CMV) and fluid overload.  In the event that I wish to have an autologous transfusion of my own blood, or a directed donor transfusion, I will discuss this with my physician.  Check only if Refusing Blood or Blood Products  I understand refusal of blood or blood products as deemed necessary by my physician may have serious consequences to my condition to include possible death. I hereby assume responsibility for my refusal and release the hospital, its personnel, and my physicians from any responsibility for the consequences of my refusal.          o  Refuse      5.   I authorize the use of any specimen, organs, tissues, body parts or foreign objects that may be removed from my body during the operation/procedure for diagnosis, research or teaching purposes and their subsequent disposal by hospital authorities.  I also authorize the release of specimen test results and/or written reports to my treating physician on the hospital medical staff or other referring or consulting physicians involved in my care, at the discretion of the Pathologist or my treating physician.    6.   I consent to the photographing or videotaping of the operations or procedures to be performed, including appropriate portions of my body for medical, scientific, or educational purposes, provided my identity is not revealed by the pictures or by descriptive texts accompanying them.  If the procedure has been photographed/videotaped, the surgeon will obtain the original picture, image, videotape or CD.  The hospital will not be responsible for storage, release or maintenance of the picture, image, tape or CD.    7.   I consent to the presence of a  or observers in the operating  room as deemed necessary by my physician or their designees.    8.   I recognize that in the event my procedure results in extended X-Ray/fluoroscopy time, I may develop a skin reaction.    9. If I have a Do Not Attempt Resuscitation (DNAR) order in place, that status will be suspended while in the operating room, procedural suite, and during the recovery period unless otherwise explicitly stated by me (or a person authorized to consent on my behalf). The surgeon or my attending physician will determine when the applicable recovery period ends for purposes of reinstating the DNAR order.  10. Patients having a sterilization procedure: I understand that if the procedure is successful the results will be permanent and it will therefore be impossible for me to inseminate, conceive, or bear children.  I also understand that the procedure is intended to result in sterility, although the result has not been guaranteed.   11. I acknowledge that my physician has explained sedation/analgesia administration to me including the risk and benefits I consent to the administration of sedation/analgesia as may be necessary or desirable in the judgment of my physician.    I CERTIFY THAT I HAVE READ AND FULLY UNDERSTAND THE ABOVE CONSENT TO OPERATION and/or OTHER PROCEDURE.    _________________________________________  __________________________________  Signature of Patient     Signature of Responsible Person         ___________________________________         Printed Name of Responsible Person           _________________________________                 Relationship to Patient  _________________________________________  ______________________________  Signature of Witness          Date  Time      Patient Name: Lisa Iqbal     : 10/11/1950                 Printed: May 9, 2024     Medical Record #: [id number]                     Page 1 of 2                                    [address]  [place]  [address]    Consent for Anesthesia    I, Lisa Iqbal agree to be cared for by an anesthesiologist, who is specially trained to monitor me and give me medicine to put me to sleep or keep me comfortable during my procedure    I understand that my anesthesiologist is not an employee or agent of [facility] or [employer]. He or she works for [employer]    As the patient asking for anesthesia services, I agree to:  Allow the anesthesiologist (anesthesia doctor) to give me medicine and do additional procedures as necessary. Some examples are: Starting or using an “IV” to give me medicine, fluids or blood during my procedure, and having a breathing tube placed to help me breathe when I’m asleep (intubation). In the event that my heart stops working properly, I understand that my anesthesiologist will make every effort to sustain my life, unless otherwise directed by [facility] Do Not Resuscitate documents.  Tell my anesthesia doctor before my procedure:  If I am pregnant.  The last time that I ate or drank.  All of the medicines I take (including prescriptions, herbal supplements, and pills I can buy without a prescription (including street drugs/illegal medications). Failure to inform my anesthesiologist about these medicines may increase my risk of anesthetic complications.  If I am allergic to anything or have had a reaction to anesthesia before.  I understand how the anesthesia medicine will help me (benefits).  I understand that with any type of anesthesia medicine there are risks:  The most common risks are: nausea, vomiting, sore throat, muscle soreness, damage to my eyes, mouth, or teeth (from breathing tube placement).  Rare risks include: remembering what happened during my procedure, allergic reactions to medications, injury to my airway, heart, lungs, vision, nerves, or muscles and in extremely rare instances death.  My doctor has explained  to me other choices available to me for my care (alternatives).  Pregnant Patients (“epidural”):  I understand that the risks of having an epidural (medicine given into my back to help control pain during labor), include itching, low blood pressure, difficulty urinating, headache or slowing of the baby’s heart. Very rare risks include infection, bleeding, seizure, irregular heart rhythms and nerve injury.  Regional Anesthesia (“spinal”, “epidural”, & “nerve blocks”):  I understand that rare but potential complications include headache, bleeding, infection, seizure, irregular heart rhythms, and nerve injury.    I can change my mind about having anesthesia services at any time before I get the medicine.    _____________________________________________________________________________  Patient (or Representative) Signature/Relationship to Patient  Date   Time    _____________________________________________________________________________   Name (if used)    Language/Organization   Time    _____________________________________________________________________________  Anesthesiologist Signature     Date   Time  I have discussed the procedure and information above with the patient (or patient’s representative) and answered their questions. The patient or their representative has agreed to have anesthesia services.    _____________________________________________________________________________  Witness        Date   Time  I have verified that the signature is that of the patient or patient’s representative, and that it was signed before the procedure  Patient Name: Lisa Iqbal     : 10/11/1950                 Printed: May 9, 2024     Medical Record #: [id number]                     Page 2 of 2

## (undated) NOTE — LETTER
[facility]  [address] [address]  [phone]  AUTHORIZATION FOR SURGICAL OPERATION OR PROCEDURE                                                           I hereby authorize Dr. Sidhu, my physician and his/her assistants (if applicable), which may include medical students, residents, and/or fellows, to perform the following surgical operation/ procedure and administer such anesthesia as may be determined necessary by my physician:  Operation/Procedure Esophagogastroduodenoscopy with possible biopsy control of bleeding under monitored anesthesia care On Lisa Iqbal.  2.   I recognize that during the surgical operation/procedure, unforeseen conditions may necessitate additional or different procedures than those listed above.  I, therefore, further authorize and request that the above-named surgeon, assistants, or designees perform such procedures as are, in their judgment, necessary and desirable.    3.   My surgeon/physician has discussed prior to my surgery the potential benefits, risks and side effects of this procedure; the likelihood of achieving goals; and potential problems that might occur during recuperation.  They also discussed reasonable alternatives to the procedure, including risks, benefits, and side effects related to the alternatives and risks related to not receiving this procedure.  I have had all my questions answered and I acknowledge that no guarantee has been made as to the result that may be obtained.    4.   Should the need arise during my operation/procedure, which includes change of level of care prior to discharge, I also consent to the administration of blood and/or blood products.  Further, I understand that despite careful testing and screening of blood or blood products by collecting agencies, I may still be subject to ill effects as a result of receiving a blood transfusion and/or blood products.  The following are some,  but not all, of the potential risks that can occur: fever and allergic reactions, hemolytic reactions, transmission of diseases such as Hepatitis, AIDS and Cytomegalovirus (CMV) and fluid overload.  In the event that I wish to have an autologous transfusion of my own blood, or a directed donor transfusion, I will discuss this with my physician.  Check only if Refusing Blood or Blood Products  I understand refusal of blood or blood products as deemed necessary by my physician may have serious consequences to my condition to include possible death. I hereby assume responsibility for my refusal and release the hospital, its personnel, and my physicians from any responsibility for the consequences of my refusal.          o  Refuse   5.   I authorize the use of any specimen, organs, tissues, body parts or foreign objects that may be removed from my body during the operation/procedure for diagnosis, research or teaching purposes and their subsequent disposal by hospital authorities.  I also authorize the release of specimen test results and/or written reports to my treating physician on the hospital medical staff or other referring or consulting physicians involved in my care, at the discretion of the Pathologist or my treating physician.    6.   I consent to the photographing or videotaping of the operations or procedures to be performed, including appropriate portions of my body for medical, scientific, or educational purposes, provided my identity is not revealed by the pictures or by descriptive texts accompanying them.  If the procedure has been photographed/videotaped, the surgeon will obtain the original picture, image, videotape or CD.  The hospital will not be responsible for storage, release or maintenance of the picture, image, tape or CD.    7.   I consent to the presence of a  or observers in the operating room as deemed necessary by my physician or their designees.    8.   I recognize that  in the event my procedure results in extended X-Ray/fluoroscopy time, I may develop a skin reaction.    9. If I have a Do Not Attempt Resuscitation (DNAR) order in place, that status will be suspended while in the operating room, procedural suite, and during the recovery period unless otherwise explicitly stated by me (or a person authorized to consent on my behalf). The surgeon or my attending physician will determine when the applicable recovery period ends for purposes of reinstating the DNAR order.  10. Patients having a sterilization procedure: I understand that if the procedure is successful the results will be permanent and it will therefore be impossible for me to inseminate, conceive, or bear children.  I also understand that the procedure is intended to result in sterility, although the result has not been guaranteed.   11. I acknowledge that my physician has explained sedation/analgesia administration to me including the risk and benefits I consent to the administration of sedation/analgesia as may be necessary or desirable in the judgment of my physician.    I CERTIFY THAT I HAVE READ AND FULLY UNDERSTAND THE ABOVE CONSENT TO OPERATION and/or OTHER PROCEDURE.     _________________________________________ _________________________________     ___________________________________  Signature of Patient     Signature of Responsible Person                   Printed Name of Responsible Person                              _________________________________________ ______________________________        ___________________________________  Signature of Witness         Date  Time         Relationship to Patient    Patient Name: Lisa Iqbal    : 10/11/1950   Printed: 2024      Medical Record #: [id number]                                              Page 1 of 1

## (undated) NOTE — IP AVS SNAPSHOT
Patient Demographics     Address  [facility] [address]  [facility] [address] Phone  [phone] (Home)  [phone] (Mobile) *Preferred* E-mail Address  [email or web address]      Patient Contacts     Name Relation Home Work Mobile    Beryl Blanchard Daughter [phone]    Shanice Mills Daughter   [phone]      Allergies as of 5/18/2024  Review status set to In Progress on 4/27/2024       Noted Reaction Type Reactions    Septra [sulfamethoxazole W/trimethoprim] 11/21/2017    RASH    Sulfa Antibiotics 10/24/2017    RASH      Code Status Information     Code Status    DNAR/Selective Treatment        Patient Instructions       Sometimes managing your health at home requires assistance.  The [facility] team has recognized your preference to use [facility].  They can be reached by phone at [phone].  The fax number for your reference is [phone].. A representative from the home health agency will contact you or your family to schedule your first visit.        Patient has been refusing all meds, speaks Yi and is brief and chucked. Patient is non-decisional, regular diet. Family has been communicating with hospice to see if that is an appropriate decision for patient. Patient  has port and was de-accesssed 5/18/2024.     Follow-up Information     Kb Reyes MD. Go in 2 week(s).    Specialty: GASTROENTEROLOGY  Why: for a follow-up office visit with GI. The office will call you to arrange date/time of visit  Contact information:  [place] [address] [phone]                        Your Home Meds List      TAKE these medications       Instructions Authorizing Provider Morning Afternoon Evening As Needed   Lidocaine Viscous HCl 2 % Soln  Commonly known as: XYLOCAINE      Take 5 mL by mouth every 3 (three) hours as needed for Pain. Swish in the mouth and spit out.   Ronan Camarillo         maalox/diphenhydramine/sucralfate Susp  Commonly  known as: MAGIC MOUTHWASH  Next dose due: tonight      Take 10 mL by mouth TID & HS.   Gabby Henry         morphINE ER 15 MG Tbcr  Commonly known as: MS Contin      Take 1 tablet (15 mg total) by mouth every 12 (twelve) hours.   Priscila Baron         OLANZapine 2.5 MG Tabs  Commonly known as: ZyPREXA  Next dose due: Tonight      Take 1 tablet (2.5 mg total) by mouth nightly.   Yuliet Martinez         ondansetron 8 MG tablet  Commonly known as: Zofran      Take 1 tablet (8 mg total) by mouth every 8 (eight) hours as needed.   Belindasa MARLENE Desai Strina         polyethylene glycol (PEG 3350) 17 GM/SCOOP Powd  Commonly known as: MiraLax      Take 17 g by mouth 2 (two) times daily.   Priscila Baron         polyethylene glycol (PEG 3350) 17 g Pack  Commonly known as: Miralax      Take 17 g by mouth daily as needed.  Stop taking on: May 25, 2024   Gabby Henry         prochlorperazine 10 mg tablet  Commonly known as: Compazine      Take 1 tablet (10 mg total) by mouth every 6 (six) hours as needed for Nausea.   Ronan Camarillo         sennosides 8.6 MG Tabs  Commonly known as: Senokot  Next dose due: Tonight      Take 1 tablet (8.6 mg total) by mouth nightly.   Priscila Baron                  [id number] - MAR ACTION REPORT  (last 48 hrs)    ** SITE UNKNOWN **     Order ID Medication Name Action Time Action Reason Comments    [id number] camphor/menthol/methyl salicylate (Thera-Gesic) 10-15 % cream 05/16/24 2014 Given      [id number] fluconazole in sodium chloride 0.9% (Diflucan) 200 mg/100mL IVPB premix 200 mg 05/17/24 0000 New Bag      [id number] fluconazole in sodium chloride 0.9% (Diflucan) 200 mg/100mL IVPB premix 200 mg 05/18/24 0037 New Bag      [id number] pantoprazole (Protonix) 40 mg in sodium chloride 0.9% PF 10 mL IV push 05/16/24 1234 Given      [id number] pantoprazole (Protonix) 40 mg in sodium chloride 0.9% PF 10 mL IV push 05/17/24 1325 Given      [id number] potassium chloride 20 mEq in dextrose  5%-sodium chloride 0.9% 1000mL infusion premix 05/16/24 1424 New Bag      [id number] potassium chloride 20 mEq in dextrose 5%-sodium chloride 0.9% 1000mL infusion premix 05/16/24 2305 New Bag      [id number] potassium chloride 20 mEq in dextrose 5%-sodium chloride 0.9% 1000mL infusion premix 05/17/24 2009 New Bag              Recent Vital Signs    Flowsheet Row Most Recent Value   /73 Filed at 05/18/2024 0325   Pulse 101 Filed at 05/18/2024 0532   Resp 18 Filed at 05/18/2024 0325   Temp 98.4 °F (36.9 °C) Filed at 05/18/2024 0325   SpO2 93 % Filed at 05/18/2024 0325      Patient's Most Recent Weight    Flowsheet Row Most Recent Value   Patient Weight 65 kg (143 lb 3.2 oz)      Lab Results Last 24 Hours    No matching results found     Microbiology Results (All)     Procedure Component Value Units Date/Time    Blood Culture [[id number]] Collected: 04/29/24 1521    Order Status: Completed Lab Status: Final result Updated: 05/04/24 1901    Specimen: Blood,peripheral      Blood Culture Result No Growth 5 Days    Blood Culture [[id number]] Collected: 04/29/24 1521    Order Status: Completed Lab Status: Final result Updated: 05/04/24 1901    Specimen: Blood,peripheral      Blood Culture Result No Growth 5 Days    Urine Culture, Routine [[id number]] Collected: 04/30/24 1535    Order Status: Completed Lab Status: Final result Updated: 05/02/24 0732    Specimen: Urine, clean catch      Urine Culture No Growth at 18-24 hrs.    SARS-CoV-2/Flu A and B/RSV by PCR (GeneXpert) [[id number]]  (Normal) Collected: 04/27/24 1813    Order Status: Completed Lab Status: Final result Updated: 04/27/24 1914    Specimen: Other from Nares      SARS-CoV-2 (COVID-19) - (GeneXpert) Not Detected     Influenza A by PCR Negative     Influenza B by PCR Negative     RSV by PCR Negative    Narrative:      This test is intended for the qualitative detection and differentiation of SARS-CoV-2, influenza A, influenza B, and respiratory syncytial virus  (RSV) viral RNA in nasopharyngeal or nares swabs from individuals suspected of respiratory viral infection consistent with COVID-19 by their healthcare provider. Signs and symptoms of respiratory viral infection due to SARS-CoV-2, influenza, and RSV can be similar.    Test performed using the Xpert Xpress SARS-CoV-2/FLU/RSV (real time RT-PCR)  assay on the GeneXpert instrument, [employer], [place] [address].   This test is being used under the Food and Drug Administration's Emergency Use Authorization.    The authorized Fact Sheet for Healthcare Providers for this assay is available upon request from the laboratory.    Rapid Strep A Screen () [[id number]]  (Normal) Collected: 24    Order Status: Completed Lab Status: Final result Updated: 24    Specimen: Other from Throat      Rapid Strep A Result Negative for Beta Streptococcus, Group A    Narrative:      A confirmatory culture is recommended if clinically indicated.         H&P - H&P Note      H&P signed by Enedleia Lui MD at 2024 11:44 PM  Version 2 of 2    Author: Enedelia Lui MD Service: Hospitalist Author Type: Physician    Filed: 2024 11:44 PM Date of Service: 2024  9:00 PM Status: Addendum    : Enedelia Lui MD (Physician)    Related Notes: Original Note by Enedelia Lui MD (Physician) filed at 2024 11:42 PM         [facility]  [facility]     Lisa Iqbal Patient Status:  Inpatient    10/11/1950 MRN [id number]   Location [facility] 4NW-A Attending Enedelia Lui MD   Hosp Day # 0 PCP Anusha Abraham MD     Chief Complaint: Difficulty eating    Subjective:    History of Present Illness:     Lisa Iqbal is a 73 year old female with 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  Patient  has visited the emergency room multiple times over the last week.  She had been on Magic mouthwash and nystatin solution however on clear whether she has been compliant with this.  Denies any fever, chills, cough, chest pain or shortness of breath, diarrhea or nausea.    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    Colon cancer (HCC)    Disorder of thyroid    Esophageal reflux    Exposure to medical diagnostic radiation    Last treatment 2018    History of adverse reaction to anesthesia    Has anxiety/nervousness when waking up    Migraines    Nausea and vomiting in adult    Osteopenia    Osteoporosis    Vertigo    Visual impairment    Glasses     Past Surgical History:   Past Surgical History:   Procedure Laterality Date          x 3    Colonoscopy N/A 2024    Procedure: COLONOSCOPY;  Surgeon: Po Aviles MD;  Location:  ENDOSCOPY    Hysterectomy      Bilateral ovaries remain    Laryngoscopy,dirct,op,biopsy  01/15/2018      Family History:   Family History   Problem Relation Age of Onset    Cancer Father         liver cancer    Cancer Brother         liver cancer     Social History:    reports that she has never smoked. She has never used smokeless tobacco. She reports that she does not drink alcohol and does not use drugs.     Allergies:   Allergies   Allergen Reactions    Septra [Sulfamethoxazole W/Trimethoprim] RASH    Sulfa Antibiotics RASH       Medications:    Current Facility-Administered Medications on File Prior to Encounter   Medication Dose Route Frequency Provider Last Rate Last Admin    [COMPLETED] sodium chloride 0.9 % IV bolus 1,000 mL  1,000 mL Intravenous Once Yuliet Martinez APRN   Stopped at 24 1355    [COMPLETED] potassium chloride 20 mEq in sodium chloride 0.9% 1,010 mL IVPB (Onc)   Intravenous Once Sonja Escobar APRN   Stopped at 24 1605    [COMPLETED] sodium chloride 0.9 % IV bolus 1,000  mL  1,000 mL Intravenous Once Gabby Henry MD   Stopped at 04/25/24 1246    [COMPLETED] ondansetron (Zofran) 4 MG/2ML injection 4 mg  4 mg Intravenous Once Gabby Henry MD   4 mg at 04/25/24 1245    [COMPLETED] potassium chloride 20 mEq/100mL IVPB premix 20 mEq  20 mEq Intravenous Once Gabby Henry MD   Stopped at 04/25/24 1530    [COMPLETED] sodium chloride 0.9% infusion   Intravenous Once Gabby Henry MD   Stopped at 04/25/24 1530    [COMPLETED] sodium chloride 0.9 % IV bolus 1,000 mL  1,000 mL Intravenous Once Yuliet Martinez APRN   Stopped at 04/24/24 1158    [COMPLETED] sodium chloride 0.9 % IV bolus 1,000 mL  1,000 mL Intravenous Once Yuliet Martinez APRN   Stopped at 04/22/24 1544    [COMPLETED] pegfilgrastim-jmdb (Fulphila) 6 MG/0.6ML SUBQ injection 6 mg  6 mg Subcutaneous Once Tania Whaley APRN   6 mg at 04/19/24 1228    [COMPLETED] sodium chloride 0.9 % IV bolus 1,000 mL  1,000 mL Intravenous Once Yuliet Martinez APRN   Stopped at 04/19/24 1315    [COMPLETED] ondansetron (Zofran) 16 mg, dexAMETHasone (Decadron) 20 mg in sodium chloride 0.9% 110 mL IVPB   Intravenous Once Tania Whaley APRN   Stopped at 04/17/24 1131    [COMPLETED] bevacizumab-bvzr (Zirabev) 350 mg in sodium chloride 0.9% 114 mL infusion  5 mg/kg (Treatment Plan Recorded) Intravenous Once Tania Whaley APRN   Stopped at 04/17/24 1205    [COMPLETED] oxaliplatin (Eloxatin) 110 mg in dextrose 5% 272 mL infusion  65 mg/m2 (Treatment Plan Recorded) Intravenous Once Tania Whaley APRN   Stopped at 04/17/24 1421    [COMPLETED] leucovorin 700 mg in dextrose 5% 250 mL infusion  400 mg/m2 (Treatment Plan Recorded) Intravenous Once Tania Whaley APRN   Stopped at 04/17/24 1421    [COMPLETED] fluorouracil (Adrucil) 50 mg/mL IV push 700 mg 14 mL  400 mg/m2 (Treatment Plan Recorded) Intravenous Once Tania Whaley APRN   700 mg at 04/17/24 1423    [COMPLETED] sodium chloride 0.9 % IV bolus 1,000 mL  1,000  mL Intravenous Once Abhinav Dominguez MD   Stopped at 04/15/24 2230    [COMPLETED] heparin (Porcine) 100 Units/mL lock flush 500 Units  5 mL Intravenous Once Abhinav Dominguez MD   500 Units at 04/15/24 2352    [COMPLETED] sodium chloride 0.9 % IV bolus 1,000 mL  1,000 mL Intravenous Once Yuliet Martinez APRN   Stopped at 04/12/24 1532    [COMPLETED] iopamidol 76% (ISOVUE-370) injection for power injector  80 mL Intravenous ONCE PRN Madi Trujillo DO   80 mL at 04/12/24 0142    [COMPLETED] heparin (Porcine) 100 Units/mL lock flush 500 Units  5 mL Intravenous Once Madi Trujillo DO   500 Units at 04/12/24 0314    [COMPLETED] ondansetron (Zofran) 4 MG/2ML injection 4 mg  4 mg Intravenous Once Madi Trujillo DO   4 mg at 04/11/24 2339    [COMPLETED] sodium chloride 0.9 % IV bolus 500 mL  500 mL Intravenous Once Madi Trujillo DO   Stopped at 04/12/24 0101    [COMPLETED] sodium chloride 0.9 % IV bolus 1,000 mL  1,000 mL Intravenous Once Yuliet Martinez APRN   Stopped at 04/10/24 1537    [COMPLETED] sodium chloride 0.9 % IV bolus 1,000 mL  1,000 mL Intravenous Once Tania Whaley APRN   Stopped at 04/05/24 1316    [COMPLETED] pegfilgrastim-jmdb (Fulphila) 6 MG/0.6ML SUBQ injection 6 mg  6 mg Subcutaneous Once Tania Whaley APRN   6 mg at 04/05/24 1213    [COMPLETED] potassium chloride (K-Dur) tab 40 mEq  40 mEq Oral Once Tania Whaley APRN   40 mEq at 04/03/24 1127    [COMPLETED] ondansetron (Zofran) 16 mg, dexAMETHasone (Decadron) 20 mg in sodium chloride 0.9% 110 mL IVPB   Intravenous Once Tania Whaley APRN   Stopped at 04/03/24 1204    [COMPLETED] bevacizumab-bvzr (Zirabev) 350 mg in sodium chloride 0.9% 114 mL infusion  5 mg/kg (Treatment Plan Recorded) Intravenous Once Tania Whaley APRN   Stopped at 04/03/24 1144    [COMPLETED] oxaliplatin (Eloxatin) 110 mg in dextrose 5% 272 mL infusion  65 mg/m2 (Treatment Plan Recorded) Intravenous Once Tania Whaley APRN   Stopped at 04/03/24 1417    [COMPLETED]  leucovorin 700 mg in dextrose 5% 250 mL infusion  400 mg/m2 (Treatment Plan Recorded) Intravenous Once Tania Whaley APRN   Stopped at 24 1417    [COMPLETED] fluorouracil (Adrucil) 50 mg/mL IV push 700 mg 14 mL  400 mg/m2 (Treatment Plan Recorded) Intravenous Once Tania Whaley APRN   700 mg at 24 1434    [COMPLETED] heparin (Porcine) 100 Units/mL lock flush             [COMPLETED] lidocaine-EPINEPHrine (Xylocaine-Epinephrine) 2 %-1:669435 injection             [COMPLETED] lidocaine (Xylocaine) 1 % injection             [COMPLETED] fentaNYL (Sublimaze) 50 mcg/mL injection             [COMPLETED] vancomycin (Vancocin) 1 g injection             [COMPLETED] midazolam (Versed) 2 MG/2ML injection             [COMPLETED] ceFAZolin (Ancef) 1 g injection             [COMPLETED] heparin (Porcine) 5000 UNIT/ML injection             [COMPLETED] ondansetron (Zofran) tab 4 mg  4 mg Oral Once Kwadwo Monsivais MD   4 mg at 24 0114    [COMPLETED] furosemide (Lasix) 10 mg/mL injection 20 mg  20 mg Intravenous Once Bernadette Vick MD   20 mg at 24 2242    [COMPLETED] sodium chloride 0.9 % IV bolus 1,000 mL  1,000 mL Intravenous Once Yuliet Martinez APRN   Stopped at 24 1503    [COMPLETED] ondansetron (Zofran) 4 MG/2ML injection 4 mg  4 mg Intravenous Once Bernadette Vick MD   4 mg at 24 1016    [COMPLETED] levoFLOXacin in dextrose 5% (Levaquin) 750 mg/150mL IVPB premix 750 mg  750 mg Intravenous Once Bernadette Vick  mL/hr at 24 1103 750 mg at 24 1103    [] ketorolac (Toradol) 15 MG/ML injection 15 mg  15 mg Intravenous Q6H PRN Jojo Barba MD   15 mg at 24 1950    [COMPLETED] oxaliplatin (Eloxatin) 140 mg in dextrose 5% 278 mL infusion  85 mg/m2 Intravenous Once Ronan Camarillo  mL/hr at 24 1016 140 mg at 24 1016    [COMPLETED] leucovorin 650 mg in dextrose 5% 250 mL IVPB  400 mg/m2 Intravenous Once Ronan Camarillo MD   650 mg at 24  1016    [COMPLETED] fluorouracil (Adrucil) 650 mg IV push  400 mg/m2 Intravenous Once Ronan Camarillo MD   650 mg at 24 1344    [COMPLETED] ondansetron (Zofran) 16 mg, dexAMETHasone (Decadron) 20 mg in sodium chloride 0.9% 110 mL IVPB   Intravenous Once Ronan Camarillo MD   Given at 24 0941    [COMPLETED] ondansetron (Zofran) 4 MG/2ML injection 4 mg  4 mg Intravenous Once Whitney Guerrero MD   4 mg at 24 1850    [COMPLETED] sodium chloride 0.9 % IV bolus 1,000 mL  1,000 mL Intravenous Once Whitney Guerrero MD   Stopped at 24    [COMPLETED] iopamidol 76% (ISOVUE-370) injection for power injector  80 mL Intravenous ONCE PRN Whitney Guerrero MD   80 mL at 24 1918    [COMPLETED] levoFLOXacin (Levaquin) tab 750 mg  750 mg Oral Once Whitney Guerrero MD   750 mg at 24    [] midazolam (Versed) 2 MG/2ML injection 1 mg  1 mg Intravenous Q5 Min PRN Clayton Bahena MD   0.5 mg at 24 1008    [] fentaNYL (Sublimaze) 50 mcg/mL injection 50 mcg  50 mcg Intravenous Q5 Min PRN Clayton Bahena MD   25 mcg at 24 1017     Current Outpatient Medications on File Prior to Encounter   Medication Sig Dispense Refill    maalox/diphenhydramine/sucralfate Oral Suspension Take 10 mL by mouth TID & HS. 240 mL 0    polyethylene glycol, PEG 3350, 17 g Oral Powd Pack Take 17 g by mouth daily as needed. 12 each 0    nystatin 718624 UNIT/ML Mouth/Throat Suspension Take 5 mL (500,000 Units total) by mouth 4 (four) times daily for 7 days. 200 mL 0    morphINE ER 15 MG Oral Tab CR Take 1 tablet (15 mg total) by mouth every 12 (twelve) hours. 60 tablet 0    prochlorperazine (COMPAZINE) 10 mg tablet Take 1 tablet (10 mg total) by mouth every 6 (six) hours as needed for Nausea. 30 tablet 3    sennosides 8.6 MG Oral Tab Take 1 tablet (8.6 mg total) by mouth nightly. 60 tablet 1    polyethylene glycol, PEG 3350, (MIRALAX) 17 GM/SCOOP Oral Powder Take 17 g by mouth 2 (two)  times daily. 578 g 1    OLANZapine 2.5 MG Oral Tab Take 1 tablet (2.5 mg total) by mouth nightly. 30 tablet 0    Lidocaine Viscous HCl 2 % Mouth/Throat Solution Take 5 mL by mouth every 3 (three) hours as needed for Pain. Swish in the mouth and spit out. 100 mL 1    levothyroxine 25 MCG Oral Tab Take 1 tablet (25 mcg total) by mouth before breakfast.      ondansetron (ZOFRAN) 8 MG tablet Take 1 tablet (8 mg total) by mouth every 8 (eight) hours as needed. 30 tablet 0       Review of Systems:   A comprehensive review of systems was completed.    Pertinent positives and negatives noted in the HPI.    Objective:   Physical Exam:    /72 (BP Location: Right arm)   Pulse 75   Temp 99 °F (37.2 °C) (Oral)   Resp 17   Ht 5' 1\" (1.549 m)   Wt 135 lb 2.3 oz (61.3 kg)   SpO2 99%   BMI 25.53 kg/m²   General: No acute distress, Alert  Respiratory: No rhonchi, no wheezes  Cardiovascular: S1, S2. Regular rate and rhythm  Abdomen: Soft, Non-tender, non-distended, positive bowel sounds  Neuro: No new focal deficits  Extremities: No edema      Results:    Labs:      Labs Last 24 Hours:    Recent Labs   Lab 04/25/24  1110 04/26/24  1306 04/27/24  1813   RBC 3.79* 3.71* 4.13   HGB 10.3* 10.1* 11.2*   HCT 32.9* 31.6* 35.5   MCV 86.8 85.2 86.0   MCH 27.2 27.2 27.1   MCHC 31.3 32.0 31.5   RDW 20.4 20.3 20.6   NEPRELIM 0.34* 0.09* 0.03*   WBC 0.7* 0.4* 0.3*   PLT 94.0* 64.0* 50.0*       Recent Labs   Lab 04/25/24  1110 04/26/24  1306 04/27/24  1813   GLU 90 106* 114*   BUN 7* 7* 8*   CREATSERUM 0.39* 0.51* 0.64   EGFRCR 104 98 93   CA 8.5 8.4* 9.1   ALB 3.0* 2.9* 3.3*    137 139   K 3.0* 3.3* 3.2*    104 103   CO2 21.0 25.0 26.0   ALKPHO 155* 142 148*   AST 29 21 21   ALT 12* 13 15   BILT 2.2* 1.6 1.5   TP 6.4 6.2* 7.2       Lab Results   Component Value Date    INR 1.5 (A) 03/27/2024    INR 1.39 (H) 03/14/2024    INR 1.07 02/08/2024       No results for input(s): \"TROP\", \"TROPHS\", \"CK\" in the last 168 hours.    No  results for input(s): \"TROP\", \"PBNP\" in the last 168 hours.    No results for input(s): \"PCT\" in the last 168 hours.    Imaging: Imaging data reviewed in Epic.    Assessment & Plan:      # mucositis   - cont magic mouth wash, nystatin solution   - gentle IVF   - pain control     # pancytopenia   - due to chemo   - monitor   - oncology consulted     # oral candidiasis   - will place on fluconazole IV    # hypokalemia , replace     # metastatic sigmoid colon cancer           Plan of care discussed with patient and ER.     Enedelia Lui MD    Supplementary Documentation:     The 21st Century Cures Act makes medical notes like these available to patients in the interest of transparency. Please be advised this is a medical document. Medical documents are intended to carry relevant information, facts as evident, and the clinical opinion of the practitioner. The medical note is intended as peer to peer communication and may appear blunt or direct. It is written in medical language and may contain abbreviations or verbiage that are unfamiliar.               **Certification      PHYSICIAN Certification of Need for Inpatient Hospitalization - Initial Certification    Patient will require inpatient services that will reasonably be expected to span two midnight's based on the clinical documentation in H+P.   Based on patients current state of illness, I anticipate that, after discharge, patient will require TBD.                    Electronically signed by Enedelia Lui MD on 4/27/2024 11:44 PM     H&P signed by Enedelia Lui MD at 4/27/2024 11:42 PM  Version 1 of 2    Author: Enedelia Lui MD Service: Hospitalist Author Type: Physician    Filed: 4/27/2024 11:42 PM Date of Service: 4/27/2024  9:00 PM Status: Signed    : Enedelia Lui MD (Physician)    Related Notes: Addendum by Enedelia Lui MD (Physician) filed at 4/27/2024 11:44 PM         [facility]  History and Physical     Lisa Iqbal  Patient Status:  Inpatient    10/11/1950 MRN [id number]   Location [facility] 4NW-A Attending Enedelia Lui MD   Hosp Day # 0 PCP Anusha Abraham MD     Chief Complaint: Difficulty eating    Subjective:    History of Present Illness:     Lisa Iqbal is a 73 year old female with 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  Patient has visited the emergency room multiple times over the last week.  She had been on Magic mouthwash and nystatin solution however on clear whether she has been compliant with this.  Denies any fever, chills, cough, chest pain or shortness of breath, diarrhea or nausea.    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    Colon cancer (HCC)    Disorder of thyroid    Esophageal reflux    Exposure to medical diagnostic radiation    Last treatment 2018    History of adverse reaction to anesthesia    Has anxiety/nervousness when waking up    Migraines    Nausea and vomiting in adult    Osteopenia    Osteoporosis    Vertigo    Visual impairment    Glasses     Past Surgical History:   Past Surgical History:   Procedure Laterality Date          x 3    Colonoscopy N/A 2024    Procedure: COLONOSCOPY;  Surgeon: Po Aviles MD;  Location:  ENDOSCOPY    Hysterectomy      Bilateral ovaries remain    Laryngoscopy,dirct,op,biopsy  01/15/2018      Family History:   Family History   Problem Relation Age of Onset    Cancer Father         liver cancer    Cancer Brother         liver cancer     Social History:    reports that she has never smoked. She has never used smokeless tobacco. She reports that she does not drink alcohol and does not use drugs.     Allergies:   Allergies   Allergen Reactions    Septra [Sulfamethoxazole  W/Trimethoprim] RASH    Sulfa Antibiotics RASH       Medications:    Current Facility-Administered Medications on File Prior to Encounter   Medication Dose Route Frequency Provider Last Rate Last Admin    [COMPLETED] sodium chloride 0.9 % IV bolus 1,000 mL  1,000 mL Intravenous Once Yuliet Martinez APRN   Stopped at 04/26/24 1355    [COMPLETED] potassium chloride 20 mEq in sodium chloride 0.9% 1,010 mL IVPB (Onc)   Intravenous Once Sonja Escobar APRN   Stopped at 04/26/24 1605    [COMPLETED] sodium chloride 0.9 % IV bolus 1,000 mL  1,000 mL Intravenous Once Gabby Henry MD   Stopped at 04/25/24 1246    [COMPLETED] ondansetron (Zofran) 4 MG/2ML injection 4 mg  4 mg Intravenous Once Gabby Henry MD   4 mg at 04/25/24 1245    [COMPLETED] potassium chloride 20 mEq/100mL IVPB premix 20 mEq  20 mEq Intravenous Once Gabby Henry MD   Stopped at 04/25/24 1530    [COMPLETED] sodium chloride 0.9% infusion   Intravenous Once Gabby Henry MD   Stopped at 04/25/24 1530    [COMPLETED] sodium chloride 0.9 % IV bolus 1,000 mL  1,000 mL Intravenous Once Yuliet Matrinez APRN   Stopped at 04/24/24 1158    [COMPLETED] sodium chloride 0.9 % IV bolus 1,000 mL  1,000 mL Intravenous Once Yuliet Martinez APRN   Stopped at 04/22/24 1544    [COMPLETED] pegfilgrastim-jmdb (Fulphila) 6 MG/0.6ML SUBQ injection 6 mg  6 mg Subcutaneous Once Tania Whaley APRN   6 mg at 04/19/24 1228    [COMPLETED] sodium chloride 0.9 % IV bolus 1,000 mL  1,000 mL Intravenous Once Yuliet Martinez APRN   Stopped at 04/19/24 1315    [COMPLETED] ondansetron (Zofran) 16 mg, dexAMETHasone (Decadron) 20 mg in sodium chloride 0.9% 110 mL IVPB   Intravenous Once Tania Whaley APRN   Stopped at 04/17/24 1131    [COMPLETED] bevacizumab-bvzr (Zirabev) 350 mg in sodium chloride 0.9% 114 mL infusion  5 mg/kg (Treatment Plan Recorded) Intravenous Once Tania Whaley APRN   Stopped at 04/17/24 1205    [COMPLETED] oxaliplatin  (Eloxatin) 110 mg in dextrose 5% 272 mL infusion  65 mg/m2 (Treatment Plan Recorded) Intravenous Once Tania Whaley APRN   Stopped at 04/17/24 1421    [COMPLETED] leucovorin 700 mg in dextrose 5% 250 mL infusion  400 mg/m2 (Treatment Plan Recorded) Intravenous Once Tania Whaley APRN   Stopped at 04/17/24 1421    [COMPLETED] fluorouracil (Adrucil) 50 mg/mL IV push 700 mg 14 mL  400 mg/m2 (Treatment Plan Recorded) Intravenous Once Tania Whaley APRN   700 mg at 04/17/24 1427    [COMPLETED] sodium chloride 0.9 % IV bolus 1,000 mL  1,000 mL Intravenous Once Abhinav Dominguez MD   Stopped at 04/15/24 2230    [COMPLETED] heparin (Porcine) 100 Units/mL lock flush 500 Units  5 mL Intravenous Once Abhinav Dominguez MD   500 Units at 04/15/24 2352    [COMPLETED] sodium chloride 0.9 % IV bolus 1,000 mL  1,000 mL Intravenous Once Yuliet Martinez APRN   Stopped at 04/12/24 1532    [COMPLETED] iopamidol 76% (ISOVUE-370) injection for power injector  80 mL Intravenous ONCE PRN Madi Trujillo DO   80 mL at 04/12/24 0142    [COMPLETED] heparin (Porcine) 100 Units/mL lock flush 500 Units  5 mL Intravenous Once Madi Trujillo DO   500 Units at 04/12/24 0314    [COMPLETED] ondansetron (Zofran) 4 MG/2ML injection 4 mg  4 mg Intravenous Once Madi Trujillo DO   4 mg at 04/11/24 2339    [COMPLETED] sodium chloride 0.9 % IV bolus 500 mL  500 mL Intravenous Once Madi Trujillo DO   Stopped at 04/12/24 0101    [COMPLETED] sodium chloride 0.9 % IV bolus 1,000 mL  1,000 mL Intravenous Once Yuliet Martinez APRN   Stopped at 04/10/24 1537    [COMPLETED] sodium chloride 0.9 % IV bolus 1,000 mL  1,000 mL Intravenous Once Tania Whaley APRN   Stopped at 04/05/24 1316    [COMPLETED] pegfilgrastim-jmdb (Fulphila) 6 MG/0.6ML SUBQ injection 6 mg  6 mg Subcutaneous Once Tania Whaley APRN   6 mg at 04/05/24 1213    [COMPLETED] potassium chloride (K-Dur) tab 40 mEq  40 mEq Oral Once Tania Whaley APRN   40 mEq at 04/03/24 1127    [COMPLETED]  ondansetron (Zofran) 16 mg, dexAMETHasone (Decadron) 20 mg in sodium chloride 0.9% 110 mL IVPB   Intravenous Once Tania Whaley APRN   Stopped at 04/03/24 1204    [COMPLETED] bevacizumab-bvzr (Zirabev) 350 mg in sodium chloride 0.9% 114 mL infusion  5 mg/kg (Treatment Plan Recorded) Intravenous Once Tania Whaley APRN   Stopped at 04/03/24 1144    [COMPLETED] oxaliplatin (Eloxatin) 110 mg in dextrose 5% 272 mL infusion  65 mg/m2 (Treatment Plan Recorded) Intravenous Once Tania Whaley APRN   Stopped at 04/03/24 1417    [COMPLETED] leucovorin 700 mg in dextrose 5% 250 mL infusion  400 mg/m2 (Treatment Plan Recorded) Intravenous Once Tania Whaley APRN   Stopped at 04/03/24 1417    [COMPLETED] fluorouracil (Adrucil) 50 mg/mL IV push 700 mg 14 mL  400 mg/m2 (Treatment Plan Recorded) Intravenous Once Tania Whaley APRN   700 mg at 04/03/24 1434    [COMPLETED] heparin (Porcine) 100 Units/mL lock flush             [COMPLETED] lidocaine-EPINEPHrine (Xylocaine-Epinephrine) 2 %-1:344235 injection             [COMPLETED] lidocaine (Xylocaine) 1 % injection             [COMPLETED] fentaNYL (Sublimaze) 50 mcg/mL injection             [COMPLETED] vancomycin (Vancocin) 1 g injection             [COMPLETED] midazolam (Versed) 2 MG/2ML injection             [COMPLETED] ceFAZolin (Ancef) 1 g injection             [COMPLETED] heparin (Porcine) 5000 UNIT/ML injection             [COMPLETED] ondansetron (Zofran) tab 4 mg  4 mg Oral Once Kwadwo Monsivais MD   4 mg at 03/25/24 0114    [COMPLETED] furosemide (Lasix) 10 mg/mL injection 20 mg  20 mg Intravenous Once Bernadette Vick MD   20 mg at 03/24/24 2242    [COMPLETED] sodium chloride 0.9 % IV bolus 1,000 mL  1,000 mL Intravenous Once Yuliet Martinez APRN   Stopped at 03/22/24 1503    [COMPLETED] ondansetron (Zofran) 4 MG/2ML injection 4 mg  4 mg Intravenous Once Bernadette Vick MD   4 mg at 03/13/24 1016    [COMPLETED] levoFLOXacin in dextrose 5% (Levaquin) 750 mg/150mL  IVPB premix 750 mg  750 mg Intravenous Once Bernadette Vick  mL/hr at 24 1103 750 mg at 24 1103    [] ketorolac (Toradol) 15 MG/ML injection 15 mg  15 mg Intravenous Q6H PRN Jojo Barba MD   15 mg at 24 1950    [COMPLETED] oxaliplatin (Eloxatin) 140 mg in dextrose 5% 278 mL infusion  85 mg/m2 Intravenous Once Ronan Camarillo  mL/hr at 24 1016 140 mg at 24 1016    [COMPLETED] leucovorin 650 mg in dextrose 5% 250 mL IVPB  400 mg/m2 Intravenous Once Ronan Camarillo MD   650 mg at 24 1016    [COMPLETED] fluorouracil (Adrucil) 650 mg IV push  400 mg/m2 Intravenous Once Ronan Camarillo MD   650 mg at 24 1344    [COMPLETED] ondansetron (Zofran) 16 mg, dexAMETHasone (Decadron) 20 mg in sodium chloride 0.9% 110 mL IVPB   Intravenous Once Ronan Camarillo MD   Given at 24 0941    [COMPLETED] ondansetron (Zofran) 4 MG/2ML injection 4 mg  4 mg Intravenous Once Whitney Guerrero MD   4 mg at 24 1850    [COMPLETED] sodium chloride 0.9 % IV bolus 1,000 mL  1,000 mL Intravenous Once Whitney Guerrero MD   Stopped at 24 210    [COMPLETED] iopamidol 76% (ISOVUE-370) injection for power injector  80 mL Intravenous ONCE PRN Whitney Guerrero MD   80 mL at 24 1918    [COMPLETED] levoFLOXacin (Levaquin) tab 750 mg  750 mg Oral Once Whitney Guerrero MD   750 mg at 24 210    [] midazolam (Versed) 2 MG/2ML injection 1 mg  1 mg Intravenous Q5 Min PRN Clayton Bahena MD   0.5 mg at 24 1008    [] fentaNYL (Sublimaze) 50 mcg/mL injection 50 mcg  50 mcg Intravenous Q5 Min PRN Clayton Bahena MD   25 mcg at 24 1017     Current Outpatient Medications on File Prior to Encounter   Medication Sig Dispense Refill    maalox/diphenhydramine/sucralfate Oral Suspension Take 10 mL by mouth TID & HS. 240 mL 0    polyethylene glycol, PEG 3350, 17 g Oral Powd Pack Take 17 g by mouth daily as needed. 12 each 0    nystatin 178946  UNIT/ML Mouth/Throat Suspension Take 5 mL (500,000 Units total) by mouth 4 (four) times daily for 7 days. 200 mL 0    morphINE ER 15 MG Oral Tab CR Take 1 tablet (15 mg total) by mouth every 12 (twelve) hours. 60 tablet 0    prochlorperazine (COMPAZINE) 10 mg tablet Take 1 tablet (10 mg total) by mouth every 6 (six) hours as needed for Nausea. 30 tablet 3    sennosides 8.6 MG Oral Tab Take 1 tablet (8.6 mg total) by mouth nightly. 60 tablet 1    polyethylene glycol, PEG 3350, (MIRALAX) 17 GM/SCOOP Oral Powder Take 17 g by mouth 2 (two) times daily. 578 g 1    OLANZapine 2.5 MG Oral Tab Take 1 tablet (2.5 mg total) by mouth nightly. 30 tablet 0    Lidocaine Viscous HCl 2 % Mouth/Throat Solution Take 5 mL by mouth every 3 (three) hours as needed for Pain. Swish in the mouth and spit out. 100 mL 1    levothyroxine 25 MCG Oral Tab Take 1 tablet (25 mcg total) by mouth before breakfast.      ondansetron (ZOFRAN) 8 MG tablet Take 1 tablet (8 mg total) by mouth every 8 (eight) hours as needed. 30 tablet 0       Review of Systems:   A comprehensive review of systems was completed.    Pertinent positives and negatives noted in the HPI.    Objective:   Physical Exam:    /72 (BP Location: Right arm)   Pulse 75   Temp 99 °F (37.2 °C) (Oral)   Resp 17   Ht 5' 1\" (1.549 m)   Wt 135 lb 2.3 oz (61.3 kg)   SpO2 99%   BMI 25.53 kg/m²   General: No acute distress, Alert  Respiratory: No rhonchi, no wheezes  Cardiovascular: S1, S2. Regular rate and rhythm  Abdomen: Soft, Non-tender, non-distended, positive bowel sounds  Neuro: No new focal deficits  Extremities: No edema      Results:    Labs:      Labs Last 24 Hours:    Recent Labs   Lab 04/25/24  1110 04/26/24  1306 04/27/24  1813   RBC 3.79* 3.71* 4.13   HGB 10.3* 10.1* 11.2*   HCT 32.9* 31.6* 35.5   MCV 86.8 85.2 86.0   MCH 27.2 27.2 27.1   MCHC 31.3 32.0 31.5   RDW 20.4 20.3 20.6   NEPRELIM 0.34* 0.09* 0.03*   WBC 0.7* 0.4* 0.3*   PLT 94.0* 64.0* 50.0*       Recent  Labs   Lab 04/25/24  1110 04/26/24  1306 04/27/24  1813   GLU 90 106* 114*   BUN 7* 7* 8*   CREATSERUM 0.39* 0.51* 0.64   EGFRCR 104 98 93   CA 8.5 8.4* 9.1   ALB 3.0* 2.9* 3.3*    137 139   K 3.0* 3.3* 3.2*    104 103   CO2 21.0 25.0 26.0   ALKPHO 155* 142 148*   AST 29 21 21   ALT 12* 13 15   BILT 2.2* 1.6 1.5   TP 6.4 6.2* 7.2       Lab Results   Component Value Date    INR 1.5 (A) 03/27/2024    INR 1.39 (H) 03/14/2024    INR 1.07 02/08/2024       No results for input(s): \"TROP\", \"TROPHS\", \"CK\" in the last 168 hours.    No results for input(s): \"TROP\", \"PBNP\" in the last 168 hours.    No results for input(s): \"PCT\" in the last 168 hours.    Imaging: Imaging data reviewed in Epic.    Assessment & Plan:      # mucositis   - cont magic mouth wash, nystatin solution   - gentle IVF   - pain control     # pancytopenia   - due to chemo   - monitor   - oncology consulted     # hypokalemia , replace     # metastatic sigmoid colon cancer           Plan of care discussed with patient and ER.     Enedelia Lui MD    Supplementary Documentation:     The 21st Century Cures Act makes medical notes like these available to patients in the interest of transparency. Please be advised this is a medical document. Medical documents are intended to carry relevant information, facts as evident, and the clinical opinion of the practitioner. The medical note is intended as peer to peer communication and may appear blunt or direct. It is written in medical language and may contain abbreviations or verbiage that are unfamiliar.               **Certification      PHYSICIAN Certification of Need for Inpatient Hospitalization - Initial Certification    Patient will require inpatient services that will reasonably be expected to span two midnight's based on the clinical documentation in H+P.   Based on patients current state of illness, I anticipate that, after discharge, patient will require TBD.                    Electronically  signed by Enedelia Lui MD on 2024 11:42 PM              Consults - MD Consult Notes      Consults signed by Laci Pickering MD at 2024  8:34 PM     Author: Laci Pickering MD Service: Psychiatry Author Type: Physician    Filed: 2024  8:34 PM Date of Service: 2024  4:48 PM Status: Signed    : Laci Pickering MD (Physician)       [facility]  Report of Psychiatric Consultation    Lisa Iqbal Patient Status:  Inpatient    10/11/1950 MRN [id number]   Location [facility] 4NW-A Attending Enedelia Lui MD   Hosp Day # 9 PCP Anusha Abraham MD     Date of Admission: 2024  Date of Consult: 2024  Reason for Consultation: Eval for depression, not eating/drinking    Impression:  Primary Psychiatric Diagnosis:  Cognitive disorder unspecified. Rule out chemo induced (ie FU) cognitive impairment.     Acute delirium/encephalopathy initially due to dehydration, hypokalemia, pain from mucositis and candidiasis, possible underlying infection treated with meropenum (completed on 5/3/24), and now from hypophosphatemia and meds (ie last morphine on 24) and ?.     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     Pertinent Medical Diagnoses:  Metastatic colon cancer. Neutropenic fever. Mucositis and oral candidiasis.     Recommendations:  1) Check serum B12 and ammonia in AM.    2) She does NOT have decision making capacity due to her delirium and cognitive disorder. Her daughter should be health care surrogate.     3) Continue Remeron 15mg nightly to help with depressive disorder and low appetite.     4) Agree with palliative care involvement for goals of care discussion.    Laci Pickering MD  2024  4:48 PM    History of Present Illness:  72 yo female with metastatic colon cancer to the liver was admitted on 24 for management of mucositis and candidiasis causing odynophagia and low po intake. Started on IVF's and fluconazole and  magic mouthwash. She is s/p FOLFOX cycle 3 on 2024 causing neutropenia. She developed a fever and was empirically treated with meropenum (no source of infection identified).     Psych consulted because of her failure to thrive with low motivation and a lack of appetite. She has appeared depressed and withdrawn and confused. She was started on Remeron. She initially agreed to NG tube feeds, but then refused this afternoon. She has been declining PT as well.     With the aid of video , she is able to tell me that she has fatigue, depressed mood, low motivation, and a lack of appetite. She isn't eating because she isn't hungry, not because it hurts to swallow anymore. Regarding her medical issues, she is able to tell me that she has cancer and believes she will be cured. She is unable to tell me if she has received chemotherapy or any treatments. She has no idea of the stage of cancer or her prognosis. She believes she is fine and will be healed. Of note, she appeared confused at times and the interpretor had to ask her the same questions several times.     Psychiatry Review Systems: No voices or visions. No hopelessness or suicidal ideation.     Past Psychiatric History: None    Social and Developmental History:  with 2 children per patient.     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    Colon cancer (HCC)    Disorder of thyroid    Esophageal reflux    Exposure to medical diagnostic radiation    Last treatment 2018    History of adverse reaction to anesthesia    Has anxiety/nervousness when waking up    Migraines    Nausea and vomiting in adult    Osteopenia    Osteoporosis    Vertigo    Visual impairment    Glasses     Past Surgical History:   Procedure Laterality Date          x 3    Colonoscopy N/A 2024    Procedure: COLONOSCOPY;  Surgeon: Po Aviles MD;  Location:  ENDOSCOPY    Hysterectomy      Bilateral ovaries remain     Laryngoscopy,dirct,op,biopsy  01/15/2018     Family History   Problem Relation Age of Onset    Cancer Father         liver cancer    Cancer Brother         liver cancer      reports that she has never smoked. She has never used smokeless tobacco. She reports that she does not drink alcohol and does not use drugs.    Allergies:  Allergies   Allergen Reactions    Septra [Sulfamethoxazole W/Trimethoprim] RASH    Sulfa Antibiotics RASH       Medications:    Current Facility-Administered Medications:     thiamine (Vitamin B1) tab 100 mg, 100 mg, Oral, Daily    dextrose 10% infusion (TPN no rate), , Intravenous, Continuous PRN    pancrelipase (Lip-Prot-Amyl) (Zenpep) DR particles cap 10,000 Units, 10,000 Units, Per G Tube, PRN **AND** sodium bicarbonate tab 325 mg, 325 mg, Per G Tube, PRN    maalox/diphenhydramine/lidocaine (First Mouthwash BLM) oral suspension 10 mL, 10 mL, Oral, QID PRN    enoxaparin (Lovenox) 40 MG/0.4ML SUBQ injection 40 mg, 40 mg, Subcutaneous, Daily    potassium chloride 20 mEq in dextrose 5%-sodium chloride 0.9% 1000mL infusion premix, , Intravenous, Continuous    mirtazapine (Remeron) tab 15 mg, 15 mg, Oral, Nightly    pantoprazole (Protonix) 40 mg in sodium chloride 0.9% PF 10 mL IV push, 40 mg, Intravenous, Q24H    acetaminophen (Ofirmev) 10 mg/mL infusion premix 1,000 mg, 1,000 mg, Intravenous, Q6H PRN    potassium chloride (Klor-Con) 20 MEQ oral powder 20 mEq, 20 mEq, Oral, Once    morphINE PF 4 MG/ML injection 4 mg, 4 mg, Intravenous, Q30 Min PRN    nystatin (Mycostatin) 304975 UNIT/ML oral suspension 500,000 Units, 500,000 Units, Oral, QID    Lidocaine Viscous HCl (XYLOCAINE) 2 % mouth solution 5 mL, 5 mL, Mouth/Throat, Q3H PRN    morphINE PF 2 MG/ML injection 1 mg, 1 mg, Intravenous, Q2H PRN **OR** morphINE PF 2 MG/ML injection 2 mg, 2 mg, Intravenous, Q2H PRN **OR** morphINE PF 4 MG/ML injection 4 mg, 4 mg, Intravenous, Q2H PRN    melatonin tab 3 mg, 3 mg, Oral, Nightly PRN    ondansetron  (Zofran) 4 MG/2ML injection 4 mg, 4 mg, Intravenous, Q6H PRN    metoclopramide (Reglan) 5 mg/mL injection 10 mg, 10 mg, Intravenous, Q8H PRN    polyethylene glycol (PEG 3350) (Miralax) 17 g oral packet 17 g, 17 g, Oral, Daily PRN    sennosides (Senokot) tab 17.2 mg, 17.2 mg, Oral, Nightly PRN    fluconazole in sodium chloride 0.9% (Diflucan) 200 mg/100mL IVPB premix 200 mg, 200 mg, Intravenous, Q24H    Review of Systems   Constitutional:  Positive for malaise/fatigue.     Mental Status Exam:     Objective      Vitals:    05/06/24 1630   BP: 141/72   Pulse: 83   Resp: 16   Temp: 97.9 °F (36.6 °C)     Appearance: fair grooming  Behavior: normal psychomotor  Attitude: cooperative and guarded  Gait: not observed  Speech: soft, slow at times  Mood: Tired with depressed mood  Affect: Congruent    Thought process: logical to most questions  Thought content: no hallucinations    Orientation: oriented person, place  Attention and Concentration: poor  Memory: intact remote  Language: Intact naming  Fund of Knowledge: Unable to recite name of [place] president    Insight: limited  Judgment: limited    Laboratory Data:  Lab Results   Component Value Date    WBC 2.5 05/06/2024    HGB 9.1 05/06/2024    HCT 28.1 05/06/2024    PLT 76.0 05/06/2024    CREATSERUM 0.60 05/06/2024    BUN 2 05/06/2024     05/06/2024    K 3.5 05/06/2024     05/06/2024    CO2 23.0 05/06/2024     05/06/2024    CA 8.5 05/06/2024    MG 1.9 05/06/2024    PHOS 1.7 05/06/2024          Electronically signed by Laci Pickering MD on 5/6/2024  8:34 PM           D/C Summary    No notes of this type exist for this encounter.        Physical Therapy Notes (last 72 hours)      Physical Therapy Note signed by Sherron Becker PTA at 5/16/2024  9:59 AM  Version 1 of 1    Author: Sherron Becker PTA Service: Rehab Author Type: Physical Therapy Assistant    Filed: 5/16/2024  9:59 AM Date of Service: 5/16/2024  9:54 AM Status: Signed    : Sherron Becker  RONDA PTA (Physical Therapy Assistant)          PHYSICAL THERAPY TREATMENT NOTE - INPATIENT    Room Number: 404/404-A     Session: trial 3/3      Number of Visits to Meet Established Goals: Trial (3 day trial starting 5/14.)    Presenting Problem: diff eating  Co-Morbidities : Colon Ca with mets with resulting stomatitis after recent chemo, Liver mets    ASSESSMENT   Patient demonstrates limited progress this session, goals remain stagnant.    Writer/BHAVNA Fong utilized iPad intrepreter ([place]). CAMILLE Ferris updated after session.      Patient with inconsistent effort noted during 2/3 sessions.  Pt initially verbalizing goal to walk, but when lift machine brought in to transfer to the chair - Pt quickly flung herself backwards into bed stating 6/10 pain, when denying pain moments prior.       Patient continues to function below baseline with bed mobility, transfers, gait, and stair negotiation.  Contributing factors to remaining limitations include decreased functional strength, decreased muscular endurance, medical status, and pt declining both food and feeding tube .  Next session anticipate patient to progress bed mobility, transfers, and gait.  Physical Therapy will continue to follow patient for duration of hospitalization.    Patient will benefit from home with home health PT and additional support.    PLAN  PT Treatment Plan: Bed mobility;Patient education;Gait training;Range of motion;Strengthening;Stair training;Transfer training  Rehab Potential : Guarded  Frequency (Obs): 3-5x/week    CURRENT GOALS     Goal #1 Patient is able to demonstrate supine - sit EOB @ level: modified independent      Goal #2 Patient is able to demonstrate transfers Sit to/from Stand at assistance level: independent      Goal #3 Patient is able to ambulate 150 feet with assist device: none at assistance level: independent          Goal #5     Goal #6     Goal Comments: Goals established on 4/28/2024 5/16/2024 all goals remain in  place.    History related to current admission: Patient is a 73 year old female admitted on 2024 from home for diff eating.  Pt diagnosed with mucositis due to chemotherapy.        HOME SITUATION  Type of Home: Apartment   Home Layout: One level  Stairs to Enter : 18  Railing: Yes     Lives With: Alone  Patient Owned Equipment: None     Prior Level of [place]: Patient is independent gait without device, was able to drive, independent with ADL/self-care.        SUBJECTIVE  \"No I want my pillows\"  Pt required max encouragement to participate    OBJECTIVE  Precautions: Bed/chair alarm    WEIGHT BEARING RESTRICTION  Weight Bearing Restriction: None                PAIN ASSESSMENT   Ratin  Location: \"whole body\"       BALANCE                                                                                                                       Static Sitting: Fair +  Dynamic Sitting: Fair           Static Standing: Poor +  Dynamic Standing: Poor    ACTIVITY TOLERANCE                         O2 WALK         AM-PAC '6-Clicks' INPATIENT SHORT FORM - BASIC MOBILITY  How much difficulty does the patient currently have...  Patient Difficulty: Turning over in bed (including adjusting bedclothes, sheets and blankets)?: A Little   Patient Difficulty: Sitting down on and standing up from a chair with arms (e.g., wheelchair, bedside commode, etc.): A Little   Patient Difficulty: Moving from lying on back to sitting on the side of the bed?: A Lot   How much help from another person does the patient currently need...   Help from Another: Moving to and from a bed to a chair (including a wheelchair)?: Total   Help from Another: Need to walk in hospital room?: Total   Help from Another: Climbing 3-5 steps with a railing?: Total       AM-PAC Score:  Raw Score: 11   Approx Degree of Impairment: 72.57%   Standardized Score (AM-PAC Scale): 33.86   CMS Modifier (G-Code): CL    FUNCTIONAL ABILITY STATUS  Gait Assessment    Functional Mobility/Gait Assessment  Gait Assistance: Not tested  Distance (ft): 0  Assistive Device:  (N/a)  Pattern:  (n/a)    Skilled Therapy Provided    Supine<>sit: Max A  Sit<>Supine: Max A    Second person present for safety and inconsistent effort.     EOB 8 minutes - required ranging level of assist - CGA to Mod A  Pt encouraged to participate in self care task while EOB (see OT note)    Sit<>stand: Min A to RW  Stand<>sit: Mod A (poor eccentric control)    Pt only able to stand ~15 seconds - writer went to two different units to obtain kane lift sling, but upon return to room, Pt suddenly returned back to bed 2/2 pain.      Pt requesting RN assist with blankets - writer offered.      THERAPEUTIC EXERCISES  Lower extremity Heel slides, knee ext, ankle pumps       Position Supine (chair position)     Repetitions   10   Sets   1     Patient End of Session: In bed;Needs met;RN aware of session/findings;Call light within reach;All patient questions and concerns addressed;Alarm set    PT Session Time: 23 minutes  NMRE: 15 minutes  Therex: 8 minutes                        Occupational Therapy Notes (last 72 hours)      Occupational Therapy Note signed by Ajit Flores OT at 5/16/2024 11:46 AM  Version 1 of 1    Author: Ajit Flores OT Service: Rehab Author Type: Occupational Therapist    Filed: 5/16/2024 11:46 AM Date of Service: 5/16/2024  9:45 AM Status: Signed    : Ajit Flores OT (Occupational Therapist)       OCCUPATIONAL THERAPY TREATMENT NOTE - INPATIENT     Room Number: 404/404-A  Session: Trial 3/3  Number of Visits to Meet Established Goals: Trial;3    Presenting Problem: mucositis due to chemotherapy    HOME SITUATION  Type of Home: Apartment  Home Layout: One level  Lives With: Alone   Patient Regularly Uses: Glasses   Prior Level of Function: Pt is IND with BADLs, IADLs, driving.  ASSESSMENT   Patient demonstrates fair progress this session, goals remain in progress.    Patient  continues to function below baseline with toileting, bathing, upper body dressing, lower body dressing, grooming, eating, bed mobility, and transfers.   Contributing factors to remaining limitations include decreased functional strength, decreased endurance, impaired sitting balance, decreased muscular endurance, decreased compliance/participation, and decreased safety awareness.  Next session anticipate patient to progress grooming, bed mobility, transfers, and maintaining seated position.  Occupational Therapy will continue to follow patient for duration of hospitalization.    Patient will benefit from skilled OT intervention on a trial basis to maximize rehab potential.  Patient will benefit from home w/ increased caregiver support, appropriate DME  and HHOT to continue to maximize rehab potential in a familiar environment w/ potential to improve and transition to gradual therapy.       OT Device Recommendations: Hospital bed (w/c, mechanical lift)    History:  Patient is a 73 year old female admitted on 4/27/2024 with Presenting Problem: mucositis due to chemotherapy. Co-Morbidities : Colon Ca with mets with resulting stomatitis after recent chemo, Liver mets     WEIGHT BEARING RESTRICTION  Weight Bearing Restriction: None                Recommendations for nursing staff:   Transfers: mechanical lift  Toileting location: bed    TREATMENT SESSION:  Patient Start of Session: semi supine  FUNCTIONAL TRANSFER ASSESSMENT  Sit to Stand: Edge of Bed  Edge of Bed: Minimal Assist    BED MOBILITY  Rolling: Moderate Assist  Supine to Sit : Moderate Assist  Sit to Supine (OT): Moderate Assist  Scooting: Min A    BALANCE ASSESSMENT  Static Sitting: Stand-by Assist (occasional min/CGA)  Sitting Unilateral: Contact Guard Assist  Static Standing: Minimal Assist  Standing Unilateral: Not Tested    FUNCTIONAL ADL ASSESSMENT  Eating: Not Tested  Grooming Seated: Minimal Assist  LB Dressing Seated: Not Tested  Toileting Seated: Not  Tested      ACTIVITY TOLERANCE: patient tolerated sitting EOB x 12 min     O2 SATURATIONS       EDUCATION PROVIDED  Patient: Role of Occupational Therapy; Plan of Care; Discharge Recommendations; Functional Transfer Techniques; Fall Prevention; Posture/Positioning; Energy Conservation; Proper Body Mechanics  Patient's Response to Education: Verbalized Understanding; Requires Further Education  Family/Caregiver: Role of Occupational Therapy; Plan of Care; Discharge Recommendations; Posture/Positioning  Family/Caregiver's Response to Education: Verbalized Understanding      Equipment used: hospital bed functions    THERAPEUTIC EXERCISES  Lower Extremity See PT note     Upper Extremity Shoulder raises  Forward punches  Elbow flexion/extension     Position Seated up in chair position     Repetitions 10   Sets 1       UPPER EXTREMITY  ROM: within functional limits    Therapist comments:  used to maximize communication with pt. Pt placed in chair position to increase alertness and participation. Pt engages in UE exercises to increase strength, ROM, and activity tolerance required for increased ADL performance, functional transfers, and UE function. With assistance of , pt agreeable to sit EOB. Pt performs bed mobility at mod A to sit EOB with cues provided for hand placement and B LE engagement. Noted active engagement when cued/prompted. Sit to stand transfer from EOB to RW performed at min A. Pt with decreased standing tolerance due to fatigue and B LE weakness. While seated EOB, pt engages in hair care grooming in preparation to transfer to bedside chair via kane lift (PTA to find lift). Additional assist provided to remove increased knots/tangles. Pt then impulsively starts to return to supine and this clinician assists her at mod A. 2 person assist required to reposition to HOB.  Patient End of Session: In bed;Needs met;Call light within reach;RN aware of session/findings;All patient questions  and concerns addressed;Alarm set    SUBJECTIVE  Pt with increased participation this date.    PAIN ASSESSMENT  Ratin  Location: states all over  Management Techniques: Repositioning;Nurse notified (lotion)     OBJECTIVE  Precautions: Bed/chair alarm    AM-PAC ‘6-Clicks’ Inpatient Daily Activity Short Form  -   Putting on and taking off regular lower body clothing?: Total  -   Bathing (including washing, rinsing, drying)?: Total  -   Toileting, which includes using toilet, bedpan or urinal? : Total  -   Putting on and taking off regular upper body clothing?: A Lot  -   Taking care of personal grooming such as brushing teeth?: A Lot  -   Eating meals?: A Lot    AM-PAC Score:  Score: 9  Approx Degree of Impairment: 79.59%  Standardized Score (AM-PAC Scale): 25.33    PLAN  OT Treatment Plan: Balance activities;Energy conservation/work simplification techniques;ADL training;IADL training;Functional transfer training;UE strengthening/ROM;Endurance training;Patient/Family training;Patient/Family education;Equipment eval/education;Compensatory technique education;Fine motor coordination activities;Continued evaluation  Rehab Potential : Guarded  Frequency: 3x/week    Goals not applicable at this time and will be discontinued:    ADL Goals   Patient will perform grooming: with supervision  Patient will perform upper body dressing:  with supervision  Patient will perform lower body dressing:  with supervision  Patient will perform toileting: with supervision     Functional Transfer Goals  Patient will transfer from supine to sit:  with supervision  Patient will transfer from sit to stand:  with supervision  Patient will transfer to bedside commode:  with supervision  Patient will transfer to toilet:  with supervision     UE Exercise Program Goal  Patient will be supervision with bilateral AROM HEP (home exercise program).     Additional Goals  Pt will tolerate standing for 7 minutes utilizing AD as needed in preparation  to perform grooming ADLs at sink level    NEW GOALS 5/14/24:  Patient will perform grooming w/ min A with cues prn while in supported sitting.- goal met 05/16  Patient will tolerate EOB sitting x 15 minutes w/ min A for balance in prep for out of bed transfers.- goal met 05/16  Patient will actively participate in 3/3 15 minute sessions to maximize independence w/ BADLs.    OT Session Time: 25 minutes  Therapeutic Activity: 18 minutes  Therapeutic Exercise: 7 minutes             Video Swallow Study Notes    No notes of this type exist for this encounter.     SLP Notes    No notes of this type exist for this encounter.     Immunizations     Name Date      Covid-19 Moderna 01/03/22     Covid-19 Moderna 06/04/21     Covid-19 Moderna 04/24/21       Future Appointments        Provider Department Center    6/3/2024 9:30 AM Laci Melton MD [facility], [id number]      Multidisciplinary Problems     Active Goals     Not on file